# Patient Record
Sex: MALE | Race: ASIAN | NOT HISPANIC OR LATINO | URBAN - METROPOLITAN AREA
[De-identification: names, ages, dates, MRNs, and addresses within clinical notes are randomized per-mention and may not be internally consistent; named-entity substitution may affect disease eponyms.]

---

## 2017-07-14 ENCOUNTER — EMERGENCY (EMERGENCY)
Facility: HOSPITAL | Age: 47
LOS: 1 days | Discharge: HOME | End: 2017-07-14
Attending: EMERGENCY MEDICINE
Payer: SELF-PAY

## 2017-07-14 VITALS
TEMPERATURE: 99 F | HEART RATE: 92 BPM | RESPIRATION RATE: 20 BRPM | SYSTOLIC BLOOD PRESSURE: 130 MMHG | OXYGEN SATURATION: 96 % | DIASTOLIC BLOOD PRESSURE: 79 MMHG

## 2017-07-14 PROCEDURE — 99284 EMERGENCY DEPT VISIT MOD MDM: CPT

## 2017-07-14 RX ORDER — SODIUM CHLORIDE 9 MG/ML
1000 INJECTION INTRAMUSCULAR; INTRAVENOUS; SUBCUTANEOUS ONCE
Qty: 0 | Refills: 0 | Status: COMPLETED | OUTPATIENT
Start: 2017-07-14 | End: 2017-07-14

## 2017-07-14 RX ORDER — KETOROLAC TROMETHAMINE 30 MG/ML
30 SYRINGE (ML) INJECTION ONCE
Qty: 0 | Refills: 0 | Status: DISCONTINUED | OUTPATIENT
Start: 2017-07-14 | End: 2017-07-14

## 2017-07-14 RX ADMIN — SODIUM CHLORIDE 1000 MILLILITER(S): 9 INJECTION INTRAMUSCULAR; INTRAVENOUS; SUBCUTANEOUS at 23:50

## 2017-07-14 RX ADMIN — Medication 30 MILLIGRAM(S): at 23:50

## 2017-07-14 NOTE — ED PROVIDER NOTE - OBJECTIVE STATEMENT
48yo p/w generalized body aches since yesterday. Pt. reports mostly b/l lower back, radiates to abdomen, intermittent, aggravated by eating, no alleviating factors. Pt. took antacid and advil w/o relief. Pt. reports joint pains. Pt. reports similar symptoms previously. Denies fevers, nausea/vomiting, diarrhea, dysuria, chest pain, shortness of breath, cough, sick contact, recent travel, prolonged stasis, history of blood clots. Pt. reports fingers sticks >200 on metformin and glipizide. PMD: NS clinic

## 2017-07-14 NOTE — ED PROVIDER NOTE - MEDICAL DECISION MAKING DETAILS
46 y/o male with PMH DM2 who presents with c/o whole body myalgias since yesterday. Per patient, he began to feel pain to whole body from his feet up to his upper back. He states that after dinner tonight he began to feel some pain in his abdomen which quickly resolved. Body aches are 7/10, better when he palpates his body. Appetite good. Denies: F/C, HA, neck pain/stiffness, CP, SOB, abd pain (current), N/V/D,  rash, recent travel, trauma, tick bites, insect bites, urinary sx. Last BM today.  Exam demonstrates non-toxic patient in NAD. No meningismus. Exam within normal limits.  DDx: viral syndrome, electrolyte/metabolic abnormality, dehydration  Plan: basic labs, toradol, IVF, re-eval

## 2017-07-14 NOTE — ED PROVIDER NOTE - PLAN OF CARE
You were seen in the Emergency Department for joint pain. You were found to have a high blood sugar. Take your medications as written. Please return to the Emergency Department if you have any new concerning symptoms such as sever pain weakness, shortness of breath or any other concerning symptoms.

## 2017-07-14 NOTE — ED PROVIDER NOTE - PROGRESS NOTE DETAILS
Patient reports improvement in symptoms. Agrees with discharge and outpatient follow up with strict return precautions.

## 2017-07-14 NOTE — ED PROVIDER NOTE - CARE PLAN
Principal Discharge DX:	Pre-syncope Principal Discharge DX:	Hyperglycemia  Instructions for follow-up, activity and diet:	You were seen in the Emergency Department for joint pain. You were found to have a high blood sugar. Take your medications as written. Please return to the Emergency Department if you have any new concerning symptoms such as sever pain weakness, shortness of breath or any other concerning symptoms.

## 2017-07-14 NOTE — ED ADULT NURSE NOTE - OBJECTIVE STATEMENT
whole body pain, mostly abdomen and flank and back; since yesterday; did not see md; took advil and zantac with  no relief; no urinary symptoms; no nausea or vomit or diarrhea; last bm today and was normal; able to tolerate po intake

## 2017-07-15 ENCOUNTER — EMERGENCY (EMERGENCY)
Facility: HOSPITAL | Age: 47
LOS: 1 days | Discharge: HOME | End: 2017-07-15
Attending: EMERGENCY MEDICINE | Admitting: PERSONAL EMERGENCY RESPONSE ATTENDANT
Payer: SELF-PAY

## 2017-07-15 VITALS
RESPIRATION RATE: 17 BRPM | SYSTOLIC BLOOD PRESSURE: 130 MMHG | HEART RATE: 64 BPM | TEMPERATURE: 98 F | OXYGEN SATURATION: 98 % | DIASTOLIC BLOOD PRESSURE: 92 MMHG

## 2017-07-15 VITALS
OXYGEN SATURATION: 95 % | TEMPERATURE: 99 F | SYSTOLIC BLOOD PRESSURE: 166 MMHG | RESPIRATION RATE: 18 BRPM | HEART RATE: 72 BPM | DIASTOLIC BLOOD PRESSURE: 94 MMHG

## 2017-07-15 VITALS
DIASTOLIC BLOOD PRESSURE: 85 MMHG | SYSTOLIC BLOOD PRESSURE: 114 MMHG | OXYGEN SATURATION: 99 % | HEART RATE: 73 BPM | RESPIRATION RATE: 16 BRPM | TEMPERATURE: 98 F

## 2017-07-15 LAB
ALBUMIN SERPL ELPH-MCNC: 4.2 G/DL — SIGNIFICANT CHANGE UP (ref 3.3–5)
ALBUMIN SERPL ELPH-MCNC: 4.6 G/DL — SIGNIFICANT CHANGE UP (ref 3.3–5)
ALP SERPL-CCNC: 86 U/L — SIGNIFICANT CHANGE UP (ref 40–120)
ALP SERPL-CCNC: 94 U/L — SIGNIFICANT CHANGE UP (ref 40–120)
ALT FLD-CCNC: 31 U/L RC — SIGNIFICANT CHANGE UP (ref 10–45)
ALT FLD-CCNC: 35 U/L RC — SIGNIFICANT CHANGE UP (ref 10–45)
ANION GAP SERPL CALC-SCNC: 12 MMOL/L — SIGNIFICANT CHANGE UP (ref 5–17)
ANION GAP SERPL CALC-SCNC: 12 MMOL/L — SIGNIFICANT CHANGE UP (ref 5–17)
APPEARANCE UR: CLEAR — SIGNIFICANT CHANGE UP
APPEARANCE UR: CLEAR — SIGNIFICANT CHANGE UP
AST SERPL-CCNC: 19 U/L — SIGNIFICANT CHANGE UP (ref 10–40)
AST SERPL-CCNC: 28 U/L — SIGNIFICANT CHANGE UP (ref 10–40)
BASE EXCESS BLDV CALC-SCNC: 1.3 MMOL/L — SIGNIFICANT CHANGE UP (ref -2–2)
BASOPHILS # BLD AUTO: 0 K/UL — SIGNIFICANT CHANGE UP (ref 0–0.2)
BASOPHILS # BLD AUTO: 0.1 K/UL — SIGNIFICANT CHANGE UP (ref 0–0.2)
BASOPHILS NFR BLD AUTO: 0.5 % — SIGNIFICANT CHANGE UP (ref 0–2)
BASOPHILS NFR BLD AUTO: 0.9 % — SIGNIFICANT CHANGE UP (ref 0–2)
BILIRUB SERPL-MCNC: 0.3 MG/DL — SIGNIFICANT CHANGE UP (ref 0.2–1.2)
BILIRUB SERPL-MCNC: 0.6 MG/DL — SIGNIFICANT CHANGE UP (ref 0.2–1.2)
BILIRUB UR-MCNC: NEGATIVE — SIGNIFICANT CHANGE UP
BILIRUB UR-MCNC: NEGATIVE — SIGNIFICANT CHANGE UP
BUN SERPL-MCNC: 12 MG/DL — SIGNIFICANT CHANGE UP (ref 7–23)
BUN SERPL-MCNC: 15 MG/DL — SIGNIFICANT CHANGE UP (ref 7–23)
CA-I SERPL-SCNC: 1.32 MMOL/L — HIGH (ref 1.12–1.3)
CALCIUM SERPL-MCNC: 9.1 MG/DL — SIGNIFICANT CHANGE UP (ref 8.4–10.5)
CALCIUM SERPL-MCNC: 9.5 MG/DL — SIGNIFICANT CHANGE UP (ref 8.4–10.5)
CHLORIDE BLDV-SCNC: 96 MMOL/L — SIGNIFICANT CHANGE UP (ref 96–108)
CHLORIDE SERPL-SCNC: 100 MMOL/L — SIGNIFICANT CHANGE UP (ref 96–108)
CHLORIDE SERPL-SCNC: 96 MMOL/L — SIGNIFICANT CHANGE UP (ref 96–108)
CO2 BLDV-SCNC: 29 MMOL/L — SIGNIFICANT CHANGE UP (ref 22–30)
CO2 SERPL-SCNC: 25 MMOL/L — SIGNIFICANT CHANGE UP (ref 22–31)
CO2 SERPL-SCNC: 26 MMOL/L — SIGNIFICANT CHANGE UP (ref 22–31)
COLOR SPEC: COLORLESS — SIGNIFICANT CHANGE UP
COLOR SPEC: COLORLESS — SIGNIFICANT CHANGE UP
CREAT SERPL-MCNC: 0.85 MG/DL — SIGNIFICANT CHANGE UP (ref 0.5–1.3)
CREAT SERPL-MCNC: 0.95 MG/DL — SIGNIFICANT CHANGE UP (ref 0.5–1.3)
CULTURE RESULTS: NO GROWTH — SIGNIFICANT CHANGE UP
DIFF PNL FLD: NEGATIVE — SIGNIFICANT CHANGE UP
DIFF PNL FLD: NEGATIVE — SIGNIFICANT CHANGE UP
EOSINOPHIL # BLD AUTO: 0 K/UL — SIGNIFICANT CHANGE UP (ref 0–0.5)
EOSINOPHIL # BLD AUTO: 0.2 K/UL — SIGNIFICANT CHANGE UP (ref 0–0.5)
EOSINOPHIL NFR BLD AUTO: 0.5 % — SIGNIFICANT CHANGE UP (ref 0–6)
EOSINOPHIL NFR BLD AUTO: 2.3 % — SIGNIFICANT CHANGE UP (ref 0–6)
GAS PNL BLDV: 138 MMOL/L — SIGNIFICANT CHANGE UP (ref 136–145)
GAS PNL BLDV: SIGNIFICANT CHANGE UP
GLUCOSE BLDV-MCNC: 417 MG/DL — HIGH (ref 70–99)
GLUCOSE SERPL-MCNC: 419 MG/DL — HIGH (ref 70–99)
GLUCOSE SERPL-MCNC: 529 MG/DL — CRITICAL HIGH (ref 70–99)
GLUCOSE UR QL: >1000
GLUCOSE UR QL: >1000
HCO3 BLDV-SCNC: 28 MMOL/L — SIGNIFICANT CHANGE UP (ref 21–29)
HCT VFR BLD CALC: 40.1 % — SIGNIFICANT CHANGE UP (ref 39–50)
HCT VFR BLD CALC: 43.5 % — SIGNIFICANT CHANGE UP (ref 39–50)
HCT VFR BLDA CALC: 46 % — SIGNIFICANT CHANGE UP (ref 39–50)
HGB BLD CALC-MCNC: 15.1 G/DL — SIGNIFICANT CHANGE UP (ref 13–17)
HGB BLD-MCNC: 13.9 G/DL — SIGNIFICANT CHANGE UP (ref 13–17)
HGB BLD-MCNC: 15.1 G/DL — SIGNIFICANT CHANGE UP (ref 13–17)
KETONES UR-MCNC: NEGATIVE — SIGNIFICANT CHANGE UP
KETONES UR-MCNC: NEGATIVE — SIGNIFICANT CHANGE UP
LACTATE BLDV-MCNC: 1.8 MMOL/L — SIGNIFICANT CHANGE UP (ref 0.7–2)
LEUKOCYTE ESTERASE UR-ACNC: NEGATIVE — SIGNIFICANT CHANGE UP
LEUKOCYTE ESTERASE UR-ACNC: NEGATIVE — SIGNIFICANT CHANGE UP
LIDOCAIN IGE QN: 46 U/L — SIGNIFICANT CHANGE UP (ref 7–60)
LYMPHOCYTES # BLD AUTO: 1.9 K/UL — SIGNIFICANT CHANGE UP (ref 1–3.3)
LYMPHOCYTES # BLD AUTO: 19.2 % — SIGNIFICANT CHANGE UP (ref 13–44)
LYMPHOCYTES # BLD AUTO: 2.9 K/UL — SIGNIFICANT CHANGE UP (ref 1–3.3)
LYMPHOCYTES # BLD AUTO: 36.1 % — SIGNIFICANT CHANGE UP (ref 13–44)
MCHC RBC-ENTMCNC: 32.9 PG — SIGNIFICANT CHANGE UP (ref 27–34)
MCHC RBC-ENTMCNC: 32.9 PG — SIGNIFICANT CHANGE UP (ref 27–34)
MCHC RBC-ENTMCNC: 34.6 GM/DL — SIGNIFICANT CHANGE UP (ref 32–36)
MCHC RBC-ENTMCNC: 34.7 GM/DL — SIGNIFICANT CHANGE UP (ref 32–36)
MCV RBC AUTO: 94.7 FL — SIGNIFICANT CHANGE UP (ref 80–100)
MCV RBC AUTO: 95.2 FL — SIGNIFICANT CHANGE UP (ref 80–100)
MONOCYTES # BLD AUTO: 0.5 K/UL — SIGNIFICANT CHANGE UP (ref 0–0.9)
MONOCYTES # BLD AUTO: 0.6 K/UL — SIGNIFICANT CHANGE UP (ref 0–0.9)
MONOCYTES NFR BLD AUTO: 4.8 % — SIGNIFICANT CHANGE UP (ref 2–14)
MONOCYTES NFR BLD AUTO: 7.4 % — SIGNIFICANT CHANGE UP (ref 2–14)
NEUTROPHILS # BLD AUTO: 4.3 K/UL — SIGNIFICANT CHANGE UP (ref 1.8–7.4)
NEUTROPHILS # BLD AUTO: 7.5 K/UL — HIGH (ref 1.8–7.4)
NEUTROPHILS NFR BLD AUTO: 53.3 % — SIGNIFICANT CHANGE UP (ref 43–77)
NEUTROPHILS NFR BLD AUTO: 75.1 % — SIGNIFICANT CHANGE UP (ref 43–77)
NITRITE UR-MCNC: NEGATIVE — SIGNIFICANT CHANGE UP
NITRITE UR-MCNC: NEGATIVE — SIGNIFICANT CHANGE UP
PCO2 BLDV: 52 MMHG — HIGH (ref 35–50)
PH BLDV: 7.34 — LOW (ref 7.35–7.45)
PH UR: 6.5 — SIGNIFICANT CHANGE UP (ref 5–8)
PH UR: 7 — SIGNIFICANT CHANGE UP (ref 5–8)
PLATELET # BLD AUTO: 205 K/UL — SIGNIFICANT CHANGE UP (ref 150–400)
PLATELET # BLD AUTO: 208 K/UL — SIGNIFICANT CHANGE UP (ref 150–400)
PO2 BLDV: 27 MMHG — SIGNIFICANT CHANGE UP (ref 25–45)
POTASSIUM BLDV-SCNC: 4.3 MMOL/L — SIGNIFICANT CHANGE UP (ref 3.5–5)
POTASSIUM SERPL-MCNC: 4.5 MMOL/L — SIGNIFICANT CHANGE UP (ref 3.5–5.3)
POTASSIUM SERPL-MCNC: 4.5 MMOL/L — SIGNIFICANT CHANGE UP (ref 3.5–5.3)
POTASSIUM SERPL-SCNC: 4.5 MMOL/L — SIGNIFICANT CHANGE UP (ref 3.5–5.3)
POTASSIUM SERPL-SCNC: 4.5 MMOL/L — SIGNIFICANT CHANGE UP (ref 3.5–5.3)
PROT SERPL-MCNC: 7.1 G/DL — SIGNIFICANT CHANGE UP (ref 6–8.3)
PROT SERPL-MCNC: 8 G/DL — SIGNIFICANT CHANGE UP (ref 6–8.3)
PROT UR-MCNC: NEGATIVE — SIGNIFICANT CHANGE UP
PROT UR-MCNC: NEGATIVE — SIGNIFICANT CHANGE UP
RBC # BLD: 4.21 M/UL — SIGNIFICANT CHANGE UP (ref 4.2–5.8)
RBC # BLD: 4.6 M/UL — SIGNIFICANT CHANGE UP (ref 4.2–5.8)
RBC # FLD: 11.8 % — SIGNIFICANT CHANGE UP (ref 10.3–14.5)
RBC # FLD: 11.8 % — SIGNIFICANT CHANGE UP (ref 10.3–14.5)
RBC CASTS # UR COMP ASSIST: SIGNIFICANT CHANGE UP /HPF (ref 0–2)
SAO2 % BLDV: 46 % — LOW (ref 67–88)
SODIUM SERPL-SCNC: 134 MMOL/L — LOW (ref 135–145)
SODIUM SERPL-SCNC: 137 MMOL/L — SIGNIFICANT CHANGE UP (ref 135–145)
SP GR SPEC: 1.02 — SIGNIFICANT CHANGE UP (ref 1.01–1.02)
SP GR SPEC: >1.03 — HIGH (ref 1.01–1.02)
SPECIMEN SOURCE: SIGNIFICANT CHANGE UP
UROBILINOGEN FLD QL: NEGATIVE — SIGNIFICANT CHANGE UP
UROBILINOGEN FLD QL: NEGATIVE — SIGNIFICANT CHANGE UP
WBC # BLD: 10 K/UL — SIGNIFICANT CHANGE UP (ref 3.8–10.5)
WBC # BLD: 8 K/UL — SIGNIFICANT CHANGE UP (ref 3.8–10.5)
WBC # FLD AUTO: 10 K/UL — SIGNIFICANT CHANGE UP (ref 3.8–10.5)
WBC # FLD AUTO: 8 K/UL — SIGNIFICANT CHANGE UP (ref 3.8–10.5)
WBC UR QL: SIGNIFICANT CHANGE UP /HPF (ref 0–5)

## 2017-07-15 PROCEDURE — 83605 ASSAY OF LACTIC ACID: CPT

## 2017-07-15 PROCEDURE — 82009 KETONE BODYS QUAL: CPT

## 2017-07-15 PROCEDURE — 82803 BLOOD GASES ANY COMBINATION: CPT

## 2017-07-15 PROCEDURE — 99284 EMERGENCY DEPT VISIT MOD MDM: CPT | Mod: 25

## 2017-07-15 PROCEDURE — 82435 ASSAY OF BLOOD CHLORIDE: CPT

## 2017-07-15 PROCEDURE — 96372 THER/PROPH/DIAG INJ SC/IM: CPT | Mod: XU

## 2017-07-15 PROCEDURE — 87086 URINE CULTURE/COLONY COUNT: CPT

## 2017-07-15 PROCEDURE — 80053 COMPREHEN METABOLIC PANEL: CPT

## 2017-07-15 PROCEDURE — 85027 COMPLETE CBC AUTOMATED: CPT

## 2017-07-15 PROCEDURE — 96374 THER/PROPH/DIAG INJ IV PUSH: CPT

## 2017-07-15 PROCEDURE — 84132 ASSAY OF SERUM POTASSIUM: CPT

## 2017-07-15 PROCEDURE — 71020: CPT | Mod: 26

## 2017-07-15 PROCEDURE — 96375 TX/PRO/DX INJ NEW DRUG ADDON: CPT | Mod: XU

## 2017-07-15 PROCEDURE — 84295 ASSAY OF SERUM SODIUM: CPT

## 2017-07-15 PROCEDURE — 96374 THER/PROPH/DIAG INJ IV PUSH: CPT | Mod: XU

## 2017-07-15 PROCEDURE — 85014 HEMATOCRIT: CPT

## 2017-07-15 PROCEDURE — 82947 ASSAY GLUCOSE BLOOD QUANT: CPT

## 2017-07-15 PROCEDURE — 93005 ELECTROCARDIOGRAM TRACING: CPT

## 2017-07-15 PROCEDURE — 71275 CT ANGIOGRAPHY CHEST: CPT | Mod: 26

## 2017-07-15 PROCEDURE — 99285 EMERGENCY DEPT VISIT HI MDM: CPT | Mod: 25

## 2017-07-15 PROCEDURE — 74174 CTA ABD&PLVS W/CONTRAST: CPT

## 2017-07-15 PROCEDURE — 81003 URINALYSIS AUTO W/O SCOPE: CPT

## 2017-07-15 PROCEDURE — 71046 X-RAY EXAM CHEST 2 VIEWS: CPT

## 2017-07-15 PROCEDURE — 93010 ELECTROCARDIOGRAM REPORT: CPT

## 2017-07-15 PROCEDURE — 81001 URINALYSIS AUTO W/SCOPE: CPT

## 2017-07-15 PROCEDURE — 74174 CTA ABD&PLVS W/CONTRAST: CPT | Mod: 26

## 2017-07-15 PROCEDURE — 71275 CT ANGIOGRAPHY CHEST: CPT

## 2017-07-15 PROCEDURE — 76775 US EXAM ABDO BACK WALL LIM: CPT

## 2017-07-15 PROCEDURE — 82330 ASSAY OF CALCIUM: CPT

## 2017-07-15 PROCEDURE — 83690 ASSAY OF LIPASE: CPT

## 2017-07-15 RX ORDER — SODIUM CHLORIDE 9 MG/ML
1000 INJECTION INTRAMUSCULAR; INTRAVENOUS; SUBCUTANEOUS ONCE
Qty: 0 | Refills: 0 | Status: COMPLETED | OUTPATIENT
Start: 2017-07-15 | End: 2017-07-15

## 2017-07-15 RX ORDER — ONDANSETRON 8 MG/1
4 TABLET, FILM COATED ORAL ONCE
Qty: 0 | Refills: 0 | Status: COMPLETED | OUTPATIENT
Start: 2017-07-15 | End: 2017-07-15

## 2017-07-15 RX ORDER — INSULIN HUMAN 100 [IU]/ML
5 INJECTION, SOLUTION SUBCUTANEOUS ONCE
Qty: 0 | Refills: 0 | Status: COMPLETED | OUTPATIENT
Start: 2017-07-15 | End: 2017-07-15

## 2017-07-15 RX ORDER — MORPHINE SULFATE 50 MG/1
4 CAPSULE, EXTENDED RELEASE ORAL ONCE
Qty: 0 | Refills: 0 | Status: DISCONTINUED | OUTPATIENT
Start: 2017-07-15 | End: 2017-07-15

## 2017-07-15 RX ORDER — SODIUM CHLORIDE 9 MG/ML
3 INJECTION INTRAMUSCULAR; INTRAVENOUS; SUBCUTANEOUS EVERY 8 HOURS
Qty: 0 | Refills: 0 | Status: DISCONTINUED | OUTPATIENT
Start: 2017-07-15 | End: 2017-07-19

## 2017-07-15 RX ADMIN — MORPHINE SULFATE 4 MILLIGRAM(S): 50 CAPSULE, EXTENDED RELEASE ORAL at 18:00

## 2017-07-15 RX ADMIN — SODIUM CHLORIDE 1000 MILLILITER(S): 9 INJECTION INTRAMUSCULAR; INTRAVENOUS; SUBCUTANEOUS at 01:00

## 2017-07-15 RX ADMIN — ONDANSETRON 4 MILLIGRAM(S): 8 TABLET, FILM COATED ORAL at 17:57

## 2017-07-15 RX ADMIN — Medication 30 MILLIGRAM(S): at 00:53

## 2017-07-15 RX ADMIN — INSULIN HUMAN 5 UNIT(S): 100 INJECTION, SOLUTION SUBCUTANEOUS at 19:04

## 2017-07-15 RX ADMIN — SODIUM CHLORIDE 1000 MILLILITER(S): 9 INJECTION INTRAMUSCULAR; INTRAVENOUS; SUBCUTANEOUS at 18:05

## 2017-07-15 RX ADMIN — MORPHINE SULFATE 4 MILLIGRAM(S): 50 CAPSULE, EXTENDED RELEASE ORAL at 23:14

## 2017-07-15 NOTE — ED PROVIDER NOTE - ATTENDING CONTRIBUTION TO CARE
47M hx DM presents to the ED with abdominal pain. Pain started 3 days ago. Severe constant diffuse. associated nausea no vomiting. Pt seen in ED Last night had labs and d/c home. now pt returns with persistent pain. severe. cannot get comfortable. No f/c/cp/sob/ha/dizziness/dysuria. + diaphoresis. abd soft and mildly tender in b/l lower quadrants. no cvat. Concern for vasc pathology. Also, pt with elevated blood surgar, concern for potential dka. Will obtain labs xray ekg cta abd/pelvis, ua vbg acetone and reassess.     Constitutional: No fever or chills  Eyes: No visual changes  HEENT: No throat pain  CV: No chest pain  Resp: No SOB no cough  GI: + abd pain, + nausea no vomiting  : No dysuria  MSK: + back pain  Skin: No rash  Neuro: No headache    Constitutional: moderate distress AAOx3  Eyes: PERRLA EOMI  Head: Normocephalic atraumatic  Mouth: MMM  Cardiac: regular rate   Resp: Lungs CTAB  GI: Abd s/ mild ttp in b/l lower quadrants no cvat  Neuro: CN2-12 intact  Skin: No rashes    Jim Olson M.D., Attending Physician

## 2017-07-15 NOTE — ED PROVIDER NOTE - MEDICAL DECISION MAKING DETAILS
47M hx DM presents to the ED with abdominal pain. Pain started 3 days ago. Severe constant diffuse. associated nausea no vomiting. Pt seen in ED Last night had labs and d/c home. now pt returns with persistent pain. severe. cannot get comfortable. No f/c/cp/sob/ha/dizziness/dysuria. + diaphoresis. abd soft and mildly tender in b/l lower quadrants. no cvat. Concern for vasc pathology. Also, pt with elevated blood surgar, concern for potential dka. Will obtain labs xray ekg cta abd/pelvis, ua vbg acetone and reassess. Jim Olson M.D., Attending Physician

## 2017-07-15 NOTE — ED ADULT NURSE NOTE - CHPI ED SYMPTOMS NEG
no blood in stool/no abdominal distension/no burning urination/no diarrhea/no dysuria/no vomiting/no fever/no hematuria/no chills

## 2017-07-15 NOTE — ED PROVIDER NOTE - CARE PLAN
Principal Discharge DX:	Generalized abdominal pain Principal Discharge DX:	Generalized abdominal pain  Instructions for follow-up, activity and diet:	1. Take motrin or tylenol for pain as needed   2. Hydrate yourself well throughout the day   3. Follow-up with surgery clinic at phone number provided this week for reevaluation and continued treatment if needed   4. Follow-up with your primary care physician this week for reevaluation   5. Return to the ER for any new or worsening symptoms.

## 2017-07-15 NOTE — ED ADULT NURSE REASSESSMENT NOTE - NS ED NURSE REASSESS COMMENT FT1
Report received from Valarie Morales RN. Pt AAOx4, NAD, resp nonlabored, skin warm/dry, resting comfortably in be. Pt currently denies any abdominal pain, n/v/d, BRBPR, dark tarry stools, chest pain, SOB, palpitations. Pt denies headache, dizziness, urinary symptoms, fevers, chills, weakness at this time. Pt discharged as per MD, IV removed as per MD, pt ambulated out of ED independently, steady gait noted.

## 2017-07-15 NOTE — ED PROCEDURE NOTE - PROCEDURE ADDITIONAL DETAILS
educational us: no cholecystitis, ectatic aorta, no hydro follow up ct
POCUS: Emergency Department Focused Ultrasound performed at patient's bedside.  The complete report will be available in PACS.

## 2017-07-15 NOTE — ED ADULT NURSE NOTE - OBJECTIVE STATEMENT
Pt was seen in ED yesterday for same c/o of abd pain, accompanied by nausea, no vomiting.  Pain increased when he was having a bm.  Abd soft. oral mucosa moist.

## 2017-07-15 NOTE — ED PROVIDER NOTE - OBJECTIVE STATEMENT
46 y/o M w/ pmh DM seen in ED last night for abdominal pain (DC w/ dx hyperglycemia and nonspecific joint pain) presents again today w/ same complaint. Pt describes diffuse 9/10 intermittent pain in bilat abdomen (upper, mid, lower) as well as in right shoulder and right lower back. Pt says pain is worst in bilat lower abdomen, is concerned it is his kidneys. Pt says pain is only slightly improved w/ aleve/advil. Pt reports continued diaphoresis, as well as new onset nausea this AM. Pt denies vomiting, SOB, fever/chills. 48 y/o M w/ pmh DM seen in ED last night for abdominal pain (DC w/ dx hyperglycemia and nonspecific joint pain) presents again today w/ same complaint. Pt describes diffuse 9/10 intermittent pain in bilat abdomen (upper, mid, lower) as well as in right shoulder and right lower back. Pt says pain is worst in bilat lower abdomen, is concerned it is his kidneys. Pt says pain is only slightly improved w/ aleve/advil. Pt reports continued diaphoresis, as well as new onset nausea this AM. Pt denies vomiting, SOB, fever/chills.    bignami- 48 yo M with pmhx dm presenting with abdominal pain x yesterday. Patient states he has been having intermittent 9/10 generalized abdominal pain. Patient states seen yesterday and felt slightly better but pain came back. Patient admits to nausea. Patient denies vomiting, diarrhea, dysuria, tingling, numbness, weakness, fever, flank pain, cp, sob, and cough

## 2017-07-15 NOTE — ED PROVIDER NOTE - ABDOMINAL TENDER
left upper quadrant/epigastric/periumbilical/left costovertebral angle/right lower quadrant/right upper quadrant/left lower quadrant/right costovertebral angle

## 2017-07-15 NOTE — ED PROVIDER NOTE - PROGRESS NOTE DETAILS
patient feeling better. tolerating po. vss. pt with cholelithiasis. will follow up with surgery. Jim Olson M.D., Attending Physician

## 2017-07-15 NOTE — ED PROVIDER NOTE - PLAN OF CARE
1. Take motrin or tylenol for pain as needed   2. Hydrate yourself well throughout the day   3. Follow-up with surgery clinic at phone number provided this week for reevaluation and continued treatment if needed   4. Follow-up with your primary care physician this week for reevaluation   5. Return to the ER for any new or worsening symptoms.

## 2017-07-15 NOTE — ED ADULT NURSE NOTE - DISCHARGE TEACHING
Vanessa MATIAS, pt verbalizes understanding to f/u with PCP and Surg Clinic and return to ED for any worsening symptoms.

## 2017-07-16 ENCOUNTER — RESULT CHARGE (OUTPATIENT)
Age: 47
End: 2017-07-16

## 2017-07-16 LAB
CULTURE RESULTS: NO GROWTH — SIGNIFICANT CHANGE UP
SPECIMEN SOURCE: SIGNIFICANT CHANGE UP

## 2017-07-17 ENCOUNTER — RESULT CHARGE (OUTPATIENT)
Age: 47
End: 2017-07-17

## 2017-07-17 ENCOUNTER — LABORATORY RESULT (OUTPATIENT)
Age: 47
End: 2017-07-17

## 2017-07-17 ENCOUNTER — NON-APPOINTMENT (OUTPATIENT)
Age: 47
End: 2017-07-17

## 2017-07-17 ENCOUNTER — APPOINTMENT (OUTPATIENT)
Dept: INTERNAL MEDICINE | Facility: CLINIC | Age: 47
End: 2017-07-17

## 2017-07-17 ENCOUNTER — OUTPATIENT (OUTPATIENT)
Dept: OUTPATIENT SERVICES | Facility: HOSPITAL | Age: 47
LOS: 1 days | End: 2017-07-17
Payer: SELF-PAY

## 2017-07-17 VITALS
DIASTOLIC BLOOD PRESSURE: 80 MMHG | WEIGHT: 135 LBS | SYSTOLIC BLOOD PRESSURE: 130 MMHG | HEIGHT: 64.75 IN | BODY MASS INDEX: 22.77 KG/M2

## 2017-07-17 DIAGNOSIS — I10 ESSENTIAL (PRIMARY) HYPERTENSION: ICD-10-CM

## 2017-07-17 LAB
BILIRUB UR QL STRIP: NORMAL
CLARITY UR: CLEAR
COLLECTION METHOD: NORMAL
GLUCOSE BLDC GLUCOMTR-MCNC: 293
GLUCOSE UR-MCNC: 500
HBA1C MFR BLD HPLC: >14
HCG UR QL: 0.2 EU/DL
HGB UR QL STRIP.AUTO: NORMAL
KETONES UR-MCNC: NORMAL
LEUKOCYTE ESTERASE UR QL STRIP: NORMAL
NITRITE UR QL STRIP: NORMAL
PH UR STRIP: 6
PROT UR STRIP-MCNC: NORMAL
SP GR UR STRIP: 1.01

## 2017-07-17 PROCEDURE — 82962 GLUCOSE BLOOD TEST: CPT

## 2017-07-17 PROCEDURE — G0463: CPT

## 2017-07-17 PROCEDURE — 93005 ELECTROCARDIOGRAM TRACING: CPT

## 2017-07-17 PROCEDURE — 83036 HEMOGLOBIN GLYCOSYLATED A1C: CPT

## 2017-07-17 PROCEDURE — 76775 US EXAM ABDO BACK WALL LIM: CPT | Mod: 26

## 2017-07-17 PROCEDURE — 81003 URINALYSIS AUTO W/O SCOPE: CPT

## 2017-07-18 ENCOUNTER — NON-APPOINTMENT (OUTPATIENT)
Age: 47
End: 2017-07-18

## 2017-07-19 ENCOUNTER — APPOINTMENT (OUTPATIENT)
Dept: INTERNAL MEDICINE | Facility: CLINIC | Age: 47
End: 2017-07-19

## 2017-07-20 ENCOUNTER — APPOINTMENT (OUTPATIENT)
Dept: INTERNAL MEDICINE | Facility: CLINIC | Age: 47
End: 2017-07-20

## 2017-07-20 DIAGNOSIS — E11.9 TYPE 2 DIABETES MELLITUS WITHOUT COMPLICATIONS: ICD-10-CM

## 2017-07-20 DIAGNOSIS — F32.9 MAJOR DEPRESSIVE DISORDER, SINGLE EPISODE, UNSPECIFIED: ICD-10-CM

## 2017-07-20 DIAGNOSIS — R10.9 UNSPECIFIED ABDOMINAL PAIN: ICD-10-CM

## 2017-07-21 LAB — TSH SERPL-ACNC: 1.98 UIU/ML

## 2017-07-25 ENCOUNTER — MESSAGE (OUTPATIENT)
Age: 47
End: 2017-07-25

## 2017-08-17 ENCOUNTER — MESSAGE (OUTPATIENT)
Age: 47
End: 2017-08-17

## 2017-08-17 RX ORDER — INSULIN GLARGINE 300 U/ML
300 INJECTION, SOLUTION SUBCUTANEOUS AT BEDTIME
Qty: 1 | Refills: 3 | Status: DISCONTINUED | COMMUNITY
Start: 2017-07-21 | End: 2017-08-17

## 2017-08-21 ENCOUNTER — APPOINTMENT (OUTPATIENT)
Dept: INTERNAL MEDICINE | Facility: CLINIC | Age: 47
End: 2017-08-21

## 2017-08-21 ENCOUNTER — OUTPATIENT (OUTPATIENT)
Dept: OUTPATIENT SERVICES | Facility: HOSPITAL | Age: 47
LOS: 1 days | End: 2017-08-21
Payer: SELF-PAY

## 2017-08-21 VITALS
HEIGHT: 64.75 IN | WEIGHT: 138 LBS | DIASTOLIC BLOOD PRESSURE: 70 MMHG | SYSTOLIC BLOOD PRESSURE: 100 MMHG | BODY MASS INDEX: 23.27 KG/M2

## 2017-08-21 DIAGNOSIS — F32.9 MAJOR DEPRESSIVE DISORDER, SINGLE EPISODE, UNSPECIFIED: ICD-10-CM

## 2017-08-21 DIAGNOSIS — E11.9 TYPE 2 DIABETES MELLITUS WITHOUT COMPLICATIONS: ICD-10-CM

## 2017-08-21 DIAGNOSIS — I10 ESSENTIAL (PRIMARY) HYPERTENSION: ICD-10-CM

## 2017-08-21 DIAGNOSIS — R10.9 UNSPECIFIED ABDOMINAL PAIN: ICD-10-CM

## 2017-08-21 PROCEDURE — G0463: CPT

## 2017-09-25 ENCOUNTER — OUTPATIENT (OUTPATIENT)
Dept: OUTPATIENT SERVICES | Facility: HOSPITAL | Age: 47
LOS: 1 days | End: 2017-09-25
Payer: SELF-PAY

## 2017-09-25 ENCOUNTER — RESULT CHARGE (OUTPATIENT)
Age: 47
End: 2017-09-25

## 2017-09-25 ENCOUNTER — APPOINTMENT (OUTPATIENT)
Dept: INTERNAL MEDICINE | Facility: CLINIC | Age: 47
End: 2017-09-25

## 2017-09-25 VITALS
HEIGHT: 64.75 IN | WEIGHT: 142 LBS | BODY MASS INDEX: 23.95 KG/M2 | SYSTOLIC BLOOD PRESSURE: 120 MMHG | DIASTOLIC BLOOD PRESSURE: 70 MMHG

## 2017-09-25 DIAGNOSIS — I10 ESSENTIAL (PRIMARY) HYPERTENSION: ICD-10-CM

## 2017-09-25 PROCEDURE — 83036 HEMOGLOBIN GLYCOSYLATED A1C: CPT

## 2017-09-25 PROCEDURE — G0463: CPT

## 2017-09-25 PROCEDURE — 90688 IIV4 VACCINE SPLT 0.5 ML IM: CPT

## 2017-09-25 PROCEDURE — G0008: CPT

## 2017-09-25 RX ORDER — SIMETHICONE 80 MG/1
80 TABLET, CHEWABLE ORAL
Qty: 120 | Refills: 5 | Status: DISCONTINUED | COMMUNITY
Start: 2017-07-17 | End: 2017-09-25

## 2017-09-25 RX ORDER — POLYETHYLENE GLYCOL 3350 17 G/17G
17 POWDER, FOR SOLUTION ORAL
Qty: 1 | Refills: 0 | Status: DISCONTINUED | COMMUNITY
Start: 2017-07-17 | End: 2017-09-25

## 2017-09-28 LAB — HBA1C MFR BLD HPLC: 10.4

## 2017-10-02 DIAGNOSIS — Z23 ENCOUNTER FOR IMMUNIZATION: ICD-10-CM

## 2017-10-02 DIAGNOSIS — E11.9 TYPE 2 DIABETES MELLITUS WITHOUT COMPLICATIONS: ICD-10-CM

## 2017-10-30 ENCOUNTER — APPOINTMENT (OUTPATIENT)
Dept: INTERNAL MEDICINE | Facility: CLINIC | Age: 47
End: 2017-10-30

## 2017-12-04 ENCOUNTER — APPOINTMENT (OUTPATIENT)
Dept: INTERNAL MEDICINE | Facility: CLINIC | Age: 47
End: 2017-12-04

## 2017-12-18 ENCOUNTER — OUTPATIENT (OUTPATIENT)
Dept: OUTPATIENT SERVICES | Facility: HOSPITAL | Age: 47
LOS: 1 days | End: 2017-12-18
Payer: SELF-PAY

## 2017-12-18 ENCOUNTER — APPOINTMENT (OUTPATIENT)
Dept: INTERNAL MEDICINE | Facility: CLINIC | Age: 47
End: 2017-12-18

## 2017-12-18 ENCOUNTER — APPOINTMENT (OUTPATIENT)
Dept: OPHTHALMOLOGY | Facility: CLINIC | Age: 47
End: 2017-12-18

## 2017-12-18 ENCOUNTER — OUTPATIENT (OUTPATIENT)
Dept: OUTPATIENT SERVICES | Facility: HOSPITAL | Age: 47
LOS: 1 days | End: 2017-12-18

## 2017-12-18 VITALS
SYSTOLIC BLOOD PRESSURE: 140 MMHG | DIASTOLIC BLOOD PRESSURE: 60 MMHG | WEIGHT: 142 LBS | BODY MASS INDEX: 23.95 KG/M2 | HEIGHT: 64.75 IN

## 2017-12-18 DIAGNOSIS — I10 ESSENTIAL (PRIMARY) HYPERTENSION: ICD-10-CM

## 2017-12-18 PROCEDURE — 83036 HEMOGLOBIN GLYCOSYLATED A1C: CPT

## 2017-12-18 PROCEDURE — G0463: CPT

## 2017-12-20 LAB — HBA1C MFR BLD HPLC: 9.3

## 2017-12-22 DIAGNOSIS — E11.9 TYPE 2 DIABETES MELLITUS WITHOUT COMPLICATIONS: ICD-10-CM

## 2017-12-22 DIAGNOSIS — H25.813 COMBINED FORMS OF AGE-RELATED CATARACT, BILATERAL: ICD-10-CM

## 2018-01-02 DIAGNOSIS — E11.9 TYPE 2 DIABETES MELLITUS WITHOUT COMPLICATIONS: ICD-10-CM

## 2018-02-12 ENCOUNTER — APPOINTMENT (OUTPATIENT)
Dept: INTERNAL MEDICINE | Facility: CLINIC | Age: 48
End: 2018-02-12

## 2018-06-06 ENCOUNTER — APPOINTMENT (OUTPATIENT)
Dept: INTERNAL MEDICINE | Facility: CLINIC | Age: 48
End: 2018-06-06

## 2018-06-11 ENCOUNTER — LABORATORY RESULT (OUTPATIENT)
Age: 48
End: 2018-06-11

## 2018-06-11 ENCOUNTER — OUTPATIENT (OUTPATIENT)
Dept: OUTPATIENT SERVICES | Facility: HOSPITAL | Age: 48
LOS: 1 days | End: 2018-06-11
Payer: SELF-PAY

## 2018-06-11 ENCOUNTER — APPOINTMENT (OUTPATIENT)
Dept: INTERNAL MEDICINE | Facility: CLINIC | Age: 48
End: 2018-06-11

## 2018-06-11 VITALS
DIASTOLIC BLOOD PRESSURE: 80 MMHG | SYSTOLIC BLOOD PRESSURE: 138 MMHG | WEIGHT: 141 LBS | HEIGHT: 64.75 IN | BODY MASS INDEX: 23.78 KG/M2

## 2018-06-11 DIAGNOSIS — I10 ESSENTIAL (PRIMARY) HYPERTENSION: ICD-10-CM

## 2018-06-11 LAB
ALBUMIN SERPL ELPH-MCNC: 4.5 G/DL — SIGNIFICANT CHANGE UP (ref 3.3–5)
ALP SERPL-CCNC: 74 U/L — SIGNIFICANT CHANGE UP (ref 40–120)
ALT FLD-CCNC: 27 U/L — SIGNIFICANT CHANGE UP (ref 10–45)
ANION GAP SERPL CALC-SCNC: 16 MMOL/L — SIGNIFICANT CHANGE UP (ref 5–17)
AST SERPL-CCNC: 18 U/L — SIGNIFICANT CHANGE UP (ref 10–40)
BILIRUB SERPL-MCNC: 0.4 MG/DL — SIGNIFICANT CHANGE UP (ref 0.2–1.2)
BUN SERPL-MCNC: 13 MG/DL — SIGNIFICANT CHANGE UP (ref 7–23)
CALCIUM SERPL-MCNC: 10 MG/DL — SIGNIFICANT CHANGE UP (ref 8.4–10.5)
CHLORIDE SERPL-SCNC: 100 MMOL/L — SIGNIFICANT CHANGE UP (ref 96–108)
CHOLEST SERPL-MCNC: 149 MG/DL — SIGNIFICANT CHANGE UP (ref 10–199)
CO2 SERPL-SCNC: 23 MMOL/L — SIGNIFICANT CHANGE UP (ref 22–31)
CREAT SERPL-MCNC: 0.86 MG/DL — SIGNIFICANT CHANGE UP (ref 0.5–1.3)
GLUCOSE SERPL-MCNC: 231 MG/DL — HIGH (ref 70–99)
HCT VFR BLD CALC: 40.9 % — SIGNIFICANT CHANGE UP (ref 39–50)
HDLC SERPL-MCNC: 54 MG/DL — SIGNIFICANT CHANGE UP (ref 40–125)
HGB BLD-MCNC: 13.8 G/DL — SIGNIFICANT CHANGE UP (ref 13–17)
LIPID PNL WITH DIRECT LDL SERPL: 71 MG/DL — SIGNIFICANT CHANGE UP
MCHC RBC-ENTMCNC: 31.2 PG — SIGNIFICANT CHANGE UP (ref 27–34)
MCHC RBC-ENTMCNC: 33.7 GM/DL — SIGNIFICANT CHANGE UP (ref 32–36)
MCV RBC AUTO: 92.3 FL — SIGNIFICANT CHANGE UP (ref 80–100)
PLATELET # BLD AUTO: 262 K/UL — SIGNIFICANT CHANGE UP (ref 150–400)
POTASSIUM SERPL-MCNC: 4.2 MMOL/L — SIGNIFICANT CHANGE UP (ref 3.5–5.3)
POTASSIUM SERPL-SCNC: 4.2 MMOL/L — SIGNIFICANT CHANGE UP (ref 3.5–5.3)
PROT SERPL-MCNC: 7.8 G/DL — SIGNIFICANT CHANGE UP (ref 6–8.3)
RBC # BLD: 4.43 M/UL — SIGNIFICANT CHANGE UP (ref 4.2–5.8)
RBC # FLD: 13.8 % — SIGNIFICANT CHANGE UP (ref 10.3–14.5)
SODIUM SERPL-SCNC: 139 MMOL/L — SIGNIFICANT CHANGE UP (ref 135–145)
TOTAL CHOLESTEROL/HDL RATIO MEASUREMENT: 2.8 RATIO — LOW (ref 3.4–9.6)
TRIGL SERPL-MCNC: 119 MG/DL — SIGNIFICANT CHANGE UP (ref 10–149)
WBC # BLD: 11.27 K/UL — HIGH (ref 3.8–10.5)
WBC # FLD AUTO: 11.27 K/UL — HIGH (ref 3.8–10.5)

## 2018-06-11 PROCEDURE — 83036 HEMOGLOBIN GLYCOSYLATED A1C: CPT

## 2018-06-11 PROCEDURE — 80053 COMPREHEN METABOLIC PANEL: CPT

## 2018-06-11 PROCEDURE — 85027 COMPLETE CBC AUTOMATED: CPT

## 2018-06-11 PROCEDURE — G0463: CPT

## 2018-06-11 PROCEDURE — 80061 LIPID PANEL: CPT

## 2018-06-11 NOTE — PHYSICAL EXAM
[No Acute Distress] : no acute distress [Well Nourished] : well nourished [Normal Sclera/Conjunctiva] : normal sclera/conjunctiva [Normal Outer Ear/Nose] : the outer ears and nose were normal in appearance [Supple] : supple [No Respiratory Distress] : no respiratory distress  [Clear to Auscultation] : lungs were clear to auscultation bilaterally [Normal Rate] : normal rate  [Regular Rhythm] : with a regular rhythm [Soft] : abdomen soft [Non Tender] : non-tender [No Spinal Tenderness] : no spinal tenderness [Grossly Normal Strength/Tone] : grossly normal strength/tone [Coordination Grossly Intact] : coordination grossly intact [No Focal Deficits] : no focal deficits [Normal Affect] : the affect was normal [Normal Insight/Judgement] : insight and judgment were intact

## 2018-06-12 LAB — HBA1C BLD-MCNC: 9.1 % — HIGH (ref 4–5.6)

## 2018-06-14 NOTE — REVIEW OF SYSTEMS
[Vision Problems] : vision problems [Fever] : no fever [Chills] : no chills [Earache] : no earache [Hearing Loss] : no hearing loss [Chest Pain] : no chest pain [Palpitations] : no palpitations [Shortness Of Breath] : no shortness of breath [Wheezing] : no wheezing [Abdominal Pain] : no abdominal pain [Dysuria] : no dysuria [Headache] : no headache [Dizziness] : no dizziness [Fainting] : no fainting

## 2018-06-14 NOTE — HISTORY OF PRESENT ILLNESS
[FreeTextEntry1] : Diabetes mellitus Type 2  [de-identified] : 49 yo M PMH DM2 (A1c 9.3% 12/17) presents for follow up. Pt currently taking Metformin 800 mg BID, Glipizide 5 mg BID- compliant. Pt endorses previous hypoglycemia episodes, shaking, when taking AM meds however episodes have resolved now with staggering of meds. Endorses improved diet was decreased carbohydrate intakes. Most recent FS: 128, 128, 133, 122, 106, 135, 101, 121, 130.\par Pt seen by opthalmology earlier this year who recc glasses however pt has not filled prescription yet. \par Pt requesting medication refill.

## 2018-06-14 NOTE — PLAN
[FreeTextEntry1] : 1. DM2, improving glycemic control \par -Cont Glipizide and Metformin at current dosing; will refill meds today\par -Will repeat A1c today\par -Counseled on dietary and lifestyle modifications\par \par 2. HCM\par Will send routine labs today including CBC, CMP, Lipid profile \par Referral for colonoscopy given  \par Up to date on vaccines. HIV negative in 2014. Pneumovax in 2010. Tdap- 2014. Flu shot September 2017. \par \par Case discussed w Dr. Elmore \par \par RTC in 6 months \par \par \par

## 2018-06-18 DIAGNOSIS — E11.9 TYPE 2 DIABETES MELLITUS WITHOUT COMPLICATIONS: ICD-10-CM

## 2019-03-05 ENCOUNTER — RX RENEWAL (OUTPATIENT)
Age: 49
End: 2019-03-05

## 2019-08-08 ENCOUNTER — RX RENEWAL (OUTPATIENT)
Age: 49
End: 2019-08-08

## 2019-09-09 PROBLEM — E11.9 TYPE 2 DIABETES MELLITUS WITHOUT COMPLICATIONS: Chronic | Status: ACTIVE | Noted: 2017-07-14

## 2019-10-07 ENCOUNTER — APPOINTMENT (OUTPATIENT)
Dept: INTERNAL MEDICINE | Facility: CLINIC | Age: 49
End: 2019-10-07

## 2019-10-21 ENCOUNTER — APPOINTMENT (OUTPATIENT)
Dept: INTERNAL MEDICINE | Facility: CLINIC | Age: 49
End: 2019-10-21

## 2019-11-15 ENCOUNTER — INPATIENT (INPATIENT)
Facility: HOSPITAL | Age: 49
LOS: 3 days | Discharge: HOME HEALTH | DRG: 246 | End: 2019-11-19
Attending: STUDENT IN AN ORGANIZED HEALTH CARE EDUCATION/TRAINING PROGRAM | Admitting: HOSPITALIST
Payer: MEDICAID

## 2019-11-15 VITALS
DIASTOLIC BLOOD PRESSURE: 94 MMHG | WEIGHT: 138.89 LBS | TEMPERATURE: 97 F | SYSTOLIC BLOOD PRESSURE: 214 MMHG | HEIGHT: 66 IN | OXYGEN SATURATION: 98 % | RESPIRATION RATE: 18 BRPM | HEART RATE: 65 BPM

## 2019-11-15 PROCEDURE — 99285 EMERGENCY DEPT VISIT HI MDM: CPT

## 2019-11-15 PROCEDURE — 93010 ELECTROCARDIOGRAM REPORT: CPT

## 2019-11-16 DIAGNOSIS — I21.4 NON-ST ELEVATION (NSTEMI) MYOCARDIAL INFARCTION: ICD-10-CM

## 2019-11-16 DIAGNOSIS — E87.2 ACIDOSIS: ICD-10-CM

## 2019-11-16 DIAGNOSIS — E11.9 TYPE 2 DIABETES MELLITUS WITHOUT COMPLICATIONS: ICD-10-CM

## 2019-11-16 DIAGNOSIS — I24.9 ACUTE ISCHEMIC HEART DISEASE, UNSPECIFIED: ICD-10-CM

## 2019-11-16 DIAGNOSIS — Z02.9 ENCOUNTER FOR ADMINISTRATIVE EXAMINATIONS, UNSPECIFIED: ICD-10-CM

## 2019-11-16 DIAGNOSIS — Z29.9 ENCOUNTER FOR PROPHYLACTIC MEASURES, UNSPECIFIED: ICD-10-CM

## 2019-11-16 LAB
ALBUMIN SERPL ELPH-MCNC: 4 G/DL — SIGNIFICANT CHANGE UP (ref 3.3–5)
ALBUMIN SERPL ELPH-MCNC: 4.8 G/DL — SIGNIFICANT CHANGE UP (ref 3.3–5)
ALP SERPL-CCNC: 72 U/L — SIGNIFICANT CHANGE UP (ref 40–120)
ALP SERPL-CCNC: 87 U/L — SIGNIFICANT CHANGE UP (ref 40–120)
ALT FLD-CCNC: 18 U/L — SIGNIFICANT CHANGE UP (ref 10–45)
ALT FLD-CCNC: 21 U/L — SIGNIFICANT CHANGE UP (ref 10–45)
ANION GAP SERPL CALC-SCNC: 10 MMOL/L — SIGNIFICANT CHANGE UP (ref 5–17)
ANION GAP SERPL CALC-SCNC: 15 MMOL/L — SIGNIFICANT CHANGE UP (ref 5–17)
APPEARANCE UR: CLEAR — SIGNIFICANT CHANGE UP
APTT BLD: 28.7 SEC — SIGNIFICANT CHANGE UP (ref 27.5–36.3)
APTT BLD: 29.2 SEC — SIGNIFICANT CHANGE UP (ref 27.5–36.3)
APTT BLD: 52.3 SEC — HIGH (ref 27.5–36.3)
APTT BLD: 64.1 SEC — HIGH (ref 27.5–36.3)
AST SERPL-CCNC: 15 U/L — SIGNIFICANT CHANGE UP (ref 10–40)
AST SERPL-CCNC: 18 U/L — SIGNIFICANT CHANGE UP (ref 10–40)
BASE EXCESS BLDV CALC-SCNC: 1.1 MMOL/L — SIGNIFICANT CHANGE UP (ref -2–2)
BASE EXCESS BLDV CALC-SCNC: 2.2 MMOL/L — HIGH (ref -2–2)
BASOPHILS # BLD AUTO: 0.03 K/UL — SIGNIFICANT CHANGE UP (ref 0–0.2)
BASOPHILS NFR BLD AUTO: 0.3 % — SIGNIFICANT CHANGE UP (ref 0–2)
BILIRUB SERPL-MCNC: 0.5 MG/DL — SIGNIFICANT CHANGE UP (ref 0.2–1.2)
BILIRUB SERPL-MCNC: 0.5 MG/DL — SIGNIFICANT CHANGE UP (ref 0.2–1.2)
BILIRUB UR-MCNC: NEGATIVE — SIGNIFICANT CHANGE UP
BUN SERPL-MCNC: 12 MG/DL — SIGNIFICANT CHANGE UP (ref 7–23)
BUN SERPL-MCNC: 13 MG/DL — SIGNIFICANT CHANGE UP (ref 7–23)
CA-I SERPL-SCNC: 1.21 MMOL/L — SIGNIFICANT CHANGE UP (ref 1.12–1.3)
CA-I SERPL-SCNC: 1.25 MMOL/L — SIGNIFICANT CHANGE UP (ref 1.12–1.3)
CALCIUM SERPL-MCNC: 10.5 MG/DL — SIGNIFICANT CHANGE UP (ref 8.4–10.5)
CALCIUM SERPL-MCNC: 9.3 MG/DL — SIGNIFICANT CHANGE UP (ref 8.4–10.5)
CHLORIDE BLDV-SCNC: 105 MMOL/L — SIGNIFICANT CHANGE UP (ref 96–108)
CHLORIDE BLDV-SCNC: 99 MMOL/L — SIGNIFICANT CHANGE UP (ref 96–108)
CHLORIDE SERPL-SCNC: 102 MMOL/L — SIGNIFICANT CHANGE UP (ref 96–108)
CHLORIDE SERPL-SCNC: 97 MMOL/L — SIGNIFICANT CHANGE UP (ref 96–108)
CHOLEST SERPL-MCNC: 159 MG/DL — SIGNIFICANT CHANGE UP (ref 10–199)
CK MB BLD-MCNC: 12.2 % — HIGH (ref 0–3.5)
CK MB BLD-MCNC: 12.9 % — HIGH (ref 0–3.5)
CK MB BLD-MCNC: 14 % — HIGH (ref 0–3.5)
CK MB CFR SERPL CALC: 106.7 NG/ML — HIGH (ref 0–6.7)
CK MB CFR SERPL CALC: 111.6 NG/ML — HIGH (ref 0–6.7)
CK MB CFR SERPL CALC: 65.9 NG/ML — HIGH (ref 0–6.7)
CK SERPL-CCNC: 179 U/L — SIGNIFICANT CHANGE UP (ref 30–200)
CK SERPL-CCNC: 540 U/L — HIGH (ref 30–200)
CK SERPL-CCNC: 762 U/L — HIGH (ref 30–200)
CK SERPL-CCNC: 867 U/L — HIGH (ref 30–200)
CO2 BLDV-SCNC: 26 MMOL/L — SIGNIFICANT CHANGE UP (ref 22–30)
CO2 BLDV-SCNC: 29 MMOL/L — SIGNIFICANT CHANGE UP (ref 22–30)
CO2 SERPL-SCNC: 23 MMOL/L — SIGNIFICANT CHANGE UP (ref 22–31)
CO2 SERPL-SCNC: 25 MMOL/L — SIGNIFICANT CHANGE UP (ref 22–31)
COLOR SPEC: SIGNIFICANT CHANGE UP
CREAT SERPL-MCNC: 0.66 MG/DL — SIGNIFICANT CHANGE UP (ref 0.5–1.3)
CREAT SERPL-MCNC: 0.77 MG/DL — SIGNIFICANT CHANGE UP (ref 0.5–1.3)
DIFF PNL FLD: NEGATIVE — SIGNIFICANT CHANGE UP
EOSINOPHIL # BLD AUTO: 0.06 K/UL — SIGNIFICANT CHANGE UP (ref 0–0.5)
EOSINOPHIL NFR BLD AUTO: 0.6 % — SIGNIFICANT CHANGE UP (ref 0–6)
GAS PNL BLDV: 134 MMOL/L — LOW (ref 135–145)
GAS PNL BLDV: 137 MMOL/L — SIGNIFICANT CHANGE UP (ref 135–145)
GAS PNL BLDV: SIGNIFICANT CHANGE UP
GLUCOSE BLDC GLUCOMTR-MCNC: 193 MG/DL — HIGH (ref 70–99)
GLUCOSE BLDC GLUCOMTR-MCNC: 219 MG/DL — HIGH (ref 70–99)
GLUCOSE BLDC GLUCOMTR-MCNC: 223 MG/DL — HIGH (ref 70–99)
GLUCOSE BLDC GLUCOMTR-MCNC: 237 MG/DL — HIGH (ref 70–99)
GLUCOSE BLDV-MCNC: 298 MG/DL — HIGH (ref 70–99)
GLUCOSE BLDV-MCNC: 299 MG/DL — HIGH (ref 70–99)
GLUCOSE SERPL-MCNC: 260 MG/DL — HIGH (ref 70–99)
GLUCOSE SERPL-MCNC: 326 MG/DL — HIGH (ref 70–99)
GLUCOSE UR QL: ABNORMAL
HBA1C BLD-MCNC: 10.8 % — HIGH (ref 4–5.6)
HCO3 BLDV-SCNC: 25 MMOL/L — SIGNIFICANT CHANGE UP (ref 21–29)
HCO3 BLDV-SCNC: 27 MMOL/L — SIGNIFICANT CHANGE UP (ref 21–29)
HCT VFR BLD CALC: 36.3 % — LOW (ref 39–50)
HCT VFR BLD CALC: 36.4 % — LOW (ref 39–50)
HCT VFR BLD CALC: 37.3 % — LOW (ref 39–50)
HCT VFR BLD CALC: 41.6 % — SIGNIFICANT CHANGE UP (ref 39–50)
HCT VFR BLDA CALC: 41 % — SIGNIFICANT CHANGE UP (ref 39–50)
HCT VFR BLDA CALC: 45 % — SIGNIFICANT CHANGE UP (ref 39–50)
HDLC SERPL-MCNC: 51 MG/DL — SIGNIFICANT CHANGE UP
HGB BLD CALC-MCNC: 13.4 G/DL — SIGNIFICANT CHANGE UP (ref 13–17)
HGB BLD CALC-MCNC: 14.6 G/DL — SIGNIFICANT CHANGE UP (ref 13–17)
HGB BLD-MCNC: 12.5 G/DL — LOW (ref 13–17)
HGB BLD-MCNC: 13 G/DL — SIGNIFICANT CHANGE UP (ref 13–17)
HGB BLD-MCNC: 13.4 G/DL — SIGNIFICANT CHANGE UP (ref 13–17)
HGB BLD-MCNC: 14.8 G/DL — SIGNIFICANT CHANGE UP (ref 13–17)
IMM GRANULOCYTES NFR BLD AUTO: 0.4 % — SIGNIFICANT CHANGE UP (ref 0–1.5)
INR BLD: 0.97 RATIO — SIGNIFICANT CHANGE UP (ref 0.88–1.16)
INR BLD: 1.02 RATIO — SIGNIFICANT CHANGE UP (ref 0.88–1.16)
KETONES UR-MCNC: NEGATIVE — SIGNIFICANT CHANGE UP
LACTATE BLDV-MCNC: 1.4 MMOL/L — SIGNIFICANT CHANGE UP (ref 0.7–2)
LACTATE BLDV-MCNC: 3.4 MMOL/L — HIGH (ref 0.7–2)
LEUKOCYTE ESTERASE UR-ACNC: NEGATIVE — SIGNIFICANT CHANGE UP
LIDOCAIN IGE QN: 23 U/L — SIGNIFICANT CHANGE UP (ref 7–60)
LIPID PNL WITH DIRECT LDL SERPL: 90 MG/DL — SIGNIFICANT CHANGE UP
LYMPHOCYTES # BLD AUTO: 3.69 K/UL — HIGH (ref 1–3.3)
LYMPHOCYTES # BLD AUTO: 34.7 % — SIGNIFICANT CHANGE UP (ref 13–44)
MAGNESIUM SERPL-MCNC: 1.7 MG/DL — SIGNIFICANT CHANGE UP (ref 1.6–2.6)
MAGNESIUM SERPL-MCNC: 1.9 MG/DL — SIGNIFICANT CHANGE UP (ref 1.6–2.6)
MCHC RBC-ENTMCNC: 30.6 PG — SIGNIFICANT CHANGE UP (ref 27–34)
MCHC RBC-ENTMCNC: 31.7 PG — SIGNIFICANT CHANGE UP (ref 27–34)
MCHC RBC-ENTMCNC: 32 PG — SIGNIFICANT CHANGE UP (ref 27–34)
MCHC RBC-ENTMCNC: 32.3 PG — SIGNIFICANT CHANGE UP (ref 27–34)
MCHC RBC-ENTMCNC: 34.3 GM/DL — SIGNIFICANT CHANGE UP (ref 32–36)
MCHC RBC-ENTMCNC: 35.6 GM/DL — SIGNIFICANT CHANGE UP (ref 32–36)
MCHC RBC-ENTMCNC: 35.8 GM/DL — SIGNIFICANT CHANGE UP (ref 32–36)
MCHC RBC-ENTMCNC: 35.9 GM/DL — SIGNIFICANT CHANGE UP (ref 32–36)
MCV RBC AUTO: 89 FL — SIGNIFICANT CHANGE UP (ref 80–100)
MCV RBC AUTO: 89 FL — SIGNIFICANT CHANGE UP (ref 80–100)
MCV RBC AUTO: 89.1 FL — SIGNIFICANT CHANGE UP (ref 80–100)
MCV RBC AUTO: 90.1 FL — SIGNIFICANT CHANGE UP (ref 80–100)
MONOCYTES # BLD AUTO: 0.55 K/UL — SIGNIFICANT CHANGE UP (ref 0–0.9)
MONOCYTES NFR BLD AUTO: 5.2 % — SIGNIFICANT CHANGE UP (ref 2–14)
NEUTROPHILS # BLD AUTO: 6.25 K/UL — SIGNIFICANT CHANGE UP (ref 1.8–7.4)
NEUTROPHILS NFR BLD AUTO: 58.8 % — SIGNIFICANT CHANGE UP (ref 43–77)
NITRITE UR-MCNC: NEGATIVE — SIGNIFICANT CHANGE UP
NRBC # BLD: 0 /100 WBCS — SIGNIFICANT CHANGE UP (ref 0–0)
NT-PROBNP SERPL-SCNC: 5 PG/ML — SIGNIFICANT CHANGE UP (ref 0–300)
OTHER CELLS CSF MANUAL: 18 ML/DL — SIGNIFICANT CHANGE UP (ref 18–22)
PCO2 BLDV: 39 MMHG — SIGNIFICANT CHANGE UP (ref 35–50)
PCO2 BLDV: 46 MMHG — SIGNIFICANT CHANGE UP (ref 35–50)
PH BLDV: 7.39 — SIGNIFICANT CHANGE UP (ref 7.35–7.45)
PH BLDV: 7.42 — SIGNIFICANT CHANGE UP (ref 7.35–7.45)
PH UR: 6 — SIGNIFICANT CHANGE UP (ref 5–8)
PHOSPHATE SERPL-MCNC: 3.5 MG/DL — SIGNIFICANT CHANGE UP (ref 2.5–4.5)
PLATELET # BLD AUTO: 229 K/UL — SIGNIFICANT CHANGE UP (ref 150–400)
PLATELET # BLD AUTO: 230 K/UL — SIGNIFICANT CHANGE UP (ref 150–400)
PLATELET # BLD AUTO: 233 K/UL — SIGNIFICANT CHANGE UP (ref 150–400)
PLATELET # BLD AUTO: 248 K/UL — SIGNIFICANT CHANGE UP (ref 150–400)
PO2 BLDV: 53 MMHG — HIGH (ref 25–45)
PO2 BLDV: 80 MMHG — HIGH (ref 25–45)
POTASSIUM BLDV-SCNC: 3.8 MMOL/L — SIGNIFICANT CHANGE UP (ref 3.5–5.3)
POTASSIUM BLDV-SCNC: 4.5 MMOL/L — SIGNIFICANT CHANGE UP (ref 3.5–5.3)
POTASSIUM SERPL-MCNC: 4.3 MMOL/L — SIGNIFICANT CHANGE UP (ref 3.5–5.3)
POTASSIUM SERPL-MCNC: 4.8 MMOL/L — SIGNIFICANT CHANGE UP (ref 3.5–5.3)
POTASSIUM SERPL-SCNC: 4.3 MMOL/L — SIGNIFICANT CHANGE UP (ref 3.5–5.3)
POTASSIUM SERPL-SCNC: 4.8 MMOL/L — SIGNIFICANT CHANGE UP (ref 3.5–5.3)
PROT SERPL-MCNC: 6.9 G/DL — SIGNIFICANT CHANGE UP (ref 6–8.3)
PROT SERPL-MCNC: 8.1 G/DL — SIGNIFICANT CHANGE UP (ref 6–8.3)
PROT UR-MCNC: NEGATIVE — SIGNIFICANT CHANGE UP
PROTHROM AB SERPL-ACNC: 11 SEC — SIGNIFICANT CHANGE UP (ref 10–12.9)
PROTHROM AB SERPL-ACNC: 11.6 SEC — SIGNIFICANT CHANGE UP (ref 10–12.9)
RBC # BLD: 4.03 M/UL — LOW (ref 4.2–5.8)
RBC # BLD: 4.09 M/UL — LOW (ref 4.2–5.8)
RBC # BLD: 4.19 M/UL — LOW (ref 4.2–5.8)
RBC # BLD: 4.67 M/UL — SIGNIFICANT CHANGE UP (ref 4.2–5.8)
RBC # FLD: 12.4 % — SIGNIFICANT CHANGE UP (ref 10.3–14.5)
RBC # FLD: 12.6 % — SIGNIFICANT CHANGE UP (ref 10.3–14.5)
RBC # FLD: 12.7 % — SIGNIFICANT CHANGE UP (ref 10.3–14.5)
RBC # FLD: 12.8 % — SIGNIFICANT CHANGE UP (ref 10.3–14.5)
SAO2 % BLDV: 87 % — SIGNIFICANT CHANGE UP (ref 67–88)
SAO2 % BLDV: 95 % — HIGH (ref 67–88)
SODIUM SERPL-SCNC: 135 MMOL/L — SIGNIFICANT CHANGE UP (ref 135–145)
SODIUM SERPL-SCNC: 137 MMOL/L — SIGNIFICANT CHANGE UP (ref 135–145)
SP GR SPEC: 1.01 — SIGNIFICANT CHANGE UP (ref 1.01–1.02)
TOTAL CHOLESTEROL/HDL RATIO MEASUREMENT: 3.1 RATIO — LOW (ref 3.4–9.6)
TRIGL SERPL-MCNC: 88 MG/DL — SIGNIFICANT CHANGE UP (ref 10–149)
TROPONIN T, HIGH SENSITIVITY RESULT: 186 NG/L — HIGH (ref 0–51)
TROPONIN T, HIGH SENSITIVITY RESULT: 28 NG/L — SIGNIFICANT CHANGE UP (ref 0–51)
TROPONIN T, HIGH SENSITIVITY RESULT: 40 NG/L — SIGNIFICANT CHANGE UP (ref 0–51)
TROPONIN T, HIGH SENSITIVITY RESULT: 454 NG/L — HIGH (ref 0–51)
TROPONIN T, HIGH SENSITIVITY RESULT: 62 NG/L — HIGH (ref 0–51)
TROPONIN T, HIGH SENSITIVITY RESULT: 853 NG/L — HIGH (ref 0–51)
TSH SERPL-MCNC: 2.23 UIU/ML — SIGNIFICANT CHANGE UP (ref 0.27–4.2)
UROBILINOGEN FLD QL: NEGATIVE — SIGNIFICANT CHANGE UP
WBC # BLD: 10.62 K/UL — HIGH (ref 3.8–10.5)
WBC # BLD: 11.73 K/UL — HIGH (ref 3.8–10.5)
WBC # BLD: 13.36 K/UL — HIGH (ref 3.8–10.5)
WBC # BLD: 13.57 K/UL — HIGH (ref 3.8–10.5)
WBC # FLD AUTO: 10.62 K/UL — HIGH (ref 3.8–10.5)
WBC # FLD AUTO: 11.73 K/UL — HIGH (ref 3.8–10.5)
WBC # FLD AUTO: 13.36 K/UL — HIGH (ref 3.8–10.5)
WBC # FLD AUTO: 13.57 K/UL — HIGH (ref 3.8–10.5)

## 2019-11-16 PROCEDURE — 71045 X-RAY EXAM CHEST 1 VIEW: CPT | Mod: 26

## 2019-11-16 PROCEDURE — 93010 ELECTROCARDIOGRAM REPORT: CPT

## 2019-11-16 PROCEDURE — 99222 1ST HOSP IP/OBS MODERATE 55: CPT | Mod: GC

## 2019-11-16 PROCEDURE — 99223 1ST HOSP IP/OBS HIGH 75: CPT

## 2019-11-16 PROCEDURE — 99222 1ST HOSP IP/OBS MODERATE 55: CPT

## 2019-11-16 PROCEDURE — 12345: CPT | Mod: NC,GC

## 2019-11-16 RX ORDER — FAMOTIDINE 10 MG/ML
20 INJECTION INTRAVENOUS ONCE
Refills: 0 | Status: COMPLETED | OUTPATIENT
Start: 2019-11-16 | End: 2019-11-16

## 2019-11-16 RX ORDER — FAMOTIDINE 10 MG/ML
20 INJECTION INTRAVENOUS
Refills: 0 | Status: DISCONTINUED | OUTPATIENT
Start: 2019-11-16 | End: 2019-11-19

## 2019-11-16 RX ORDER — KETOROLAC TROMETHAMINE 30 MG/ML
15 SYRINGE (ML) INJECTION ONCE
Refills: 0 | Status: DISCONTINUED | OUTPATIENT
Start: 2019-11-16 | End: 2019-11-16

## 2019-11-16 RX ORDER — ONDANSETRON 8 MG/1
4 TABLET, FILM COATED ORAL ONCE
Refills: 0 | Status: COMPLETED | OUTPATIENT
Start: 2019-11-16 | End: 2019-11-16

## 2019-11-16 RX ORDER — HEPARIN SODIUM 5000 [USP'U]/ML
3800 INJECTION INTRAVENOUS; SUBCUTANEOUS EVERY 6 HOURS
Refills: 0 | Status: DISCONTINUED | OUTPATIENT
Start: 2019-11-16 | End: 2019-11-16

## 2019-11-16 RX ORDER — INSULIN LISPRO 100/ML
5 VIAL (ML) SUBCUTANEOUS
Refills: 0 | Status: DISCONTINUED | OUTPATIENT
Start: 2019-11-16 | End: 2019-11-17

## 2019-11-16 RX ORDER — TICAGRELOR 90 MG/1
90 TABLET ORAL EVERY 12 HOURS
Refills: 0 | Status: DISCONTINUED | OUTPATIENT
Start: 2019-11-17 | End: 2019-11-19

## 2019-11-16 RX ORDER — INFLUENZA VIRUS VACCINE 15; 15; 15; 15 UG/.5ML; UG/.5ML; UG/.5ML; UG/.5ML
0.5 SUSPENSION INTRAMUSCULAR ONCE
Refills: 0 | Status: DISCONTINUED | OUTPATIENT
Start: 2019-11-16 | End: 2019-11-19

## 2019-11-16 RX ORDER — NITROGLYCERIN 6.5 MG
0.4 CAPSULE, EXTENDED RELEASE ORAL ONCE
Refills: 0 | Status: COMPLETED | OUTPATIENT
Start: 2019-11-16 | End: 2019-11-16

## 2019-11-16 RX ORDER — INSULIN LISPRO 100/ML
VIAL (ML) SUBCUTANEOUS
Refills: 0 | Status: DISCONTINUED | OUTPATIENT
Start: 2019-11-16 | End: 2019-11-19

## 2019-11-16 RX ORDER — HEPARIN SODIUM 5000 [USP'U]/ML
3800 INJECTION INTRAVENOUS; SUBCUTANEOUS ONCE
Refills: 0 | Status: COMPLETED | OUTPATIENT
Start: 2019-11-16 | End: 2019-11-16

## 2019-11-16 RX ORDER — INSULIN GLARGINE 100 [IU]/ML
10 INJECTION, SOLUTION SUBCUTANEOUS AT BEDTIME
Refills: 0 | Status: DISCONTINUED | OUTPATIENT
Start: 2019-11-16 | End: 2019-11-16

## 2019-11-16 RX ORDER — HEPARIN SODIUM 5000 [USP'U]/ML
3800 INJECTION INTRAVENOUS; SUBCUTANEOUS EVERY 6 HOURS
Refills: 0 | Status: DISCONTINUED | OUTPATIENT
Start: 2019-11-16 | End: 2019-11-17

## 2019-11-16 RX ORDER — SODIUM CHLORIDE 9 MG/ML
1000 INJECTION INTRAMUSCULAR; INTRAVENOUS; SUBCUTANEOUS ONCE
Refills: 0 | Status: COMPLETED | OUTPATIENT
Start: 2019-11-16 | End: 2019-11-16

## 2019-11-16 RX ORDER — DEXTROSE 50 % IN WATER 50 %
15 SYRINGE (ML) INTRAVENOUS ONCE
Refills: 0 | Status: DISCONTINUED | OUTPATIENT
Start: 2019-11-16 | End: 2019-11-19

## 2019-11-16 RX ORDER — NITROGLYCERIN 6.5 MG
0.4 CAPSULE, EXTENDED RELEASE ORAL
Refills: 0 | Status: DISCONTINUED | OUTPATIENT
Start: 2019-11-16 | End: 2019-11-19

## 2019-11-16 RX ORDER — ATORVASTATIN CALCIUM 80 MG/1
40 TABLET, FILM COATED ORAL AT BEDTIME
Refills: 0 | Status: DISCONTINUED | OUTPATIENT
Start: 2019-11-16 | End: 2019-11-16

## 2019-11-16 RX ORDER — GLUCAGON INJECTION, SOLUTION 0.5 MG/.1ML
1 INJECTION, SOLUTION SUBCUTANEOUS ONCE
Refills: 0 | Status: DISCONTINUED | OUTPATIENT
Start: 2019-11-16 | End: 2019-11-19

## 2019-11-16 RX ORDER — ASPIRIN/CALCIUM CARB/MAGNESIUM 324 MG
81 TABLET ORAL DAILY
Refills: 0 | Status: DISCONTINUED | OUTPATIENT
Start: 2019-11-16 | End: 2019-11-19

## 2019-11-16 RX ORDER — METFORMIN HYDROCHLORIDE 850 MG/1
1 TABLET ORAL
Qty: 0 | Refills: 0 | DISCHARGE

## 2019-11-16 RX ORDER — ACETAMINOPHEN 500 MG
650 TABLET ORAL ONCE
Refills: 0 | Status: COMPLETED | OUTPATIENT
Start: 2019-11-16 | End: 2019-11-16

## 2019-11-16 RX ORDER — TICAGRELOR 90 MG/1
180 TABLET ORAL ONCE
Refills: 0 | Status: COMPLETED | OUTPATIENT
Start: 2019-11-16 | End: 2019-11-16

## 2019-11-16 RX ORDER — ASPIRIN/CALCIUM CARB/MAGNESIUM 324 MG
324 TABLET ORAL ONCE
Refills: 0 | Status: COMPLETED | OUTPATIENT
Start: 2019-11-16 | End: 2019-11-16

## 2019-11-16 RX ORDER — SODIUM CHLORIDE 9 MG/ML
1000 INJECTION, SOLUTION INTRAVENOUS
Refills: 0 | Status: DISCONTINUED | OUTPATIENT
Start: 2019-11-16 | End: 2019-11-19

## 2019-11-16 RX ORDER — HEPARIN SODIUM 5000 [USP'U]/ML
INJECTION INTRAVENOUS; SUBCUTANEOUS
Qty: 25000 | Refills: 0 | Status: DISCONTINUED | OUTPATIENT
Start: 2019-11-16 | End: 2019-11-16

## 2019-11-16 RX ORDER — ATORVASTATIN CALCIUM 80 MG/1
80 TABLET, FILM COATED ORAL AT BEDTIME
Refills: 0 | Status: DISCONTINUED | OUTPATIENT
Start: 2019-11-16 | End: 2019-11-19

## 2019-11-16 RX ORDER — INSULIN LISPRO 100/ML
VIAL (ML) SUBCUTANEOUS AT BEDTIME
Refills: 0 | Status: DISCONTINUED | OUTPATIENT
Start: 2019-11-16 | End: 2019-11-19

## 2019-11-16 RX ORDER — HEPARIN SODIUM 5000 [USP'U]/ML
INJECTION INTRAVENOUS; SUBCUTANEOUS
Qty: 25000 | Refills: 0 | Status: DISCONTINUED | OUTPATIENT
Start: 2019-11-16 | End: 2019-11-17

## 2019-11-16 RX ORDER — MORPHINE SULFATE 50 MG/1
4 CAPSULE, EXTENDED RELEASE ORAL ONCE
Refills: 0 | Status: DISCONTINUED | OUTPATIENT
Start: 2019-11-16 | End: 2019-11-16

## 2019-11-16 RX ORDER — METOPROLOL TARTRATE 50 MG
12.5 TABLET ORAL
Refills: 0 | Status: DISCONTINUED | OUTPATIENT
Start: 2019-11-16 | End: 2019-11-17

## 2019-11-16 RX ORDER — INSULIN GLARGINE 100 [IU]/ML
15 INJECTION, SOLUTION SUBCUTANEOUS AT BEDTIME
Refills: 0 | Status: DISCONTINUED | OUTPATIENT
Start: 2019-11-16 | End: 2019-11-17

## 2019-11-16 RX ORDER — ACETAMINOPHEN 500 MG
650 TABLET ORAL EVERY 6 HOURS
Refills: 0 | Status: DISCONTINUED | OUTPATIENT
Start: 2019-11-16 | End: 2019-11-19

## 2019-11-16 RX ORDER — DEXTROSE 50 % IN WATER 50 %
12.5 SYRINGE (ML) INTRAVENOUS ONCE
Refills: 0 | Status: DISCONTINUED | OUTPATIENT
Start: 2019-11-16 | End: 2019-11-19

## 2019-11-16 RX ORDER — DEXTROSE 50 % IN WATER 50 %
25 SYRINGE (ML) INTRAVENOUS ONCE
Refills: 0 | Status: DISCONTINUED | OUTPATIENT
Start: 2019-11-16 | End: 2019-11-19

## 2019-11-16 RX ADMIN — Medication 12.5 MILLIGRAM(S): at 06:08

## 2019-11-16 RX ADMIN — SODIUM CHLORIDE 1000 MILLILITER(S): 9 INJECTION INTRAMUSCULAR; INTRAVENOUS; SUBCUTANEOUS at 04:00

## 2019-11-16 RX ADMIN — Medication 650 MILLIGRAM(S): at 18:00

## 2019-11-16 RX ADMIN — MORPHINE SULFATE 4 MILLIGRAM(S): 50 CAPSULE, EXTENDED RELEASE ORAL at 02:22

## 2019-11-16 RX ADMIN — Medication 650 MILLIGRAM(S): at 02:31

## 2019-11-16 RX ADMIN — Medication 2: at 17:40

## 2019-11-16 RX ADMIN — Medication 15 MILLIGRAM(S): at 02:30

## 2019-11-16 RX ADMIN — INSULIN GLARGINE 15 UNIT(S): 100 INJECTION, SOLUTION SUBCUTANEOUS at 21:59

## 2019-11-16 RX ADMIN — HEPARIN SODIUM 750 UNIT(S)/HR: 5000 INJECTION INTRAVENOUS; SUBCUTANEOUS at 13:20

## 2019-11-16 RX ADMIN — Medication 2: at 12:44

## 2019-11-16 RX ADMIN — Medication 650 MILLIGRAM(S): at 17:30

## 2019-11-16 RX ADMIN — Medication 12.5 MILLIGRAM(S): at 17:29

## 2019-11-16 RX ADMIN — Medication 5 UNIT(S): at 17:40

## 2019-11-16 RX ADMIN — TICAGRELOR 180 MILLIGRAM(S): 90 TABLET ORAL at 06:08

## 2019-11-16 RX ADMIN — Medication 650 MILLIGRAM(S): at 03:08

## 2019-11-16 RX ADMIN — ATORVASTATIN CALCIUM 80 MILLIGRAM(S): 80 TABLET, FILM COATED ORAL at 21:59

## 2019-11-16 RX ADMIN — FAMOTIDINE 20 MILLIGRAM(S): 10 INJECTION INTRAVENOUS at 01:13

## 2019-11-16 RX ADMIN — Medication 0.4 MILLIGRAM(S): at 23:45

## 2019-11-16 RX ADMIN — HEPARIN SODIUM 750 UNIT(S)/HR: 5000 INJECTION INTRAVENOUS; SUBCUTANEOUS at 06:09

## 2019-11-16 RX ADMIN — MORPHINE SULFATE 4 MILLIGRAM(S): 50 CAPSULE, EXTENDED RELEASE ORAL at 01:16

## 2019-11-16 RX ADMIN — Medication 1: at 09:01

## 2019-11-16 RX ADMIN — Medication 0.4 MILLIGRAM(S): at 03:07

## 2019-11-16 RX ADMIN — Medication 324 MILLIGRAM(S): at 01:33

## 2019-11-16 RX ADMIN — Medication 15 MILLIGRAM(S): at 03:08

## 2019-11-16 RX ADMIN — FAMOTIDINE 20 MILLIGRAM(S): 10 INJECTION INTRAVENOUS at 17:30

## 2019-11-16 RX ADMIN — SODIUM CHLORIDE 1000 MILLILITER(S): 9 INJECTION INTRAMUSCULAR; INTRAVENOUS; SUBCUTANEOUS at 02:30

## 2019-11-16 RX ADMIN — HEPARIN SODIUM 850 UNIT(S)/HR: 5000 INJECTION INTRAVENOUS; SUBCUTANEOUS at 20:47

## 2019-11-16 RX ADMIN — ONDANSETRON 4 MILLIGRAM(S): 8 TABLET, FILM COATED ORAL at 01:09

## 2019-11-16 RX ADMIN — HEPARIN SODIUM 3800 UNIT(S): 5000 INJECTION INTRAVENOUS; SUBCUTANEOUS at 06:09

## 2019-11-16 NOTE — CONSULT NOTE ADULT - PROBLEM SELECTOR RECOMMENDATION 9
Suggest begin Lantus 15 units, premeal Humalog 5 units to be titrated daily. discussed with h/o officer

## 2019-11-16 NOTE — ED PROVIDER NOTE - OBJECTIVE STATEMENT
48 yo m pmh niddm, pw cp. pt reports cp began at rest at 1900 on 11/15. reports pain left sided, radiates to shoulder, no known exacerbating/remitting factors,  has had similar pain in past but never this severe. non positional. non pleuritic. no recent travel. no hx cad, pe, dvt. denies sob.

## 2019-11-16 NOTE — PROGRESS NOTE ADULT - SUBJECTIVE AND OBJECTIVE BOX
PROGRESS NOTE:   ************************************************  Authoted by: Maria D Patton MD PGY1  Psychiatry  Pager: [facility] [phone]; [facility] [phone]  *************************************************    Patient is a 49y old  Male who presents with a chief complaint of chest pain (16 Nov 2019 05:51)      SUBJECTIVE / OVERNIGHT EVENTS: The patient was seen and examined at bedside.     REVIEW OF SYSTEMS:    CONSTITUTIONAL: No weakness. No fevers. No chills. No rigors. No weight loss. No night sweats. No poor appetite.  	EYES: No visual changes. No eye pain.  	ENT: No hearing difficulty. No vertigo. No dysphagia. No sore throat. No Sinusitis/rhinorrhea.   	NECK: No pain. No stiffness/rigidity.  	CARDIAC: +chest pain. +palpitations. +lightheadedness.  	RESPIRATORY: No cough. +SOB. No hemoptysis.  	GASTROINTESTINAL: +abdominal pain. +nausea. No vomiting. No hematemesis. No diarrhea. No constipation. No melena. No hematochezia.  	GENITOURINARY: No dysuria. No frequency. No hesitancy. No hematuria. No oliguria.  	NEUROLOGICAL: No numbness/tingling. No focal weakness. No urinary or fecal incontinence. No headache. No unsteady gait.  	BACK: No back pain. No flank pain.  	EXTREMITIES: No lower extremity edema. Full ROM. No joint pain.  	SKIN: No rashes. No itching. No other lesions.  	PSYCHIATRIC: No depression. No anxiety. No SI/HI.  	ALLERGIC: No lip swelling. No hives.  	All other review of systems is negative unless indicated above.    CONSTITUTIONAL: No weakness, fevers or chills  EYES/ENT: No visual changes;  No vertigo or throat pain   NECK: No pain or stiffness  RESPIRATORY: +SOB No cough, wheezing, hemoptysis  CARDIOVASCULAR: +chest pain. +palpitations. +lightheadedness.  GASTROINTESTINAL: +abdominal pain. +nausea; No vomiting, or hematemesis; No diarrhea or constipation. No melena or hematochezia.  GENITOURINARY: No dysuria, frequency or hematuria  NEUROLOGICAL: No numbness or weakness  SKIN: No itching, rashes    MEDICATIONS  (STANDING):  aspirin enteric coated 81 milliGRAM(s) Oral daily  atorvastatin 40 milliGRAM(s) Oral at bedtime  dextrose 5%. 1000 milliLiter(s) (50 mL/Hr) IV Continuous <Continuous>  dextrose 50% Injectable 12.5 Gram(s) IV Push once  dextrose 50% Injectable 25 Gram(s) IV Push once  dextrose 50% Injectable 25 Gram(s) IV Push once  famotidine    Tablet 20 milliGRAM(s) Oral two times a day  heparin  Infusion.  Unit(s)/Hr (7.5 mL/Hr) IV Continuous <Continuous>  influenza   Vaccine 0.5 milliLiter(s) IntraMuscular once  insulin lispro (HumaLOG) corrective regimen sliding scale   SubCutaneous three times a day before meals  insulin lispro (HumaLOG) corrective regimen sliding scale   SubCutaneous at bedtime  metoprolol tartrate 12.5 milliGRAM(s) Oral two times a day    MEDICATIONS  (PRN):  dextrose 40% Gel 15 Gram(s) Oral once PRN Blood Glucose LESS THAN 70 milliGRAM(s)/deciliter  glucagon  Injectable 1 milliGRAM(s) IntraMuscular once PRN Glucose LESS THAN 70 milligrams/deciliter  heparin  Injectable 3800 Unit(s) IV Push every 6 hours PRN For aPTT less than 40      CAPILLARY BLOOD GLUCOSE        I&O's Summary      PHYSICAL EXAM:  Vital Signs Last 24 Hrs  T(C): 36.1 (16 Nov 2019 06:36), Max: 36.7 (16 Nov 2019 04:45)  T(F): 97 (16 Nov 2019 06:36), Max: 98 (16 Nov 2019 04:45)  HR: 65 (16 Nov 2019 06:36) (60 - 76)  BP: 161/96 (16 Nov 2019 06:36) (123/89 - 214/94)  BP(mean): --  RR: 18 (16 Nov 2019 06:36) (15 - 20)  SpO2: 97% (16 Nov 2019 06:36) (97% - 99%)    TELEMETRY:    CONSTITUTIONAL: NAD, well-developed  RESPIRATORY: Normal respiratory effort; lungs are clear to auscultation bilaterally  CARDIOVASCULAR: Not tachy, Not aaliyah. Regular rhythm. Not irregularly irregular. S1. S2. No murmur. No rub. No distant heart sound. No JVD. No lower extremity edema; Peripheral pulses are 2+ bilaterally  ABDOMEN: Nontender to palpation, normoactive bowel sounds, no rebound/guarding; No hepatosplenomegaly  MUSCLOSKELETAL: no clubbing or cyanosis of digits; no joint swelling or tenderness to palpation  PSYCH: A+O to person, place, and time; affect appropriate      LABS:                        12.5   10.62 )-----------( 233      ( 16 Nov 2019 06:09 )             36.4     11-16    135  |  102  |  12  ----------------------------<  260<H>  4.3   |  23  |  0.66    Ca    9.3      16 Nov 2019 06:09  Phos  3.5     11-16  Mg     1.7     11-16    TPro  6.9  /  Alb  4.0  /  TBili  0.5  /  DBili  x   /  AST  18  /  ALT  18  /  AlkPhos  72  11-16    PT/INR - ( 16 Nov 2019 06:09 )   PT: 11.6 sec;   INR: 1.02 ratio         PTT - ( 16 Nov 2019 06:09 )  PTT:28.7 sec  CARDIAC MARKERS ( 16 Nov 2019 06:09 )  x     / x     / 179 U/L / x     / x          Troponin T, High Sensitivity (11.16.19 @ 06:09)    Troponin T, High Sensitivity Result: 62    Troponin T, High Sensitivity (11.16.19 @ 02:41)    Troponin T, High Sensitivity Result: 40    Troponin T, High Sensitivity (11.16.19 @ 01:08)    Troponin T, High Sensitivity Result: 28      RADIOLOGY & ADDITIONAL TESTS:  Results Reviewed:   Imaging Personally Reviewed:  Electrocardiogram Personally Reviewed:    COORDINATION OF CARE:  Care Discussed with Consultants/Other Providers [Y/N]:  Prior or Outpatient Records Reviewed [Y/N]: PROGRESS NOTE:   ************************************************  Authoted by: Maria D Patton MD PGY1  Psychiatry  Pager: [facility] [phone]; [facility] [phone]  *************************************************    Patient is a 49y old  Male who presents with a chief complaint of chest pain (16 Nov 2019 05:51)      SUBJECTIVE / OVERNIGHT EVENTS: The patient was seen and examined at bedside.  He continues to have chest pain, but says it has improved from admission.  Denies pain radiating elsewhere.  Denies n/v/c/    REVIEW OF SYSTEMS:    CONSTITUTIONAL: No weakness, fevers or chills  EYES/ENT: No visual changes;  No vertigo or throat pain   NECK: No pain or stiffness  RESPIRATORY: +SOB No cough, wheezing, hemoptysis  CARDIOVASCULAR: +chest pain. +palpitations. +lightheadedness.  GASTROINTESTINAL: No abdominal pain. No nausea; No vomiting, or hematemesis; No diarrhea or constipation. No melena or hematochezia.  GENITOURINARY: No dysuria, frequency or hematuria  NEUROLOGICAL: No numbness or weakness  SKIN: No itching, rashes    MEDICATIONS  (STANDING):  aspirin enteric coated 81 milliGRAM(s) Oral daily  atorvastatin 40 milliGRAM(s) Oral at bedtime  dextrose 5%. 1000 milliLiter(s) (50 mL/Hr) IV Continuous <Continuous>  dextrose 50% Injectable 12.5 Gram(s) IV Push once  dextrose 50% Injectable 25 Gram(s) IV Push once  dextrose 50% Injectable 25 Gram(s) IV Push once  famotidine    Tablet 20 milliGRAM(s) Oral two times a day  heparin  Infusion.  Unit(s)/Hr (7.5 mL/Hr) IV Continuous <Continuous>  influenza   Vaccine 0.5 milliLiter(s) IntraMuscular once  insulin lispro (HumaLOG) corrective regimen sliding scale   SubCutaneous three times a day before meals  insulin lispro (HumaLOG) corrective regimen sliding scale   SubCutaneous at bedtime  metoprolol tartrate 12.5 milliGRAM(s) Oral two times a day    MEDICATIONS  (PRN):  dextrose 40% Gel 15 Gram(s) Oral once PRN Blood Glucose LESS THAN 70 milliGRAM(s)/deciliter  glucagon  Injectable 1 milliGRAM(s) IntraMuscular once PRN Glucose LESS THAN 70 milligrams/deciliter  heparin  Injectable 3800 Unit(s) IV Push every 6 hours PRN For aPTT less than 40      CAPILLARY BLOOD GLUCOSE        I&O's Summary      PHYSICAL EXAM:  Vital Signs Last 24 Hrs  T(C): 36.1 (16 Nov 2019 06:36), Max: 36.7 (16 Nov 2019 04:45)  T(F): 97 (16 Nov 2019 06:36), Max: 98 (16 Nov 2019 04:45)  HR: 65 (16 Nov 2019 06:36) (60 - 76)  BP: 161/96 (16 Nov 2019 06:36) (123/89 - 214/94)  BP(mean): --  RR: 18 (16 Nov 2019 06:36) (15 - 20)  SpO2: 97% (16 Nov 2019 06:36) (97% - 99%)    TELEMETRY: Sinus rhythm, 80s, no atypical beats, ST elevation    CONSTITUTIONAL: NAD, well-developed  RESPIRATORY: Normal respiratory effort; lungs are clear to auscultation bilaterally  CARDIOVASCULAR: Not tachy, Not aaliyah. Regular rhythm. Not irregularly irregular. S1. S2. No murmur. No rub. No distant heart sound. No JVD. No lower extremity edema; Peripheral pulses are 2+ bilaterally  ABDOMEN: Nontender to palpation, normoactive bowel sounds, no rebound/guarding; No hepatosplenomegaly  MUSCLOSKELETAL: no clubbing or cyanosis of digits; no joint swelling or tenderness to palpation  PSYCH: A+O to person, place, and time; affect appropriate      LABS:                        12.5   10.62 )-----------( 233      ( 16 Nov 2019 06:09 )             36.4     11-16    135  |  102  |  12  ----------------------------<  260<H>  4.3   |  23  |  0.66    Ca    9.3      16 Nov 2019 06:09  Phos  3.5     11-16  Mg     1.7     11-16    TPro  6.9  /  Alb  4.0  /  TBili  0.5  /  DBili  x   /  AST  18  /  ALT  18  /  AlkPhos  72  11-16    PT/INR - ( 16 Nov 2019 06:09 )   PT: 11.6 sec;   INR: 1.02 ratio         PTT - ( 16 Nov 2019 06:09 )  PTT:28.7 sec  CARDIAC MARKERS ( 16 Nov 2019 06:09 )  x     / x     / 179 U/L / x     / x          Troponin T, High Sensitivity (11.16.19 @ 06:09)    Troponin T, High Sensitivity Result: 62    Troponin T, High Sensitivity (11.16.19 @ 02:41)    Troponin T, High Sensitivity Result: 40    Troponin T, High Sensitivity (11.16.19 @ 01:08)    Troponin T, High Sensitivity Result: 28      RADIOLOGY & ADDITIONAL TESTS:  Results Reviewed:   Imaging Personally Reviewed:  Electrocardiogram Personally Reviewed:    COORDINATION OF CARE:  Care Discussed with Consultants/Other Providers [Y/N]:  Prior or Outpatient Records Reviewed [Y/N]: PROGRESS NOTE:   ************************************************  Authoted by: Maria D Patton MD PGY1  Psychiatry  Pager: [facility] [phone]; [facility] [phone]  *************************************************    Patient is a 49y old  Male who presents with a chief complaint of chest pain (16 Nov 2019 05:51)      SUBJECTIVE / OVERNIGHT EVENTS: The patient was seen and examined at bedside.  He continues to have chest pain, but says it has improved from admission.  Denies pain radiating elsewhere.  Denies n/v/fever/chills/diaphoresis/abdominal pain.  Denies SOB or trouble breathing.    REVIEW OF SYSTEMS:    CONSTITUTIONAL: No weakness, fevers or chills  EYES/ENT: No visual changes;  No vertigo or throat pain   NECK: No pain or stiffness  RESPIRATORY: No SOB No cough, wheezing, hemoptysis  CARDIOVASCULAR: +chest pain. +palpitations. +lightheadedness.  GASTROINTESTINAL: No abdominal pain. No nausea; No vomiting, or hematemesis; No diarrhea or constipation. No melena or hematochezia.  GENITOURINARY: No dysuria, frequency or hematuria  NEUROLOGICAL: No numbness or weakness  SKIN: No itching, rashes    MEDICATIONS  (STANDING):  aspirin enteric coated 81 milliGRAM(s) Oral daily  atorvastatin 40 milliGRAM(s) Oral at bedtime  dextrose 5%. 1000 milliLiter(s) (50 mL/Hr) IV Continuous <Continuous>  dextrose 50% Injectable 12.5 Gram(s) IV Push once  dextrose 50% Injectable 25 Gram(s) IV Push once  dextrose 50% Injectable 25 Gram(s) IV Push once  famotidine    Tablet 20 milliGRAM(s) Oral two times a day  heparin  Infusion.  Unit(s)/Hr (7.5 mL/Hr) IV Continuous <Continuous>  influenza   Vaccine 0.5 milliLiter(s) IntraMuscular once  insulin lispro (HumaLOG) corrective regimen sliding scale   SubCutaneous three times a day before meals  insulin lispro (HumaLOG) corrective regimen sliding scale   SubCutaneous at bedtime  metoprolol tartrate 12.5 milliGRAM(s) Oral two times a day    MEDICATIONS  (PRN):  dextrose 40% Gel 15 Gram(s) Oral once PRN Blood Glucose LESS THAN 70 milliGRAM(s)/deciliter  glucagon  Injectable 1 milliGRAM(s) IntraMuscular once PRN Glucose LESS THAN 70 milligrams/deciliter  heparin  Injectable 3800 Unit(s) IV Push every 6 hours PRN For aPTT less than 40      CAPILLARY BLOOD GLUCOSE        I&O's Summary      PHYSICAL EXAM:  Vital Signs Last 24 Hrs  T(C): 36.1 (16 Nov 2019 06:36), Max: 36.7 (16 Nov 2019 04:45)  T(F): 97 (16 Nov 2019 06:36), Max: 98 (16 Nov 2019 04:45)  HR: 65 (16 Nov 2019 06:36) (60 - 76)  BP: 161/96 (16 Nov 2019 06:36) (123/89 - 214/94)  BP(mean): --  RR: 18 (16 Nov 2019 06:36) (15 - 20)  SpO2: 97% (16 Nov 2019 06:36) (97% - 99%)    TELEMETRY: Sinus rhythm, 80s, no atypical beats, ST elevation    CONSTITUTIONAL: NAD, well-developed, seen resting in bed  RESPIRATORY: Normal respiratory effort; lungs are clear to auscultation bilaterally  CARDIOVASCULAR: Regular rhythm, S1. S2,  No murmur/rub/gallop, no distant heart sound. No JVD. No lower extremity edema; Peripheral pulses are 2+ bilaterally  ABDOMEN: soft, Nontender to palpation, normoactive bowel sounds, no rebound/guarding; No hepatosplenomegaly  MUSCLOSKELETAL: no clubbing or cyanosis of digits; no joint swelling or tenderness to palpation  PSYCH: A+O to person, place, and time; affect appropriate      LABS:                        12.5   10.62 )-----------( 233      ( 16 Nov 2019 06:09 )             36.4     11-16    135  |  102  |  12  ----------------------------<  260<H>  4.3   |  23  |  0.66    Ca    9.3      16 Nov 2019 06:09  Phos  3.5     11-16  Mg     1.7     11-16    TPro  6.9  /  Alb  4.0  /  TBili  0.5  /  DBili  x   /  AST  18  /  ALT  18  /  AlkPhos  72  11-16    PT/INR - ( 16 Nov 2019 06:09 )   PT: 11.6 sec;   INR: 1.02 ratio         PTT - ( 16 Nov 2019 06:09 )  PTT:28.7 sec  CARDIAC MARKERS ( 16 Nov 2019 06:09 )  x     / x     / 179 U/L / x     / x          Troponin T, High Sensitivity (11.16.19 @ 06:09)    Troponin T, High Sensitivity Result: 62    Troponin T, High Sensitivity (11.16.19 @ 02:41)    Troponin T, High Sensitivity Result: 40    Troponin T, High Sensitivity (11.16.19 @ 01:08)    Troponin T, High Sensitivity Result: 28    Thyroid Stimulating Hormone, Serum (11.16.19 @ 10:03)    Thyroid Stimulating Hormone, Serum: 2.23 uIU/mL    Hemoglobin A1C, Whole Blood: 10.8      RADIOLOGY & ADDITIONAL TESTS:  Results Reviewed:     < from: Xray Chest 1 View- PORTABLE-Urgent (11.16.19 @ 01:08) >  FINDINGS:     The heart is normal in size.   No focal consolidations, pleural effusions or pneumothoraces.  Degenerative changes of the spine.    IMPRESSION:   No focal consolidations.    < end of copied text >        Imaging Personally Reviewed:  Electrocardiogram Personally Reviewed:    NSR, rate 80, QTc 355.  TWI in III, STD in III, <2mm RAVINDRA in V3-V5.    COORDINATION OF CARE:  Care Discussed with Consultants/Other Providers [Y/N]:  Prior or Outpatient Records Reviewed [Y/N]: PROGRESS NOTE:   ************************************************  Authoted by: Maria D Patton MD PGY1  Psychiatry  Pager: [facility] [phone]; [facility] [phone]  *************************************************    Patient is a 49y old  Male who presents with a chief complaint of chest pain (16 Nov 2019 05:51)      SUBJECTIVE / OVERNIGHT EVENTS: The patient was seen and examined at bedside.  He continues to have chest pain, but says it has improved from admission.  Denies pain radiating elsewhere.  Denies n/v/fever/chills/diaphoresis/abdominal pain.  Denies SOB or trouble breathing.    REVIEW OF SYSTEMS:    CONSTITUTIONAL: No weakness, fevers or chills  EYES/ENT: No visual changes;  No vertigo or throat pain   NECK: No pain or stiffness  RESPIRATORY: No SOB No cough, wheezing, hemoptysis  CARDIOVASCULAR: +chest pain. +palpitations. +lightheadedness.  GASTROINTESTINAL: No abdominal pain. No nausea; No vomiting, or hematemesis; No diarrhea or constipation. No melena or hematochezia.  GENITOURINARY: No dysuria, frequency or hematuria  NEUROLOGICAL: No numbness or weakness  SKIN: No itching, rashes    MEDICATIONS  (STANDING):  aspirin enteric coated 81 milliGRAM(s) Oral daily  atorvastatin 40 milliGRAM(s) Oral at bedtime  dextrose 5%. 1000 milliLiter(s) (50 mL/Hr) IV Continuous <Continuous>  dextrose 50% Injectable 12.5 Gram(s) IV Push once  dextrose 50% Injectable 25 Gram(s) IV Push once  dextrose 50% Injectable 25 Gram(s) IV Push once  famotidine    Tablet 20 milliGRAM(s) Oral two times a day  heparin  Infusion.  Unit(s)/Hr (7.5 mL/Hr) IV Continuous <Continuous>  influenza   Vaccine 0.5 milliLiter(s) IntraMuscular once  insulin lispro (HumaLOG) corrective regimen sliding scale   SubCutaneous three times a day before meals  insulin lispro (HumaLOG) corrective regimen sliding scale   SubCutaneous at bedtime  metoprolol tartrate 12.5 milliGRAM(s) Oral two times a day    MEDICATIONS  (PRN):  dextrose 40% Gel 15 Gram(s) Oral once PRN Blood Glucose LESS THAN 70 milliGRAM(s)/deciliter  glucagon  Injectable 1 milliGRAM(s) IntraMuscular once PRN Glucose LESS THAN 70 milligrams/deciliter  heparin  Injectable 3800 Unit(s) IV Push every 6 hours PRN For aPTT less than 40      CAPILLARY BLOOD GLUCOSE        I&O's Summary      PHYSICAL EXAM:  Vital Signs Last 24 Hrs  T(C): 36.1 (16 Nov 2019 06:36), Max: 36.7 (16 Nov 2019 04:45)  T(F): 97 (16 Nov 2019 06:36), Max: 98 (16 Nov 2019 04:45)  HR: 65 (16 Nov 2019 06:36) (60 - 76)  BP: 161/96 (16 Nov 2019 06:36) (123/89 - 214/94)  BP(mean): --  RR: 18 (16 Nov 2019 06:36) (15 - 20)  SpO2: 97% (16 Nov 2019 06:36) (97% - 99%)    TELEMETRY: Sinus rhythm, 80s, no atypical beats, ST elevation    CONSTITUTIONAL: NAD, well-developed, seen resting in bed  RESPIRATORY: Normal respiratory effort; lungs are clear to auscultation bilaterally  CARDIOVASCULAR: Regular rhythm, S1. S2,  No murmur/rub/gallop, no distant heart sound. No JVD. No lower extremity edema; Peripheral pulses are 2+ bilaterally  ABDOMEN: soft, Nontender to palpation, normoactive bowel sounds, no rebound/guarding; No hepatosplenomegaly  MUSCLOSKELETAL: no clubbing or cyanosis of digits; no joint swelling or tenderness to palpation  PSYCH: A+O to person, place, and time; affect appropriate      LABS:                        12.5   10.62 )-----------( 233      ( 16 Nov 2019 06:09 )             36.4     11-16    135  |  102  |  12  ----------------------------<  260<H>  4.3   |  23  |  0.66    Ca    9.3      16 Nov 2019 06:09  Phos  3.5     11-16  Mg     1.7     11-16    TPro  6.9  /  Alb  4.0  /  TBili  0.5  /  DBili  x   /  AST  18  /  ALT  18  /  AlkPhos  72  11-16    PT/INR - ( 16 Nov 2019 06:09 )   PT: 11.6 sec;   INR: 1.02 ratio         PTT - ( 16 Nov 2019 06:09 )  PTT:28.7 sec  CARDIAC MARKERS ( 16 Nov 2019 06:09 )  x     / x     / 179 U/L / x     / x        Troponin T, High Sensitivity (11.16.19 @ 12:17)    Troponin T, High Sensitivity Result: 186    Troponin T, High Sensitivity (11.16.19 @ 06:09)    Troponin T, High Sensitivity Result: 62    Troponin T, High Sensitivity (11.16.19 @ 02:41)    Troponin T, High Sensitivity Result: 40    Troponin T, High Sensitivity (11.16.19 @ 01:08)    Troponin T, High Sensitivity Result: 28    CKMB Mass Assay (11.16.19 @ 12:17)    CKMB Units: 65.9 ng/mL    CPK Mass Assay %: 12.2 %    Creatine Kinase, Serum (11.16.19 @ 12:17)    Creatine Kinase, Serum: 540 U/L    Thyroid Stimulating Hormone, Serum (11.16.19 @ 10:03)    Thyroid Stimulating Hormone, Serum: 2.23 uIU/mL    Hemoglobin A1C, Whole Blood: 10.8        RADIOLOGY & ADDITIONAL TESTS:  Results Reviewed:     < from: Xray Chest 1 View- PORTABLE-Urgent (11.16.19 @ 01:08) >  FINDINGS:     The heart is normal in size.   No focal consolidations, pleural effusions or pneumothoraces.  Degenerative changes of the spine.    IMPRESSION:   No focal consolidations.    < end of copied text >        Imaging Personally Reviewed:  Electrocardiogram Personally Reviewed:    NSR, rate 80, QTc 355.  TWI in III, STD in III, <2mm RAVINDRA in V3-V5.    COORDINATION OF CARE:  Care Discussed with Consultants/Other Providers [Y/N]:  Prior or Outpatient Records Reviewed [Y/N]:

## 2019-11-16 NOTE — H&P ADULT - NSHPREVIEWOFSYSTEMS_GEN_ALL_CORE
REVIEW OF SYSTEMS:  CONSTITUTIONAL: No weakness. No fevers. No chills. No rigors. No weight loss. No night sweats. No poor appetite.  EYES: No visual changes. No eye pain.  ENT: No hearing difficulty. No vertigo. No dysphagia. No sore throat. No Sinusitis/rhinorrhea.   NECK: No pain. No stiffness/rigidity.  CARDIAC: +chest pain. +palpitations. +lightheadedness.  RESPIRATORY: No cough. +SOB. No hemoptysis.  GASTROINTESTINAL: +abdominal pain. +nausea. No vomiting. No hematemesis. No diarrhea. No constipation. No melena. No hematochezia.  GENITOURINARY: No dysuria. No frequency. No hesitancy. No hematuria. No oliguria.  NEUROLOGICAL: No numbness/tingling. No focal weakness. No urinary or fecal incontinence. No headache. No unsteady gait.  BACK: No back pain. No flank pain.  EXTREMITIES: No lower extremity edema. Full ROM. No joint pain.  SKIN: No rashes. No itching. No other lesions.  PSYCHIATRIC: No depression. No anxiety. No SI/HI.  ALLERGIC: No lip swelling. No hives.  All other review of systems is negative unless indicated above.  Unless indicated above, unable to assess ROS 2/2

## 2019-11-16 NOTE — PROGRESS NOTE ADULT - PROBLEM SELECTOR PLAN 1
- trend CE  - tele  - TTE  - hep gtt  - asa, brilinta  - metoprolol as per card recs  - will need [facility]  - pepcid - trend CE: trops 28->40->62  - TTE pending  - hep gtt 750 units/hr  - asa 81mg, brilinta 90mg q12hr  - metoprolol as per card recs 12.5mg BID  -atorvastatin 40mg qHS  - will need LHC  - pepcid 20mg BID  - SL nitro PRN  - repeating EKG, trops, +CK, CKMB at noon  -TSH, lipids WNL - trend CE: trops 28->40->62->  - TTE pending  - hep gtt 750 units/hr  - asa 81mg, brilinta 90mg q12hr  - metoprolol as per card recs 12.5mg BID  -atorvastatin 80mg qHS  - pepcid 20mg BID  - f/u EKG unchanged  - CK elevated, CKMP  -TSH, lipids WNL  - cards recs appreciated, LHC for Monday unless sx worsen; amlodipine 5 mg if BP remains elevated. - trend CE: trops 28->40->62->186  - TTE pending  - hep gtt 750 units/hr  - asa 81mg, brilinta 90mg q12hr  - metoprolol as per card recs 12.5mg BID  - atorvastatin 80mg qHS  - pepcid 20mg BID  - f/u EKG unchanged  - CK elevated 540, CKMB elevated 65.9, CPK Mass Assay % 12.2  - TSH, lipids WNL  - cards recs appreciated, LHC for Monday unless sx worsen; amlodipine 5 mg if BP remains elevated. - trend CE: trops 28->40->62->186  - TTE pending  - hep gtt 750 units/hr  - asa 81mg, brilinta 90mg q12hr  - metoprolol as per card recs 12.5mg BID  - atorvastatin 80mg qHS  - pepcid 20mg BID  - f/u EKG unchanged  - CK elevated 540, CKMB elevated 65.9, CPK Mass Assay % 12.2  - TSH, lipids WNL  - cards recs appreciated, LHC for Monday unless sx worsen; amlodipine 5 mg if BP remains elevated.  -Continue to trend trops and follow up with cards.

## 2019-11-16 NOTE — H&P ADULT - NSHPPHYSICALEXAM_GEN_ALL_CORE
PHYSICAL EXAM:   GENERAL: Alert. Not confused. No acute distress. Not thin. Not cachectic. Not obese.  HEAD:  Atraumatic. Normocephalic.  EYES: EOMI. PERRLA. Normal conjunctiva/sclera.  ENT: Neck supple. No JVD. Moist oral mucosa. Not edentulous. No thrush.  LYMPH: Normal supraclavicular/cervical lymph nodes.   CARDIAC: Not tachy, Not aaliyah. Regular rhythm. Not irregularly irregular. S1. S2. No murmur. No rub. No distant heart sounds.  LUNG/CHEST: CTAB. BS equal bilaterally. No wheezes. No rales. No rhonchi.  ABDOMEN: Soft. No tenderness. No distension. No fluid wave. Normal bowel sounds.  BACK: No midline/vertebral tenderness. No flank tenderness.  VASCULAR: +2 b/l radial or ulnar pulses. Palpable DP pulses.  EXTREMITIES:  No clubbing. No cyanosis. No edema. Moving all 4.  NEUROLOGY: A&Ox3. Non-focal exam. Cranial nerves intact. Normal speech. Sensation intact.  PSYCH: Normal behavior. Normal affect.  SKIN: No jaundice. No erythema. No rash/lesion.  Vascular Access:     ICU Vital Signs Last 24 Hrs  T(C): 36.7 (16 Nov 2019 04:45), Max: 36.7 (16 Nov 2019 04:45)  T(F): 98 (16 Nov 2019 04:45), Max: 98 (16 Nov 2019 04:45)  HR: 66 (16 Nov 2019 04:45) (65 - 76)  BP: 123/89 (16 Nov 2019 04:45) (123/89 - 214/94)  BP(mean): --  ABP: --  ABP(mean): --  RR: 16 (16 Nov 2019 04:45) (16 - 20)  SpO2: 99% (16 Nov 2019 04:45) (97% - 99%)      I&O's Summary

## 2019-11-16 NOTE — ED PROVIDER NOTE - PROGRESS NOTE DETAILS
Patient reassessed, NAD, non-toxic appearing. results dw pt/family, questions answered. reports persistent pain. cards consulted

## 2019-11-16 NOTE — ED ADULT NURSE NOTE - OBJECTIVE STATEMENT
left chest pain at 7 pm while watching tv; nausea but no vomit left chest pain at 7 pm while watching tv; nausea but no vomit; no cardiac hx; pt is alert and oriented x 4; skin is warm and dry; ekg done in triage and placed on cardiac monitor upon arrival to bed in Ed.

## 2019-11-16 NOTE — ED PROVIDER NOTE - NS ED ROS FT
GENERAL: No fever or chills, //             EYES: no change in vision, //             HEENT: no trouble swallowing or speaking, //             CARDIAC:  chest pain, //              PULMONARY: no cough or SOB, //             GI: no abdominal pain, no nausea or no vomiting, no diarrhea or constipation, //             : No changes in urination,  //            SKIN: no rashes,  //            NEURO: no headache,  //             MSK: No joint pain otherwise as HPI or negative. ~Tera Galindo DO PGY2

## 2019-11-16 NOTE — CONSULT NOTE ADULT - ATTENDING COMMENTS
Physical Therapy Daily Treatment     Visit Count: 5 / 12 (1/15/18)  Plan of Care Dates: Initial: 10/18/2017 Through: 1/13/2018     Insurance Information:   Received auth from [facility] for 12 visit(s) (total of 12 visits, including evaluation). Authorization # [id number] is valid from 10/18/17 - 1/15/18    Next Referring Provider Visit: 11/13/17     Referred by: Leonel Ramirez DPM  Medical Diagnosis (from order): M76.822 Posterior tibial tendinitis of left lower extremity   Insurance: 1. [employer]  2. N/A     Date of onset/injury: September 2017  Diagnosis Precautions: none  Chart reviewed: Relevant co-morbidities, allergies, tests and medications:   See initial Evaluation for past medical and surgical history.     SUBJECTIVE   Patient states that she feels \"good\" today. Patient reports walking half mile a couple times since her last visit, and is continuing to not use the boot.      Current Pain: 0/10.    Functional Change: Patient reports that she can do more on her feet; however, days with more activity can lead to soreness/tiredness, such as when performing multiple errands. Patient states she usually feels good in the morning, but by the end of day she can be sore.    OBJECTIVE     Observation/Gait: Patient arrived to today's session with no apparent antalgic gait and was wearing athletic shoes.     Treatment     Therapeutic Exercise: (bundled with neuromuscular re-education)   Double-leg bridge hold with marches   - 2 x 10 reps bilaterally   Side-lying straight-leg abduction    - x 20 reps     Neuromuscular Re-Education:  Single-Leg stance (right/left)   - x 1 minute even surface    - x 1 minute foam surface    - x 1 minute weight shifts (fwd/bwd)     Gait Training:   Tandem walking (forward/backward)   - 3 x 10' each direction  Step-ups with second hold (6\") bilaterally    - x 10 reps    - high knees x 10 reps    - lateral with high knee hold x 10 reps     Manual Therapy:   Left Foot  Manual  Rearfoot Distraction   - 2 x 30 seconds   Manual medial/lateral calcaneal glides    - 2 x 30 seconds (grade 4)  Manual Stretching    - Dorsiflexion - 2 x 30 seconds      Current Home Program (not performed this date except as noted above):   Exercise: Date issued Date DC Comments   Longsitting gastroc stretch with belt*  Supine hamstring stretch with belt*  Resisted ankle PF with t-band (green)*  Seated self-ankle DF stretch*  Seated closed-chain ankle DF/PF AROM * 10/18/17     Resisted clams, hip abduction, standing 3-way hip, standing fire hydrant, bridge with clam 11/1/17  To be performed 3-4 times a week           ASSESSMENT   Patient tolerated treatment well, especially with implementation of more challenging weight-bearing exercises. Patient required initial cuing to correct straight-leg abduction raises to proper alignment, and was provided cuing for maintaining correct foot posture in single-leg stance, which improved balance and exercise performance. Patient showed intermittent bouts of instability with exercises; although, the majority of repetitions were controlled. Patient continues to show active engagement in sessions, and receives instruction/education well.     Pain after treatment: 1-2/10  Patient educated on gradually progressing walking distance up to 1 mile, by the end of the upcoming weekend. Patient instructed that she would be able to walk, while pushing a stroller, as well as to keep up with icing as needed.   Result of above outlined education: Verbalizes understanding and Demonstrates understanding    Goals:     To be obtained by end of this plan of care:  1.  Patient will be issued, fit, instructed in custom-molded orthotics  2.  Patient will demonstrate left foot/ankle pain < 2/10 with iADL's, ADL's, work, fitness/wellness activities  3.  Patient will demonstrate left ankle AROM and PROM which is symmetrical, WNL, pain free including dorsiflexion = 20°  4.  Patient will demonstrate  left gastroc flexibility which is symmetrical, WNL, pain free including dorsiflexion = 20°  5.  Patient will demonstrate ability to ambulate between rooms, 2 blocks, 1 mile with non-antalgic gait pattern on even and uneven surfaces at variable speeds without limitation  6.  Patient will demonstrate ability to perform SL balance x 20 seconds on even and uneven surface without loss of balance to negotiate uneven terrain without limitation  7.  Patient will demonstrate ability to perform SL squat x 5 repetitions with good dynamic knee control to climb stairs without limitation  8.  Patient will demonstrate ability to perform SL heel raise x 20 repetitions with symmetrical heel rise to walk all distances without limitation  9. Patient will demonstrate improvement in lower extremity functional scale score from initial score of 30 to greater than or equal to 70.   9.  Patient will be independent with [facility] for self-management of left foot/ankle pain     PLAN   Next Session: Patient care to implement orthotic casting at next session per patient presentation. Continue to progress weight-bearing functional gait/balance exercises focusing on hip/ankle control per patient tolerance, as well as implement manual intervention and core strengthening as needed.      ultrasound vs iontophoresis - posterior tibial tendon   instrument assisted soft tissue mobilization - gastroc/soleus complex  select foot/ankle mobilization - emphasis ankle dorsiflexion and rearfoot mobility  Gastroc/soleus stretching and strengthening  foot/ankle range of motion, strength, coordination exercises  - incorporate eccentric training  kinesiology taping as needed  Footwear recommendations and custom-molded orthotics  review, modify, progress home exercises     THERAPY DAILY BILLING   Primary Insurance:  [employer]  Secondary Insurance: N/A    Evaluation Procedures:  No evaluation codes were used on this date of service    Timed  Procedures:  Gait Training, 18 minutes  Manual Therapy, 10 minutes  Neuromuscular Re-Education, 17 minutes    Untimed Procedures:  No untimed codes were used on this date of service    Total Treatment Time: 45 Minutes    ALLYN Macedo  Care approved by and performed under the direction of therapist.  Student's note read and approved.   Jose Alberto Ramirez, PT   Mr. Larsen reports that his chest pain has significantly improved.  Troponin T 28 --> 40 --> 62.  ACS treatment with DAPT and heparin gtt started.  Plan for [facility] Monday unless change in symptoms.  Repeat ECG and obtain TTE.  Increase atorvastatin to 80 mg q day.  Consider starting amlodipine 5 mg if BP remains elevated.

## 2019-11-16 NOTE — CONSULT NOTE ADULT - SUBJECTIVE AND OBJECTIVE BOX
HPI:  49M bilingual Divehi/english speaking, c hx DM2, pw acute onset chest pain.    Diabetes: h/o Type 2 DM x 17 years, maintained on orals only, metformin and glipizide. States BS controlled at home, FS low to mid 100s although A1c here is 10.8% Denies end-organ damage due to DM +FHx DM brother    Pt states he was eating dinner at around 7PM, when he had two types of chest pain. The first chest pain was a burning epigastric and substernal pain. The second chest pain was a more severe, aching pain, localized over his left lateral chest, with radiation down his left arm and to his back. At around 8pm, after his dinner, pt drank some tea, and then developed diaphoresis, palpitations, nausea, lightheadedness, mild SOB, and unbearable chest pain, which brought him to the hospital. The chest pain was worse with lying flat and better with leaning forward. In the ED, pt reports that he started to cry 2/2 his chest pain and discomfort. Pt states that his pain is significantly improved now, and he only has the mild epigastric pain. Pt has never had anything like this before. Pt's last exercise stress test was in August 2011, results in [employer]scripts, which showed 11 mets, normal results. Father passed away of heart attack in his 70s.    VS: Tm 97.4, P 65, /94, R 20, 97% RA  In the ED, received , NS 1L, pepcid, toradol, morphine, NTG (16 Nov 2019 05:51)      PAST MEDICAL & SURGICAL HISTORY:  Diabetes  No significant past surgical history      FAMILY HISTORY:  FH: HTN (hypertension)  FH: CAD (coronary artery disease)  brother : diabetes    Social History: -cigs    Outpatient Medications: metformin, glipizide    MEDICATIONS  (STANDING):  aspirin enteric coated 81 milliGRAM(s) Oral daily  atorvastatin 80 milliGRAM(s) Oral at bedtime  dextrose 5%. 1000 milliLiter(s) (50 mL/Hr) IV Continuous <Continuous>  dextrose 50% Injectable 12.5 Gram(s) IV Push once  dextrose 50% Injectable 25 Gram(s) IV Push once  dextrose 50% Injectable 25 Gram(s) IV Push once  famotidine    Tablet 20 milliGRAM(s) Oral two times a day  heparin  Infusion.  Unit(s)/Hr (7.5 mL/Hr) IV Continuous <Continuous>  influenza   Vaccine 0.5 milliLiter(s) IntraMuscular once  insulin lispro (HumaLOG) corrective regimen sliding scale   SubCutaneous three times a day before meals  insulin lispro (HumaLOG) corrective regimen sliding scale   SubCutaneous at bedtime  metoprolol tartrate 12.5 milliGRAM(s) Oral two times a day    MEDICATIONS  (PRN):  dextrose 40% Gel 15 Gram(s) Oral once PRN Blood Glucose LESS THAN 70 milliGRAM(s)/deciliter  glucagon  Injectable 1 milliGRAM(s) IntraMuscular once PRN Glucose LESS THAN 70 milligrams/deciliter  heparin  Injectable 3800 Unit(s) IV Push every 6 hours PRN For aPTT less than 40      Allergies    No Known Allergies    Intolerances      Review of Systems:  Constitutional: No fever  Eyes: No blurry vision  Neuro: No tremors  HEENT: No pain  Cardiovascular: No chest pain, palpitations  Respiratory: No SOB, no cough  GI: No nausea, vomiting, abdominal pain  : No dysuria  Skin: no rash  Psych: no depression  Endocrine: no polyuria, polydipsia  Hem/lymph: no swelling  Osteoporosis: no fractures    PHYSICAL EXAM:  VITALS: T(C): 36.8 (11-16-19 @ 12:20)  T(F): 98.2 (11-16-19 @ 12:20), Max: 98.2 (11-16-19 @ 12:20)  HR: 66 (11-16-19 @ 12:20) (60 - 76)  BP: 137/80 (11-16-19 @ 12:20) (123/89 - 214/94)  RR:  (15 - 20)  SpO2:  (97% - 99%)  Wt(kg): --65 kg  GENERAL: NAD, well-groomed, well-developed  EYES: No proptosis, no lid lag, anicteric  HEENT:  Atraumatic, Normocephalic, moist mucous membranes  THYROID: Normal size, no palpable nodules  RESPIRATORY: Clear to auscultation bilaterally; No rales, rhonchi, wheezing, or rubs  CARDIOVASCULAR: Regular rate and rhythm; No murmurs; no peripheral edema  pedal pulses present, skin intact  GI: Soft, nontender, non distended, normal bowel sounds  SKIN: Dry, intact, No rashes or lesions  MUSCULOSKELETAL: Full range of motion, normal strength  NEURO: sensation intact, extraocular movements intact, no tremor, normal reflexes  PSYCH: Alert and oriented x 3, normal affect, normal mood  CUSHING'S SIGNS: no striae    POCT Blood Glucose.: 219 mg/dL (11-16-19 @ 12:43)  POCT Blood Glucose.: 193 mg/dL (11-16-19 @ 08:57)                            13.0   11.73 )-----------( 230      ( 16 Nov 2019 12:17 )             36.3       11-16    135  |  102  |  12  ----------------------------<  260<H>  4.3   |  23  |  0.66    EGFR if : 130  EGFR if non : 112    Ca    9.3      11-16  Mg     1.7     11-16  Phos  3.5     11-16    TPro  6.9  /  Alb  4.0  /  TBili  0.5  /  DBili  x   /  AST  18  /  ALT  18  /  AlkPhos  72  11-16      Thyroid Function Tests:  11-16 @ 10:03 TSH 2.23 FreeT4 -- T3 -- Anti TPO -- Anti Thyroglobulin Ab -- TSI --      Hemoglobin A1C, Whole Blood: 10.8 % <H> [4.0 - 5.6] (11-16-19 @ 10:30)      11-16 Chol 159 LDL 90 HDL 51 Trig 88    Radiology:

## 2019-11-16 NOTE — PROGRESS NOTE ADULT - PROBLEM SELECTOR PLAN 4
DVT: on heparin gtt DVT: on heparin gtt  Diet: DASH/TLC DVT PPx: on heparin gtt  Diet: DASH/TLC and CC.

## 2019-11-16 NOTE — CONSULT NOTE ADULT - ASSESSMENT
48 yo male with long-standing Type 2 DM, poorly controlled on orals, adm for evaluation of chest pain.   Recommend change to basal/ bolus insulin during hospitalization, can start with weight based regimen of 0.5 IU per kg divided.   Goals of long-term DM disc, will need outpatient endocrine f/u

## 2019-11-16 NOTE — H&P ADULT - NSHPLABSRESULTS_GEN_ALL_CORE
Personally reviewed labs.   Personally reviewed imaging.   Personally reviewed EKG.                           14.8   13.36 )-----------( 248      ( 16 Nov 2019 01:08 )             41.6       11-16    137  |  97  |  13  ----------------------------<  326<H>  4.8   |  25  |  0.77    Ca    10.5      16 Nov 2019 01:08  Mg     1.9     11-16    TPro  8.1  /  Alb  4.8  /  TBili  0.5  /  DBili  x   /  AST  15  /  ALT  21  /  AlkPhos  87  11-16            LIVER FUNCTIONS - ( 16 Nov 2019 01:08 )  Alb: 4.8 g/dL / Pro: 8.1 g/dL / ALK PHOS: 87 U/L / ALT: 21 U/L / AST: 15 U/L / GGT: x             PT/INR - ( 16 Nov 2019 01:08 )   PT: 11.0 sec;   INR: 0.97 ratio         PTT - ( 16 Nov 2019 01:08 )  PTT:29.2 sec Personally reviewed labs.   Personally reviewed imaging.   Personally reviewed EKG. NSR, rate 80, . LVH. TWI in III. STD in III. <2mm RAVINDRA in V3-V5.                          14.8   13.36 )-----------( 248      ( 16 Nov 2019 01:08 )             41.6       11-16    137  |  97  |  13  ----------------------------<  326<H>  4.8   |  25  |  0.77    Ca    10.5      16 Nov 2019 01:08  Mg     1.9     11-16    TPro  8.1  /  Alb  4.8  /  TBili  0.5  /  DBili  x   /  AST  15  /  ALT  21  /  AlkPhos  87  11-16            LIVER FUNCTIONS - ( 16 Nov 2019 01:08 )  Alb: 4.8 g/dL / Pro: 8.1 g/dL / ALK PHOS: 87 U/L / ALT: 21 U/L / AST: 15 U/L / GGT: x             PT/INR - ( 16 Nov 2019 01:08 )   PT: 11.0 sec;   INR: 0.97 ratio         PTT - ( 16 Nov 2019 01:08 )  PTT:29.2 sec

## 2019-11-16 NOTE — PROGRESS NOTE ADULT - PROBLEM SELECTOR PLAN 3
- poss 2/2 high dose metformin - poss 2/2 high dose metformin?  confirmed home meds with pharmacy, on glipizide 5mg BID and metformin 800mg TID - poss 2/2 high dose metformin?  confirmed home meds with pharmacy, on glipizide 5mg BID and metformin 800mg TID  -f/u rpt V[facility]

## 2019-11-16 NOTE — PROGRESS NOTE ADULT - ATTENDING COMMENTS
-Patient seen/examined on 11/16/19. Case/plan discussed with the intern and resident as reviewed/edited by me above and in any comments below.  -F/u with cards regarding rising trops.

## 2019-11-16 NOTE — PROGRESS NOTE ADULT - ASSESSMENT
49M bilingual Swedish/english speaking, c hx DM2, pw NSTEMI 49M bilingual Icelandic/english speaking, PMH of DMII (oral meds only with A1c of 10.8), pw NSTEMI.

## 2019-11-16 NOTE — H&P ADULT - PROBLEM SELECTOR PLAN 1
- trend CE  - tele  - TTE  - hep gtt  - asa, brilinta  - metoprolol as per card recs  - will need [facility]  - pepcid

## 2019-11-16 NOTE — ED PROVIDER NOTE - ATTENDING CONTRIBUTION TO CARE
Afebrile. Awake and Alert. Lungs CTA. Heart RRR. Abdomen soft NTND. CN II-XII grossly intact. Moves all extremities without lateralization.    r/o ACS  PE less likely, no hypoxia or tachycardia  No cough to suggest infectious etiology Afebrile. Awake and Alert. Lungs CTA. Heart RRR. Abdomen soft NTND. CN II-XII grossly intact. Moves all extremities without lateralization.    r/o ACS  PE less likely, no hypoxia or tachycardia  No cough to suggest infectious etiology  r/o GB pathology Afebrile. Awake and Alert. Lungs CTA. Heart RRR. Abdomen soft NTND. CN II-XII grossly intact. Moves all extremities without lateralization.    r/o ACS  PE less likely, no hypoxia or tachycardia  No cough to suggest infectious etiology  h/o gallstones, however, no RUQ TTP

## 2019-11-16 NOTE — ED PROVIDER NOTE - PHYSICAL EXAMINATION
General: well appearing, interactive, well nourished, no apparent distress, ncat  HEENT: EOMI, PERRLA, normal mucosa, normal oropharynx, no lesions on the lips or on oral mucosa, normal external ear  Neck: supple, no lymphadenopathy, full range of motion, no nuchal rigidity  CV: RRR, normal S1 and S2 with no murmur, capillary refill less than two seconds, pp 2+  Resp: lungs CTA b/l, good aeration bilaterally, symmetric chest wall   Abd: non-distended, soft, non-tender  : no CVA tenderness  MSK: full range of motion, no cyanosis, no edema, no clubbing, no immobility  Neuro: CN II-XII grossly intact, muscle strength 5/5 in all extremities

## 2019-11-16 NOTE — PROGRESS NOTE ADULT - PROBLEM SELECTOR PLAN 2
- ISS, a1c - ISS with FSBGs  - A1c 10.8  - c/s Endocrine - ISS with FSBGs  - A1c 10.8  - Appreciate Endocrine recs re: insulin - ISS with FSBGs  - A1c 10.8  - Appreciate Endocrine recs re: insulin lantus 15u qhs/ humalog 5u TID AC - ISS with FSBGs  - A1c 10.8  - Appreciate Endocrine recs re: insulin lantus 15u qhs/ humalog 5u TID AC  -Nutrition eval.

## 2019-11-16 NOTE — PROGRESS NOTE ADULT - PROBLEM SELECTOR PLAN 5
Transitions of Care Status:  1.  Name of PCP:  2.  PCP Contacted on Admission: [ ] Y    [ ] N    3.  PCP contacted at Discharge: [ ] Y    [ ] N    [ ] N/A  4.  Post-Discharge Appointment Date and Location:  5.  Summary of Handoff given to PCP: Transitions of Care Status:  1.  Name of PCP:  Pt does not have PCP, goes to [facility] clinic  2.  PCP Contacted on Admission: [ ] Y    [ ] N    3.  PCP contacted at Discharge: [ ] Y    [ ] N    [ ] N/A  4.  Post-Discharge Appointment Date and Location:  5.  Summary of Handoff given to PCP:

## 2019-11-16 NOTE — H&P ADULT - NSHPSOCIALHISTORY_GEN_ALL_CORE
Patient is scheduled for surgery 6/20/2017 with Dr Renae for left inguinal hernia    Patient is having lab work done with surgeon today 6/15  Patient had a CXR 5/6/2017    Please advise if you would like any other testing   Social History:    Marital Status: ( x ) , (  ) Single, (  ) , (  ) , (  )   # of Children: 2 sons  family lives in [facility]  Lives with: ( x ) alone, (  ) children, (  ) spouse, (  ) parents, (  ) siblings, (  ) friends, (  ) other:   Occupation: convenience store    Substance Use/Illicit Drugs: (  ) never used vs other:   Tobacco Usage: ( x ) never smoked, (  ) former smoker, (  ) current smoker and Total Pack-Years:   Last Alcohol Usage/Frequency/Amount/Withdrawal/Hx of Abuse:  1 shot of marilia 2 days ago. usually drinks once a week  Foreign travel:   Animal exposure:

## 2019-11-16 NOTE — CONSULT NOTE ADULT - SUBJECTIVE AND OBJECTIVE BOX
Patient seen and evaluated at bedside    Chief Complaint: chest pain, improving    HPI:  50 yo male with a history of DMII (oral meds only) presents with chest pain.  He is associating it with a meal he had earlier in the day.  There was food that was "old" in his fridge for many days and 2 hours after eating it he developed burning pain in his epigastrium and chest.  However he also says chest pain radiated over both sides of his chest.  Pain was very severe and brought him to the ed.  He has noted that the pain was resolving, and improved with sublingual nitro.  He also felt better with belching.    PMHx:   Diabetes    PSHx:   No significant past surgical history    Allergies:  No Known Allergies      Home Meds: see chart    Current Medications:   dextrose 40% Gel 15 Gram(s) Oral once PRN  dextrose 5%. 1000 milliLiter(s) IV Continuous <Continuous>  dextrose 50% Injectable 12.5 Gram(s) IV Push once  dextrose 50% Injectable 25 Gram(s) IV Push once  dextrose 50% Injectable 25 Gram(s) IV Push once  glucagon  Injectable 1 milliGRAM(s) IntraMuscular once PRN  insulin lispro (HumaLOG) corrective regimen sliding scale   SubCutaneous three times a day before meals  insulin lispro (HumaLOG) corrective regimen sliding scale   SubCutaneous at bedtime      FAMILY HISTORY: CAD in father    Social History:  drinks alcohol socially, no smoking/drugs    REVIEW OF SYSTEMS:  CONSTITUTIONAL: No weakness, fevers or chills  EYES/ENT: No visual changes;  No dysphagia  NECK: No pain or stiffness  RESPIRATORY: No cough, wheezing, hemoptysis; No shortness of breath  CARDIOVASCULAR: see hpi  GASTROINTESTINAL: see hpi  BACK: No back pain  GENITOURINARY: No dysuria, frequency or hematuria  NEUROLOGICAL: No numbness or weakness  SKIN: No itching, burning, rashes, or lesions   All other review of systems is negative unless indicated above.    Physical Exam:  T(F): 97.4 (11-15), Max: 97.4 (11-15)  HR: 70 (11-16) (65 - 76)  BP: 167/95 (11-16) (150/101 - 214/94)  RR: 16 (11-16)  SpO2: 99% (11-16)  GENERAL: No acute distress, well-developed  HEAD:  Atraumatic, Normocephalic  ENT: EOMI, PERRLA, conjunctiva and sclera clear, Neck supple, No JVD, moist mucosa  CHEST/LUNG: Clear to auscultation bilaterally; No wheeze, equal breath sounds bilaterally   BACK: No spinal tenderness  HEART: Regular rate and rhythm; No murmurs, rubs, or gallops  ABDOMEN: Soft, Nontender, Nondistended; Bowel sounds present  EXTREMITIES:  No clubbing, cyanosis, or edema  PSYCH: Nl behavior, nl affect  NEUROLOGY: AAOx3, non-focal  SKIN: Normal color, No rashes or lesions    Cardiovascular Diagnostic Testing:    ECG: Personally reviewed: sharp large t waves in v3-v5, sinus 80s    Echo: pending    Imaging:    CXR: unremarkable    Labs: Personally reviewed                        14.8   13.36 )-----------( 248      ( 16 Nov 2019 01:08 )             41.6     11-16    137  |  97  |  13  ----------------------------<  326<H>  4.8   |  25  |  0.77    Ca    10.5      16 Nov 2019 01:08  Mg     1.9     11-16    TPro  8.1  /  Alb  4.8  /  TBili  0.5  /  DBili  x   /  AST  15  /  ALT  21  /  AlkPhos  87  11-16    PT/INR - ( 16 Nov 2019 01:08 )   PT: 11.0 sec;   INR: 0.97 ratio         PTT - ( 16 Nov 2019 01:08 )  PTT:29.2 sec    CARDIAC MARKERS ( 16 Nov 2019 02:41 )  40 ng/L / x     / x     / x     / x     / x      CARDIAC MARKERS ( 16 Nov 2019 01:08 )  28 ng/L / x     / x     / x     / x     / x        Serum Pro-Brain Natriuretic Peptide: 5 pg/mL (11-16 @ 01:08)      Assessment and Plan:    50 yo male with a history of DMII (oral meds only) presents with chest pain.  There are many atypical features to his pain including onset after eating, burning sensation.  Abnormal ekg changes, troponin negative although trending up, arm pain radiation, and FHx of CAD, would initially manage for UA.      Possible Unstable angina, pain resolving  - treat with heparin gtt, ASA, brillinta, statin, metop 12.5 bid  - get echo, trend complete set of cardiac enzymes and ekg  - SL nitro prn  - stress test Sunday/Monday  - monitor on tele      For all Cardiology service contact information, go to [email or web address] and use "[facility]" to login.

## 2019-11-17 ENCOUNTER — TRANSCRIPTION ENCOUNTER (OUTPATIENT)
Age: 49
End: 2019-11-17

## 2019-11-17 DIAGNOSIS — R07.89 OTHER CHEST PAIN: ICD-10-CM

## 2019-11-17 DIAGNOSIS — I10 ESSENTIAL (PRIMARY) HYPERTENSION: ICD-10-CM

## 2019-11-17 LAB
ALBUMIN SERPL ELPH-MCNC: 4.2 G/DL — SIGNIFICANT CHANGE UP (ref 3.3–5)
ALP SERPL-CCNC: 75 U/L — SIGNIFICANT CHANGE UP (ref 40–120)
ALT FLD-CCNC: 27 U/L — SIGNIFICANT CHANGE UP (ref 10–45)
ANION GAP SERPL CALC-SCNC: 13 MMOL/L — SIGNIFICANT CHANGE UP (ref 5–17)
APTT BLD: 66 SEC — HIGH (ref 27.5–36.3)
AST SERPL-CCNC: 69 U/L — HIGH (ref 10–40)
BASOPHILS # BLD AUTO: 0.04 K/UL — SIGNIFICANT CHANGE UP (ref 0–0.2)
BASOPHILS NFR BLD AUTO: 0.3 % — SIGNIFICANT CHANGE UP (ref 0–2)
BILIRUB SERPL-MCNC: 0.7 MG/DL — SIGNIFICANT CHANGE UP (ref 0.2–1.2)
BUN SERPL-MCNC: 11 MG/DL — SIGNIFICANT CHANGE UP (ref 7–23)
CALCIUM SERPL-MCNC: 9.4 MG/DL — SIGNIFICANT CHANGE UP (ref 8.4–10.5)
CHLORIDE SERPL-SCNC: 101 MMOL/L — SIGNIFICANT CHANGE UP (ref 96–108)
CK MB BLD-MCNC: 13.2 % — HIGH (ref 0–3.5)
CK MB CFR SERPL CALC: 105.3 NG/ML — HIGH (ref 0–6.7)
CK SERPL-CCNC: 798 U/L — HIGH (ref 30–200)
CO2 SERPL-SCNC: 27 MMOL/L — SIGNIFICANT CHANGE UP (ref 22–31)
CREAT SERPL-MCNC: 0.68 MG/DL — SIGNIFICANT CHANGE UP (ref 0.5–1.3)
EOSINOPHIL # BLD AUTO: 0.2 K/UL — SIGNIFICANT CHANGE UP (ref 0–0.5)
EOSINOPHIL NFR BLD AUTO: 1.5 % — SIGNIFICANT CHANGE UP (ref 0–6)
GLUCOSE BLDC GLUCOMTR-MCNC: 193 MG/DL — HIGH (ref 70–99)
GLUCOSE BLDC GLUCOMTR-MCNC: 262 MG/DL — HIGH (ref 70–99)
GLUCOSE BLDC GLUCOMTR-MCNC: 327 MG/DL — HIGH (ref 70–99)
GLUCOSE SERPL-MCNC: 161 MG/DL — HIGH (ref 70–99)
HCT VFR BLD CALC: 39 % — SIGNIFICANT CHANGE UP (ref 39–50)
HGB BLD-MCNC: 13.6 G/DL — SIGNIFICANT CHANGE UP (ref 13–17)
IMM GRANULOCYTES NFR BLD AUTO: 0.3 % — SIGNIFICANT CHANGE UP (ref 0–1.5)
LYMPHOCYTES # BLD AUTO: 32.3 % — SIGNIFICANT CHANGE UP (ref 13–44)
LYMPHOCYTES # BLD AUTO: 4.24 K/UL — HIGH (ref 1–3.3)
MAGNESIUM SERPL-MCNC: 1.7 MG/DL — SIGNIFICANT CHANGE UP (ref 1.6–2.6)
MCHC RBC-ENTMCNC: 31.1 PG — SIGNIFICANT CHANGE UP (ref 27–34)
MCHC RBC-ENTMCNC: 34.9 GM/DL — SIGNIFICANT CHANGE UP (ref 32–36)
MCV RBC AUTO: 89.2 FL — SIGNIFICANT CHANGE UP (ref 80–100)
MONOCYTES # BLD AUTO: 0.78 K/UL — SIGNIFICANT CHANGE UP (ref 0–0.9)
MONOCYTES NFR BLD AUTO: 5.9 % — SIGNIFICANT CHANGE UP (ref 2–14)
NEUTROPHILS # BLD AUTO: 7.82 K/UL — HIGH (ref 1.8–7.4)
NEUTROPHILS NFR BLD AUTO: 59.7 % — SIGNIFICANT CHANGE UP (ref 43–77)
NRBC # BLD: 0 /100 WBCS — SIGNIFICANT CHANGE UP (ref 0–0)
PHOSPHATE SERPL-MCNC: 2.8 MG/DL — SIGNIFICANT CHANGE UP (ref 2.5–4.5)
PLATELET # BLD AUTO: 263 K/UL — SIGNIFICANT CHANGE UP (ref 150–400)
POTASSIUM SERPL-MCNC: 3.6 MMOL/L — SIGNIFICANT CHANGE UP (ref 3.5–5.3)
POTASSIUM SERPL-SCNC: 3.6 MMOL/L — SIGNIFICANT CHANGE UP (ref 3.5–5.3)
PROT SERPL-MCNC: 7.2 G/DL — SIGNIFICANT CHANGE UP (ref 6–8.3)
RBC # BLD: 4.37 M/UL — SIGNIFICANT CHANGE UP (ref 4.2–5.8)
RBC # FLD: 12.7 % — SIGNIFICANT CHANGE UP (ref 10.3–14.5)
SODIUM SERPL-SCNC: 141 MMOL/L — SIGNIFICANT CHANGE UP (ref 135–145)
TROPONIN T, HIGH SENSITIVITY RESULT: 1188 NG/L — HIGH (ref 0–51)
WBC # BLD: 13.12 K/UL — HIGH (ref 3.8–10.5)
WBC # FLD AUTO: 13.12 K/UL — HIGH (ref 3.8–10.5)

## 2019-11-17 PROCEDURE — 99233 SBSQ HOSP IP/OBS HIGH 50: CPT | Mod: GC

## 2019-11-17 PROCEDURE — 92941 PRQ TRLML REVSC TOT OCCL AMI: CPT | Mod: LC

## 2019-11-17 PROCEDURE — 93458 L HRT ARTERY/VENTRICLE ANGIO: CPT | Mod: 26,59

## 2019-11-17 PROCEDURE — 92929: CPT | Mod: LC

## 2019-11-17 PROCEDURE — 93010 ELECTROCARDIOGRAM REPORT: CPT

## 2019-11-17 PROCEDURE — 99232 SBSQ HOSP IP/OBS MODERATE 35: CPT | Mod: GC

## 2019-11-17 PROCEDURE — 93306 TTE W/DOPPLER COMPLETE: CPT | Mod: 26

## 2019-11-17 PROCEDURE — 99232 SBSQ HOSP IP/OBS MODERATE 35: CPT

## 2019-11-17 PROCEDURE — 99152 MOD SED SAME PHYS/QHP 5/>YRS: CPT

## 2019-11-17 RX ORDER — LISINOPRIL 2.5 MG/1
2.5 TABLET ORAL DAILY
Refills: 0 | Status: DISCONTINUED | OUTPATIENT
Start: 2019-11-17 | End: 2019-11-18

## 2019-11-17 RX ORDER — MAGNESIUM SULFATE 500 MG/ML
2 VIAL (ML) INJECTION ONCE
Refills: 0 | Status: COMPLETED | OUTPATIENT
Start: 2019-11-17 | End: 2019-11-17

## 2019-11-17 RX ORDER — METOPROLOL TARTRATE 50 MG
25 TABLET ORAL EVERY 12 HOURS
Refills: 0 | Status: DISCONTINUED | OUTPATIENT
Start: 2019-11-17 | End: 2019-11-18

## 2019-11-17 RX ORDER — INSULIN GLARGINE 100 [IU]/ML
18 INJECTION, SOLUTION SUBCUTANEOUS AT BEDTIME
Refills: 0 | Status: DISCONTINUED | OUTPATIENT
Start: 2019-11-17 | End: 2019-11-19

## 2019-11-17 RX ORDER — INSULIN LISPRO 100/ML
7 VIAL (ML) SUBCUTANEOUS
Refills: 0 | Status: DISCONTINUED | OUTPATIENT
Start: 2019-11-17 | End: 2019-11-19

## 2019-11-17 RX ORDER — CHLORHEXIDINE GLUCONATE 213 G/1000ML
1 SOLUTION TOPICAL AT BEDTIME
Refills: 0 | Status: DISCONTINUED | OUTPATIENT
Start: 2019-11-17 | End: 2019-11-19

## 2019-11-17 RX ADMIN — Medication 81 MILLIGRAM(S): at 09:06

## 2019-11-17 RX ADMIN — FAMOTIDINE 20 MILLIGRAM(S): 10 INJECTION INTRAVENOUS at 16:42

## 2019-11-17 RX ADMIN — Medication 50 GRAM(S): at 11:29

## 2019-11-17 RX ADMIN — ATORVASTATIN CALCIUM 80 MILLIGRAM(S): 80 TABLET, FILM COATED ORAL at 21:05

## 2019-11-17 RX ADMIN — Medication 3: at 11:30

## 2019-11-17 RX ADMIN — CHLORHEXIDINE GLUCONATE 1 APPLICATION(S): 213 SOLUTION TOPICAL at 21:05

## 2019-11-17 RX ADMIN — INSULIN GLARGINE 15 UNIT(S): 100 INJECTION, SOLUTION SUBCUTANEOUS at 21:06

## 2019-11-17 RX ADMIN — LISINOPRIL 2.5 MILLIGRAM(S): 2.5 TABLET ORAL at 16:42

## 2019-11-17 RX ADMIN — Medication 2: at 21:05

## 2019-11-17 RX ADMIN — HEPARIN SODIUM 850 UNIT(S)/HR: 5000 INJECTION INTRAVENOUS; SUBCUTANEOUS at 03:47

## 2019-11-17 RX ADMIN — FAMOTIDINE 20 MILLIGRAM(S): 10 INJECTION INTRAVENOUS at 05:19

## 2019-11-17 RX ADMIN — Medication 12.5 MILLIGRAM(S): at 05:19

## 2019-11-17 RX ADMIN — Medication 25 MILLIGRAM(S): at 16:42

## 2019-11-17 RX ADMIN — Medication 5 UNIT(S): at 11:30

## 2019-11-17 RX ADMIN — TICAGRELOR 90 MILLIGRAM(S): 90 TABLET ORAL at 05:19

## 2019-11-17 RX ADMIN — TICAGRELOR 90 MILLIGRAM(S): 90 TABLET ORAL at 16:42

## 2019-11-17 NOTE — PROGRESS NOTE ADULT - PROBLEM SELECTOR PLAN 1
- trend CE: trops 28->40->62->186  - TTE pending  - hep gtt 750 units/hr  - asa 81mg, brilinta 90mg q12hr  - metoprolol as per card recs 12.5mg BID  - atorvastatin 80mg qHS  - pepcid 20mg BID  - f/u EKG unchanged  - CK elevated 540, CKMB elevated 65.9, CPK Mass Assay % 12.2  - TSH, lipids WNL  - cards recs appreciated, LHC for Monday unless sx worsen; amlodipine 5 mg if BP remains elevated.  -Continue to trend trops and follow up with cards. - trend CE: trops 28->40->62->186->454->853->1188  CKMB: 65.9->106.7->111.6->105.3  CK: 179->540->762->867->798  - Went for [facility] this AM given uptrending Ace, worsening chest pain  - TTE pending  - hep gtt 750 units/hr  - asa 81mg, brilinta 90mg q12hr  - metoprolol as per card recs 12.5mg BID  - atorvastatin 80mg qHS  - pepcid 20mg BID  - f/u EKG overnight unchanged  - TSH, lipids WNL  - pt to be transferred to CCU s/p [facility]

## 2019-11-17 NOTE — PROGRESS NOTE ADULT - ASSESSMENT
49M bilingual Yakut/english speaking, PMH of DMII (oral meds only with A1c of 10.8), pw NSTEMI. 49M bilingual Slovak/english speaking, PMH of DMII (oral meds only with A1c of 10.8), pw NSTEMI vs UA.

## 2019-11-17 NOTE — PROGRESS NOTE ADULT - SUBJECTIVE AND OBJECTIVE BOX
Patient seen and examined at bedside.    Overnight Events: Waxing/waning midsternal chest pain since having dinner last night. No SOB, lightheadedness/dizziness.       REVIEW OF SYSTEMS:  Constitutional:     No fevers, chills, weight loss, weight gain  HEENT:                  No dry eyes, nasal congestion, postnasal drip  CV:                         see above  Resp:                     No cough, SOB, dyspnea, wheezing, sputum  GI:                          No nausea, vomiting, abdominal pain, diarrhea, constipation  :                        No dysuria, nocturia, hematuria, increased urinary frequency  Musculoskeletal: No back pain, myalgias, arthralgias   Skin:                       No rash, pruritus, ecchymoses  Neurological:        No headache, dizziness, syncope, weakness, numbness  Psychiatric:           No anxiety, depression   Endocrine:            No hot/cold intolerance, polydipsia  Heme/Lymph:      No bleeding, easy bruising  Allergic/Immune: No itchy eyes, rhinorrhea, hives angioedema      Current Meds:  acetaminophen   Tablet .. 650 milliGRAM(s) Oral every 6 hours PRN  aspirin enteric coated 81 milliGRAM(s) Oral daily  atorvastatin 80 milliGRAM(s) Oral at bedtime  dextrose 40% Gel 15 Gram(s) Oral once PRN  dextrose 5%. 1000 milliLiter(s) IV Continuous <Continuous>  dextrose 50% Injectable 12.5 Gram(s) IV Push once  dextrose 50% Injectable 25 Gram(s) IV Push once  dextrose 50% Injectable 25 Gram(s) IV Push once  famotidine    Tablet 20 milliGRAM(s) Oral two times a day  glucagon  Injectable 1 milliGRAM(s) IntraMuscular once PRN  heparin  Infusion.  Unit(s)/Hr IV Continuous <Continuous>  heparin  Injectable 3800 Unit(s) IV Push every 6 hours PRN  influenza   Vaccine 0.5 milliLiter(s) IntraMuscular once  insulin glargine Injectable (LANTUS) 15 Unit(s) SubCutaneous at bedtime  insulin lispro (HumaLOG) corrective regimen sliding scale   SubCutaneous three times a day before meals  insulin lispro (HumaLOG) corrective regimen sliding scale   SubCutaneous at bedtime  insulin lispro Injectable (HumaLOG) 5 Unit(s) SubCutaneous three times a day before meals  metoprolol tartrate 12.5 milliGRAM(s) Oral two times a day  nitroglycerin     SubLingual 0.4 milliGRAM(s) SubLingual every 5 minutes PRN  ticagrelor 90 milliGRAM(s) Oral every 12 hours      PAST MEDICAL & SURGICAL HISTORY:  Diabetes  No significant past surgical history      Vitals:  T(F): 97.5 (11-17), Max: 98.3 (11-16)  HR: 71 (-17) (66 - 80)  BP: 153/82 (11-17) (137/80 - 161/95)  RR: 18 (-)  SpO2: 96% ()  I&O's Summary    2019 07:01  -  2019 07:00  --------------------------------------------------------  IN: 508.5 mL / OUT: 1950 mL / NET: -1441.5 mL        Physical Exam:  Appearance: No acute distress   Eyes: PERRL, EOMI, pink conjunctiva  HENT: Normal oral mucosa  Cardiovascular: RRR, S1, S2, no murmurs, rubs, or gallops; no edema; no JVD  Respiratory: Clear to auscultation bilaterally  Gastrointestinal: soft, non-tender, non-distended with normal bowel sounds  Musculoskeletal: No clubbing; no joint deformity   Neurologic: Non-focal  Lymphatic: No lymphadenopathy  Psychiatry: AAOx3, mood & affect appropriate  Skin: No rashes, ecchymoses, or cyanosis                          13.6   13.12 )-----------( 263      ( 2019 07:18 )             39.0         141  |  101  |  11  ----------------------------<  161<H>  3.6   |  27  |  0.68    Ca    9.4      2019 07:18  Phos  2.8       Mg     1.7         TPro  7.2  /  Alb  4.2  /  TBili  0.7  /  DBili  x   /  AST  69<H>  /  ALT  27  /  AlkPhos  75      PT/INR - ( 2019 06:09 )   PT: 11.6 sec;   INR: 1.02 ratio         PTT - ( 2019 02:48 )  PTT:66.0 sec  CARDIAC MARKERS ( 2019 01:07 )  x     / x     / 798 U/L / x     / 105.3 ng/mL  CARDIAC MARKERS ( 2019 20:53 )  x     / x     / 867 U/L / x     / 111.6 ng/mL  CARDIAC MARKERS ( 2019 16:18 )  x     / x     / 762 U/L / x     / 106.7 ng/mL  CARDIAC MARKERS ( 2019 12:17 )  x     / x     / 540 U/L / x     / 65.9 ng/mL  CARDIAC MARKERS ( 2019 06:09 )  x     / x     / 179 U/L / x     / x          Serum Pro-Brain Natriuretic Peptide: 5 pg/mL ( @ 01:08)    Total Cholesterol: 159  LDL: 90  HDL: 51  T    Hemoglobin A1C, Whole Blood: 10.8 % ( @ 10:30)      New ECG(s): Personally reviewed      Interpretation of Telemetry: SR 60-90s, overnight episode of sinus tachy 120s

## 2019-11-17 NOTE — PROGRESS NOTE ADULT - ASSESSMENT
49 yr old [facility] male with PMH of DM2, family history of CAD who presented with chest pain and EKG changes, now s/p LHC with KHARI x2 to prox om2 via RRA access.

## 2019-11-17 NOTE — PROGRESS NOTE ADULT - ASSESSMENT
48 yo male with a history of DMII (oral meds only), FMH CAD who presents with chest pain.  Abnormal ekg changes, cardiac biomarkers trending up, ongoing chest pain this morning. Recommend LHC for unstable angina.     #Unstable angina  - Continue heparin gtt, ASA, Brilinta, statin, metop 12.5 bid  - LHC this morning  - check TTE    Discussed with Catracho White and Armen.     TALIB Hartmann MD  Cardiology Fellow  All cardiology service information may be found 24/7 on [email or web address], password: [email or web address]  [phone] 50 yo male with a history of DMII (oral meds only), FMH CAD who presents with chest pain.  Abnormal ekg changes, cardiac biomarkers trending up, ongoing chest pain this morning. Recommend LHC for unstable angina.     #Unstable angina  - Continue heparin gtt, ASA, Brilinta, statin, metop 12.5 bid  - LHC this morning and then transfer to CCU2  - check TTE    Discussed with Catracho White and Armen.     TALIB Hartmann MD  Cardiology Fellow  All cardiology service information may be found 24/7 on [email or web address], password: [employer]  [phone]

## 2019-11-17 NOTE — PROVIDER CONTACT NOTE (OTHER) - ASSESSMENT
Patient is A&O x4, denies SOB, dizziness, arm pain, nausea. VS: Temp 97.9, HR 68, /95, RR 18, O2 98% on room air.

## 2019-11-17 NOTE — PROVIDER CONTACT NOTE (OTHER) - ACTION/TREATMENT ORDERED:
Peter Hewitt MD aware. EKG completed as ordered. nitroglycerin tablet given as ordered. Peter Hewitt MD aware. EKG completed as ordered. nitroglycerin tablet x1 given as ordered. Will continue to monitor.

## 2019-11-17 NOTE — PROGRESS NOTE ADULT - PROBLEM SELECTOR PLAN 2
- ISS with FSBGs  - A1c 10.8  - Appreciate Endocrine recs re: insulin lantus 15u qhs/ humalog 5u TID AC  -Nutrition eval.

## 2019-11-17 NOTE — CHART NOTE - NSCHARTNOTEFT_GEN_A_CORE
Patient in cath lab. Chart checked, missed Breakfast and Dinner time humalog.   Adjust lantus to 18 units and humalog to 7 units TIDAC with low dose sliding scale.

## 2019-11-17 NOTE — PROGRESS NOTE ADULT - PROBLEM SELECTOR PLAN 5
Transitions of Care Status:  1.  Name of PCP:  Pt does not have PCP, goes to [facility] clinic  2.  PCP Contacted on Admission: [ ] Y    [ ] N    3.  PCP contacted at Discharge: [ ] Y    [ ] N    [ ] N/A  4.  Post-Discharge Appointment Date and Location:  5.  Summary of Handoff given to PCP:

## 2019-11-17 NOTE — PROGRESS NOTE ADULT - PROBLEM SELECTOR PLAN 1
-now s/p KHARI x2 to OM2 via RRA  -RRA site stable  -continue aspirin, brilinta, statin  -echo  EF 55%, mild inferior wall hypokinesis

## 2019-11-17 NOTE — PROGRESS NOTE ADULT - PROBLEM SELECTOR PLAN 3
- poss 2/2 high dose metformin?  confirmed home meds with pharmacy, on glipizide 5mg BID and metformin 800mg TID  -f/u rpt VBG Resolved  -rpt VBG with resolution of acidosis, lactate now WNL

## 2019-11-17 NOTE — CHART NOTE - NSCHARTNOTEFT_GEN_A_CORE
Removal of Radial Band    Pulses in the (right) upper extremity are (palpable). The patient was placed in the supine position. The insertion site was identified and the band deflated per protocol. The radial band was removed slowly. Direct pressure was applied for  __0____ minutes/not applicable.      Monitoring of the (right) wrists and both upper extremities including neuro-vascular checks and vital signs every 15 minutes x 4.    Complications: None    Comments: Palpable pulses, LINARES, cap refill < 3 secs, no ecchymosis or hematoma

## 2019-11-17 NOTE — PROGRESS NOTE ADULT - ATTENDING COMMENTS
I interviewed and examined Mr. Larsen.  His case was discussed with Dr. Hartmann.  Mr. Larsen reports resolution of chest pain post PCI/stent.  His BP remains elevated.  Given HTN and DM2 the patient would benefit from an ACE if no evidence of SHAKEEL over the next 48 hours.  TTE pending.

## 2019-11-17 NOTE — CHART NOTE - NSCHARTNOTEFT_GEN_A_CORE
CCU Accept Note    Transfer from:   ( x ) Telemetry     3 DSU    Accepting physican: Deacon Snow MD    HPI:  49M bilingual Azeri/english speaking, c hx DM2, pw acute onset chest pain.  Pt states he was eating dinner at around 7PM, when he had two types of chest pain. The first chest pain was a burning epigastric and substernal pain. The second chest pain was a more severe, aching pain, localized over his left lateral chest, with radiation down his left arm and to his back. At around 8pm, after his dinner, pt drank some tea, and then developed diaphoresis, palpitations, nausea, lightheadedness, mild SOB, and unbearable chest pain, which brought him to the hospital. The chest pain was worse with lying flat and better with leaning forward. In the ED, pt reports that he started to cry 2/2 his chest pain and discomfort. Pt states that his pain is significantly improved now, and he only has the mild epigastric pain. Pt has never had anything like this before. Pt's last exercise stress test was in August 2011, results in allscripts, which showed 11 mets, normal results. Father passed away of heart attack in his 70s.  VS: Tm 97.4, P 65, /94, R 20, 97% RA  In the ED, received , NS 1L, pepcid, toradol, morphine, NTG  REVIEW OF SYSTEMS:    CONSTITUTIONAL: + weakness, fevers or chills  EYES/ENT: No visual changes;  No vertigo or throat pain   NECK: No pain or stiffness  RESPIRATORY: No cough, wheezing, hemoptysis; No shortness of breath  CARDIOVASCULAR: No chest pain or palpitations  GASTROINTESTINAL: No abdominal or epigastric pain. No nausea, vomiting, or hematemesis; No diarrhea or constipation. No melena or hematochezia.  GENITOURINARY: No dysuria, frequency or hematuria  NEUROLOGICAL: No numbness or weakness  SKIN: No itching, rashes      Vital Signs Last 24 Hrs  T(C): 36.4 (17 Nov 2019 04:43), Max: 36.8 (16 Nov 2019 12:20)  T(F): 97.5 (17 Nov 2019 04:43), Max: 98.3 (16 Nov 2019 20:09)  HR: 94 (17 Nov 2019 10:20) (66 - 94)  BP: 151/88 (17 Nov 2019 10:20) (137/80 - 161/95)  BP(mean): 112 (17 Nov 2019 10:20) (112 - 112)  RR: 17 (17 Nov 2019 10:20) (17 - 18)  SpO2: 96% (17 Nov 2019 10:20) (96% - 99%)  I&O's Summary    16 Nov 2019 07:01  -  17 Nov 2019 07:00  --------------------------------------------------------  IN: 508.5 mL / OUT: 1950 mL / NET: -1441.5 mL      Allergies:      MEDICATIONS  (STANDING):  aspirin enteric coated 81 milliGRAM(s) Oral daily  atorvastatin 80 milliGRAM(s) Oral at bedtime  dextrose 5%. 1000 milliLiter(s) (50 mL/Hr) IV Continuous <Continuous>  dextrose 50% Injectable 12.5 Gram(s) IV Push once  dextrose 50% Injectable 25 Gram(s) IV Push once  dextrose 50% Injectable 25 Gram(s) IV Push once  famotidine    Tablet 20 milliGRAM(s) Oral two times a day  heparin  Infusion.  Unit(s)/Hr (7.5 mL/Hr) IV Continuous <Continuous>  influenza   Vaccine 0.5 milliLiter(s) IntraMuscular once  insulin glargine Injectable (LANTUS) 15 Unit(s) SubCutaneous at bedtime  insulin lispro (HumaLOG) corrective regimen sliding scale   SubCutaneous three times a day before meals  insulin lispro (HumaLOG) corrective regimen sliding scale   SubCutaneous at bedtime  insulin lispro Injectable (HumaLOG) 5 Unit(s) SubCutaneous three times a day before meals  magnesium sulfate  IVPB 2 Gram(s) IV Intermittent once  metoprolol tartrate 12.5 milliGRAM(s) Oral two times a day  ticagrelor 90 milliGRAM(s) Oral every 12 hours    MEDICATIONS  (PRN):  acetaminophen   Tablet .. 650 milliGRAM(s) Oral every 6 hours PRN Mild Pain (1 - 3), Moderate Pain (4 - 6)  dextrose 40% Gel 15 Gram(s) Oral once PRN Blood Glucose LESS THAN 70 milliGRAM(s)/deciliter  glucagon  Injectable 1 milliGRAM(s) IntraMuscular once PRN Glucose LESS THAN 70 milligrams/deciliter  heparin  Injectable 3800 Unit(s) IV Push every 6 hours PRN For aPTT less than 40  nitroglycerin     SubLingual 0.4 milliGRAM(s) SubLingual every 5 minutes PRN Chest Pain      LABS                                            13.6                  Neurophils% (auto):   59.7   (11-17 @ 07:18):    13.12)-----------(263          Lymphocytes% (auto):  32.3                                          39.0                   Eosinphils% (auto):   1.5      Manual%: Neutrophils x    ; Lymphocytes x    ; Eosinophils x    ; Bands%: x    ; Blasts x                                    141    |  101    |  11                  Calcium: 9.4   / iCa: x      (11-17 @ 07:18)    -----------------< from: 12 Lead ECG (11.16.19 @ 01:01) >        -----------<  161       Magnesium: 1.7                              3.6     |  27     |  0.68             Phosphorous: 2.8      TPro  7.2    /  Alb  4.2    /  TBili  0.7    /  DBili  x      /  AST  69     /  ALT  27     /  AlkPhos  75     17 Nov 2019 07:18    ( 11-17 @ 02:48 )   PT: x    ;   INR: x      aPTT: 66.0 sec    PHYSICAL  General: WN/WD NAD  Neurology: Awake, nonfocal, LINARES x 4  Eyes: Scleras clear, PERRLA/ EOMI, Gross vision intact  ENT:Gross hearing intact, grossly patent pharynx, no stridor  Neck: Neck supple, trachea midline, No JVD,   Respiratory: CTA B/L, No wheezing, rales, rhonchi  CV: RRR, S1S2, no murmurs, rubs or gallops  Abdominal: Soft, NT, ND +BS,   Extremities: No edema, + peripheral pulses  Skin: No Rashes, Hematoma, Ecchymosis  Lymphatic: No Neck, axilla, groin LAD  Psych: Oriented x 3, normal affect  Incisions: right radial band intact, fingers pink, warm and mobile      EKG:  Ventricular Rate 80 BPM    Atrial Rate 80 BPM    P-R Interval 124 ms    QRS Duration 74 ms    Q-T Interval 308 ms    QTC Calculation(Bezet) 355 ms    P Axis 35 degrees    R Axis 51 degrees    T Axis 23 degrees    Diagnosis Line NORMAL SINUS RHYTHM  NONSPECIFIC ST ABNORMALITY  ABNORMAL ECG    < end of copied text >  Telemetry:    Echo:    Cardiac Cath:  Stress Test:  Imaging  < from: Xray Chest 1 View- PORTABLE-Urgent (11.16.19 @ 01:08) >    FINDINGS:     The heart is normal in size.   No focal consolidations, pleural effusions or pneumothoraces.  Degenerative changes of the spine.    IMPRESSION:   No focal consolidations.      < end of copied text >      ASSESSMENT & PLAN:   50 yo male with a history of DMII (oral meds only), FMH CAD who p/w CP.  Abnormal ekg changes, cardiac biomarkers trending up, ongoing chest pain this morning. Recommend LHC for unstable angina.     #NSTEMI LHC: s/p PCI LM/LAD mild Dz; prxLCx 70%, OM2 prx 100% KHARI x 2, EDP 12  -accept to CCU for close monitoring  -continue DAPT, high intensity statin, low dose BB with bp/hr parameters  -will add ACE-I as BP and sCr tolerates  -Trend Ace, daily ECGs  -Right radial checks, remove band in 2 hrs  -DASH diet / low card diet  -IHSS obtain AIC if not ordered CCU Accept Note    Transfer from:   ( x ) Telemetry     3 DSU    Accepting physican: Deacon Snow MD    HPI:  49M bilingual Turkish/english speaking, c hx DM2, pw acute onset chest pain.  Pt states he was eating dinner at around 7PM, when he had two types of chest pain. The first chest pain was a burning epigastric and substernal pain. The second chest pain was a more severe, aching pain, localized over his left lateral chest, with radiation down his left arm and to his back. At around 8pm, after his dinner, pt drank some tea, and then developed diaphoresis, palpitations, nausea, lightheadedness, mild SOB, and unbearable chest pain, which brought him to the hospital. The chest pain was worse with lying flat and better with leaning forward. In the ED, pt reports that he started to cry 2/2 his chest pain and discomfort. Pt states that his pain is significantly improved now, and he only has the mild epigastric pain. Pt has never had anything like this before. Pt's last exercise stress test was in August 2011, results in allscripts, which showed 11 mets, normal results. Father passed away of heart attack in his 70s.  VS: Tm 97.4, P 65, /94, R 20, 97% RA  In the ED, received , NS 1L, pepcid, toradol, morphine, NTG  REVIEW OF SYSTEMS:    CONSTITUTIONAL: + weakness, fevers or chills  EYES/ENT: No visual changes;  No vertigo or throat pain   NECK: No pain or stiffness  RESPIRATORY: No cough, wheezing, hemoptysis; No shortness of breath  CARDIOVASCULAR: No chest pain or palpitations  GASTROINTESTINAL: No abdominal or epigastric pain. No nausea, vomiting, or hematemesis; No diarrhea or constipation. No melena or hematochezia.  GENITOURINARY: No dysuria, frequency or hematuria  NEUROLOGICAL: No numbness or weakness  SKIN: No itching, rashes      Vital Signs Last 24 Hrs  T(C): 36.4 (17 Nov 2019 04:43), Max: 36.8 (16 Nov 2019 12:20)  T(F): 97.5 (17 Nov 2019 04:43), Max: 98.3 (16 Nov 2019 20:09)  HR: 94 (17 Nov 2019 10:20) (66 - 94)  BP: 151/88 (17 Nov 2019 10:20) (137/80 - 161/95)  BP(mean): 112 (17 Nov 2019 10:20) (112 - 112)  RR: 17 (17 Nov 2019 10:20) (17 - 18)  SpO2: 96% (17 Nov 2019 10:20) (96% - 99%)  I&O's Summary    16 Nov 2019 07:01  -  17 Nov 2019 07:00  --------------------------------------------------------  IN: 508.5 mL / OUT: 1950 mL / NET: -1441.5 mL      Allergies:      MEDICATIONS  (STANDING):  aspirin enteric coated 81 milliGRAM(s) Oral daily  atorvastatin 80 milliGRAM(s) Oral at bedtime  dextrose 5%. 1000 milliLiter(s) (50 mL/Hr) IV Continuous <Continuous>  dextrose 50% Injectable 12.5 Gram(s) IV Push once  dextrose 50% Injectable 25 Gram(s) IV Push once  dextrose 50% Injectable 25 Gram(s) IV Push once  famotidine    Tablet 20 milliGRAM(s) Oral two times a day  heparin  Infusion.  Unit(s)/Hr (7.5 mL/Hr) IV Continuous <Continuous>  influenza   Vaccine 0.5 milliLiter(s) IntraMuscular once  insulin glargine Injectable (LANTUS) 15 Unit(s) SubCutaneous at bedtime  insulin lispro (HumaLOG) corrective regimen sliding scale   SubCutaneous three times a day before meals  insulin lispro (HumaLOG) corrective regimen sliding scale   SubCutaneous at bedtime  insulin lispro Injectable (HumaLOG) 5 Unit(s) SubCutaneous three times a day before meals  magnesium sulfate  IVPB 2 Gram(s) IV Intermittent once  metoprolol tartrate 12.5 milliGRAM(s) Oral two times a day  ticagrelor 90 milliGRAM(s) Oral every 12 hours    MEDICATIONS  (PRN):  acetaminophen   Tablet .. 650 milliGRAM(s) Oral every 6 hours PRN Mild Pain (1 - 3), Moderate Pain (4 - 6)  dextrose 40% Gel 15 Gram(s) Oral once PRN Blood Glucose LESS THAN 70 milliGRAM(s)/deciliter  glucagon  Injectable 1 milliGRAM(s) IntraMuscular once PRN Glucose LESS THAN 70 milligrams/deciliter  heparin  Injectable 3800 Unit(s) IV Push every 6 hours PRN For aPTT less than 40  nitroglycerin     SubLingual 0.4 milliGRAM(s) SubLingual every 5 minutes PRN Chest Pain      LABS                                            13.6                  Neurophils% (auto):   59.7   (11-17 @ 07:18):    13.12)-----------(263          Lymphocytes% (auto):  32.3                                          39.0                   Eosinphils% (auto):   1.5      Manual%: Neutrophils x    ; Lymphocytes x    ; Eosinophils x    ; Bands%: x    ; Blasts x                                    141    |  101    |  11                  Calcium: 9.4   / iCa: x      (11-17 @ 07:18)    -----------------< from: 12 Lead ECG (11.16.19 @ 01:01) >        -----------<  161       Magnesium: 1.7                              3.6     |  27     |  0.68             Phosphorous: 2.8      TPro  7.2    /  Alb  4.2    /  TBili  0.7    /  DBili  x      /  AST  69     /  ALT  27     /  AlkPhos  75     17 Nov 2019 07:18    ( 11-17 @ 02:48 )   PT: x    ;   INR: x      aPTT: 66.0 sec    PHYSICAL  General: WN/WD NAD  Neurology: Awake, nonfocal, LINARES x 4  Eyes: Scleras clear, PERRLA/ EOMI, Gross vision intact  ENT:Gross hearing intact, grossly patent pharynx, no stridor  Neck: Neck supple, trachea midline, No JVD,   Respiratory: CTA B/L, No wheezing, rales, rhonchi  CV: RRR, S1S2, no murmurs, rubs or gallops  Abdominal: Soft, NT, ND +BS,   Extremities: No edema, + peripheral pulses  Skin: No Rashes, Hematoma, Ecchymosis  Lymphatic: No Neck, axilla, groin LAD  Psych: Oriented x 3, normal affect  Incisions: right radial band intact, fingers pink, warm and mobile      EKG:  Ventricular Rate 80 BPM    Atrial Rate 80 BPM    P-R Interval 124 ms    QRS Duration 74 ms    Q-T Interval 308 ms    QTC Calculation(Bezet) 355 ms    P Axis 35 degrees    R Axis 51 degrees    T Axis 23 degrees    Diagnosis Line NORMAL SINUS RHYTHM  NONSPECIFIC ST ABNORMALITY  ABNORMAL ECG    < end of copied text >  Telemetry:    Echo:    Cardiac Cath:  Stress Test:  Imaging  < from: Xray Chest 1 View- PORTABLE-Urgent (11.16.19 @ 01:08) >    FINDINGS:     The heart is normal in size.   No focal consolidations, pleural effusions or pneumothoraces.  Degenerative changes of the spine.    IMPRESSION:   No focal consolidations.      < end of copied text >      ASSESSMENT & PLAN:   48 yo male with a history of DMII (oral meds only), FMH CAD who p/w CP.  Abnormal ekg changes, cardiac biomarkers trending up, ongoing chest pain this morning. Recommend LHC for unstable angina.     #NSTEMI LHC: s/p PCI LM/LAD mild Dz; prxLCx 70%, OM2 prx 100% KHARI x 2, EDP 12  -accept to CCU for close monitoring  -continue DAPT, high intensity statin, low dose BB with bp/hr parameters  -will add ACE-I as BP and sCr tolerates  -Trend Ace, daily ECGs  -Right radial checks, remove band in 2 hrs  -DASH diet / low card diet  -IHSS obtain AIC if not ordered  -TTE today and f/u results CCU Accept Note    Transfer from:   ( x ) Telemetry     3 DSU    Accepting physican: Deacon Snow MD    HPI:  49M bilingual Upper sorbian/english speaking, c hx DM2, pw acute onset chest pain.  Pt states he was eating dinner at around 7PM, when he had two types of chest pain. The first chest pain was a burning epigastric and substernal pain. The second chest pain was a more severe, aching pain, localized over his left lateral chest, with radiation down his left arm and to his back. At around 8pm, after his dinner, pt drank some tea, and then developed diaphoresis, palpitations, nausea, lightheadedness, mild SOB, and unbearable chest pain, which brought him to the hospital. The chest pain was worse with lying flat and better with leaning forward. In the ED, pt reports that he started to cry 2/2 his chest pain and discomfort. Pt states that his pain is significantly improved now, and he only has the mild epigastric pain. Pt has never had anything like this before. Pt's last exercise stress test was in August 2011, results in allscripts, which showed 11 mets, normal results. Father passed away of heart attack in his 70s.  VS: Tm 97.4, P 65, /94, R 20, 97% RA  In the ED, received , NS 1L, pepcid, toradol, morphine, NTG  REVIEW OF SYSTEMS:    CONSTITUTIONAL: + weakness, fevers or chills  EYES/ENT: No visual changes;  No vertigo or throat pain   NECK: No pain or stiffness  RESPIRATORY: No cough, wheezing, hemoptysis; No shortness of breath  CARDIOVASCULAR: No chest pain or palpitations  GASTROINTESTINAL: No abdominal or epigastric pain. No nausea, vomiting, or hematemesis; No diarrhea or constipation. No melena or hematochezia.  GENITOURINARY: No dysuria, frequency or hematuria  NEUROLOGICAL: No numbness or weakness  SKIN: No itching, rashes      Vital Signs Last 24 Hrs  T(C): 36.4 (17 Nov 2019 04:43), Max: 36.8 (16 Nov 2019 12:20)  T(F): 97.5 (17 Nov 2019 04:43), Max: 98.3 (16 Nov 2019 20:09)  HR: 94 (17 Nov 2019 10:20) (66 - 94)  BP: 151/88 (17 Nov 2019 10:20) (137/80 - 161/95)  BP(mean): 112 (17 Nov 2019 10:20) (112 - 112)  RR: 17 (17 Nov 2019 10:20) (17 - 18)  SpO2: 96% (17 Nov 2019 10:20) (96% - 99%)  I&O's Summary    16 Nov 2019 07:01  -  17 Nov 2019 07:00  --------------------------------------------------------  IN: 508.5 mL / OUT: 1950 mL / NET: -1441.5 mL      Allergies:      MEDICATIONS  (STANDING):  aspirin enteric coated 81 milliGRAM(s) Oral daily  atorvastatin 80 milliGRAM(s) Oral at bedtime  dextrose 5%. 1000 milliLiter(s) (50 mL/Hr) IV Continuous <Continuous>  dextrose 50% Injectable 12.5 Gram(s) IV Push once  dextrose 50% Injectable 25 Gram(s) IV Push once  dextrose 50% Injectable 25 Gram(s) IV Push once  famotidine    Tablet 20 milliGRAM(s) Oral two times a day  heparin  Infusion.  Unit(s)/Hr (7.5 mL/Hr) IV Continuous <Continuous>  influenza   Vaccine 0.5 milliLiter(s) IntraMuscular once  insulin glargine Injectable (LANTUS) 15 Unit(s) SubCutaneous at bedtime  insulin lispro (HumaLOG) corrective regimen sliding scale   SubCutaneous three times a day before meals  insulin lispro (HumaLOG) corrective regimen sliding scale   SubCutaneous at bedtime  insulin lispro Injectable (HumaLOG) 5 Unit(s) SubCutaneous three times a day before meals  magnesium sulfate  IVPB 2 Gram(s) IV Intermittent once  metoprolol tartrate 12.5 milliGRAM(s) Oral two times a day  ticagrelor 90 milliGRAM(s) Oral every 12 hours    MEDICATIONS  (PRN):  acetaminophen   Tablet .. 650 milliGRAM(s) Oral every 6 hours PRN Mild Pain (1 - 3), Moderate Pain (4 - 6)  dextrose 40% Gel 15 Gram(s) Oral once PRN Blood Glucose LESS THAN 70 milliGRAM(s)/deciliter  glucagon  Injectable 1 milliGRAM(s) IntraMuscular once PRN Glucose LESS THAN 70 milligrams/deciliter  heparin  Injectable 3800 Unit(s) IV Push every 6 hours PRN For aPTT less than 40  nitroglycerin     SubLingual 0.4 milliGRAM(s) SubLingual every 5 minutes PRN Chest Pain      LABS                                            13.6                  Neurophils% (auto):   59.7   (11-17 @ 07:18):    13.12)-----------(263          Lymphocytes% (auto):  32.3                                          39.0                   Eosinphils% (auto):   1.5      Manual%: Neutrophils x    ; Lymphocytes x    ; Eosinophils x    ; Bands%: x    ; Blasts x                                    141    |  101    |  11                  Calcium: 9.4   / iCa: x      (11-17 @ 07:18)    -----------------< from: 12 Lead ECG (11.16.19 @ 01:01) >        -----------<  161       Magnesium: 1.7                              3.6     |  27     |  0.68             Phosphorous: 2.8      TPro  7.2    /  Alb  4.2    /  TBili  0.7    /  DBili  x      /  AST  69     /  ALT  27     /  AlkPhos  75     17 Nov 2019 07:18    ( 11-17 @ 02:48 )   PT: x    ;   INR: x      aPTT: 66.0 sec    PHYSICAL  General: WN/WD NAD  Neurology: Awake, nonfocal, LINARES x 4  Eyes: Scleras clear, PERRLA/ EOMI, Gross vision intact  ENT:Gross hearing intact, grossly patent pharynx, no stridor  Neck: Neck supple, trachea midline, No JVD,   Respiratory: CTA B/L, No wheezing, rales, rhonchi  CV: RRR, S1S2, no murmurs, rubs or gallops  Abdominal: Soft, NT, ND +BS,   Extremities: No edema, + peripheral pulses  Skin: No Rashes, Hematoma, Ecchymosis  Lymphatic: No Neck, axilla, groin LAD  Psych: Oriented x 3, normal affect  Incisions: right radial band intact, fingers pink, warm and mobile      EKG:  Ventricular Rate 80 BPM    Atrial Rate 80 BPM    P-R Interval 124 ms    QRS Duration 74 ms    Q-T Interval 308 ms    QTC Calculation(Bezet) 355 ms    P Axis 35 degrees    R Axis 51 degrees    T Axis 23 degrees    Diagnosis Line NORMAL SINUS RHYTHM  NONSPECIFIC ST ABNORMALITY  ABNORMAL ECG    < end of copied text >  Telemetry:    Echo:    Cardiac Cath:  Stress Test:  Imaging  < from: Xray Chest 1 View- PORTABLE-Urgent (11.16.19 @ 01:08) >    FINDINGS:     The heart is normal in size.   No focal consolidations, pleural effusions or pneumothoraces.  Degenerative changes of the spine.    IMPRESSION:   No focal consolidations.      < end of copied text >      ASSESSMENT & PLAN:   50 yo male with a history of DMII (oral meds only), FMH CAD who p/w CP.  Abnormal ekg changes, cardiac biomarkers trending up, ongoing chest pain this morning. Recommend LHC for unstable angina.     #NSTEMI LHC: s/p PCI LM/LAD mild Dz; prxLCx 70%, OM2 prx 100% KHARI x 2, EDP 12  -accept to CCU for close monitoring  -continue DAPT, high intensity statin, low dose BB with bp/hr parameters  -will add ACE-I as BP and sCr tolerates  -Trend Ace, daily ECGs  -Right radial checks, remove band in 2 hrs  -DASH diet / low card diet  -IHSS obtain AIC if not ordered  -TTE today and f/u results    CCU Attending Accept Note Addendum:  Mr Larsen is resting comfortably in bed, now s/p PCI of the proximal LCX and OM, which was 100% occluded.  LVEF was noted to be preserved.  He now reports resolution of the angina he was experiencing this morning.  His ECG is stable.  His BP is 158/85 with a HR in the 70's.  He has a right radial band with brisk capillary refill in the right digits.  His lungs are clear and cardiac exam is normal.  Continue DAPT, statin, and add a low dose beta blocker and ACE as BP and HR allow.  Planned for a TTE today.  Optimize his DM regimen.  I have reviewed the angiographic findings, details of the PCI, and my plan with the patient in detail.  hIsan Gallego MD CCU Accept Note    Transfer from:   ( x ) Telemetry     3 DSU    Accepting physican: Ihsan Gallego MD    HPI:  49M bilingual Yakut/english speaking, c hx DM2, pw acute onset chest pain.  Pt states he was eating dinner at around 7PM, when he had two types of chest pain. The first chest pain was a burning epigastric and substernal pain. The second chest pain was a more severe, aching pain, localized over his left lateral chest, with radiation down his left arm and to his back. At around 8pm, after his dinner, pt drank some tea, and then developed diaphoresis, palpitations, nausea, lightheadedness, mild SOB, and unbearable chest pain, which brought him to the hospital. The chest pain was worse with lying flat and better with leaning forward. In the ED, pt reports that he started to cry 2/2 his chest pain and discomfort. Pt states that his pain is significantly improved now, and he only has the mild epigastric pain. Pt has never had anything like this before. Pt's last exercise stress test was in August 2011, results in allscripts, which showed 11 mets, normal results. Father passed away of heart attack in his 70s.  VS: Tm 97.4, P 65, /94, R 20, 97% RA  In the ED, received , NS 1L, pepcid, toradol, morphine, NTG  REVIEW OF SYSTEMS:    CONSTITUTIONAL: + weakness, fevers or chills  EYES/ENT: No visual changes;  No vertigo or throat pain   NECK: No pain or stiffness  RESPIRATORY: No cough, wheezing, hemoptysis; No shortness of breath  CARDIOVASCULAR: No chest pain or palpitations  GASTROINTESTINAL: No abdominal or epigastric pain. No nausea, vomiting, or hematemesis; No diarrhea or constipation. No melena or hematochezia.  GENITOURINARY: No dysuria, frequency or hematuria  NEUROLOGICAL: No numbness or weakness  SKIN: No itching, rashes      Vital Signs Last 24 Hrs  T(C): 36.4 (17 Nov 2019 04:43), Max: 36.8 (16 Nov 2019 12:20)  T(F): 97.5 (17 Nov 2019 04:43), Max: 98.3 (16 Nov 2019 20:09)  HR: 94 (17 Nov 2019 10:20) (66 - 94)  BP: 151/88 (17 Nov 2019 10:20) (137/80 - 161/95)  BP(mean): 112 (17 Nov 2019 10:20) (112 - 112)  RR: 17 (17 Nov 2019 10:20) (17 - 18)  SpO2: 96% (17 Nov 2019 10:20) (96% - 99%)  I&O's Summary    16 Nov 2019 07:01  -  17 Nov 2019 07:00  --------------------------------------------------------  IN: 508.5 mL / OUT: 1950 mL / NET: -1441.5 mL      Allergies:      MEDICATIONS  (STANDING):  aspirin enteric coated 81 milliGRAM(s) Oral daily  atorvastatin 80 milliGRAM(s) Oral at bedtime  dextrose 5%. 1000 milliLiter(s) (50 mL/Hr) IV Continuous <Continuous>  dextrose 50% Injectable 12.5 Gram(s) IV Push once  dextrose 50% Injectable 25 Gram(s) IV Push once  dextrose 50% Injectable 25 Gram(s) IV Push once  famotidine    Tablet 20 milliGRAM(s) Oral two times a day  heparin  Infusion.  Unit(s)/Hr (7.5 mL/Hr) IV Continuous <Continuous>  influenza   Vaccine 0.5 milliLiter(s) IntraMuscular once  insulin glargine Injectable (LANTUS) 15 Unit(s) SubCutaneous at bedtime  insulin lispro (HumaLOG) corrective regimen sliding scale   SubCutaneous three times a day before meals  insulin lispro (HumaLOG) corrective regimen sliding scale   SubCutaneous at bedtime  insulin lispro Injectable (HumaLOG) 5 Unit(s) SubCutaneous three times a day before meals  magnesium sulfate  IVPB 2 Gram(s) IV Intermittent once  metoprolol tartrate 12.5 milliGRAM(s) Oral two times a day  ticagrelor 90 milliGRAM(s) Oral every 12 hours    MEDICATIONS  (PRN):  acetaminophen   Tablet .. 650 milliGRAM(s) Oral every 6 hours PRN Mild Pain (1 - 3), Moderate Pain (4 - 6)  dextrose 40% Gel 15 Gram(s) Oral once PRN Blood Glucose LESS THAN 70 milliGRAM(s)/deciliter  glucagon  Injectable 1 milliGRAM(s) IntraMuscular once PRN Glucose LESS THAN 70 milligrams/deciliter  heparin  Injectable 3800 Unit(s) IV Push every 6 hours PRN For aPTT less than 40  nitroglycerin     SubLingual 0.4 milliGRAM(s) SubLingual every 5 minutes PRN Chest Pain      LABS                                            13.6                  Neurophils% (auto):   59.7   (11-17 @ 07:18):    13.12)-----------(263          Lymphocytes% (auto):  32.3                                          39.0                   Eosinphils% (auto):   1.5      Manual%: Neutrophils x    ; Lymphocytes x    ; Eosinophils x    ; Bands%: x    ; Blasts x                                    141    |  101    |  11                  Calcium: 9.4   / iCa: x      (11-17 @ 07:18)    -----------------< from: 12 Lead ECG (11.16.19 @ 01:01) >        -----------<  161       Magnesium: 1.7                              3.6     |  27     |  0.68             Phosphorous: 2.8      TPro  7.2    /  Alb  4.2    /  TBili  0.7    /  DBili  x      /  AST  69     /  ALT  27     /  AlkPhos  75     17 Nov 2019 07:18    ( 11-17 @ 02:48 )   PT: x    ;   INR: x      aPTT: 66.0 sec    PHYSICAL  General: WN/WD NAD  Neurology: Awake, nonfocal, LINARES x 4  Eyes: Scleras clear, PERRLA/ EOMI, Gross vision intact  ENT:Gross hearing intact, grossly patent pharynx, no stridor  Neck: Neck supple, trachea midline, No JVD,   Respiratory: CTA B/L, No wheezing, rales, rhonchi  CV: RRR, S1S2, no murmurs, rubs or gallops  Abdominal: Soft, NT, ND +BS,   Extremities: No edema, + peripheral pulses  Skin: No Rashes, Hematoma, Ecchymosis  Lymphatic: No Neck, axilla, groin LAD  Psych: Oriented x 3, normal affect  Incisions: right radial band intact, fingers pink, warm and mobile      EKG:  Ventricular Rate 80 BPM    Atrial Rate 80 BPM    P-R Interval 124 ms    QRS Duration 74 ms    Q-T Interval 308 ms    QTC Calculation(Bezet) 355 ms    P Axis 35 degrees    R Axis 51 degrees    T Axis 23 degrees    Diagnosis Line NORMAL SINUS RHYTHM  NONSPECIFIC ST ABNORMALITY  ABNORMAL ECG    < end of copied text >  Telemetry:    Echo:    Cardiac Cath:  Stress Test:  Imaging  < from: Xray Chest 1 View- PORTABLE-Urgent (11.16.19 @ 01:08) >    FINDINGS:     The heart is normal in size.   No focal consolidations, pleural effusions or pneumothoraces.  Degenerative changes of the spine.    IMPRESSION:   No focal consolidations.      < end of copied text >      ASSESSMENT & PLAN:   50 yo male with a history of DMII (oral meds only), FMH CAD who p/w CP.  Abnormal ekg changes, cardiac biomarkers trending up, ongoing chest pain this morning. Recommend LHC for unstable angina.     #NSTEMI LHC: s/p PCI LM/LAD mild Dz; prxLCx 70%, OM2 prx 100% KHARI x 2, EDP 12  -accept to CCU for close monitoring  -continue DAPT, high intensity statin, low dose BB with bp/hr parameters  -will add ACE-I as BP and sCr tolerates  -Trend Ace, daily ECGs  -Right radial checks, remove band in 2 hrs  -DASH diet / low card diet  -IHSS obtain AIC if not ordered  -TTE today and f/u results    CCU Attending Accept Note Addendum:  Mr Larsen is resting comfortably in bed, now s/p PCI of the proximal LCX and OM, which was 100% occluded.  LVEF was noted to be preserved.  He now reports resolution of the angina he was experiencing this morning.  His ECG is stable.  His BP is 158/85 with a HR in the 70's.  He has a right radial band with brisk capillary refill in the right digits.  His lungs are clear and cardiac exam is normal.  Continue DAPT, statin, and add a low dose beta blocker and ACE as BP and HR allow.  Planned for a TTE today.  Optimize his DM regimen.  I have reviewed the angiographic findings, details of the PCI, and my plan with the patient in detail.  Ihsan Gallego MD

## 2019-11-17 NOTE — PROGRESS NOTE ADULT - SUBJECTIVE AND OBJECTIVE BOX
HPI:  49M bilingual Occitan/english speaking, c hx DM2, pw acute onset chest pain.  Pt states he was eating dinner at around 7PM, when he had two types of chest pain. The first chest pain was a burning epigastric and substernal pain. The second chest pain was a more severe, aching pain, localized over his left lateral chest, with radiation down his left arm and to his back. At around 8pm, after his dinner, pt drank some tea, and then developed diaphoresis, palpitations, nausea, lightheadedness, mild SOB, and unbearable chest pain, which brought him to the hospital. The chest pain was worse with lying flat and better with leaning forward. In the ED, pt reports that he started to cry 2/2 his chest pain and discomfort. Pt states that his pain is significantly improved now, and he only has the mild epigastric pain. Pt has never had anything like this before. Pt's last exercise stress test was in 2011, results in [employer], which showed 11 mets, normal results. Father passed away of heart attack in his 70s. Admitted to CCU for unstable angina/    INTERVAL HISTORY: patient now s/p KHARI x 2 to prox OM2 100% via RRA; pt currently chest pain free    REVIEW OF SYSTEMS:    CONSTITUTIONAL: No weakness, fevers or chills  EYES/ENT: No visual changes;  No vertigo or throat pain   NECK: No pain or stiffness  RESPIRATORY: No cough, wheezing, hemoptysis; No shortness of breath  CARDIOVASCULAR: No chest pain or palpitations  GASTROINTESTINAL: No abdominal or epigastric pain. No nausea, vomiting, or hematemesis; No diarrhea or constipation. No melena or hematochezia.  GENITOURINARY: No dysuria, frequency or hematuria  NEUROLOGICAL: No numbness or weakness  SKIN: No itching, rashes      MEDICATIONS  (STANDING):  aspirin enteric coated 81 milliGRAM(s) Oral daily  atorvastatin 80 milliGRAM(s) Oral at bedtime  chlorhexidine 2% Cloths 1 Application(s) Topical at bedtime  dextrose 5%. 1000 milliLiter(s) (50 mL/Hr) IV Continuous <Continuous>  dextrose 50% Injectable 12.5 Gram(s) IV Push once  dextrose 50% Injectable 25 Gram(s) IV Push once  dextrose 50% Injectable 25 Gram(s) IV Push once  famotidine    Tablet 20 milliGRAM(s) Oral two times a day  influenza   Vaccine 0.5 milliLiter(s) IntraMuscular once  insulin glargine Injectable (LANTUS) 18 Unit(s) SubCutaneous at bedtime  insulin lispro (HumaLOG) corrective regimen sliding scale   SubCutaneous three times a day before meals  insulin lispro (HumaLOG) corrective regimen sliding scale   SubCutaneous at bedtime  insulin lispro Injectable (HumaLOG) 5 Unit(s) SubCutaneous three times a day before meals  lisinopril 2.5 milliGRAM(s) Oral daily  metoprolol tartrate 25 milliGRAM(s) Oral every 12 hours  ticagrelor 90 milliGRAM(s) Oral every 12 hours    MEDICATIONS  (PRN):  acetaminophen   Tablet .. 650 milliGRAM(s) Oral every 6 hours PRN Mild Pain (1 - 3), Moderate Pain (4 - 6)  dextrose 40% Gel 15 Gram(s) Oral once PRN Blood Glucose LESS THAN 70 milliGRAM(s)/deciliter  glucagon  Injectable 1 milliGRAM(s) IntraMuscular once PRN Glucose LESS THAN 70 milligrams/deciliter  nitroglycerin     SubLingual 0.4 milliGRAM(s) SubLingual every 5 minutes PRN Chest Pain      Objective:  ICU Vital Signs Last 24 Hrs  T(C): 36.4 (2019 14:30), Max: 36.6 (2019 22:02)  T(F): 97.5 (2019 14:30), Max: 97.9 (2019 22:02)  HR: 80 (2019 19:00) (66 - 95)  BP: 135/82 (2019 19:00) (132/80 - 161/95)  BP(mean): 102 (2019 19:00) (100 - 125)  ABP: --  ABP(mean): --  RR: 34 (2019 19:00) (11 - 34)  SpO2: 97% (2019 19:00) (96% - 99%)          16 @ 07:01  -   @ 07:00  --------------------------------------------------------  IN: 508.5 mL / OUT: 1950 mL / NET: -1441.5 mL     @ 07:01  -   @ 21:30  --------------------------------------------------------  IN: 0 mL / OUT: 800 mL / NET: -800 mL      Daily     Daily Weight in k.1 (2019 04:43)    PHYSICAL EXAM:  General: in no acute distress  Neurology: A&Ox3, nonfocal, LINARES x 4  Respiratory: CTA B/L  CV: RRR, S1S2, no murmurs, rubs or gallops  Abdominal: Soft, NT, ND +BS  Extremities: No edema, + peripheral pulses; RRA cath site stable and without hematoma    TELEMETRY: SR 70s/80s    EKG:   < from: 12 Lead ECG (19 @ 01:01) >  Diagnosis Line NORMAL SINUS RHYTHM  NONSPECIFIC ST ABNORMALITY  ABNORMAL ECG    < end of copied text >      IMAGING:    Labs:                          13.6   13.12 )-----------( 263      ( 2019 07:18 )             39.0         141  |  101  |  11  ----------------------------<  161<H>  3.6   |  27  |  0.68    Ca    9.4      2019 07:18  Phos  2.8       Mg     1.7         TPro  7.2  /  Alb  4.2  /  TBili  0.7  /  DBili  x   /  AST  69<H>  /  ALT  27  /  AlkPhos  75      LIVER FUNCTIONS - ( 2019 07:18 )  Alb: 4.2 g/dL / Pro: 7.2 g/dL / ALK PHOS: 75 U/L / ALT: 27 U/L / AST: 69 U/L / GGT: x           PT/INR - ( 2019 06:09 )   PT: 11.6 sec;   INR: 1.02 ratio         PTT - ( 2019 02:48 )  PTT:66.0 sec  CKMB Units: 105.3 ng/mL ( @ 01:07)  Creatine Kinase, Serum: 798 U/L ( @ 01:07)    Urinalysis Basic - ( 2019 14:45 )    Color: Light Yellow / Appearance: Clear / S.015 / pH: x  Gluc: x / Ketone: Negative  / Bili: Negative / Urobili: Negative   Blood: x / Protein: Negative / Nitrite: Negative   Leuk Esterase: Negative / RBC: x / WBC x   Sq Epi: x / Non Sq Epi: x / Bacteria: x

## 2019-11-17 NOTE — PROGRESS NOTE ADULT - SUBJECTIVE AND OBJECTIVE BOX
PROGRESS NOTE:   ************************************************  Authoted by: Maria D Patton MD PGY1  Psychiatry  Pager: [facility] [phone]; [facility] [phone]  *************************************************    Patient is a 49y old  Male who presents with a chief complaint of chest pain (2019 14:27)      SUBJECTIVE / OVERNIGHT EVENTS: The patient was seen and examined at bedside.     REVIEW OF SYSTEMS:    CONSTITUTIONAL: No weakness, fevers or chills  EYES/ENT: No visual changes;  No vertigo or throat pain   NECK: No pain or stiffness  RESPIRATORY: No SOB No cough, wheezing, hemoptysis  CARDIOVASCULAR: +chest pain. +palpitations. +lightheadedness.  GASTROINTESTINAL: No abdominal pain. No nausea; No vomiting, or hematemesis; No diarrhea or constipation. No melena or hematochezia.  GENITOURINARY: No dysuria, frequency or hematuria  NEUROLOGICAL: No numbness or weakness  SKIN: No itching, rashes    MEDICATIONS  (STANDING):  aspirin enteric coated 81 milliGRAM(s) Oral daily  atorvastatin 80 milliGRAM(s) Oral at bedtime  dextrose 5%. 1000 milliLiter(s) (50 mL/Hr) IV Continuous <Continuous>  dextrose 50% Injectable 12.5 Gram(s) IV Push once  dextrose 50% Injectable 25 Gram(s) IV Push once  dextrose 50% Injectable 25 Gram(s) IV Push once  famotidine    Tablet 20 milliGRAM(s) Oral two times a day  heparin  Infusion.  Unit(s)/Hr (7.5 mL/Hr) IV Continuous <Continuous>  influenza   Vaccine 0.5 milliLiter(s) IntraMuscular once  insulin glargine Injectable (LANTUS) 15 Unit(s) SubCutaneous at bedtime  insulin lispro (HumaLOG) corrective regimen sliding scale   SubCutaneous three times a day before meals  insulin lispro (HumaLOG) corrective regimen sliding scale   SubCutaneous at bedtime  insulin lispro Injectable (HumaLOG) 5 Unit(s) SubCutaneous three times a day before meals  metoprolol tartrate 12.5 milliGRAM(s) Oral two times a day  ticagrelor 90 milliGRAM(s) Oral every 12 hours    MEDICATIONS  (PRN):  acetaminophen   Tablet .. 650 milliGRAM(s) Oral every 6 hours PRN Mild Pain (1 - 3), Moderate Pain (4 - 6)  dextrose 40% Gel 15 Gram(s) Oral once PRN Blood Glucose LESS THAN 70 milliGRAM(s)/deciliter  glucagon  Injectable 1 milliGRAM(s) IntraMuscular once PRN Glucose LESS THAN 70 milligrams/deciliter  heparin  Injectable 3800 Unit(s) IV Push every 6 hours PRN For aPTT less than 40  nitroglycerin     SubLingual 0.4 milliGRAM(s) SubLingual every 5 minutes PRN Chest Pain      CAPILLARY BLOOD GLUCOSE      POCT Blood Glucose.: 223 mg/dL (2019 21:44)  POCT Blood Glucose.: 237 mg/dL (2019 17:39)  POCT Blood Glucose.: 219 mg/dL (2019 12:43)  POCT Blood Glucose.: 193 mg/dL (2019 08:57)    I&O's Summary    2019 07:01  -  2019 07:00  --------------------------------------------------------  IN: 491.5 mL / OUT: 1950 mL / NET: -1458.5 mL        PHYSICAL EXAM:  Vital Signs Last 24 Hrs  T(C): 36.4 (2019 04:43), Max: 36.8 (2019 12:20)  T(F): 97.5 (2019 04:43), Max: 98.3 (2019 20:09)  HR: 71 (2019 04:43) (66 - 80)  BP: 153/82 (2019 04:43) (137/80 - 161/95)  BP(mean): --  RR: 18 (2019 04:43) (18 - 18)  SpO2: 96% (2019 04:43) (96% - 99%)    TELEMETRY:    CONSTITUTIONAL: NAD, well-developed, seen resting in bed  RESPIRATORY: Normal respiratory effort; lungs are clear to auscultation bilaterally  CARDIOVASCULAR: Regular rhythm, S1. S2,  No murmur/rub/gallop, no distant heart sound. No JVD. No lower extremity edema; Peripheral pulses are 2+ bilaterally  ABDOMEN: soft, Nontender to palpation, normoactive bowel sounds, no rebound/guarding; No hepatosplenomegaly  MUSCLOSKELETAL: no clubbing or cyanosis of digits; no joint swelling or tenderness to palpation  PSYCH: A+O to person, place, and time; affect appropriate  LABS:                        13.4   13.57 )-----------( 229      ( 2019 20:53 )             37.3     -    135  |  102  |  12  ----------------------------<  260<H>  4.3   |  23  |  0.66    Ca    9.3      2019 06:09  Phos  3.5     -  Mg     1.7     -    TPro  6.9  /  Alb  4.0  /  TBili  0.5  /  DBili  x   /  AST  18  /  ALT  18  /  AlkPhos  72  11-16    PT/INR - ( 2019 06:09 )   PT: 11.6 sec;   INR: 1.02 ratio         PTT - ( 2019 02:48 )  PTT:66.0 sec  CARDIAC MARKERS ( 2019 01:07 )  x     / x     / 798 U/L / x     / 105.3 ng/mL  CARDIAC MARKERS ( 2019 20:53 )  x     / x     / 867 U/L / x     / 111.6 ng/mL  CARDIAC MARKERS ( 2019 16:18 )  x     / x     / 762 U/L / x     / 106.7 ng/mL  CARDIAC MARKERS ( 2019 12:17 )  x     / x     / 540 U/L / x     / 65.9 ng/mL  CARDIAC MARKERS ( 2019 06:09 )  x     / x     / 179 U/L / x     / x          Urinalysis Basic - ( 2019 14:45 )    Color: Light Yellow / Appearance: Clear / S.015 / pH: x  Gluc: x / Ketone: Negative  / Bili: Negative / Urobili: Negative   Blood: x / Protein: Negative / Nitrite: Negative   Leuk Esterase: Negative / RBC: x / WBC x   Sq Epi: x / Non Sq Epi: x / Bacteria: x          RADIOLOGY & ADDITIONAL TESTS:  Results Reviewed:   Imaging Personally Reviewed:  Electrocardiogram Personally Reviewed:    COORDINATION OF CARE:  Care Discussed with Consultants/Other Providers [Y/N]:  Prior or Outpatient Records Reviewed [Y/N]: PROGRESS NOTE:   ************************************************  Authoted by: Maria D Patton MD PGY1  Psychiatry  Pager: [facility] [phone]; [facility] [phone]  *************************************************    Patient is a 49y old  Male who presents with a chief complaint of chest pain (2019 07:18)      SUBJECTIVE / OVERNIGHT EVENTS: The patient was seen and examined at bedside. Overnight, pt with increasing chest pain, rpt EKG unchanged, given one tab SL nitro to good effect.    REVIEW OF SYSTEMS:    CONSTITUTIONAL: No weakness, fevers or chills  EYES/ENT: No visual changes;  No vertigo or throat pain   NECK: No pain or stiffness  RESPIRATORY: No SOB No cough, wheezing, hemoptysis  CARDIOVASCULAR: +chest pain. +palpitations. +lightheadedness.  GASTROINTESTINAL: No abdominal pain. No nausea; No vomiting, or hematemesis; No diarrhea or constipation. No melena or hematochezia.  GENITOURINARY: No dysuria, frequency or hematuria  NEUROLOGICAL: No numbness or weakness  SKIN: No itching, rashes    MEDICATIONS  (STANDING):  aspirin enteric coated 81 milliGRAM(s) Oral daily  atorvastatin 80 milliGRAM(s) Oral at bedtime  dextrose 5%. 1000 milliLiter(s) (50 mL/Hr) IV Continuous <Continuous>  dextrose 50% Injectable 12.5 Gram(s) IV Push once  dextrose 50% Injectable 25 Gram(s) IV Push once  dextrose 50% Injectable 25 Gram(s) IV Push once  famotidine    Tablet 20 milliGRAM(s) Oral two times a day  heparin  Infusion.  Unit(s)/Hr (7.5 mL/Hr) IV Continuous <Continuous>  influenza   Vaccine 0.5 milliLiter(s) IntraMuscular once  insulin glargine Injectable (LANTUS) 15 Unit(s) SubCutaneous at bedtime  insulin lispro (HumaLOG) corrective regimen sliding scale   SubCutaneous three times a day before meals  insulin lispro (HumaLOG) corrective regimen sliding scale   SubCutaneous at bedtime  insulin lispro Injectable (HumaLOG) 5 Unit(s) SubCutaneous three times a day before meals  metoprolol tartrate 12.5 milliGRAM(s) Oral two times a day  ticagrelor 90 milliGRAM(s) Oral every 12 hours    MEDICATIONS  (PRN):  acetaminophen   Tablet .. 650 milliGRAM(s) Oral every 6 hours PRN Mild Pain (1 - 3), Moderate Pain (4 - 6)  dextrose 40% Gel 15 Gram(s) Oral once PRN Blood Glucose LESS THAN 70 milliGRAM(s)/deciliter  glucagon  Injectable 1 milliGRAM(s) IntraMuscular once PRN Glucose LESS THAN 70 milligrams/deciliter  heparin  Injectable 3800 Unit(s) IV Push every 6 hours PRN For aPTT less than 40  nitroglycerin     SubLingual 0.4 milliGRAM(s) SubLingual every 5 minutes PRN Chest Pain      CAPILLARY BLOOD GLUCOSE      POCT Blood Glucose.: 193 mg/dL (2019 08:37)  POCT Blood Glucose.: 223 mg/dL (2019 21:44)  POCT Blood Glucose.: 237 mg/dL (2019 17:39)  POCT Blood Glucose.: 219 mg/dL (2019 12:43)  POCT Blood Glucose.: 193 mg/dL (2019 08:57)    I&O's Summary    2019 07:01  -  2019 07:00  --------------------------------------------------------  IN: 508.5 mL / OUT: 1950 mL / NET: -1441.5 mL        PHYSICAL EXAM:  Vital Signs Last 24 Hrs  T(C): 36.4 (2019 04:43), Max: 36.8 (2019 12:20)  T(F): 97.5 (2019 04:43), Max: 98.3 (2019 20:09)  HR: 71 (2019 04:43) (66 - 80)  BP: 153/82 (2019 04:43) (137/80 - 161/95)  BP(mean): --  RR: 18 (2019 04:43) (18 - 18)  SpO2: 96% (2019 04:43) (96% - 99%)    TELEMETRY:    CONSTITUTIONAL: NAD, well-developed, seen resting in bed  RESPIRATORY: Normal respiratory effort; lungs are clear to auscultation bilaterally  CARDIOVASCULAR: Regular rhythm, S1. S2,  No murmur/rub/gallop, no distant heart sound. No JVD. No lower extremity edema; Peripheral pulses are 2+ bilaterally  ABDOMEN: soft, Nontender to palpation, normoactive bowel sounds, no rebound/guarding; No hepatosplenomegaly  MUSCLOSKELETAL: no clubbing or cyanosis of digits; no joint swelling or tenderness to palpation  PSYCH: A+O to person, place, and time; affect appropriate    LABS:                        13.6   13.12 )-----------( 263      ( 2019 07:18 )             39.0         141  |  101  |  11  ----------------------------<  161<H>  3.6   |  27  |  0.68    Ca    9.4      2019 07:18  Phos  2.8       Mg     1.7         TPro  7.2  /  Alb  4.2  /  TBili  0.7  /  DBili  x   /  AST  69<H>  /  ALT  27  /  AlkPhos  75  -17    PT/INR - ( 2019 06:09 )   PT: 11.6 sec;   INR: 1.02 ratio         PTT - ( 2019 02:48 )  PTT:66.0 sec  CARDIAC MARKERS ( 2019 01:07 )  x     / x     / 798 U/L / x     / 105.3 ng/mL  CARDIAC MARKERS ( 2019 20:53 )  x     / x     / 867 U/L / x     / 111.6 ng/mL  CARDIAC MARKERS ( 2019 16:18 )  x     / x     / 762 U/L / x     / 106.7 ng/mL  CARDIAC MARKERS ( 2019 12:17 )  x     / x     / 540 U/L / x     / 65.9 ng/mL  CARDIAC MARKERS ( 2019 06:09 )  x     / x     / 179 U/L / x     / x          Urinalysis Basic - ( 2019 14:45 )    Color: Light Yellow / Appearance: Clear / S.015 / pH: x  Gluc: x / Ketone: Negative  / Bili: Negative / Urobili: Negative   Blood: x / Protein: Negative / Nitrite: Negative   Leuk Esterase: Negative / RBC: x / WBC x   Sq Epi: x / Non Sq Epi: x / Bacteria: x          RADIOLOGY & ADDITIONAL TESTS:  Results Reviewed:   Imaging Personally Reviewed:  Electrocardiogram Personally Reviewed:    COORDINATION OF CARE:  Care Discussed with Consultants/Other Providers [Y/N]:  Prior or Outpatient Records Reviewed [Y/N]: PROGRESS NOTE:   ************************************************  Authoted by: Maria D Patton MD PGY1  Psychiatry  Pager: [facility] [phone]; [facility] [phone]  *************************************************    Patient is a 49y old  Male who presents with a chief complaint of chest pain (2019 07:18)      SUBJECTIVE / OVERNIGHT EVENTS: The patient was seen and examined at bedside. Overnight, pt with increasing chest pain, rpt EKG unchanged, given one tab SL nitro to good effect.  This AM, pt still with chest pain, taken for LHC.  To be transferred s/p LHC to CCU.    REVIEW OF SYSTEMS:    CONSTITUTIONAL: No weakness, fevers or chills  EYES/ENT: No visual changes;  No vertigo or throat pain   NECK: No pain or stiffness  RESPIRATORY: No SOB No cough, wheezing, hemoptysis  CARDIOVASCULAR: +chest pain. No palpitations. No lightheadedness.  GASTROINTESTINAL: No abdominal pain. No nausea; No vomiting, or hematemesis; No diarrhea or constipation. No melena or hematochezia.  GENITOURINARY: No dysuria, frequency or hematuria  NEUROLOGICAL: No numbness or weakness  SKIN: No itching, rashes    MEDICATIONS  (STANDING):  aspirin enteric coated 81 milliGRAM(s) Oral daily  atorvastatin 80 milliGRAM(s) Oral at bedtime  dextrose 5%. 1000 milliLiter(s) (50 mL/Hr) IV Continuous <Continuous>  dextrose 50% Injectable 12.5 Gram(s) IV Push once  dextrose 50% Injectable 25 Gram(s) IV Push once  dextrose 50% Injectable 25 Gram(s) IV Push once  famotidine    Tablet 20 milliGRAM(s) Oral two times a day  heparin  Infusion.  Unit(s)/Hr (7.5 mL/Hr) IV Continuous <Continuous>  influenza   Vaccine 0.5 milliLiter(s) IntraMuscular once  insulin glargine Injectable (LANTUS) 15 Unit(s) SubCutaneous at bedtime  insulin lispro (HumaLOG) corrective regimen sliding scale   SubCutaneous three times a day before meals  insulin lispro (HumaLOG) corrective regimen sliding scale   SubCutaneous at bedtime  insulin lispro Injectable (HumaLOG) 5 Unit(s) SubCutaneous three times a day before meals  metoprolol tartrate 12.5 milliGRAM(s) Oral two times a day  ticagrelor 90 milliGRAM(s) Oral every 12 hours    MEDICATIONS  (PRN):  acetaminophen   Tablet .. 650 milliGRAM(s) Oral every 6 hours PRN Mild Pain (1 - 3), Moderate Pain (4 - 6)  dextrose 40% Gel 15 Gram(s) Oral once PRN Blood Glucose LESS THAN 70 milliGRAM(s)/deciliter  glucagon  Injectable 1 milliGRAM(s) IntraMuscular once PRN Glucose LESS THAN 70 milligrams/deciliter  heparin  Injectable 3800 Unit(s) IV Push every 6 hours PRN For aPTT less than 40  nitroglycerin     SubLingual 0.4 milliGRAM(s) SubLingual every 5 minutes PRN Chest Pain      CAPILLARY BLOOD GLUCOSE      POCT Blood Glucose.: 193 mg/dL (2019 08:37)  POCT Blood Glucose.: 223 mg/dL (2019 21:44)  POCT Blood Glucose.: 237 mg/dL (2019 17:39)  POCT Blood Glucose.: 219 mg/dL (2019 12:43)  POCT Blood Glucose.: 193 mg/dL (2019 08:57)    I&O's Summary    2019 07:01  -  2019 07:00  --------------------------------------------------------  IN: 508.5 mL / OUT: 1950 mL / NET: -1441.5 mL        PHYSICAL EXAM:  Vital Signs Last 24 Hrs  T(C): 36.4 (2019 04:43), Max: 36.8 (2019 12:20)  T(F): 97.5 (2019 04:43), Max: 98.3 (2019 20:09)  HR: 71 (2019 04:43) (66 - 80)  BP: 153/82 (2019 04:43) (137/80 - 161/95)  BP(mean): --  RR: 18 (2019 04:43) (18 - 18)  SpO2: 96% (2019 04:43) (96% - 99%)    CONSTITUTIONAL: NAD, well-developed, seen resting in bed  RESPIRATORY: Normal respiratory effort; lungs are clear to auscultation bilaterally  CARDIOVASCULAR: Regular rhythm, S1. S2,  No murmur/rub/gallop, no distant heart sound. No JVD. No lower extremity edema; Peripheral pulses are 2+ bilaterally  ABDOMEN: soft, Nontender to palpation, normoactive bowel sounds, no rebound/guarding; No hepatosplenomegaly  MUSCLOSKELETAL: no clubbing or cyanosis of digits; no joint swelling or tenderness to palpation  PSYCH: A+O to person, place, and time; affect appropriate    LABS:                        13.6   13.12 )-----------( 263      ( 2019 07:18 )             39.0     -    141  |  101  |  11  ----------------------------<  161<H>  3.6   |  27  |  0.68    Ca    9.4      2019 07:18  Phos  2.8     -  Mg     1.7         TPro  7.2  /  Alb  4.2  /  TBili  0.7  /  DBili  x   /  AST  69<H>  /  ALT  27  /  AlkPhos  75  -17    PT/INR - ( 2019 06:09 )   PT: 11.6 sec;   INR: 1.02 ratio         PTT - ( 2019 02:48 )  PTT:66.0 sec  CARDIAC MARKERS ( 2019 01:07 )  x     / x     / 798 U/L / x     / 105.3 ng/mL  CARDIAC MARKERS ( 2019 20:53 )  x     / x     / 867 U/L / x     / 111.6 ng/mL  CARDIAC MARKERS ( 2019 16:18 )  x     / x     / 762 U/L / x     / 106.7 ng/mL  CARDIAC MARKERS ( 2019 12:17 )  x     / x     / 540 U/L / x     / 65.9 ng/mL  CARDIAC MARKERS ( 2019 06:09 )  x     / x     / 179 U/L / x     / x          Urinalysis Basic - ( 2019 14:45 )    Color: Light Yellow / Appearance: Clear / S.015 / pH: x  Gluc: x / Ketone: Negative  / Bili: Negative / Urobili: Negative   Blood: x / Protein: Negative / Nitrite: Negative   Leuk Esterase: Negative / RBC: x / WBC x   Sq Epi: x / Non Sq Epi: x / Bacteria: x      RADIOLOGY & ADDITIONAL TESTS:  Results Reviewed:   Imaging Personally Reviewed:  Electrocardiogram Personally Reviewed:    COORDINATION OF CARE:  Care Discussed with Consultants/Other Providers [Y/N]:  Prior or Outpatient Records Reviewed [Y/N]:

## 2019-11-17 NOTE — PROGRESS NOTE ADULT - ATTENDING COMMENTS
-Patient seen/examined on 11/17/19. Case/plan discussed with the intern and resident as reviewed/edited by me above and in any comments below.  -Appreciated cardiology taking patient for cath. -Patient seen/examined on 11/17/19. Case/plan discussed with the intern and resident as reviewed/edited by me above and in any comments below.  -Appreciated cardiology taking patient for cath.  -F/u cards recs.

## 2019-11-18 DIAGNOSIS — E11.65 TYPE 2 DIABETES MELLITUS WITH HYPERGLYCEMIA: ICD-10-CM

## 2019-11-18 DIAGNOSIS — Z29.9 ENCOUNTER FOR PROPHYLACTIC MEASURES, UNSPECIFIED: ICD-10-CM

## 2019-11-18 LAB
ALBUMIN SERPL ELPH-MCNC: 3.9 G/DL — SIGNIFICANT CHANGE UP (ref 3.3–5)
ALP SERPL-CCNC: 75 U/L — SIGNIFICANT CHANGE UP (ref 40–120)
ALT FLD-CCNC: 24 U/L — SIGNIFICANT CHANGE UP (ref 10–45)
ANION GAP SERPL CALC-SCNC: 13 MMOL/L — SIGNIFICANT CHANGE UP (ref 5–17)
AST SERPL-CCNC: 33 U/L — SIGNIFICANT CHANGE UP (ref 10–40)
BASOPHILS # BLD AUTO: 0.03 K/UL — SIGNIFICANT CHANGE UP (ref 0–0.2)
BASOPHILS NFR BLD AUTO: 0.3 % — SIGNIFICANT CHANGE UP (ref 0–2)
BILIRUB SERPL-MCNC: 1 MG/DL — SIGNIFICANT CHANGE UP (ref 0.2–1.2)
BUN SERPL-MCNC: 11 MG/DL — SIGNIFICANT CHANGE UP (ref 7–23)
CALCIUM SERPL-MCNC: 9.3 MG/DL — SIGNIFICANT CHANGE UP (ref 8.4–10.5)
CHLORIDE SERPL-SCNC: 104 MMOL/L — SIGNIFICANT CHANGE UP (ref 96–108)
CK MB BLD-MCNC: 6.8 % — HIGH (ref 0–3.5)
CK MB CFR SERPL CALC: 17.7 NG/ML — HIGH (ref 0–6.7)
CK SERPL-CCNC: 259 U/L — HIGH (ref 30–200)
CO2 SERPL-SCNC: 21 MMOL/L — LOW (ref 22–31)
CREAT SERPL-MCNC: 0.8 MG/DL — SIGNIFICANT CHANGE UP (ref 0.5–1.3)
EOSINOPHIL # BLD AUTO: 0.11 K/UL — SIGNIFICANT CHANGE UP (ref 0–0.5)
EOSINOPHIL NFR BLD AUTO: 1.1 % — SIGNIFICANT CHANGE UP (ref 0–6)
GLUCOSE BLDC GLUCOMTR-MCNC: 148 MG/DL — HIGH (ref 70–99)
GLUCOSE BLDC GLUCOMTR-MCNC: 153 MG/DL — HIGH (ref 70–99)
GLUCOSE BLDC GLUCOMTR-MCNC: 254 MG/DL — HIGH (ref 70–99)
GLUCOSE BLDC GLUCOMTR-MCNC: 336 MG/DL — HIGH (ref 70–99)
GLUCOSE SERPL-MCNC: 149 MG/DL — HIGH (ref 70–99)
HCT VFR BLD CALC: 39.1 % — SIGNIFICANT CHANGE UP (ref 39–50)
HGB BLD-MCNC: 14.2 G/DL — SIGNIFICANT CHANGE UP (ref 13–17)
IMM GRANULOCYTES NFR BLD AUTO: 0.4 % — SIGNIFICANT CHANGE UP (ref 0–1.5)
LYMPHOCYTES # BLD AUTO: 3.2 K/UL — SIGNIFICANT CHANGE UP (ref 1–3.3)
LYMPHOCYTES # BLD AUTO: 31.6 % — SIGNIFICANT CHANGE UP (ref 13–44)
MAGNESIUM SERPL-MCNC: 2.2 MG/DL — SIGNIFICANT CHANGE UP (ref 1.6–2.6)
MCHC RBC-ENTMCNC: 32.4 PG — SIGNIFICANT CHANGE UP (ref 27–34)
MCHC RBC-ENTMCNC: 36.3 GM/DL — HIGH (ref 32–36)
MCV RBC AUTO: 89.3 FL — SIGNIFICANT CHANGE UP (ref 80–100)
MONOCYTES # BLD AUTO: 0.97 K/UL — HIGH (ref 0–0.9)
MONOCYTES NFR BLD AUTO: 9.6 % — SIGNIFICANT CHANGE UP (ref 2–14)
NEUTROPHILS # BLD AUTO: 5.78 K/UL — SIGNIFICANT CHANGE UP (ref 1.8–7.4)
NEUTROPHILS NFR BLD AUTO: 57 % — SIGNIFICANT CHANGE UP (ref 43–77)
NRBC # BLD: 0 /100 WBCS — SIGNIFICANT CHANGE UP (ref 0–0)
PHOSPHATE SERPL-MCNC: 3.2 MG/DL — SIGNIFICANT CHANGE UP (ref 2.5–4.5)
PLATELET # BLD AUTO: 240 K/UL — SIGNIFICANT CHANGE UP (ref 150–400)
POTASSIUM SERPL-MCNC: 4 MMOL/L — SIGNIFICANT CHANGE UP (ref 3.5–5.3)
POTASSIUM SERPL-SCNC: 4 MMOL/L — SIGNIFICANT CHANGE UP (ref 3.5–5.3)
PROT SERPL-MCNC: 7.3 G/DL — SIGNIFICANT CHANGE UP (ref 6–8.3)
RBC # BLD: 4.38 M/UL — SIGNIFICANT CHANGE UP (ref 4.2–5.8)
RBC # FLD: 12.9 % — SIGNIFICANT CHANGE UP (ref 10.3–14.5)
SODIUM SERPL-SCNC: 138 MMOL/L — SIGNIFICANT CHANGE UP (ref 135–145)
TROPONIN T, HIGH SENSITIVITY RESULT: 790 NG/L — HIGH (ref 0–51)
WBC # BLD: 10.13 K/UL — SIGNIFICANT CHANGE UP (ref 3.8–10.5)
WBC # FLD AUTO: 10.13 K/UL — SIGNIFICANT CHANGE UP (ref 3.8–10.5)

## 2019-11-18 PROCEDURE — 99233 SBSQ HOSP IP/OBS HIGH 50: CPT

## 2019-11-18 PROCEDURE — 99232 SBSQ HOSP IP/OBS MODERATE 35: CPT

## 2019-11-18 RX ORDER — INSULIN NPH HUM/REG INSULIN HM 70-30/ML
10 VIAL (ML) SUBCUTANEOUS
Qty: 100 | Refills: 0
Start: 2019-11-18

## 2019-11-18 RX ORDER — FAMOTIDINE 10 MG/ML
1 INJECTION INTRAVENOUS
Qty: 180 | Refills: 0
Start: 2019-11-18 | End: 2020-02-15

## 2019-11-18 RX ORDER — METOPROLOL TARTRATE 50 MG
25 TABLET ORAL ONCE
Refills: 0 | Status: COMPLETED | OUTPATIENT
Start: 2019-11-18 | End: 2019-11-18

## 2019-11-18 RX ORDER — METOPROLOL TARTRATE 50 MG
50 TABLET ORAL DAILY
Refills: 0 | Status: DISCONTINUED | OUTPATIENT
Start: 2019-11-19 | End: 2019-11-19

## 2019-11-18 RX ORDER — LISINOPRIL 2.5 MG/1
1 TABLET ORAL
Qty: 90 | Refills: 0
Start: 2019-11-18 | End: 2020-02-15

## 2019-11-18 RX ORDER — TICAGRELOR 90 MG/1
1 TABLET ORAL
Qty: 180 | Refills: 0
Start: 2019-11-18 | End: 2020-02-15

## 2019-11-18 RX ORDER — METOPROLOL TARTRATE 50 MG
50 TABLET ORAL DAILY
Refills: 0 | Status: DISCONTINUED | OUTPATIENT
Start: 2019-11-18 | End: 2019-11-18

## 2019-11-18 RX ORDER — METFORMIN HYDROCHLORIDE 850 MG/1
1 TABLET ORAL
Qty: 0 | Refills: 0 | DISCHARGE

## 2019-11-18 RX ORDER — INSULIN NPH HUM/REG INSULIN HM 70-30/ML
5 VIAL (ML) SUBCUTANEOUS
Qty: 0 | Refills: 3 | DISCHARGE
Start: 2019-11-18 | End: 2020-03-16

## 2019-11-18 RX ORDER — INSULIN NPH HUM/REG INSULIN HM 70-30/ML
4 VIAL (ML) SUBCUTANEOUS
Qty: 0 | Refills: 3 | DISCHARGE
Start: 2019-11-18 | End: 2020-03-16

## 2019-11-18 RX ORDER — METOPROLOL TARTRATE 50 MG
1 TABLET ORAL
Qty: 30 | Refills: 3
Start: 2019-11-18 | End: 2020-03-16

## 2019-11-18 RX ORDER — ATORVASTATIN CALCIUM 80 MG/1
1 TABLET, FILM COATED ORAL
Qty: 90 | Refills: 0
Start: 2019-11-18 | End: 2020-02-15

## 2019-11-18 RX ORDER — INSULIN NPH HUM/REG INSULIN HM 70-30/ML
10 VIAL (ML) SUBCUTANEOUS
Qty: 0 | Refills: 0 | DISCHARGE
Start: 2019-11-18

## 2019-11-18 RX ORDER — METOPROLOL TARTRATE 50 MG
1 TABLET ORAL
Qty: 180 | Refills: 0
Start: 2019-11-18 | End: 2020-02-15

## 2019-11-18 RX ORDER — INSULIN NPH HUM/REG INSULIN HM 70-30/ML
9 VIAL (ML) SUBCUTANEOUS
Qty: 0 | Refills: 0 | DISCHARGE
Start: 2019-11-18

## 2019-11-18 RX ORDER — INSULIN NPH HUM/REG INSULIN HM 70-30/ML
5 VIAL (ML) SUBCUTANEOUS
Qty: 100 | Refills: 3
Start: 2019-11-18 | End: 2020-03-16

## 2019-11-18 RX ORDER — LISINOPRIL 2.5 MG/1
5 TABLET ORAL DAILY
Refills: 0 | Status: DISCONTINUED | OUTPATIENT
Start: 2019-11-18 | End: 2019-11-19

## 2019-11-18 RX ADMIN — Medication 7 UNIT(S): at 16:27

## 2019-11-18 RX ADMIN — LISINOPRIL 5 MILLIGRAM(S): 2.5 TABLET ORAL at 06:53

## 2019-11-18 RX ADMIN — TICAGRELOR 90 MILLIGRAM(S): 90 TABLET ORAL at 17:08

## 2019-11-18 RX ADMIN — Medication 4: at 08:19

## 2019-11-18 RX ADMIN — FAMOTIDINE 20 MILLIGRAM(S): 10 INJECTION INTRAVENOUS at 06:53

## 2019-11-18 RX ADMIN — Medication 7 UNIT(S): at 12:08

## 2019-11-18 RX ADMIN — FAMOTIDINE 20 MILLIGRAM(S): 10 INJECTION INTRAVENOUS at 17:08

## 2019-11-18 RX ADMIN — Medication 25 MILLIGRAM(S): at 17:08

## 2019-11-18 RX ADMIN — Medication 1: at 12:08

## 2019-11-18 RX ADMIN — Medication 25 MILLIGRAM(S): at 06:53

## 2019-11-18 RX ADMIN — INSULIN GLARGINE 18 UNIT(S): 100 INJECTION, SOLUTION SUBCUTANEOUS at 21:15

## 2019-11-18 RX ADMIN — Medication 1: at 21:15

## 2019-11-18 RX ADMIN — CHLORHEXIDINE GLUCONATE 1 APPLICATION(S): 213 SOLUTION TOPICAL at 21:16

## 2019-11-18 RX ADMIN — Medication 81 MILLIGRAM(S): at 12:08

## 2019-11-18 RX ADMIN — ATORVASTATIN CALCIUM 80 MILLIGRAM(S): 80 TABLET, FILM COATED ORAL at 21:15

## 2019-11-18 RX ADMIN — Medication 7 UNIT(S): at 08:19

## 2019-11-18 RX ADMIN — TICAGRELOR 90 MILLIGRAM(S): 90 TABLET ORAL at 06:53

## 2019-11-18 NOTE — DISCHARGE NOTE PROVIDER - NSDCQMACEB_CARD_A_CORE
"Chief Complaint   Patient presents with     Cerumen Impaction     Ear Cleaning       Initial Resp 16  Ht 1.467 m (4' 9.75\")  Wt 61.7 kg (136 lb)  BMI 28.67 kg/m2 Estimated body mass index is 28.67 kg/(m^2) as calculated from the following:    Height as of this encounter: 1.467 m (4' 9.75\").    Weight as of this encounter: 61.7 kg (136 lb).  Medication Reconciliation: complete     Linda Maradiaga MA    " Yes

## 2019-11-18 NOTE — DISCHARGE NOTE PROVIDER - NSDCFUSCHEDAPPT_GEN_ALL_CORE_FT
TARUN CERVANTES ; 11/19/2019 ; NPP Med Int [address]  TARUN CERVANTES ; 11/22/2019 ; [address]PP [address] TARUN CERVANTES ; 11/19/2019 ; NPP Med Int [address]  TARUN CERVANTES ; 11/22/2019 ; [address]PP [address]  TARUN CERVANTES ; 11/26/2019 ; NPP Cardio [address] TARUN CERVANTES ; 11/26/2019 ; NPP Cardio [address]  TARUN CERVANTES ; 12/06/2019 ; NPP Med Int [address]  TARUN CERVANTES ; 12/20/2019 ; NPP [address]

## 2019-11-18 NOTE — DISCHARGE NOTE PROVIDER - NSDCCPCAREPLAN_GEN_ALL_CORE_FT
PRINCIPAL DISCHARGE DIAGNOSIS  Diagnosis: Chest pain  Assessment and Plan of Treatment: -received 2 stents to PRINCIPAL DISCHARGE DIAGNOSIS  Diagnosis: Chest pain  Assessment and Plan of Treatment: -received 2 stents to OM 2 artery; take all medications as ordered and follow up with your doctor. DO NOT STOP aspirin and Brilinta-these medications keep your stents open.   -Echo: EF 55%

## 2019-11-18 NOTE — PROGRESS NOTE ADULT - SUBJECTIVE AND OBJECTIVE BOX
Admission date:  CHIEF COMPLAINT:  HPI:  49M bilingual Romansh/english speaking, c hx DM2, pw acute onset chest pain.    Pt states he was eating dinner at around 7PM, when he had two types of chest pain. The first chest pain was a burning epigastric and substernal pain. The second chest pain was a more severe, aching pain, localized over his left lateral chest, with radiation down his left arm and to his back. At around 8pm, after his dinner, pt drank some tea, and then developed diaphoresis, palpitations, nausea, lightheadedness, mild SOB, and unbearable chest pain, which brought him to the hospital. The chest pain was worse with lying flat and better with leaning forward. In the ED, pt reports that he started to cry 2/2 his chest pain and discomfort. Pt states that his pain is significantly improved now, and he only has the mild epigastric pain. Pt has never had anything like this before. Pt's last exercise stress test was in 2011, results in [employer]scripts, which showed 11 mets, normal results. Father passed away of heart attack in his 70s.    VS: Tm 97.4, P 65, /94, R 20, 97% RA  In the ED, received , NS 1L, pepcid, toradol, morphine, NTG (2019 05:51)    INTERVAL HISTORY: Patient seen and examined, denies CP, dyspnea, orthopnea, PND, palpitations and able to lay flat. NAD noted.    REVIEW OF SYSTEMS:    CONSTITUTIONAL: + weakness, fevers or chills  EYES/ENT: No visual changes;  No vertigo or throat pain   NECK: No pain or stiffness  RESPIRATORY: No cough, wheezing, hemoptysis; No shortness of breath  CARDIOVASCULAR: No chest pain or palpitations  GASTROINTESTINAL: No abdominal or epigastric pain. No nausea, vomiting, or hematemesis; No diarrhea or constipation. No melena or hematochezia.  GENITOURINARY: No dysuria, frequency or hematuria  NEUROLOGICAL: No numbness or weakness  SKIN: No itching, rashes      MEDICATIONS  (STANDING):  aspirin enteric coated 81 milliGRAM(s) Oral daily  atorvastatin 80 milliGRAM(s) Oral at bedtime  chlorhexidine 2% Cloths 1 Application(s) Topical at bedtime  dextrose 5%. 1000 milliLiter(s) (50 mL/Hr) IV Continuous <Continuous>  dextrose 50% Injectable 12.5 Gram(s) IV Push once  dextrose 50% Injectable 25 Gram(s) IV Push once  dextrose 50% Injectable 25 Gram(s) IV Push once  famotidine    Tablet 20 milliGRAM(s) Oral two times a day  influenza   Vaccine 0.5 milliLiter(s) IntraMuscular once  insulin glargine Injectable (LANTUS) 18 Unit(s) SubCutaneous at bedtime  insulin lispro (HumaLOG) corrective regimen sliding scale   SubCutaneous three times a day before meals  insulin lispro (HumaLOG) corrective regimen sliding scale   SubCutaneous at bedtime  insulin lispro Injectable (HumaLOG) 7 Unit(s) SubCutaneous three times a day before meals  lisinopril 5 milliGRAM(s) Oral daily  metoprolol tartrate 25 milliGRAM(s) Oral every 12 hours  ticagrelor 90 milliGRAM(s) Oral every 12 hours    MEDICATIONS  (PRN):  acetaminophen   Tablet .. 650 milliGRAM(s) Oral every 6 hours PRN Mild Pain (1 - 3), Moderate Pain (4 - 6)  dextrose 40% Gel 15 Gram(s) Oral once PRN Blood Glucose LESS THAN 70 milliGRAM(s)/deciliter  glucagon  Injectable 1 milliGRAM(s) IntraMuscular once PRN Glucose LESS THAN 70 milligrams/deciliter  nitroglycerin     SubLingual 0.4 milliGRAM(s) SubLingual every 5 minutes PRN Chest Pain      Objective:  ICU Vital Signs Last 24 Hrs  T(C): 36.9 (2019 22:00), Max: 36.9 (2019 22:00)  T(F): 98.5 (2019 22:00), Max: 98.5 (2019 22:00)  HR: 68 (2019 06:00) (60 - 95)  BP: 113/75 (2019 06:00) (84/54 - 159/98)  BP(mean): 90 (2019 06:00) (64 - 125)  ABP: --  ABP(mean): --  RR: 26 (2019 06:00) (11 - 39)  SpO2: 98% (2019 06:00) (96% - 99%)           @ 07:01  -   @ 07:00  --------------------------------------------------------  IN: 0 mL / OUT: 1450 mL / NET: -1450 mL      Daily     Daily     PHYSICAL EXAM:    General: WN/WD NAD  Neurology: Awake, nonfocal, LINARES x 4  Eyes: Scleras clear, PERRLA/ EOMI, Gross vision intact  ENT:Gross hearing intact, grossly patent pharynx, no stridor  Neck: Neck supple, trachea midline, No JVD,   Respiratory: CTA B/L, No wheezing, rales, rhonchi  CV: RRR, S1S2, no murmurs, rubs or gallops  Abdominal: Soft, NT, ND +BS,   Extremities: No edema, + peripheral pulses  Skin: No Rashes, Hematoma, Ecchymosis  Lymphatic: No Neck, axilla, groin LAD  Psych: Oriented x 3, normal affect  Incisions: right radial benign        TELEMETRY:     EKG:     IMAGING:    Labs:                          14.2   10.13 )-----------( 240      ( 2019 05:39 )             39.1         138  |  104  |  11  ----------------------------<  149<H>  4.0   |  21<L>  |  0.80    Ca    9.3      2019 05:39  Phos  3.2       Mg     2.2         TPro  7.3  /  Alb  3.9  /  TBili  1.0  /  DBili  x   /  AST  33  /  ALT  24  /  AlkPhos  75      LIVER FUNCTIONS - ( 2019 05:39 )  Alb: 3.9 g/dL / Pro: 7.3 g/dL / ALK PHOS: 75 U/L / ALT: 24 U/L / AST: 33 U/L / GGT: x           PTT - ( 2019 02:48 )  PTT:66.0 sec  CKMB Units: 17.7 ng/mL ( @ 05:39)  Creatine Kinase, Serum: 259 U/L ( @ 05:39)    Urinalysis Basic - ( 2019 14:45 )    Color: Light Yellow / Appearance: Clear / S.015 / pH: x  Gluc: x / Ketone: Negative  / Bili: Negative / Urobili: Negative   Blood: x / Protein: Negative / Nitrite: Negative   Leuk Esterase: Negative / RBC: x / WBC x   Sq Epi: x / Non Sq Epi: x / Bacteria: x        HEALTH ISSUES - PROBLEM Dx:

## 2019-11-18 NOTE — PHYSICAL THERAPY INITIAL EVALUATION ADULT - PERTINENT HX OF CURRENT PROBLEM, REHAB EVAL
50 yo M p/w cp. pt reports cp began at rest at 1900 on 11/15. reports pain left sided, radiates to shoulder, no known exacerbating/remitting factors,  has had similar pain in past but never this severe. non positional. Found to have an NSTEMI. Now s/p C with KHARI x2 to prox om2 via RRA access on 11/17. XRay Chest 11/16: No focal consolidations

## 2019-11-18 NOTE — DISCHARGE NOTE PROVIDER - NSDCFUADDINST_GEN_ALL_CORE_FT
Continue your medications. Do not stop Aspirin or Brilinta unless instructed by your cardiologist.  No heavy lifting or pushing/pulling or strenuous activity with procedure arm for 2 weeks. No driving for 2 days.You may shower 24 hours following procedure but no soaking of your wrist in water (such as in a pool, sink, or tub) for 1 week. Check wrist site for bleeding and/or swelling daily following procedure. Call your doctor/cardiologist immediately for bleeding or swelling or if you have increased/persistent pain or drainage at the wrist site or if you have numbness, tingling or blue or white coloring of your hand or fingers.  Follow up with your cardiologist in 1- 2 weeks. You may call [facility] at [phone] (or [phone] after office hours and weekends) with any questions or concerns following your procedure. Continue your medications. Do not stop Aspirin or Brilinta unless instructed by your cardiologist.  No heavy lifting or pushing/pulling or strenuous activity with procedure arm for 2 weeks. No driving for 2 days. You may shower 24 hours following procedure but no soaking of your wrist in water (such as in a pool, sink, or tub) for 1 week. Check wrist site for bleeding and/or swelling daily following procedure. Call your doctor/cardiologist immediately for bleeding or swelling or if you have increased/persistent pain or drainage at the wrist site or if you have numbness, tingling or blue or white coloring of your hand or fingers.  Follow up with your cardiologist in 1- 2 weeks. You may call [facility] at [phone] (or [phone] after office hours and weekends) with any questions or concerns following your procedure.

## 2019-11-18 NOTE — DISCHARGE NOTE PROVIDER - NSDCCAREPROVSEEN_GEN_ALL_CORE_FT
Rylie May  [facility] Cardiology, Team Rylie May  [facility] Cardiology, Team  [facility] Endocrinology, Team

## 2019-11-18 NOTE — PROGRESS NOTE ADULT - ASSESSMENT
49 yr old [facility] male with PMH of DM2, family history of CAD who presented with chest pain and EKG changes, now s/p [facility] with KHARI x2 to prox om2 via RRA access.

## 2019-11-18 NOTE — DISCHARGE NOTE PROVIDER - PROVIDER TOKENS
FREE:[LAST:[Reddy],FIRST:[Jim],PHONE:[phone]],FAX:[phone]],ADDRESS:[[facility] [place] [address]],SCHEDULEDAPPT:[11/26/2019],SCHEDULEDAPPTTIME:[02:15 PM]],PROVIDER:[TOKEN:[[id number]]] FREE:[LAST:[Reddy],FIRST:[Jim],PHONE:[phone]],FAX:[phone]],ADDRESS:[[facility] [address] [address]],SCHEDULEDAPPT:[11/26/2019],SCHEDULEDAPPTTIME:[02:15 PM]],FREE:[LAST:[Austen],FIRST:[Kathy],PHONE:[phone]],FAX:[phone]],ADDRESS:[[address] [address]]]

## 2019-11-18 NOTE — PROGRESS NOTE ADULT - SUBJECTIVE AND OBJECTIVE BOX
DIABETES FOLLOW UP NOTE: Saw pt earlier today  INTERVAL HX: 50 y/o M w/h/o uncontrolled Type 2 DM x 17 years, maintained on orals only (A1c here is 10.8%). Denies complications but pt has no insurance so he doesn't have  yearly follow ups. Here with CP/unstable angina> s/p cardiac cath with  DESX2. Denies any CP/SOB. Reports tolerating POs with glycemic control variable depending on PO intake. Noted FBG in  but then FBG by . Pt reports he ate a banana and a large bread sandwich this am prior to breakfast. Noted more bananas and bread at bedside. Pt's friend brought them because she believed they were healthy. No hypoglycemia. Per team possible discharge this pm.      Review of Systems:  General: As above  Cardiovascular: No chest pain, palpitations  Respiratory: No SOB, no cough  GI: No nausea, vomiting, abdominal pain  Endocrine: no polyuria, polydipsia or S&Sx of hypoglycemia    Allergies    No Known Allergies    Intolerances      MEDICATIONS:  atorvastatin 80 milliGRAM(s) Oral at bedtime  insulin glargine Injectable (LANTUS) 18 Unit(s) SubCutaneous at bedtime  insulin lispro (HumaLOG) corrective regimen sliding scale   SubCutaneous three times a day before meals  insulin lispro (HumaLOG) corrective regimen sliding scale   SubCutaneous at bedtime  insulin lispro Injectable (HumaLOG) 7 Unit(s) SubCutaneous three times a day before meals    PHYSICAL EXAM:  VITALS: T(C): 37 (11-18-19 @ 15:00)  T(F): 98.6 (11-18-19 @ 15:00), Max: 98.6 (11-18-19 @ 15:00)  HR: 85 (11-18-19 @ 15:00) (60 - 103)  BP: 107/73 (11-18-19 @ 15:00) (84/54 - 156/88)  RR:  (16 - 49)  SpO2:  (96% - 98%)  Wt(kg): --  GENERAL: Male sitting at edge of bed eating lunch in NAD. Friend at bedside  Abdomen: Soft, nontender, non distended. central adiposity  Extremities: Warm, no edema in all 4 exts  NEURO: A&O X3    LABS:  POCT Blood Glucose.: 148 mg/dL (11-18-19 @ 16:05)  POCT Blood Glucose.: 153 mg/dL (11-18-19 @ 11:56)  POCT Blood Glucose.: 336 mg/dL (11-18-19 @ 08:15)  POCT Blood Glucose.: 327 mg/dL (11-17-19 @ 21:02)  POCT Blood Glucose.: 262 mg/dL (11-17-19 @ 11:27)  POCT Blood Glucose.: 193 mg/dL (11-17-19 @ 08:37)  POCT Blood Glucose.: 223 mg/dL (11-16-19 @ 21:44)  POCT Blood Glucose.: 237 mg/dL (11-16-19 @ 17:39)  POCT Blood Glucose.: 219 mg/dL (11-16-19 @ 12:43)  POCT Blood Glucose.: 193 mg/dL (11-16-19 @ 08:57)                            14.2   10.13 )-----------( 240      ( 18 Nov 2019 05:39 )             39.1       11-18    138  |  104  |  11  ----------------------------<  149<H>  4.0   |  21<L>  |  0.80    EGFR if : 120  EGFR if non : 103    Ca    9.3      11-18  Mg     2.2     11-18  Phos  3.2     11-18    TPro  7.3  /  Alb  3.9  /  TBili  1.0  /  DBili  x   /  AST  33  /  ALT  24  /  AlkPhos  75  11-18      Thyroid Function Tests:  11-16 @ 10:03 TSH 2.23 FreeT4 -- T3 -- Anti TPO -- Anti Thyroglobulin Ab -- TSI --      Hemoglobin A1C, Whole Blood: 10.8 % <H> [4.0 - 5.6] (11-16-19 @ 10:30)      11-16 Chol 159 LDL 90 HDL 51 Trig 88

## 2019-11-18 NOTE — DISCHARGE NOTE PROVIDER - NSDCFUADDAPPT_GEN_ALL_CORE_FT
Please see you doctor in 1-2 weeks for followup. Please see the Cardiologist Jim Blakely Tuesday 11/26/19 @2:15 PM  Please call and make an appointment fo see Endocrinologist Kathy Byoce MD in 1 - 2 weeks after discharge (HgbA1C 10.8) Please see the Cardiologist Jim Blakely Tuesday 11/26/19 @2:15 PM  Please call and make an appointment fo see Endocrinology  Clinic in 1 - 2 weeks after discharge (HgbA1C 10.8) Please see the Cardiologist Jim Blakely Tuesday 11/26/19 @2:15 PM  An appointment to see Endocrinology  Clinic at [address] [facility]  on 12/6 at 2:30pm(HgbA1C 10.8)  Appointment on 12/20 Opthamology [address] at 1:15pm

## 2019-11-18 NOTE — DISCHARGE NOTE PROVIDER - NSDCACTIVITY_GEN_ALL_CORE
No heavy lifting/straining/Stairs allowed/Walking - Indoors allowed/Walking - Outdoors allowed/Showering allowed

## 2019-11-18 NOTE — CHART NOTE - NSCHARTNOTEFT_GEN_A_CORE
CCU Transfer Note    Transfer from: CCU    Transfer to:    (  ) Telemetry       Accepting Physician:    Signout given to:     CCU COURSE:  49M bilingual Vietnamese/english speaking, c hx DM2, pw acute onset chest pain.    Pt states he was eating dinner at around 7PM, when he had two types of chest pain. The first chest pain was a burning epigastric and substernal pain. The second chest pain was a more severe, aching pain, localized over his left lateral chest, with radiation down his left arm and to his back. At around 8pm, after his dinner, pt drank some tea, and then developed diaphoresis, palpitations, nausea, lightheadedness, mild SOB, and unbearable chest pain, which brought him to the hospital. The chest pain was worse with lying flat and better with leaning forward. In the ED, pt reports that he started to cry 2/2 his chest pain and discomfort. Pt states that his pain is significantly improved now, and he only has the mild epigastric pain. Pt has never had anything like this before. Pt's last exercise stress test was in August 2011, results in allscripts, which showed 11 mets, normal results. Father passed away of heart attack in his 70s. In the ED, received , NS 1L, pepcid, toradol, morphine, NTG (16 Nov 2019 05:51). 11/17 s/p [facility] with KHARI x2 to prox om2 via RRA access.    INTERVAL HISTORY: Patient seen and examined, denies CP, dyspnea, orthopnea, PND, palpitations and able to lay flat. NAD noted.      PAST MEDICAL & SURGICAL HISTORY:  Diabetes  No significant past surgical history      Vital Signs Last 24 Hrs  T(C): 37 (18 Nov 2019 15:00), Max: 37 (18 Nov 2019 15:00)  T(F): 98.6 (18 Nov 2019 15:00), Max: 98.6 (18 Nov 2019 15:00)  HR: 88 (18 Nov 2019 17:00) (60 - 103)  BP: 129/80 (18 Nov 2019 17:00) (84/54 - 144/83)  BP(mean): 98 (18 Nov 2019 17:00) (64 - 106)  RR: 23 (18 Nov 2019 17:00) (16 - 49)  SpO2: 97% (18 Nov 2019 16:00) (96% - 98%)  I&O's Summary    17 Nov 2019 07:01  -  18 Nov 2019 07:00  --------------------------------------------------------  IN: 0 mL / OUT: 1450 mL / NET: -1450 mL    18 Nov 2019 07:01  -  18 Nov 2019 17:08  --------------------------------------------------------  IN: 120 mL / OUT: 0 mL / NET: 120 mL      Allergies    No Known Allergies    Intolerances      MEDICATIONS  (STANDING):  aspirin enteric coated 81 milliGRAM(s) Oral daily  atorvastatin 80 milliGRAM(s) Oral at bedtime  chlorhexidine 2% Cloths 1 Application(s) Topical at bedtime  dextrose 5%. 1000 milliLiter(s) (50 mL/Hr) IV Continuous <Continuous>  dextrose 50% Injectable 12.5 Gram(s) IV Push once  dextrose 50% Injectable 25 Gram(s) IV Push once  dextrose 50% Injectable 25 Gram(s) IV Push once  famotidine    Tablet 20 milliGRAM(s) Oral two times a day  influenza   Vaccine 0.5 milliLiter(s) IntraMuscular once  insulin glargine Injectable (LANTUS) 18 Unit(s) SubCutaneous at bedtime  insulin lispro (HumaLOG) corrective regimen sliding scale   SubCutaneous three times a day before meals  insulin lispro (HumaLOG) corrective regimen sliding scale   SubCutaneous at bedtime  insulin lispro Injectable (HumaLOG) 7 Unit(s) SubCutaneous three times a day before meals  lisinopril 5 milliGRAM(s) Oral daily  metoprolol tartrate 25 milliGRAM(s) Oral once  ticagrelor 90 milliGRAM(s) Oral every 12 hours    MEDICATIONS  (PRN):  acetaminophen   Tablet .. 650 milliGRAM(s) Oral every 6 hours PRN Mild Pain (1 - 3), Moderate Pain (4 - 6)  dextrose 40% Gel 15 Gram(s) Oral once PRN Blood Glucose LESS THAN 70 milliGRAM(s)/deciliter  glucagon  Injectable 1 milliGRAM(s) IntraMuscular once PRN Glucose LESS THAN 70 milligrams/deciliter  nitroglycerin     SubLingual 0.4 milliGRAM(s) SubLingual every 5 minutes PRN Chest Pain      Adult Advanced Hemodynamics Last 24 Hrs    CARDIAC MARKERS ( 18 Nov 2019 05:39 )  x     / x     / 259 U/L / x     / 17.7 ng/mL  CARDIAC MARKERS ( 17 Nov 2019 01:07 )  x     / x     / 798 U/L / x     / 105.3 ng/mL  CARDIAC MARKERS ( 16 Nov 2019 20:53 )  x     / x     / 867 U/L / x     / 111.6 ng/mL                            14.2   10.13 )-----------( 240      ( 18 Nov 2019 05:39 )             39.1     11-18    138  |  104  |  11  ----------------------------<  149<H>  4.0   |  21<L>  |  0.80    Ca    9.3      18 Nov 2019 05:39  Phos  3.2     11-18  Mg     2.2     11-18      TPro  7.3  /  Alb  3.9  /  TBili  1.0  /  DBili  x   /  AST  33  /  ALT  24  /  AlkPhos  75  11-18    PTT - ( 17 Nov 2019 02:48 )  PTT:66.0 sec    PHYSICAL EXAM:  General: WN/WD NAD  Neurology: Awake, nonfocal, LINARES x 4  Eyes: Scleras clear, PERRLA/ EOMI, Gross vision intact  ENT:Gross hearing intact, grossly patent pharynx, no stridor  Neck: Neck supple, trachea midline, No JVD,   Respiratory: CTA B/L, No wheezing, rales, rhonchi  CV: RRR, S1S2, no murmurs, rubs or gallops  Abdominal: Soft, NT, ND +BS,   Extremities: No edema, + peripheral pulses  Skin: No Rashes, Hematoma, Ecchymosis  Lymphatic: No Neck, axilla, groin LAD  Psych: Oriented x 3, normal affect  Incisions: right radial benign      Lactate:    Ekg:  < from: 12 Lead ECG (11.17.19 @ 10:24) >    Ventricular Rate 74 BPM    Atrial Rate 74 BPM    P-R Interval 124 ms    QRS Duration 80 ms    Q-T Interval 356 ms    QTC Calculation(Bezet) 395 ms    P Axis 34 degrees    R Axis 49 degrees    T Axis 52 degrees    Diagnosis Line NORMAL SINUS RHYTHM  NORMAL ECG    < end of copied text >  Telemetry:  Echo:  < from: Transthoracic Echocardiogram (11.17.19 @ 14:32) >    Conclusions: EF 55%  1. Mild concentric left ventricular hypertrophy.  2. Mild inferior wall hypokinesis.    < end of copied text >      Cardiac Cath:  Stress Test:  Imaging:    ASSESSMENT & PLAN:   49 yr old [place] male with PMH of DM2, family history of CAD who presented with chest pain and EKG changes, now s/p [facility] with KHARI x2 to prox om2 via RRA access.     Problem/Plan - 1:  ·  Problem: Other chest pain.  Plan: -now s/p KHARI x2 to OM2 via RRA  -RRA site stable  -continue aspirin, brilinta, statin  -echo  EF 55%, mild inferior wall hypokinesis.      Problem/Plan - 2:  ·  Problem: Hypertension.  Plan: -continue metoprolol  -continue lisinopril 5mg daily.      Problem/Plan - 3:  ·  Problem: Type 2 diabetes mellitus without complication, without long-term current use of insulin.  Plan: -lantus increased to 18 units QHS  -continue ISS coverage.      Problem/Plan - 4:  ·  Problem: Prophylactic measure.  Plan: For possible discharge to home will f/u with cards clinic  Meds sent to [facility]  PT eval patient ability to climb up / down stairs and there is no need for home PT or SARA.    Attending Attestation:   49 DM, [facility] yesterday with Dr Ayers with KHARI to OM2 x 2.  Cx 60%, mild LAD disease with EDP ~12.   EF is 55%.  Feeling well today.  Creatinine is stable at 0.8.   Trop 790<--1100    Plan for d/c home today with brillinata and aspirin  Followup in 1-2 weeks        FOR FOLLOW UP:  -Uptitrate BB / ACE-I  -Meds sent to [facility]  -[facility] for emergency medicare/medicaid?  -Cardiology Consult CCU Transfer Note    Transfer from: CCU    Transfer to:    (  ) Telemetry       Accepting Physician: Vimal May MD    Signout given to: Vimal May MD    CCU COURSE:  49M bilingual Swedish/english speaking, c hx DM2, pw acute onset chest pain.    Pt states he was eating dinner at around 7PM, when he had two types of chest pain. The first chest pain was a burning epigastric and substernal pain. The second chest pain was a more severe, aching pain, localized over his left lateral chest, with radiation down his left arm and to his back. At around 8pm, after his dinner, pt drank some tea, and then developed diaphoresis, palpitations, nausea, lightheadedness, mild SOB, and unbearable chest pain, which brought him to the hospital. The chest pain was worse with lying flat and better with leaning forward. In the ED, pt reports that he started to cry 2/2 his chest pain and discomfort. Pt states that his pain is significantly improved now, and he only has the mild epigastric pain. Pt has never had anything like this before. Pt's last exercise stress test was in August 2011, results in allscripts, which showed 11 mets, normal results. Father passed away of heart attack in his 70s. In the ED, received , NS 1L, pepcid, toradol, morphine, NTG (16 Nov 2019 05:51). 11/17 s/p [facility] with KHARI x2 to prox om2 via RRA access.    INTERVAL HISTORY: Patient seen and examined, denies CP, dyspnea, orthopnea, PND, palpitations and able to lay flat. NAD noted.      PAST MEDICAL & SURGICAL HISTORY:  Diabetes  No significant past surgical history      Vital Signs Last 24 Hrs  T(C): 37 (18 Nov 2019 15:00), Max: 37 (18 Nov 2019 15:00)  T(F): 98.6 (18 Nov 2019 15:00), Max: 98.6 (18 Nov 2019 15:00)  HR: 88 (18 Nov 2019 17:00) (60 - 103)  BP: 129/80 (18 Nov 2019 17:00) (84/54 - 144/83)  BP(mean): 98 (18 Nov 2019 17:00) (64 - 106)  RR: 23 (18 Nov 2019 17:00) (16 - 49)  SpO2: 97% (18 Nov 2019 16:00) (96% - 98%)  I&O's Summary    17 Nov 2019 07:01  -  18 Nov 2019 07:00  --------------------------------------------------------  IN: 0 mL / OUT: 1450 mL / NET: -1450 mL    18 Nov 2019 07:01  -  18 Nov 2019 17:08  --------------------------------------------------------  IN: 120 mL / OUT: 0 mL / NET: 120 mL      Allergies    No Known Allergies    Intolerances      MEDICATIONS  (STANDING):  aspirin enteric coated 81 milliGRAM(s) Oral daily  atorvastatin 80 milliGRAM(s) Oral at bedtime  chlorhexidine 2% Cloths 1 Application(s) Topical at bedtime  dextrose 5%. 1000 milliLiter(s) (50 mL/Hr) IV Continuous <Continuous>  dextrose 50% Injectable 12.5 Gram(s) IV Push once  dextrose 50% Injectable 25 Gram(s) IV Push once  dextrose 50% Injectable 25 Gram(s) IV Push once  famotidine    Tablet 20 milliGRAM(s) Oral two times a day  influenza   Vaccine 0.5 milliLiter(s) IntraMuscular once  insulin glargine Injectable (LANTUS) 18 Unit(s) SubCutaneous at bedtime  insulin lispro (HumaLOG) corrective regimen sliding scale   SubCutaneous three times a day before meals  insulin lispro (HumaLOG) corrective regimen sliding scale   SubCutaneous at bedtime  insulin lispro Injectable (HumaLOG) 7 Unit(s) SubCutaneous three times a day before meals  lisinopril 5 milliGRAM(s) Oral daily  metoprolol tartrate 25 milliGRAM(s) Oral once  ticagrelor 90 milliGRAM(s) Oral every 12 hours    MEDICATIONS  (PRN):  acetaminophen   Tablet .. 650 milliGRAM(s) Oral every 6 hours PRN Mild Pain (1 - 3), Moderate Pain (4 - 6)  dextrose 40% Gel 15 Gram(s) Oral once PRN Blood Glucose LESS THAN 70 milliGRAM(s)/deciliter  glucagon  Injectable 1 milliGRAM(s) IntraMuscular once PRN Glucose LESS THAN 70 milligrams/deciliter  nitroglycerin     SubLingual 0.4 milliGRAM(s) SubLingual every 5 minutes PRN Chest Pain      Adult Advanced Hemodynamics Last 24 Hrs    CARDIAC MARKERS ( 18 Nov 2019 05:39 )  x     / x     / 259 U/L / x     / 17.7 ng/mL  CARDIAC MARKERS ( 17 Nov 2019 01:07 )  x     / x     / 798 U/L / x     / 105.3 ng/mL  CARDIAC MARKERS ( 16 Nov 2019 20:53 )  x     / x     / 867 U/L / x     / 111.6 ng/mL                            14.2   10.13 )-----------( 240      ( 18 Nov 2019 05:39 )             39.1     11-18    138  |  104  |  11  ----------------------------<  149<H>  4.0   |  21<L>  |  0.80    Ca    9.3      18 Nov 2019 05:39  Phos  3.2     11-18  Mg     2.2     11-18      TPro  7.3  /  Alb  3.9  /  TBili  1.0  /  DBili  x   /  AST  33  /  ALT  24  /  AlkPhos  75  11-18    PTT - ( 17 Nov 2019 02:48 )  PTT:66.0 sec    PHYSICAL EXAM:  General: WN/WD NAD  Neurology: Awake, nonfocal, LINARES x 4  Eyes: Scleras clear, PERRLA/ EOMI, Gross vision intact  ENT:Gross hearing intact, grossly patent pharynx, no stridor  Neck: Neck supple, trachea midline, No JVD,   Respiratory: CTA B/L, No wheezing, rales, rhonchi  CV: RRR, S1S2, no murmurs, rubs or gallops  Abdominal: Soft, NT, ND +BS,   Extremities: No edema, + peripheral pulses  Skin: No Rashes, Hematoma, Ecchymosis  Lymphatic: No Neck, axilla, groin LAD  Psych: Oriented x 3, normal affect  Incisions: right radial benign      Lactate:    Ekg:  < from: 12 Lead ECG (11.17.19 @ 10:24) >    Ventricular Rate 74 BPM    Atrial Rate 74 BPM    P-R Interval 124 ms    QRS Duration 80 ms    Q-T Interval 356 ms    QTC Calculation(Bezet) 395 ms    P Axis 34 degrees    R Axis 49 degrees    T Axis 52 degrees    Diagnosis Line NORMAL SINUS RHYTHM  NORMAL ECG    < end of copied text >  Telemetry:  Echo:  < from: Transthoracic Echocardiogram (11.17.19 @ 14:32) >    Conclusions: EF 55%  1. Mild concentric left ventricular hypertrophy.  2. Mild inferior wall hypokinesis.    < end of copied text >      Cardiac Cath:  Stress Test:  Imaging:    ASSESSMENT & PLAN:   49 yr old [place] male with PMH of DM2, family history of CAD who presented with chest pain and EKG changes, now s/p [facility] with KHARI x2 to prox om2 via RRA access.     Problem/Plan - 1:  ·  Problem: Other chest pain.  Plan: -now s/p KHARI x2 to OM2 via RRA  -RRA site stable  -continue aspirin, brilinta, statin  -echo  EF 55%, mild inferior wall hypokinesis.      Problem/Plan - 2:  ·  Problem: Hypertension.  Plan: -continue metoprolol  -continue lisinopril 5mg daily.      Problem/Plan - 3:  ·  Problem: Type 2 diabetes mellitus without complication, without long-term current use of insulin.  Plan: -lantus increased to 18 units QHS  -continue ISS coverage.      Problem/Plan - 4:  ·  Problem: Prophylactic measure.  Plan: For possible discharge to home will f/u with cards clinic  Meds sent to [facility]  PT eval patient ability to climb up / down stairs and there is no need for home PT or SARA.    Attending Attestation:   49 DM, [facility] yesterday with Dr Ayesr with KHARI to OM2 x 2.  Cx 60%, mild LAD disease with EDP ~12.   EF is 55%.  Feeling well today.  Creatinine is stable at 0.8.   Trop 790<--1100    Plan for d/c home today with brillinata and aspirin  Followup in 1-2 weeks        FOR FOLLOW UP:  -Uptitrate BB / ACE-I  -Meds sent to [facility]  -[facility] for emergency medicare/medicaid?  -Cardiology Consult

## 2019-11-18 NOTE — PHYSICAL THERAPY INITIAL EVALUATION ADULT - ADDITIONAL COMMENTS
Stat ECG was performed and given to Piper FU.       pt lives alone in 2nd floor apartment (1 flight of stairs). Prior to admission, pt was I with all functional mobility and ADLs without AD.

## 2019-11-18 NOTE — DISCHARGE NOTE PROVIDER - CARE PROVIDER_API CALL
Jim Blakely  Cardiology Clinic Dept  [address] [place] [address]  Phone: [phone]  Fax: [phone]  Scheduled Appointment: 11/26/2019 02:15 PM    Kathy Boyce)  EndocrinologyMetabDiabetes; Internal Medicine  [address] [facility] [address]  [place] [address]  Phone: [phone]  Fax: [phone]  Follow Up Time: Jim Blakely  Cardiology Clinic Dept  [address] [place] [address].[place]. [address]  Phone: [phone]  Fax: [phone]  Scheduled Appointment: 11/26/2019 02:15 PM    Austen Kathy  [facility] Endocrinology  [address] [place] [address], [place] [address]  Phone: [phone]  Fax: [phone]  Follow Up Time:

## 2019-11-18 NOTE — PROGRESS NOTE ADULT - PROBLEM SELECTOR PLAN 4
For possible discharge to home will f/u with cards clinic  Meds sent to [facility] For possible discharge to home will f/u with cards clinic  Meds sent to [facility]  PT eval patient ability to climb up / down stairs and there is no need for home PT or SARA

## 2019-11-18 NOTE — DISCHARGE NOTE PROVIDER - HOSPITAL COURSE
49M bilingual Faroese/english speaking, c hx DM2, pw acute onset chest pain.    Pt states he was eating dinner at around 7PM, when he had two types of chest pain. The first chest pain was a burning epigastric and substernal pain. The second chest pain was a more severe, aching pain, localized over his left lateral chest, with radiation down his left arm and to his back. At around 8pm, after his dinner, pt drank some tea, and then developed diaphoresis, palpitations, nausea, lightheadedness, mild SOB, and unbearable chest pain, which brought him to the hospital. The chest pain was worse with lying flat and better with leaning forward. In the ED, pt reports that he started to cry 2/2 his chest pain and discomfort. Pt states that his pain is significantly improved now, and he only has the mild epigastric pain. Pt has never had anything like this before. Pt's last exercise stress test was in August 2011, results in allscripts, which showed 11 mets, normal results. Father passed away of heart attack in his 70s. Admitted to CCU for unstable angina/        11/17: patient now s/p KHARI x 2 to prox OM2 100% via RRA 49M bilingual Nepali/english speaking, c hx DM2, pw acute onset chest pain.    Pt states he was eating dinner at around 7PM, when he had two types of chest pain. The first chest pain was a burning epigastric and substernal pain. The second chest pain was a more severe, aching pain, localized over his left lateral chest, with radiation down his left arm and to his back. At around 8pm, after his dinner, pt drank some tea, and then developed diaphoresis, palpitations, nausea, lightheadedness, mild SOB, and unbearable chest pain, which brought him to the hospital. The chest pain was worse with lying flat and better with leaning forward. In the ED, pt reports that he started to cry 2/2 his chest pain and discomfort. Pt states that his pain is significantly improved now, and he only has the mild epigastric pain. Pt has never had anything like this before. Pt's last exercise stress test was in August 2011, results in allscripts, which showed 11 mets, normal results. Father passed away of heart attack in his 70s. Admitted to CCU for unstable angina.        11/17: patient now s/p KHARI x 2 to prox OM2 100% via RRA 49M bilingual Yakut/english speaking, c hx DM2, pw acute onset chest pain.    Pt states he was eating dinner at around 7PM, when he had two types of chest pain. The first chest pain was a burning epigastric and substernal pain. The second chest pain was a more severe, aching pain, localized over his left lateral chest, with radiation down his left arm and to his back. At around 8pm, after his dinner, pt drank some tea, and then developed diaphoresis, palpitations, nausea, lightheadedness, mild SOB, and unbearable chest pain, which brought him to the hospital. The chest pain was worse with lying flat and better with leaning forward. In the ED, pt reports that he started to cry 2/2 his chest pain and discomfort. Pt states that his pain is significantly improved now, and he only has the mild epigastric pain. Pt has never had anything like this before. Pt's last exercise stress test was in August 2011, results in allscripts, which showed 11 mets, normal results. Father passed away of heart attack in his 70s. Admitted to CCU for unstable angina.11/17: patient now s/p KHARI x 2 to prox OM2 100% via RRA < from: Transthoracic Echocardiogram (11.17.19 @ 14:32) >    Observations: EF 55%    Mitral Valve: Normal mitral valve. Mild mitral    regurgitation.  Peak mitral valve gradient equals 3 mm Hg,    mean transmitral valve gradient equals 1 mm Hg.    Aortic Valve/Aorta: Normal trileaflet aortic valve. Peak    transaortic valve gradient equals 7 mm Hg. Peak left    ventricular outflow tract gradient equals 4 mm Hg, mean    gradient is equal to 2 mm Hg, LVOT velocity time integral    equals 17 cm.    Aortic Root: 3.1 cm.    Left Atrium: LA volume index = 11 cc/m2.    Left Ventricle: Mild inferior wall hypokinesis. Mild    concentric left ventricular hypertrophy.    Right Heart: Normal right atrium. Normal right ventricular    size and function. Normal tricuspid valve. Mild tricuspid    regurgitation. Normal pulmonic valve. Minimal pulmonic    regurgitation.    Pericardium/Pleura: Normal pericardium with no pericardial    effusion.    Hemodynamic: Estimated right atrial pressure is 8 mm Hg.    Estimated right ventricular systolic pressure equals 35 mm    Hg, assuming right atrial pressure equals 8 mm Hg,    consistent with borderline pulmonary hypertension. 49M bilingual Divehi/english speaking, c hx DM2, pw acute onset chest pain.    Pt states he was eating dinner at around 7PM, when he had two types of chest pain. The first chest pain was a burning epigastric and substernal pain. The second chest pain was a more severe, aching pain, localized over his left lateral chest, with radiation down his left arm and to his back. At around 8pm, after his dinner, pt drank some tea, and then developed diaphoresis, palpitations, nausea, lightheadedness, mild SOB, and unbearable chest pain, which brought him to the hospital. The chest pain was worse with lying flat and better with leaning forward. In the ED, pt reports that he started to cry 2/2 his chest pain and discomfort. Pt states that his pain is significantly improved now, and he only has the mild epigastric pain. Pt has never had anything like this before. Pt's last exercise stress test was in August 2011, results in allscripts, which showed 11 mets, normal results. Father passed away of heart attack in his 70s. Admitted to CCU for unstable angina.11/17: patient now s/p KHARI x 2 to prox OM2 100% via RRA. Patient instructed to please follow up with Cardiologist Jim Blakely Tuesday 11/26/19 @2:15 PM    Please call and make an appointment fo see Endocrinologist Kathy Boyce MD in 1 - 2 weeks after discharge (HgbA1C 10.8)     < from: Transthoracic Echocardiogram (11.17.19 @ 14:32) >    Observations: EF 55%    Mitral Valve: Normal mitral valve. Mild mitral    regurgitation.  Peak mitral valve gradient equals 3 mm Hg,    mean transmitral valve gradient equals 1 mm Hg.    Aortic Valve/Aorta: Normal trileaflet aortic valve. Peak    transaortic valve gradient equals 7 mm Hg. Peak left    ventricular outflow tract gradient equals 4 mm Hg, mean    gradient is equal to 2 mm Hg, LVOT velocity time integral    equals 17 cm.    Aortic Root: 3.1 cm.    Left Atrium: LA volume index = 11 cc/m2.    Left Ventricle: Mild inferior wall hypokinesis. Mild    concentric left ventricular hypertrophy.    Right Heart: Normal right atrium. Normal right ventricular    size and function. Normal tricuspid valve. Mild tricuspid    regurgitation. Normal pulmonic valve. Minimal pulmonic    regurgitation.    Pericardium/Pleura: Normal pericardium with no pericardial    effusion.    Hemodynamic: Estimated right atrial pressure is 8 mm Hg.    Estimated right ventricular systolic pressure equals 35 mm    Hg, assuming right atrial pressure equals 8 mm Hg,    consistent with borderline pulmonary hypertension. 49M bilingual Nepali/english speaking, c hx DM2, pw acute onset chest pain.    Pt states he was eating dinner at around 7PM, when he had two types of chest pain. The first chest pain was a burning epigastric and substernal pain. The second chest pain was a more severe, aching pain, localized over his left lateral chest, with radiation down his left arm and to his back. At around 8pm, after his dinner, pt drank some tea, and then developed diaphoresis, palpitations, nausea, lightheadedness, mild SOB, and unbearable chest pain, which brought him to the hospital. The chest pain was worse with lying flat and better with leaning forward. In the ED, pt reports that he started to cry 2/2 his chest pain and discomfort. Pt states that his pain is significantly improved now, and he only has the mild epigastric pain. Pt has never had anything like this before. Pt's last exercise stress test was in August 2011, results in allscripts, which showed 11 mets, normal results. Father passed away of heart attack in his 70s. Admitted to CCU for unstable angina.11/17: patient now s/p KHARI x 2 to prox OM2 100% via RRA. Patient instructed to please follow up with Cardiologist Jim Blakely Tuesday 11/26/19 @2:15 PM    An appointment to see Endocrinologist Kathy Boyce MD is scheduled for 12/6 at 14:30 (HgbA1C 10.8)     < from: Transthoracic Echocardiogram (11.17.19 @ 14:32) >    Observations: EF 55%    Mitral Valve: Normal mitral valve. Mild mitral    regurgitation.  Peak mitral valve gradient equals 3 mm Hg,    mean transmitral valve gradient equals 1 mm Hg.    Aortic Valve/Aorta: Normal trileaflet aortic valve. Peak    transaortic valve gradient equals 7 mm Hg. Peak left    ventricular outflow tract gradient equals 4 mm Hg, mean    gradient is equal to 2 mm Hg, LVOT velocity time integral    equals 17 cm.    Aortic Root: 3.1 cm.    Left Atrium: LA volume index = 11 cc/m2.    Left Ventricle: Mild inferior wall hypokinesis. Mild    concentric left ventricular hypertrophy.    Right Heart: Normal right atrium. Normal right ventricular    size and function. Normal tricuspid valve. Mild tricuspid    regurgitation. Normal pulmonic valve. Minimal pulmonic    regurgitation.    Pericardium/Pleura: Normal pericardium with no pericardial    effusion.    Hemodynamic: Estimated right atrial pressure is 8 mm Hg.    Estimated right ventricular systolic pressure equals 35 mm    Hg, assuming right atrial pressure equals 8 mm Hg,    consistent with borderline pulmonary hypertension.

## 2019-11-18 NOTE — DISCHARGE NOTE PROVIDER - NSDCCPTREATMENT_GEN_ALL_CORE_FT
PRINCIPAL PROCEDURE  Procedure: Coronary stent placement  Findings and Treatment: s/p 2 stents to OM2 artery via right radial artery

## 2019-11-18 NOTE — PROGRESS NOTE ADULT - ATTENDING COMMENTS
49 DM, C yesterday with Dr Ayers with KHARI to OM2 x 2.  Cx 60%, mild LAD disease with EDP ~12.   EF is 55%.  Feeling well today.  Creatinine is stable at 0.8.   Trop 790<--1100    Plan for d/c home today with brillinata and aspirin  Followup in 1-2 weeks    Mehdi [id number]

## 2019-11-18 NOTE — PROGRESS NOTE ADULT - PROBLEM SELECTOR PLAN 1
-Test BG ac and hs  -C/w Lantus 15/Humalog 7 ac meals. Primary team increase Lantus dose to 18 units however fasting hyperglycemia in POC this am was caused by food intake and not because pt needs more Lantus insulin.  -Continue low dose Humalog correction scales ac and hs.  DISCHARGE:  -test BG ac breakfast and ac dinner  -Since pt has no health insurance and can't afford insulin analogs please discharge on Humulin 70/30 mix insulin 10 units ac breakfast and 5 ac dinner.  -Needs to learn insulin preparation and administration by unit staff. Spoke to RN.  -Pt to f/u at [address]. Phone . Please make apt  -Please write Rxs for: Humulin 70/30 mix insulin #1 vial plus 1/2 cc insulin syringes #1 box/glucose meter/strips/lancets  -Since pt is new to insulin consider 1-2 free home care visits to reinforce education.  -Plan discussed with pt/team/RN.  Contact info: [phone] (24/7). pager [phone] -Test BG ac and hs  -C/w Lantus 15/Humalog 7 ac meals. Primary team increase Lantus dose to 18 units however fasting hyperglycemia in POC this am was caused by food intake and not because pt needs more Lantus insulin.  -Continue low dose Humalog correction scales ac and hs.  DISCHARGE:  -test BG ac breakfast and ac dinner  -Since pt has no health insurance and can't afford insulin analogs please discharge on Humulin 70/30 mix insulin 10 units ac breakfast and 5 ac dinner.  -C/w Metformin 850mg tid (max dose)  -Needs to learn insulin preparation and administration by unit staff. Spoke to RN.  -Pt to f/u at [address]. Phone . Please make apt  -Please write Rxs for: Humulin 70/30 mix insulin #1 vial plus 1/2 cc insulin syringes #1 box/glucose meter/strips/lancets  -Since pt is new to insulin consider 1-2 free home care visits to reinforce education.  -Plan discussed with pt/team/RN.  Contact info: [phone] (24/7). pager [phone]

## 2019-11-18 NOTE — CHART NOTE - NSCHARTNOTEFT_GEN_A_CORE
====================  CCU MIDNIGHT ROUNDS  ====================    TARUN CERVANTES  [id number]  Patient is a 49y old  Male who presents with a chief complaint of chest pain (18 Nov 2019 16:20)      ====================  SUMMARY:49 yr old Czech male with PMH of DM2, family history of CAD who presented with chest pain and EKG changes, now s/p LHC with KHARI x2 to prox om2 via RRA access.  NSR on monitor   ====================        ====================  NEW EVENTS: no new event  ====================        ====================  VITALS (Last 12 hrs):  ====================    T(C): 36.8 (11-18-19 @ 20:00), Max: 37 (11-18-19 @ 15:00)  T(F): 98.3 (11-18-19 @ 20:00), Max: 98.6 (11-18-19 @ 15:00)  HR: 71 (11-18-19 @ 22:00) (71 - 103)  BP: 119/65 (11-18-19 @ 22:00) (86/51 - 138/79)  BP(mean): 86 (11-18-19 @ 22:00) (64 - 102)  RR: 23 (11-18-19 @ 22:00) (20 - 49)  SpO2: 97% (11-18-19 @ 22:00) (96% - 98%)    17 Nov 2019 07:01  -  18 Nov 2019 07:00  --------------------------------------------------------  IN: 0 mL / OUT: 1450 mL / NET: -1450 mL    18 Nov 2019 07:01  -  18 Nov 2019 22:53  --------------------------------------------------------  IN: 480 mL / OUT: 0 mL / NET: 480 mL            ====================  NEW LABS:  ====================                          14.2   10.13 )-----------( 240      ( 18 Nov 2019 05:39 )             39.1     11-18    138  |  104  |  11  ----------------------------<  149<H>  4.0   |  21<L>  |  0.80    Ca    9.3      18 Nov 2019 05:39  Phos  3.2     11-18  Mg     2.2     11-18    TPro  7.3  /  Alb  3.9  /  TBili  1.0  /  DBili  x   /  AST  33  /  ALT  24  /  AlkPhos  75  11-18    PTT - ( 17 Nov 2019 02:48 )  PTT:66.0 sec  Creatine Kinase, Serum: 259 U/L <H> (11-18-19 @ 05:39)    CKMB Units: 17.7 ng/mL (11-18 @ 05:39)    PHYSICAL EXAM:    Appearance: Normal	  HEENT:   Normal oral mucosa, PERRL, EOMI	  Cardiovascular: Normal S1 S2, No JVD, No murmurs, No edema  Respiratory: Lungs clear to auscultation	  Psychiatry: A & O x 3, Mood & affect appropriate  Gastrointestinal:  Soft, Non-tender, + BS	  Skin: No rashes, No ecchymoses, No cyanosis	  Neurologic: Non-focal  Extremities: Normal range of motion, No clubbing, cyanosis or edema  Vascular: Peripheral pulses palpable 2+ bilaterally                    ====================  PLAN:49 DM, LHC yesterday with Dr Ayers with KHARI to OM2 x 2.  Cx 60%, mild LAD disease with EDP ~12.   EF is 55%. Creatinine is stable at 0.8.  Trop 790<--1100. Currently stable . No arrhthymias on tele. Plan for d/c home today with Brilinta  and aspirin .Followup in 1-2 weeks. Patient accepted by hospitalist Dr May . Patient is stable for non tele medicine bed  ====================  - ====================  CCU MIDNIGHT ROUNDS  ====================    TARUN CERVANTES  [id number]  Patient is a 49y old  Male who presents with a chief complaint of chest pain (18 Nov 2019 16:20)      ====================  SUMMARY:49 yr old Romanian male with PMH of DM2, family history of CAD who presented with chest pain and EKG changes, now s/p C with KHARI x2 to prox om2 via RRA access.  NSR on monitor   ====================        ====================  NEW EVENTS: no new event  ====================     MEDICATIONS  (STANDING):  aspirin enteric coated 81 milliGRAM(s) Oral daily  atorvastatin 80 milliGRAM(s) Oral at bedtime  chlorhexidine 2% Cloths 1 Application(s) Topical at bedtime  dextrose 5%. 1000 milliLiter(s) (50 mL/Hr) IV Continuous <Continuous>  dextrose 50% Injectable 12.5 Gram(s) IV Push once  dextrose 50% Injectable 25 Gram(s) IV Push once  dextrose 50% Injectable 25 Gram(s) IV Push once  famotidine    Tablet 20 milliGRAM(s) Oral two times a day  influenza   Vaccine 0.5 milliLiter(s) IntraMuscular once  insulin glargine Injectable (LANTUS) 18 Unit(s) SubCutaneous at bedtime  insulin lispro (HumaLOG) corrective regimen sliding scale   SubCutaneous three times a day before meals  insulin lispro (HumaLOG) corrective regimen sliding scale   SubCutaneous at bedtime  insulin lispro Injectable (HumaLOG) 7 Unit(s) SubCutaneous three times a day before meals  lisinopril 5 milliGRAM(s) Oral daily  metoprolol tartrate 25 milliGRAM(s) Oral once  ticagrelor 90 milliGRAM(s) Oral every 12 hours  MEDICATIONS  (PRN):  acetaminophen   Tablet .. 650 milliGRAM(s) Oral every 6 hours PRN Mild Pain (1 - 3), Moderate Pain (4 - 6)  dextrose 40% Gel 15 Gram(s) Oral once PRN Blood Glucose LESS THAN 70 milliGRAM(s)/deciliter  glucagon  Injectable 1 milliGRAM(s) IntraMuscular once PRN Glucose LESS THAN 70 milligrams/deciliter  nitroglycerin     SubLingual 0.4 milliGRAM(s) SubLingual every 5 minutes PRN Chest Pain      ====================  VITALS (Last 12 hrs):  ====================    T(C): 36.8 (11-18-19 @ 20:00), Max: 37 (11-18-19 @ 15:00)  T(F): 98.3 (11-18-19 @ 20:00), Max: 98.6 (11-18-19 @ 15:00)  HR: 71 (11-18-19 @ 22:00) (71 - 103)  BP: 119/65 (11-18-19 @ 22:00) (86/51 - 138/79)  BP(mean): 86 (11-18-19 @ 22:00) (64 - 102)  RR: 23 (11-18-19 @ 22:00) (20 - 49)  SpO2: 97% (11-18-19 @ 22:00) (96% - 98%)    17 Nov 2019 07:01  -  18 Nov 2019 07:00  --------------------------------------------------------  IN: 0 mL / OUT: 1450 mL / NET: -1450 mL    18 Nov 2019 07:01  -  18 Nov 2019 22:53  --------------------------------------------------------  IN: 480 mL / OUT: 0 mL / NET: 480 mL            ====================  NEW LABS:  ====================                          14.2   10.13 )-----------( 240      ( 18 Nov 2019 05:39 )             39.1     11-18    138  |  104  |  11  ----------------------------<  149<H>  4.0   |  21<L>  |  0.80    Ca    9.3      18 Nov 2019 05:39  Phos  3.2     11-18  Mg     2.2     11-18    TPro  7.3  /  Alb  3.9  /  TBili  1.0  /  DBili  x   /  AST  33  /  ALT  24  /  AlkPhos  75  11-18    PTT - ( 17 Nov 2019 02:48 )  PTT:66.0 sec  Creatine Kinase, Serum: 259 U/L <H> (11-18-19 @ 05:39)    CKMB Units: 17.7 ng/mL (11-18 @ 05:39)    PHYSICAL EXAM:    Appearance: Normal	  HEENT:   Normal oral mucosa, PERRL, EOMI	  Cardiovascular: Normal S1 S2, No JVD, No murmurs, No edema  Respiratory: Lungs clear to auscultation	  Psychiatry: A & O x 3, Mood & affect appropriate  Gastrointestinal:  Soft, Non-tender, + BS	  Skin: No rashes, No ecchymoses, No cyanosis	  Neurologic: Non-focal  Extremities: Normal range of motion, No clubbing, cyanosis or edema  Vascular: Peripheral pulses palpable 2+ bilaterally                    ====================  PLAN:49 DM, LHC yesterday with Dr Ayers with KHARI to OM2 x 2.  Cx 60%, mild LAD disease with EDP ~12.   EF is 55%. Creatinine is stable at 0.8.  Trop 790<--1100. Currently stable . No arrhthymias on tele. Plan for d/c home today with Brilinta  and aspirin .Followup in 1-2 weeks. Patient accepted by hospitalist Dr May . Patient is stable for non tele medicine bed  ====================  -

## 2019-11-18 NOTE — DISCHARGE NOTE PROVIDER - NSDCMRMEDTOKEN_GEN_ALL_CORE_FT
aspirin 81 mg oral delayed release capsule:  orally once a day  glipiZIDE 5 mg oral tablet: 1 tab(s) orally 2 times a day  metFORMIN 850 mg oral tablet: 1 tab(s) orally 3 times a day aspirin 81 mg oral delayed release capsule:  orally once a day  glipiZIDE 5 mg oral tablet: 1 tab(s) orally 2 times a day  metFORMIN 850 mg oral tablet: 1 tab(s) orally 3 times a day  DO NOT TAKE UNTIL 11/20 aspirin 81 mg oral delayed release capsule:  orally once a day  atorvastatin 80 mg oral tablet: 1 tab(s) orally once a day (at bedtime)  famotidine 20 mg oral tablet: 1 tab(s) orally 2 times a day  glipiZIDE 5 mg oral tablet: 1 tab(s) orally 2 times a day  lisinopril 5 mg oral tablet: 1 tab(s) orally once a day  metFORMIN 850 mg oral tablet: 1 tab(s) orally 3 times a day  DO NOT TAKE UNTIL 11/20  metoprolol tartrate 25 mg oral tablet: 1 tab(s) orally every 12 hours  ticagrelor 90 mg oral tablet: 1 tab(s) orally every 12 hours aspirin 81 mg oral delayed release capsule:  orally once a day  atorvastatin 80 mg oral tablet: 1 tab(s) orally once a day (at bedtime)  famotidine 20 mg oral tablet: 1 tab(s) orally 2 times a day  glipiZIDE 5 mg oral tablet: 1 tab(s) orally 2 times a day  lisinopril 5 mg oral tablet: 1 tab(s) orally once a day  metFORMIN 850 mg oral tablet: 1 tab(s) orally 3 times a day  DO NOT TAKE UNTIL 11/20  Metoprolol Succinate ER 50 mg oral tablet, extended release: 1 tab(s) orally once a day   ticagrelor 90 mg oral tablet: 1 tab(s) orally every 12 hours aspirin 81 mg oral delayed release capsule:  orally once a day  atorvastatin 80 mg oral tablet: 1 tab(s) orally once a day (at bedtime)  famotidine 20 mg oral tablet: 1 tab(s) orally 2 times a day  glucometer: 1 each subcutaneous 2 times a day   HumuLIN 70/30 subcutaneous suspension: 10 unit(s) subcutaneous once a day before breakfast  HumuLIN 70/30 subcutaneous suspension: 5 unit(s) subcutaneous once a day (at bedtime)   lancet: 1  subcutaneous 2 times a day   lisinopril 5 mg oral tablet: 1 tab(s) orally once a day  metFORMIN 850 mg oral tablet: 1 tab(s) orally 3 times a day  DO NOT TAKE UNTIL 11/20  Metoprolol Succinate ER 50 mg oral tablet, extended release: 1 tab(s) orally once a day   syringe: 1 each subcutaneous 2 times a day   syringes: 100 syringes  ticagrelor 90 mg oral tablet: 1 tab(s) orally every 12 hours

## 2019-11-18 NOTE — DISCHARGE NOTE PROVIDER - CARE PROVIDERS DIRECT ADDRESSES
,DirectAddress_Unknown,[email or web address] ,DirectAddress_Unknown,DirectAddress_Unknown

## 2019-11-19 ENCOUNTER — APPOINTMENT (OUTPATIENT)
Dept: INTERNAL MEDICINE | Facility: CLINIC | Age: 49
End: 2019-11-19

## 2019-11-19 ENCOUNTER — TRANSCRIPTION ENCOUNTER (OUTPATIENT)
Age: 49
End: 2019-11-19

## 2019-11-19 VITALS
OXYGEN SATURATION: 100 % | RESPIRATION RATE: 20 BRPM | SYSTOLIC BLOOD PRESSURE: 115 MMHG | DIASTOLIC BLOOD PRESSURE: 63 MMHG | HEART RATE: 63 BPM

## 2019-11-19 LAB
ALBUMIN SERPL ELPH-MCNC: 3.9 G/DL — SIGNIFICANT CHANGE UP (ref 3.3–5)
ALP SERPL-CCNC: 69 U/L — SIGNIFICANT CHANGE UP (ref 40–120)
ALT FLD-CCNC: 25 U/L — SIGNIFICANT CHANGE UP (ref 10–45)
ANION GAP SERPL CALC-SCNC: 14 MMOL/L — SIGNIFICANT CHANGE UP (ref 5–17)
AST SERPL-CCNC: 27 U/L — SIGNIFICANT CHANGE UP (ref 10–40)
BASOPHILS # BLD AUTO: 0.02 K/UL — SIGNIFICANT CHANGE UP (ref 0–0.2)
BASOPHILS NFR BLD AUTO: 0.2 % — SIGNIFICANT CHANGE UP (ref 0–2)
BILIRUB SERPL-MCNC: 0.8 MG/DL — SIGNIFICANT CHANGE UP (ref 0.2–1.2)
BUN SERPL-MCNC: 22 MG/DL — SIGNIFICANT CHANGE UP (ref 7–23)
CALCIUM SERPL-MCNC: 9.2 MG/DL — SIGNIFICANT CHANGE UP (ref 8.4–10.5)
CHLORIDE SERPL-SCNC: 101 MMOL/L — SIGNIFICANT CHANGE UP (ref 96–108)
CO2 SERPL-SCNC: 23 MMOL/L — SIGNIFICANT CHANGE UP (ref 22–31)
CREAT SERPL-MCNC: 0.8 MG/DL — SIGNIFICANT CHANGE UP (ref 0.5–1.3)
EOSINOPHIL # BLD AUTO: 0.13 K/UL — SIGNIFICANT CHANGE UP (ref 0–0.5)
EOSINOPHIL NFR BLD AUTO: 1.3 % — SIGNIFICANT CHANGE UP (ref 0–6)
GLUCOSE BLDC GLUCOMTR-MCNC: 148 MG/DL — HIGH (ref 70–99)
GLUCOSE BLDC GLUCOMTR-MCNC: 183 MG/DL — HIGH (ref 70–99)
GLUCOSE SERPL-MCNC: 185 MG/DL — HIGH (ref 70–99)
HCT VFR BLD CALC: 38.2 % — LOW (ref 39–50)
HGB BLD-MCNC: 13.5 G/DL — SIGNIFICANT CHANGE UP (ref 13–17)
IMM GRANULOCYTES NFR BLD AUTO: 0.3 % — SIGNIFICANT CHANGE UP (ref 0–1.5)
LYMPHOCYTES # BLD AUTO: 3.36 K/UL — HIGH (ref 1–3.3)
LYMPHOCYTES # BLD AUTO: 33.3 % — SIGNIFICANT CHANGE UP (ref 13–44)
MAGNESIUM SERPL-MCNC: 1.8 MG/DL — SIGNIFICANT CHANGE UP (ref 1.6–2.6)
MCHC RBC-ENTMCNC: 31.3 PG — SIGNIFICANT CHANGE UP (ref 27–34)
MCHC RBC-ENTMCNC: 35.3 GM/DL — SIGNIFICANT CHANGE UP (ref 32–36)
MCV RBC AUTO: 88.6 FL — SIGNIFICANT CHANGE UP (ref 80–100)
MONOCYTES # BLD AUTO: 0.83 K/UL — SIGNIFICANT CHANGE UP (ref 0–0.9)
MONOCYTES NFR BLD AUTO: 8.2 % — SIGNIFICANT CHANGE UP (ref 2–14)
NEUTROPHILS # BLD AUTO: 5.72 K/UL — SIGNIFICANT CHANGE UP (ref 1.8–7.4)
NEUTROPHILS NFR BLD AUTO: 56.7 % — SIGNIFICANT CHANGE UP (ref 43–77)
NRBC # BLD: 0 /100 WBCS — SIGNIFICANT CHANGE UP (ref 0–0)
PHOSPHATE SERPL-MCNC: 3.4 MG/DL — SIGNIFICANT CHANGE UP (ref 2.5–4.5)
PLATELET # BLD AUTO: 247 K/UL — SIGNIFICANT CHANGE UP (ref 150–400)
POTASSIUM SERPL-MCNC: 4.1 MMOL/L — SIGNIFICANT CHANGE UP (ref 3.5–5.3)
POTASSIUM SERPL-SCNC: 4.1 MMOL/L — SIGNIFICANT CHANGE UP (ref 3.5–5.3)
PROT SERPL-MCNC: 7.3 G/DL — SIGNIFICANT CHANGE UP (ref 6–8.3)
RBC # BLD: 4.31 M/UL — SIGNIFICANT CHANGE UP (ref 4.2–5.8)
RBC # FLD: 12.7 % — SIGNIFICANT CHANGE UP (ref 10.3–14.5)
SODIUM SERPL-SCNC: 138 MMOL/L — SIGNIFICANT CHANGE UP (ref 135–145)
WBC # BLD: 10.09 K/UL — SIGNIFICANT CHANGE UP (ref 3.8–10.5)
WBC # FLD AUTO: 10.09 K/UL — SIGNIFICANT CHANGE UP (ref 3.8–10.5)

## 2019-11-19 PROCEDURE — 93005 ELECTROCARDIOGRAM TRACING: CPT

## 2019-11-19 PROCEDURE — C1874: CPT

## 2019-11-19 PROCEDURE — 82435 ASSAY OF BLOOD CHLORIDE: CPT

## 2019-11-19 PROCEDURE — 85610 PROTHROMBIN TIME: CPT

## 2019-11-19 PROCEDURE — 82550 ASSAY OF CK (CPK): CPT

## 2019-11-19 PROCEDURE — C9606: CPT | Mod: LC

## 2019-11-19 PROCEDURE — 80061 LIPID PANEL: CPT

## 2019-11-19 PROCEDURE — C1894: CPT

## 2019-11-19 PROCEDURE — 99152 MOD SED SAME PHYS/QHP 5/>YRS: CPT

## 2019-11-19 PROCEDURE — 99238 HOSP IP/OBS DSCHRG MGMT 30/<: CPT

## 2019-11-19 PROCEDURE — 80053 COMPREHEN METABOLIC PANEL: CPT

## 2019-11-19 PROCEDURE — 83690 ASSAY OF LIPASE: CPT

## 2019-11-19 PROCEDURE — 83880 ASSAY OF NATRIURETIC PEPTIDE: CPT

## 2019-11-19 PROCEDURE — 82947 ASSAY GLUCOSE BLOOD QUANT: CPT

## 2019-11-19 PROCEDURE — 99153 MOD SED SAME PHYS/QHP EA: CPT

## 2019-11-19 PROCEDURE — 84443 ASSAY THYROID STIM HORMONE: CPT

## 2019-11-19 PROCEDURE — 84100 ASSAY OF PHOSPHORUS: CPT

## 2019-11-19 PROCEDURE — 71045 X-RAY EXAM CHEST 1 VIEW: CPT

## 2019-11-19 PROCEDURE — 82803 BLOOD GASES ANY COMBINATION: CPT

## 2019-11-19 PROCEDURE — C9601: CPT | Mod: LC

## 2019-11-19 PROCEDURE — 99285 EMERGENCY DEPT VISIT HI MDM: CPT | Mod: 25

## 2019-11-19 PROCEDURE — 84484 ASSAY OF TROPONIN QUANT: CPT

## 2019-11-19 PROCEDURE — 85730 THROMBOPLASTIN TIME PARTIAL: CPT

## 2019-11-19 PROCEDURE — 85027 COMPLETE CBC AUTOMATED: CPT

## 2019-11-19 PROCEDURE — 82565 ASSAY OF CREATININE: CPT

## 2019-11-19 PROCEDURE — C1769: CPT

## 2019-11-19 PROCEDURE — 81003 URINALYSIS AUTO W/O SCOPE: CPT

## 2019-11-19 PROCEDURE — C1887: CPT

## 2019-11-19 PROCEDURE — 82553 CREATINE MB FRACTION: CPT

## 2019-11-19 PROCEDURE — 82962 GLUCOSE BLOOD TEST: CPT

## 2019-11-19 PROCEDURE — 84295 ASSAY OF SERUM SODIUM: CPT

## 2019-11-19 PROCEDURE — 83605 ASSAY OF LACTIC ACID: CPT

## 2019-11-19 PROCEDURE — 84132 ASSAY OF SERUM POTASSIUM: CPT

## 2019-11-19 PROCEDURE — 97161 PT EVAL LOW COMPLEX 20 MIN: CPT

## 2019-11-19 PROCEDURE — 82330 ASSAY OF CALCIUM: CPT

## 2019-11-19 PROCEDURE — 93010 ELECTROCARDIOGRAM REPORT: CPT

## 2019-11-19 PROCEDURE — 83036 HEMOGLOBIN GLYCOSYLATED A1C: CPT

## 2019-11-19 PROCEDURE — 93458 L HRT ARTERY/VENTRICLE ANGIO: CPT | Mod: 59

## 2019-11-19 PROCEDURE — 93306 TTE W/DOPPLER COMPLETE: CPT

## 2019-11-19 PROCEDURE — 96374 THER/PROPH/DIAG INJ IV PUSH: CPT

## 2019-11-19 PROCEDURE — 85014 HEMATOCRIT: CPT

## 2019-11-19 PROCEDURE — 96375 TX/PRO/DX INJ NEW DRUG ADDON: CPT

## 2019-11-19 PROCEDURE — C1725: CPT

## 2019-11-19 PROCEDURE — 83735 ASSAY OF MAGNESIUM: CPT

## 2019-11-19 RX ORDER — MAGNESIUM OXIDE 400 MG ORAL TABLET 241.3 MG
400 TABLET ORAL ONCE
Refills: 0 | Status: COMPLETED | OUTPATIENT
Start: 2019-11-19 | End: 2019-11-19

## 2019-11-19 RX ADMIN — MAGNESIUM OXIDE 400 MG ORAL TABLET 400 MILLIGRAM(S): 241.3 TABLET ORAL at 06:49

## 2019-11-19 RX ADMIN — Medication 7 UNIT(S): at 12:18

## 2019-11-19 RX ADMIN — TICAGRELOR 90 MILLIGRAM(S): 90 TABLET ORAL at 05:27

## 2019-11-19 RX ADMIN — Medication 1: at 12:18

## 2019-11-19 RX ADMIN — Medication 81 MILLIGRAM(S): at 12:18

## 2019-11-19 RX ADMIN — FAMOTIDINE 20 MILLIGRAM(S): 10 INJECTION INTRAVENOUS at 05:26

## 2019-11-19 RX ADMIN — LISINOPRIL 5 MILLIGRAM(S): 2.5 TABLET ORAL at 05:26

## 2019-11-19 RX ADMIN — Medication 7 UNIT(S): at 08:29

## 2019-11-19 RX ADMIN — Medication 50 MILLIGRAM(S): at 05:26

## 2019-11-19 NOTE — PROGRESS NOTE ADULT - PROBLEM SELECTOR PLAN 3
c/w lantus & ISS coverage  Plan for Humulin 70/30 for discharge per endo recs  c/w diabetes education  To F/u with Endocrinology

## 2019-11-19 NOTE — DISCHARGE NOTE NURSING/CASE MANAGEMENT/SOCIAL WORK - PATIENT PORTAL LINK FT
You can access the FollowMyHealth Patient Portal offered by Health system by registering at the following website: [email or web address]. By joining [facility]’s FollowMyHealth portal, you will also be able to view your health information using other applications (apps) compatible with our system.

## 2019-11-19 NOTE — PROGRESS NOTE ADULT - PROBLEM SELECTOR PROBLEM 1
NSTEMI (non-ST elevated myocardial infarction)
NSTEMI (non-ST elevated myocardial infarction)
Other chest pain
Other chest pain
Type 2 diabetes mellitus with hyperglycemia, without long-term current use of insulin
Other chest pain

## 2019-11-19 NOTE — DISCHARGE NOTE NURSING/CASE MANAGEMENT/SOCIAL WORK - NSDCFUADDAPPT_GEN_ALL_CORE_FT
Please see the Cardiologist Jim Blakely Tuesday 11/26/19 @2:15 PM  An appointment to see Endocrinology  Clinic at [address] [facility]  on 12/6 at 2:30pm(HgbA1C 10.8)  Appointment on 12/20 Opthamology [address] at 1:15pm

## 2019-11-19 NOTE — PROGRESS NOTE ADULT - PROBLEM SELECTOR PLAN 1
S/p KHARI x2 to OM2 via RRA  -RRA site stable  -c/w aspirin, brilinta, statin, toprol & ACE  -TTE - EF 55%, mild inferior wall hypokinesis  F/u with socia work -  For discharge to home -Meds sent to [facility]  will f/u with cards clinic appointment scheduled

## 2019-11-19 NOTE — PROGRESS NOTE ADULT - SUBJECTIVE AND OBJECTIVE BOX
Admission date:   CHIEF COMPLAINT:  HPI: 49M bilingual Icelandic/english speaking, c hx DM2, pw acute onset chest pain.  Pt states he was eating dinner at around 7PM, when he had two types of chest pain. The first chest pain was a burning epigastric and substernal pain. The second chest pain was a more severe, aching pain, localized over his left lateral chest, with radiation down his left arm and to his back. At around 8pm, after his dinner, pt drank some tea, and then developed diaphoresis, palpitations, nausea, lightheadedness, mild SOB, and unbearable chest pain, which brought him to the hospital. The chest pain was worse with lying flat and better with leaning forward. In the ED, pt reports that he started to cry 2/2 his chest pain and discomfort. Pt states that his pain is significantly improved now, and he only has the mild epigastric pain. Pt has never had anything like this before. Pt's last exercise stress test was in 2011, results in allscripts, which showed 11 mets, normal results. Father passed away of heart attack in his 70s.  VS: Tm 97.4, P 65, /94, R 20, 97% RA  In the ED, received , NS 1L, pepcid, toradol, morphine, NTG (2019 05:51)    INTERVAL HISTORY: No acute events overnight  Resting comfortably in bed, no complaints offered    REVIEW OF SYSTEMS: Denies           ; all others negative    MEDICATIONS  (STANDING):  aspirin enteric coated 81 milliGRAM(s) Oral daily  atorvastatin 80 milliGRAM(s) Oral at bedtime  chlorhexidine 2% Cloths 1 Application(s) Topical at bedtime  dextrose 5%. 1000 milliLiter(s) (50 mL/Hr) IV Continuous <Continuous>  dextrose 50% Injectable 12.5 Gram(s) IV Push once  dextrose 50% Injectable 25 Gram(s) IV Push once  dextrose 50% Injectable 25 Gram(s) IV Push once  famotidine    Tablet 20 milliGRAM(s) Oral two times a day  influenza   Vaccine 0.5 milliLiter(s) IntraMuscular once  insulin glargine Injectable (LANTUS) 18 Unit(s) SubCutaneous at bedtime  insulin lispro (HumaLOG) corrective regimen sliding scale   SubCutaneous three times a day before meals  insulin lispro (HumaLOG) corrective regimen sliding scale   SubCutaneous at bedtime  insulin lispro Injectable (HumaLOG) 7 Unit(s) SubCutaneous three times a day before meals  lisinopril 5 milliGRAM(s) Oral daily  metoprolol succinate ER 50 milliGRAM(s) Oral daily  ticagrelor 90 milliGRAM(s) Oral every 12 hours    MEDICATIONS  (PRN):  acetaminophen   Tablet .. 650 milliGRAM(s) Oral every 6 hours PRN Mild Pain (1 - 3), Moderate Pain (4 - 6)  dextrose 40% Gel 15 Gram(s) Oral once PRN Blood Glucose LESS THAN 70 milliGRAM(s)/deciliter  glucagon  Injectable 1 milliGRAM(s) IntraMuscular once PRN Glucose LESS THAN 70 milligrams/deciliter  nitroglycerin     SubLingual 0.4 milliGRAM(s) SubLingual every 5 minutes PRN Chest Pain      Objective:  ICU Vital Signs Last 24 Hrs  T(C): 37.2 (2019 07:00), Max: 37.2 (2019 07:00)  T(F): 98.9 (2019 07:00), Max: 98.9 (2019 07:00)  HR: 65 (2019 07:00) (65 - 103)  BP: 97/59 (2019 07:00) (86/51 - 138/79)  BP(mean): 71 (2019 07:00) (64 - 102)  ABP: --  ABP(mean): --  RR: 16 (2019 07:00) (16 - 49)  SpO2: 98% (2019 07:00) (96% - 98%)     @ 07:01  -   @ 07:00  --------------------------------------------------------  IN: 600 mL / OUT: 0 mL / NET: 600 mL      Daily     Daily Weight in k.2 (2019 05:25)    PHYSICAL EXAM:  Constitutional:  HEENT:  Respiratory:  Cardiovascular:  Access site:  Gastrointestinal:  Genitourinary:  Extremities:  Vascular:  Neurological:  Skin:      TELEMETRY:  SR no events    EKG: SR 64; ARUN 124; QRS 80; QT/QTc 356/367      Labs:                          13.5   10.09 )-----------( 247      ( 2019 05:54 )             38.2         138  |  101  |  22  ----------------------------<  185<H>  4.1   |  23  |  0.80    Ca    9.2      2019 05:54  Phos  3.4       Mg     1.8         TPro  7.3  /  Alb  3.9  /  TBili  0.8  /  DBili  x   /  AST  27  /  ALT  25  /  AlkPhos  69      LIVER FUNCTIONS - ( 2019 05:54 )  Alb: 3.9 g/dL / Pro: 7.3 g/dL / ALK PHOS: 69 U/L / ALT: 25 U/L / AST: 27 U/L / GGT: x                   HEALTH ISSUES - PROBLEM Dx:  Type 2 diabetes mellitus with hyperglycemia, without long-term current use of insulin: Type 2 diabetes mellitus with hyperglycemia, without long-term current use of insulin  Prophylactic measure: Prophylactic measure Admission date:   CHIEF COMPLAINT:  HPI: 49M bilingual Kiswahili/english speaking, c hx DM2, pw acute onset chest pain.  Pt states he was eating dinner at around 7PM, when he had two types of chest pain. The first chest pain was a burning epigastric and substernal pain. The second chest pain was a more severe, aching pain, localized over his left lateral chest, with radiation down his left arm and to his back. At around 8pm, after his dinner, pt drank some tea, and then developed diaphoresis, palpitations, nausea, lightheadedness, mild SOB, and unbearable chest pain, which brought him to the hospital. The chest pain was worse with lying flat and better with leaning forward. In the ED, pt reports that he started to cry 2/2 his chest pain and discomfort. Pt states that his pain is significantly improved now, and he only has the mild epigastric pain. Pt has never had anything like this before. Pt's last exercise stress test was in 2011, results in allscripts, which showed 11 mets, normal results. Father passed away of heart attack in his 70s.  VS: Tm 97.4, P 65, /94, R 20, 97% RA  In the ED, received , NS 1L, pepcid, toradol, morphine, NTG (2019 05:51)    INTERVAL HISTORY: No acute events overnight  Resting comfortably in bed, no complaints offered    REVIEW OF SYSTEMS: Denies chest pain, SOB, dizziness, headache, NV; all others negative    MEDICATIONS  (STANDING):  aspirin enteric coated 81 milliGRAM(s) Oral daily  atorvastatin 80 milliGRAM(s) Oral at bedtime  chlorhexidine 2% Cloths 1 Application(s) Topical at bedtime  dextrose 5%. 1000 milliLiter(s) (50 mL/Hr) IV Continuous <Continuous>  dextrose 50% Injectable 12.5 Gram(s) IV Push once  dextrose 50% Injectable 25 Gram(s) IV Push once  dextrose 50% Injectable 25 Gram(s) IV Push once  famotidine    Tablet 20 milliGRAM(s) Oral two times a day  influenza   Vaccine 0.5 milliLiter(s) IntraMuscular once  insulin glargine Injectable (LANTUS) 18 Unit(s) SubCutaneous at bedtime  insulin lispro (HumaLOG) corrective regimen sliding scale   SubCutaneous three times a day before meals  insulin lispro (HumaLOG) corrective regimen sliding scale   SubCutaneous at bedtime  insulin lispro Injectable (HumaLOG) 7 Unit(s) SubCutaneous three times a day before meals  lisinopril 5 milliGRAM(s) Oral daily  metoprolol succinate ER 50 milliGRAM(s) Oral daily  ticagrelor 90 milliGRAM(s) Oral every 12 hours    MEDICATIONS  (PRN):  acetaminophen   Tablet .. 650 milliGRAM(s) Oral every 6 hours PRN Mild Pain (1 - 3), Moderate Pain (4 - 6)  dextrose 40% Gel 15 Gram(s) Oral once PRN Blood Glucose LESS THAN 70 milliGRAM(s)/deciliter  glucagon  Injectable 1 milliGRAM(s) IntraMuscular once PRN Glucose LESS THAN 70 milligrams/deciliter  nitroglycerin     SubLingual 0.4 milliGRAM(s) SubLingual every 5 minutes PRN Chest Pain      Objective:  ICU Vital Signs Last 24 Hrs  T(C): 37.2 (2019 07:00), Max: 37.2 (2019 07:00)  T(F): 98.9 (2019 07:00), Max: 98.9 (2019 07:00)  HR: 65 (2019 07:00) (65 - 103)  BP: 97/59 (2019 07:00) (86/51 - 138/79)  BP(mean): 71 (2019 07:00) (64 - 102)  ABP: --  ABP(mean): --  RR: 16 (2019 07:00) (16 - 49)  SpO2: 98% (2019 07:00) (96% - 98%)     @ 07:01  -   @ 07:00  --------------------------------------------------------  IN: 600 mL / OUT: 0 mL / NET: 600 mL      Daily     Daily Weight in k.2 (2019 05:25)    PHYSICAL EXAM:  Constitutional: NAD  HEENT: oral mucosa pink moist  Respiratory: Regular unlabored, CTA, no wheeze  Cardiovascular: RRR S1S2   Access site: Right radial site ok, no hematoma/ecchymosis, hand warm +/sensation  Gastrointestinal: soft ND/NT; + bowel sounds  Genitourinary: voiding on own  Extremities: LINARES equal strength  Vascular: warm peripherally  Neurological: A &O x 3 mood & affect appropriate  Skin: warm dry intact, no rash      TELEMETRY:  SR no events    EKG: SR 64; ARUN 124; QRS 80; QT/QTc 356/367      Labs:                          13.5   10.09 )-----------( 247      ( 2019 05:54 )             38.2         138  |  101  |  22  ----------------------------<  185<H>  4.1   |  23  |  0.80    Ca    9.2      2019 05:54  Phos  3.4       Mg     1.8         TPro  7.3  /  Alb  3.9  /  TBili  0.8  /  DBili  x   /  AST  27  /  ALT  25  /  AlkPhos  69      LIVER FUNCTIONS - ( 2019 05:54 )  Alb: 3.9 g/dL / Pro: 7.3 g/dL / ALK PHOS: 69 U/L / ALT: 25 U/L / AST: 27 U/L / GGT: x                   HEALTH ISSUES - PROBLEM Dx:  Type 2 diabetes mellitus with hyperglycemia, without long-term current use of insulin: Type 2 diabetes mellitus with hyperglycemia, without long-term current use of insulin  Prophylactic measure: Prophylactic measure

## 2019-11-19 NOTE — PROGRESS NOTE ADULT - PROBLEM SELECTOR PROBLEM 3
Lactic acidosis
Lactic acidosis
Type 2 diabetes mellitus without complication, without long-term current use of insulin

## 2019-11-19 NOTE — PROGRESS NOTE ADULT - ATTENDING COMMENTS
48 yo man with UA s/p PCI doing well from cardiac stand point and is ready for discharge     Continue DAPT, high dose statin  Continue bb and ACEi  Will need follow up with cardiology

## 2019-11-19 NOTE — PROGRESS NOTE ADULT - ASSESSMENT
49 yr old [facility] male with PMH of DM2, family history of CAD who presented with chest pain and EKG changes, now s/p LHC with KHARI x2 to prox om2 via RRA access.

## 2019-11-19 NOTE — DISCHARGE NOTE NURSING/CASE MANAGEMENT/SOCIAL WORK - NSFLUVACAGEDISCH_IMM_ALL_CORE
Pt stated she fell 1 week ago.  Didn't notice anything right away, but for the last 2 days L knee has been swollen and painful.  States nothing is helping pain, rates pain a 10/10, but stated she did not take anything today and went to [place] yesterday.   Adult

## 2019-11-19 NOTE — PROGRESS NOTE ADULT - PROBLEM SELECTOR PROBLEM 2
Hypertension
Hypertension
Type 2 diabetes mellitus without complication, without long-term current use of insulin
Type 2 diabetes mellitus without complication, without long-term current use of insulin
Hypertension

## 2019-11-26 ENCOUNTER — OUTPATIENT (OUTPATIENT)
Dept: OUTPATIENT SERVICES | Facility: HOSPITAL | Age: 49
LOS: 1 days | End: 2019-11-26
Payer: SELF-PAY

## 2019-11-26 ENCOUNTER — APPOINTMENT (OUTPATIENT)
Dept: CARDIOLOGY | Facility: HOSPITAL | Age: 49
End: 2019-11-26

## 2019-11-26 VITALS
DIASTOLIC BLOOD PRESSURE: 68 MMHG | HEIGHT: 65 IN | SYSTOLIC BLOOD PRESSURE: 109 MMHG | HEART RATE: 60 BPM | WEIGHT: 140 LBS | OXYGEN SATURATION: 98 % | BODY MASS INDEX: 23.32 KG/M2

## 2019-11-26 DIAGNOSIS — I25.10 ATHEROSCLEROTIC HEART DISEASE OF NATIVE CORONARY ARTERY WITHOUT ANGINA PECTORIS: ICD-10-CM

## 2019-11-26 DIAGNOSIS — I25.2 OLD MYOCARDIAL INFARCTION: ICD-10-CM

## 2019-11-26 PROCEDURE — G0463: CPT

## 2019-11-26 PROCEDURE — 93005 ELECTROCARDIOGRAM TRACING: CPT

## 2019-11-27 ENCOUNTER — NON-APPOINTMENT (OUTPATIENT)
Age: 49
End: 2019-11-27

## 2019-11-27 PROBLEM — I25.2 HISTORY OF NON-ST ELEVATION MYOCARDIAL INFARCTION (NSTEMI): Status: RESOLVED | Noted: 2019-11-27 | Resolved: 2019-11-27

## 2019-11-27 RX ORDER — GLIPIZIDE 5 MG/1
5 TABLET ORAL TWICE DAILY
Qty: 180 | Refills: 0 | Status: DISCONTINUED | COMMUNITY
Start: 2017-08-17 | End: 2019-11-27

## 2019-11-27 NOTE — PHYSICAL EXAM
[General Appearance - Well Developed] : well developed [Normal Appearance] : normal appearance [General Appearance - Well Nourished] : well nourished [Normal Conjunctiva] : the conjunctiva exhibited no abnormalities [Normal Oral Mucosa] : normal oral mucosa [Normal Jugular Venous A Waves Present] : normal jugular venous A waves present [Normal Jugular Venous V Waves Present] : normal jugular venous V waves present [Heart Rate And Rhythm] : heart rate and rhythm were normal [Heart Sounds] : normal S1 and S2 [Murmurs] : no murmurs present [Arterial Pulses Normal] : the arterial pulses were normal [Respiration, Rhythm And Depth] : normal respiratory rhythm and effort [Auscultation Breath Sounds / Voice Sounds] : lungs were clear to auscultation bilaterally [Bowel Sounds] : normal bowel sounds [Abdomen Soft] : soft [Abdomen Tenderness] : non-tender [Abnormal Walk] : normal gait [Nail Clubbing] : no clubbing of the fingernails [Cyanosis, Localized] : no localized cyanosis [Skin Color & Pigmentation] : normal skin color and pigmentation [Skin Turgor] : normal skin turgor [] : no rash [Oriented To Time, Place, And Person] : oriented to person, place, and time [Impaired Insight] : insight and judgment were intact [Affect] : the affect was normal

## 2019-11-29 DIAGNOSIS — I21.29 ST ELEVATION (STEMI) MYOCARDIAL INFARCTION INVOLVING OTHER SITES: ICD-10-CM

## 2019-11-30 NOTE — DISCUSSION/SUMMARY
[Patient] : the patient [FreeTextEntry1] : Mr. Crocker is a 50 yo [place] man with a PMHx of poorly controlled DM2 (A1c 10.8 11/2019), recent NSTEMI 11/16 s/p LHC w/ KHARI to 60% pCX and 100% OM2, here for post-discharge follow up. \par \par #CAD/Recent NSTEMI:\par -TTE w/ normal LV function\par -No CP, SOB, cardiac sx since discharge\par -Continue asa 81 daily, Brilinta 90 BID for at least 1 year post intervention\par -Continue atorvastatin 80 daily\par -Continue Toprol XL 50 daily\par -Continue lisinopril 5 mg daily\par -Counseled on aggressive risk factor modification including healthy diet, exercise and diabetes control\par -Will attempt to refer to cardiac rehab, though patient is currently uninsured\par \par #Epigastric pain: Hx of GERD. No alarm symptoms\par -Continue H2 blocker\par -F/u with PCP\par \par -RTC in 2 months\par  \par \par

## 2019-11-30 NOTE — REVIEW OF SYSTEMS
[Abdominal Pain] : abdominal pain [Nausea] : nausea [Negative] : Heme/Lymph [Vomiting] : no vomiting

## 2019-11-30 NOTE — HISTORY OF PRESENT ILLNESS
[FreeTextEntry1] : Mr. Crocker is a 48 yo [place] man with a PMHx of poorly controlled DM2 (A1c 10.8 11/2019), recent NSTEMI 11/16 s/p C w/ KHARI to 60% pCX and 100% OM2, here for post-discharge follow up. Patient has felt generally well since hospital discharge. He endorses some intermittent epigastric pressure/nausea/bloating, which doesn’t seem to be related to eating, usually lasts for 10-15 minutes then subsides on its own, different from the chest pain he had during his NSTEMI. He denies any vomiting, constipation, diarrhea, melena, BRBPR. He denies any chest pain, SOB, TORO, PND, or LE swelling. Prior to hospitalization, he admits to not watching his diet or exercising. He was also only intermittently compliant with his diabetic medications.  He was seen by endocrine while inpatient and was started on Humalog. Since discharge, he has been eating healthier foods including vegetables, lean meats and avoiding carbohydrates and sweets. He has not started exercising yet, but plans to start w/ light exercise next week. He has been compliant with his medications including aspirin 81, Brilinta 90 BID, atorvastatin 80, Toprol 50 and lisinopril 5, metformin 850 TID and Humalog. He has follow up with endocrine on 12/6/19.  \par \par A1c: 10.8\par Cholesterol: 159, TG 88, HDL 51, LDL 90\par \par [facility] 11/17/19:\par VENTRICLES: EF estimated was 55 %.\par CORONARY VESSELS: The coronary circulation is right dominant.\par LM:   --  LM: The vessel was medium sized. Angiography showed mild\par atherosclerosis with no flow limiting lesions.\par LAD:   --  Proximal LAD: The vessel was medium sized. Angiography showed\par mild atherosclerosis with no flow limiting lesions.\par CX:   --  Proximal circumflex: There was a 60 % stenosis.\par --  OM1: The vessel was small sized. Angiography showed mild\par atherosclerosis with no flow limiting lesions.\par --  OM2: There was a 100 % stenosis.\par RCA:   --  Proximal RCA: The vessel was large sized. Angiography showed\par mild atherosclerosis with no flow limiting lesions.\par --  RPDA: The vessel was medium sized. Angiography showed mild\par atherosclerosis with no flow limiting lesions.\par COMPLICATIONS: There were no complications.\par DIAGNOSTIC RECOMMENDATIONS: Coronary Angiography Summary:\par -single vessel coronary disease.\par -OM2 100% (culprit lesions).\par -proximal circumflex (60%).\par Coronary Interventional Summary:\par -successful KHARI to the OM2.\par -successful KHARI to the proximal circumflex\par \par TTE 11/17/19:\par Dimensions:    Normal Values:\par LA:     3.2    2.0 - 4.0 cm\par Ao:     3.1    2.0 - 3.8 cm\par SEPTUM: 1.1    0.6 - 1.2 cm\par PWT:    1.1    0.6 - 1.1 cm\par LVIDd:  4.6    3.0 - 5.6 cm\par LVIDs:  3.3    1.8 - 4.0 cm\par Derived variables:\par LVMI: 112 g/m2\par RWT: 0.47\par Fractional short: 28 %\par EF (Teicholtz): 55 %\par Doppler Peak Velocity (m/sec): MV=0.8 AoV=1.3\par ------------------------------------------------------------------------\par Observations:\par Mitral Valve: Normal mitral valve. Mild mitral\par regurgitation.  Peak mitral valve gradient equals 3 mm Hg,\par mean transmitral valve gradient equals 1 mm Hg.\par Aortic Valve/Aorta: Normal trileaflet aortic valve. Peak\par transaortic valve gradient equals 7 mm Hg. Peak left\par ventricular outflow tract gradient equals 4 mm Hg, mean\par gradient is equal to 2 mm Hg, LVOT velocity time integral\par equals 17 cm.\par Aortic Root: 3.1 cm.\par Left Atrium: LA volume index = 11 cc/m2.\par Left Ventricle: Mild inferior wall hypokinesis. Mild\par concentric left ventricular hypertrophy.\par Right Heart: Normal right atrium. Normal right ventricular\par size and function. Normal tricuspid valve. Mild tricuspid\par regurgitation. Normal pulmonic valve. Minimal pulmonic\par regurgitation.\par Pericardium/Pleura: Normal pericardium with no pericardial\par effusion.\par Hemodynamic: Estimated right atrial pressure is 8 mm Hg.\par Estimated right ventricular systolic pressure equals 35 mm\par Hg, assuming right atrial pressure equals 8 mm Hg,\par consistent with borderline pulmonary hypertension.\par ------------------------------------------------------------------------\par Conclusions:\par 1. Mild concentric left ventricular hypertrophy.\par 2. Mild inferior wall hypokinesis.\par

## 2019-12-03 NOTE — ED ADULT NURSE NOTE - CADM POA CENTRAL LINE
Received call from patient.  Mrs. Todd voiced concern regarding several days of chronic headaches which she described as a bad migraine and intractable nausea.  Also, stated on Saturday and Sunday she experienced two separate episodes of dizziness and disorientation which did not last very long.  She asked if this could be related to her radiation treatment.  I stated that it would be out of the ordinary based upon the area we are treating and I would discuss with Dr. Gerber.  After speaking with Dr. Gerber, he would like patient to have a MRI of the brain.  Explained to patient that we will order and obtain authorization.  Patient verbalized understanding.   No

## 2019-12-06 ENCOUNTER — APPOINTMENT (OUTPATIENT)
Dept: INTERNAL MEDICINE | Facility: CLINIC | Age: 49
End: 2019-12-06

## 2019-12-06 ENCOUNTER — OUTPATIENT (OUTPATIENT)
Dept: OUTPATIENT SERVICES | Facility: HOSPITAL | Age: 49
LOS: 1 days | End: 2019-12-06
Payer: SELF-PAY

## 2019-12-06 VITALS — SYSTOLIC BLOOD PRESSURE: 98 MMHG | HEART RATE: 78 BPM | DIASTOLIC BLOOD PRESSURE: 78 MMHG | OXYGEN SATURATION: 98 %

## 2019-12-06 VITALS
HEIGHT: 65 IN | DIASTOLIC BLOOD PRESSURE: 80 MMHG | SYSTOLIC BLOOD PRESSURE: 132 MMHG | BODY MASS INDEX: 23.32 KG/M2 | WEIGHT: 140 LBS

## 2019-12-06 VITALS — OXYGEN SATURATION: 96 % | HEART RATE: 62 BPM | DIASTOLIC BLOOD PRESSURE: 72 MMHG | SYSTOLIC BLOOD PRESSURE: 120 MMHG

## 2019-12-06 DIAGNOSIS — R10.9 UNSPECIFIED ABDOMINAL PAIN: ICD-10-CM

## 2019-12-06 DIAGNOSIS — I10 ESSENTIAL (PRIMARY) HYPERTENSION: ICD-10-CM

## 2019-12-06 DIAGNOSIS — I95.1 ORTHOSTATIC HYPOTENSION: ICD-10-CM

## 2019-12-06 PROCEDURE — G0008: CPT

## 2019-12-06 PROCEDURE — G0463: CPT | Mod: 25

## 2019-12-06 PROCEDURE — 90688 IIV4 VACCINE SPLT 0.5 ML IM: CPT

## 2019-12-20 ENCOUNTER — APPOINTMENT (OUTPATIENT)
Dept: OPHTHALMOLOGY | Facility: CLINIC | Age: 49
End: 2019-12-20

## 2019-12-23 RX ORDER — TICAGRELOR 90 MG/1
90 TABLET ORAL TWICE DAILY
Qty: 60 | Refills: 3 | Status: DISCONTINUED | COMMUNITY
Start: 2019-11-26 | End: 2019-12-23

## 2019-12-26 NOTE — ASSESSMENT
[FreeTextEntry1] : 49-yo M w/ PMH of T2DM (A1C 10.8 in Nov 2019), presenting for post-hospitalization follow-up.\par \par #T2DM\par - Historically elevated A1C, currently 10.8 as of Nov 2019.\par - Started on Humulin 70/30 10u QAM and 5u QPM\par - Advised to reduce to 9u QAM and 4u QPM\par - Patient follows visiting nurse for diabetes education\par - Advised to log FSG to paper book\par \par #CAD\par - CAD s/p KHARI x2\par - Continue with ASA 81 mg PO QD, Brilinta 90 mg PO BID, Lipitor 80 mg PO QD, Toprol XL 50 mg PO QD, and lisinopril 5 mg PO QD.\par \par #Orthostatic hypotension\par - Orthostatic positive on current visit. Patient has recently experienced a major cardiac event.\par - Advised to hydrate well with 6-8 cups of water\par - Advised to take caution and time when standing up\par - Continue to monitor at this time.\par \par #Epigastric pain\par - History of GERD. Epigastric burning sensation with bloating.\par - EKG w/ cardiology 11/29/19 WNL\par - C/w H2 blocker at this time\par \par #HCM\par - Flu shot given\par - Colonoscopy due, however given the recent cardiac event and being on DAPT, will hold off colonoscopy at this time.\par - RTC 5 weeks\par \par Discussed with Dr. Drummond

## 2019-12-26 NOTE — HISTORY OF PRESENT ILLNESS
[FreeTextEntry1] : Post-hospitalization follow-up. [de-identified] : 49-yo M w/ PMH of T2DM (A1C 10.8 in Nov 2019), presenting for post-hospitalization follow-up. Patient presented to [facility] ED for acute chest pain a/w diaphoresis, radiation to the back and L arm, palpitations, nausea, SOB, and lightheadedness on 11/16/19. Patient was admitted to CCU for unstable angina. Cath revealed pOM2 100%, s/p KHARI x2. TTE with EF 55%. Follows cardiology, which recommends ASA 81 mg PO QD, Brilinta 90 mg PO BID for at least 1 year post-intervention, Lipitor 80 mg PO QD, metoprolol succinate 50 mg PO QD, lisninopril 5 mg PO QD. For his T2DM, patient uses Humulin 70/30 10u QAM and 5u QPM and metformin 850 mg PO TID. FSG spans from 60 to 80s AM. Complains of epigastric burning and bloating sensation. Took no meds for the epigastric pain. Spoke to cardiologist 10 days ago, was given famotidine c/f GI upset. Also complains of lightheadedness upon standing up too quickly from sitting. No LOC or head trauma.

## 2019-12-26 NOTE — REVIEW OF SYSTEMS
[Fever] : no fever [Chills] : no chills [Fatigue] : no fatigue [Night Sweats] : no night sweats [Discharge] : no discharge [Pain] : no pain [Redness] : no redness [Sore Throat] : no sore throat [Chest Pain] : no chest pain [Palpitations] : no palpitations [Claudication] : no  leg claudication [Lower Ext Edema] : no lower extremity edema [Shortness Of Breath] : no shortness of breath [Wheezing] : no wheezing [Cough] : no cough [Nausea] : no nausea [Constipation] : no constipation [Vomiting] : no vomiting [Diarrhea] : no diarrhea [Dysuria] : no dysuria [Incontinence] : no incontinence [Joint Stiffness] : no joint stiffness [Joint Pain] : no joint pain [Joint Swelling] : no joint swelling [Mole Changes] : no mole changes [Itching] : no itching [Headache] : no headache [Skin Rash] : no skin rash [Dizziness] : no dizziness [Confusion] : no confusion [Fainting] : no fainting [Anxiety] : no anxiety [Depression] : no depression [FreeTextEntry5] : Orthostatic hypotension [Swollen Glands] : no swollen glands [FreeTextEntry7] : Epigastric burning and bloating

## 2019-12-27 DIAGNOSIS — Z23 ENCOUNTER FOR IMMUNIZATION: ICD-10-CM

## 2019-12-27 DIAGNOSIS — R10.9 UNSPECIFIED ABDOMINAL PAIN: ICD-10-CM

## 2019-12-27 DIAGNOSIS — I95.1 ORTHOSTATIC HYPOTENSION: ICD-10-CM

## 2019-12-27 DIAGNOSIS — E11.9 TYPE 2 DIABETES MELLITUS WITHOUT COMPLICATIONS: ICD-10-CM

## 2019-12-27 DIAGNOSIS — I25.10 ATHEROSCLEROTIC HEART DISEASE OF NATIVE CORONARY ARTERY WITHOUT ANGINA PECTORIS: ICD-10-CM

## 2020-01-07 ENCOUNTER — OUTPATIENT (OUTPATIENT)
Dept: OUTPATIENT SERVICES | Facility: HOSPITAL | Age: 50
LOS: 1 days | End: 2020-01-07
Payer: SELF-PAY

## 2020-01-07 ENCOUNTER — APPOINTMENT (OUTPATIENT)
Dept: INTERNAL MEDICINE | Facility: CLINIC | Age: 50
End: 2020-01-07

## 2020-01-07 VITALS
HEIGHT: 65 IN | WEIGHT: 134 LBS | BODY MASS INDEX: 22.33 KG/M2 | DIASTOLIC BLOOD PRESSURE: 70 MMHG | SYSTOLIC BLOOD PRESSURE: 104 MMHG

## 2020-01-07 DIAGNOSIS — I25.10 ATHEROSCLEROTIC HEART DISEASE OF NATIVE CORONARY ARTERY W/OUT ANGINA PECTORIS: ICD-10-CM

## 2020-01-07 DIAGNOSIS — I10 ESSENTIAL (PRIMARY) HYPERTENSION: ICD-10-CM

## 2020-01-07 DIAGNOSIS — Z87.39 PERSONAL HISTORY OF OTHER DISEASES OF THE MUSCULOSKELETAL SYSTEM AND CONNECTIVE TISSUE: ICD-10-CM

## 2020-01-07 DIAGNOSIS — Z92.29 PERSONAL HISTORY OF OTHER DRUG THERAPY: ICD-10-CM

## 2020-01-07 DIAGNOSIS — F32.9 MAJOR DEPRESSIVE DISORDER, SINGLE EPISODE, UNSPECIFIED: ICD-10-CM

## 2020-01-07 DIAGNOSIS — K21.9 GASTRO-ESOPHAGEAL REFLUX DISEASE W/OUT ESOPHAGITIS: ICD-10-CM

## 2020-01-07 PROCEDURE — G0463: CPT

## 2020-01-08 PROBLEM — F32.9 DEPRESSIVE DISORDER: Status: RESOLVED | Noted: 2017-07-17 | Resolved: 2020-01-08

## 2020-01-08 PROBLEM — I25.10 CORONARY ARTERY DISEASE: Noted: 2019-11-27

## 2020-01-08 PROBLEM — Z92.29 HISTORY OF INFLUENZA VACCINATION: Status: RESOLVED | Noted: 2017-09-25 | Resolved: 2020-01-08

## 2020-01-08 NOTE — PHYSICAL EXAM
[No Acute Distress] : no acute distress [Well Nourished] : well nourished [Well-Appearing] : well-appearing [Well Developed] : well developed [No Respiratory Distress] : no respiratory distress  [No Accessory Muscle Use] : no accessory muscle use [Clear to Auscultation] : lungs were clear to auscultation bilaterally [Normal Rate] : normal rate  [Regular Rhythm] : with a regular rhythm [Normal S1, S2] : normal S1 and S2 [No Murmur] : no murmur heard [No Edema] : there was no peripheral edema [Soft] : abdomen soft [Non Tender] : non-tender [No Masses] : no abdominal mass palpated [Non-distended] : non-distended [Normal Affect] : the affect was normal [Alert and Oriented x3] : oriented to person, place, and time [Normal Mood] : the mood was normal [Normal Insight/Judgement] : insight and judgment were intact

## 2020-01-10 ENCOUNTER — APPOINTMENT (OUTPATIENT)
Dept: INTERNAL MEDICINE | Facility: CLINIC | Age: 50
End: 2020-01-10

## 2020-01-14 DIAGNOSIS — E11.9 TYPE 2 DIABETES MELLITUS WITHOUT COMPLICATIONS: ICD-10-CM

## 2020-01-14 DIAGNOSIS — K21.9 GASTRO-ESOPHAGEAL REFLUX DISEASE WITHOUT ESOPHAGITIS: ICD-10-CM

## 2020-01-14 DIAGNOSIS — I25.10 ATHEROSCLEROTIC HEART DISEASE OF NATIVE CORONARY ARTERY WITHOUT ANGINA PECTORIS: ICD-10-CM

## 2020-01-15 ENCOUNTER — APPOINTMENT (OUTPATIENT)
Dept: INTERNAL MEDICINE | Facility: CLINIC | Age: 50
End: 2020-01-15

## 2020-01-15 ENCOUNTER — EMERGENCY (EMERGENCY)
Facility: HOSPITAL | Age: 50
LOS: 1 days | Discharge: HOME | End: 2020-01-15
Attending: EMERGENCY MEDICINE
Payer: MEDICAID

## 2020-01-15 ENCOUNTER — OUTPATIENT (OUTPATIENT)
Dept: OUTPATIENT SERVICES | Facility: HOSPITAL | Age: 50
LOS: 1 days | End: 2020-01-15
Payer: SELF-PAY

## 2020-01-15 ENCOUNTER — NON-APPOINTMENT (OUTPATIENT)
Age: 50
End: 2020-01-15

## 2020-01-15 VITALS
OXYGEN SATURATION: 97 % | WEIGHT: 138.89 LBS | SYSTOLIC BLOOD PRESSURE: 98 MMHG | HEIGHT: 64 IN | DIASTOLIC BLOOD PRESSURE: 65 MMHG | HEART RATE: 63 BPM | RESPIRATION RATE: 16 BRPM | TEMPERATURE: 98 F

## 2020-01-15 VITALS
WEIGHT: 134 LBS | SYSTOLIC BLOOD PRESSURE: 104 MMHG | BODY MASS INDEX: 22.3 KG/M2 | HEART RATE: 76 BPM | DIASTOLIC BLOOD PRESSURE: 58 MMHG | OXYGEN SATURATION: 99 %

## 2020-01-15 DIAGNOSIS — Z95.5 PRESENCE OF CORONARY ANGIOPLASTY IMPLANT AND GRAFT: Chronic | ICD-10-CM

## 2020-01-15 LAB
ALBUMIN SERPL ELPH-MCNC: 4.3 G/DL — SIGNIFICANT CHANGE UP (ref 3.3–5)
ALP SERPL-CCNC: 77 U/L — SIGNIFICANT CHANGE UP (ref 40–120)
ALT FLD-CCNC: 62 U/L — HIGH (ref 10–45)
ANION GAP SERPL CALC-SCNC: 14 MMOL/L — SIGNIFICANT CHANGE UP (ref 5–17)
APTT BLD: 30.5 SEC — SIGNIFICANT CHANGE UP (ref 27.5–36.3)
AST SERPL-CCNC: 44 U/L — HIGH (ref 10–40)
BILIRUB SERPL-MCNC: 0.6 MG/DL — SIGNIFICANT CHANGE UP (ref 0.2–1.2)
BUN SERPL-MCNC: 12 MG/DL — SIGNIFICANT CHANGE UP (ref 7–23)
CALCIUM SERPL-MCNC: 9.7 MG/DL — SIGNIFICANT CHANGE UP (ref 8.4–10.5)
CHLORIDE SERPL-SCNC: 97 MMOL/L — SIGNIFICANT CHANGE UP (ref 96–108)
CO2 SERPL-SCNC: 25 MMOL/L — SIGNIFICANT CHANGE UP (ref 22–31)
CREAT SERPL-MCNC: 0.69 MG/DL — SIGNIFICANT CHANGE UP (ref 0.5–1.3)
GLUCOSE SERPL-MCNC: 91 MG/DL — SIGNIFICANT CHANGE UP (ref 70–99)
HCT VFR BLD CALC: 36.6 % — LOW (ref 39–50)
HGB BLD-MCNC: 12.1 G/DL — LOW (ref 13–17)
INR BLD: 1.18 RATIO — HIGH (ref 0.88–1.16)
MCHC RBC-ENTMCNC: 30.6 PG — SIGNIFICANT CHANGE UP (ref 27–34)
MCHC RBC-ENTMCNC: 33.1 GM/DL — SIGNIFICANT CHANGE UP (ref 32–36)
MCV RBC AUTO: 92.7 FL — SIGNIFICANT CHANGE UP (ref 80–100)
NRBC # BLD: 0 /100 WBCS — SIGNIFICANT CHANGE UP (ref 0–0)
PLATELET # BLD AUTO: 212 K/UL — SIGNIFICANT CHANGE UP (ref 150–400)
POTASSIUM SERPL-MCNC: 4.1 MMOL/L — SIGNIFICANT CHANGE UP (ref 3.5–5.3)
POTASSIUM SERPL-SCNC: 4.1 MMOL/L — SIGNIFICANT CHANGE UP (ref 3.5–5.3)
PROT SERPL-MCNC: 7.5 G/DL — SIGNIFICANT CHANGE UP (ref 6–8.3)
PROTHROM AB SERPL-ACNC: 13.5 SEC — HIGH (ref 10–12.9)
RBC # BLD: 3.95 M/UL — LOW (ref 4.2–5.8)
RBC # FLD: 13.2 % — SIGNIFICANT CHANGE UP (ref 10.3–14.5)
SODIUM SERPL-SCNC: 136 MMOL/L — SIGNIFICANT CHANGE UP (ref 135–145)
TROPONIN T, HIGH SENSITIVITY RESULT: 16 NG/L — SIGNIFICANT CHANGE UP (ref 0–51)
TROPONIN T, HIGH SENSITIVITY RESULT: 16 NG/L — SIGNIFICANT CHANGE UP (ref 0–51)
WBC # BLD: 7.67 K/UL — SIGNIFICANT CHANGE UP (ref 3.8–10.5)
WBC # FLD AUTO: 7.67 K/UL — SIGNIFICANT CHANGE UP (ref 3.8–10.5)

## 2020-01-15 PROCEDURE — 99218: CPT

## 2020-01-15 PROCEDURE — 93005 ELECTROCARDIOGRAM TRACING: CPT

## 2020-01-15 PROCEDURE — 71046 X-RAY EXAM CHEST 2 VIEWS: CPT | Mod: 26

## 2020-01-15 PROCEDURE — G0463: CPT | Mod: 25

## 2020-01-15 RX ORDER — INSULIN GLARGINE 100 [IU]/ML
5 INJECTION, SOLUTION SUBCUTANEOUS AT BEDTIME
Refills: 0 | Status: DISCONTINUED | OUTPATIENT
Start: 2020-01-15 | End: 2020-02-02

## 2020-01-15 RX ORDER — SODIUM CHLORIDE 9 MG/ML
1000 INJECTION, SOLUTION INTRAVENOUS
Refills: 0 | Status: DISCONTINUED | OUTPATIENT
Start: 2020-01-15 | End: 2020-02-02

## 2020-01-15 RX ORDER — ASPIRIN/CALCIUM CARB/MAGNESIUM 324 MG
162 TABLET ORAL ONCE
Refills: 0 | Status: COMPLETED | OUTPATIENT
Start: 2020-01-15 | End: 2020-01-15

## 2020-01-15 RX ORDER — METFORMIN HYDROCHLORIDE 850 MG/1
850 TABLET ORAL THREE TIMES A DAY
Refills: 0 | Status: DISCONTINUED | OUTPATIENT
Start: 2020-01-15 | End: 2020-02-02

## 2020-01-15 RX ORDER — INSULIN LISPRO 100/ML
VIAL (ML) SUBCUTANEOUS AT BEDTIME
Refills: 0 | Status: DISCONTINUED | OUTPATIENT
Start: 2020-01-15 | End: 2020-02-02

## 2020-01-15 RX ORDER — CLOPIDOGREL BISULFATE 75 MG/1
75 TABLET, FILM COATED ORAL DAILY
Refills: 0 | Status: DISCONTINUED | OUTPATIENT
Start: 2020-01-15 | End: 2020-02-02

## 2020-01-15 RX ORDER — ATORVASTATIN CALCIUM 80 MG/1
80 TABLET, FILM COATED ORAL AT BEDTIME
Refills: 0 | Status: DISCONTINUED | OUTPATIENT
Start: 2020-01-15 | End: 2020-02-02

## 2020-01-15 RX ORDER — DEXTROSE 50 % IN WATER 50 %
25 SYRINGE (ML) INTRAVENOUS ONCE
Refills: 0 | Status: DISCONTINUED | OUTPATIENT
Start: 2020-01-15 | End: 2020-02-02

## 2020-01-15 RX ORDER — INSULIN LISPRO 100/ML
VIAL (ML) SUBCUTANEOUS
Refills: 0 | Status: DISCONTINUED | OUTPATIENT
Start: 2020-01-15 | End: 2020-02-02

## 2020-01-15 RX ORDER — FAMOTIDINE 10 MG/ML
20 INJECTION INTRAVENOUS AT BEDTIME
Refills: 0 | Status: DISCONTINUED | OUTPATIENT
Start: 2020-01-15 | End: 2020-01-16

## 2020-01-15 RX ORDER — FAMOTIDINE 10 MG/ML
20 INJECTION INTRAVENOUS DAILY
Refills: 0 | Status: DISCONTINUED | OUTPATIENT
Start: 2020-01-15 | End: 2020-01-24

## 2020-01-15 RX ORDER — ASPIRIN/CALCIUM CARB/MAGNESIUM 324 MG
81 TABLET ORAL DAILY
Refills: 0 | Status: DISCONTINUED | OUTPATIENT
Start: 2020-01-15 | End: 2020-02-02

## 2020-01-15 RX ORDER — GLUCAGON INJECTION, SOLUTION 0.5 MG/.1ML
1 INJECTION, SOLUTION SUBCUTANEOUS ONCE
Refills: 0 | Status: DISCONTINUED | OUTPATIENT
Start: 2020-01-15 | End: 2020-02-02

## 2020-01-15 RX ORDER — METOPROLOL TARTRATE 50 MG
50 TABLET ORAL DAILY
Refills: 0 | Status: DISCONTINUED | OUTPATIENT
Start: 2020-01-15 | End: 2020-02-02

## 2020-01-15 RX ORDER — LISINOPRIL 2.5 MG/1
5 TABLET ORAL DAILY
Refills: 0 | Status: DISCONTINUED | OUTPATIENT
Start: 2020-01-15 | End: 2020-02-02

## 2020-01-15 RX ORDER — DEXTROSE 50 % IN WATER 50 %
15 SYRINGE (ML) INTRAVENOUS ONCE
Refills: 0 | Status: DISCONTINUED | OUTPATIENT
Start: 2020-01-15 | End: 2020-02-02

## 2020-01-15 RX ORDER — DEXTROSE 50 % IN WATER 50 %
12.5 SYRINGE (ML) INTRAVENOUS ONCE
Refills: 0 | Status: DISCONTINUED | OUTPATIENT
Start: 2020-01-15 | End: 2020-02-02

## 2020-01-15 RX ADMIN — FAMOTIDINE 20 MILLIGRAM(S): 10 INJECTION INTRAVENOUS at 23:30

## 2020-01-15 RX ADMIN — METFORMIN HYDROCHLORIDE 850 MILLIGRAM(S): 850 TABLET ORAL at 23:30

## 2020-01-15 RX ADMIN — ATORVASTATIN CALCIUM 80 MILLIGRAM(S): 80 TABLET, FILM COATED ORAL at 23:30

## 2020-01-15 RX ADMIN — Medication 162 MILLIGRAM(S): at 16:59

## 2020-01-15 NOTE — ED PROVIDER NOTE - CLINICAL SUMMARY MEDICAL DECISION MAKING FREE TEXT BOX
50yo M pmhx htn hld dm cad p/w CC chest pain - intermittent since Sunday - will send labs, ekg, give aspirin

## 2020-01-15 NOTE — ED CDU PROVIDER INITIAL DAY NOTE - ATTENDING CONTRIBUTION TO CARE
ATTENDING, Bhavin RODARTE: I have personally performed a face to face diagnostic evaluation on this patient.  I have reviewed the ACP note and agree with the history, exam, and plan of care, except as noted here. Progress notes and further evaluation to be reviewed by observation and discharging attending.

## 2020-01-15 NOTE — ED PROVIDER NOTE - RAPID ASSESSMENT
Pt immediately triaged, IV placed, and sent back to Dr. Nguyen after EKG.  EKG done in [facility] at 4:21pm: peak T waves, V2 to V4, similar to EKG in PMD's office 1 hour prior.     49y M with PMHx of CAD S/P MI and cardiac stents x2 (on ASA 81mg QD and Plavix 75mg QD), HTN p/w sharp CP radiating to left arm with SOB x 3 days, experienced both when at rest and with exertion. CP lasts 5 minutes at a time, self resolves. Denies similarity to previous CP experienced with previous MI. Last dose of ASA 81mg today.     PE: NAD, no diaphoresis.     **Pt seen in waiting room by Attending, Johnnie MACKENZIE), documentation completed by Mirella Mcdonald. Pt to be sent to main ED for further evaluation - all orders placed to be followed by MD in the main ED** Pt immediately triaged, IV placed, and sent back to Dr. Nguyen after EKG.  EKG done in [facility] at 4:21pm: peak T waves, V2 to V4, similar to EKG in PMD's office 1 hour prior.     49y M with PMHx of CAD S/P MI and cardiac stents x2 (on ASA 81mg QD and Plavix 75mg QD), HTN p/w sharp CP radiating to left arm with SOB x 3 days, experienced both when at rest and with exertion. CP lasts 5 minutes at a time, self resolves. Denies similarity to previous CP experienced with previous MI. Last dose of ASA 81mg today. Ordered additional aspirin    PE: NAD, no diaphoresis. No increased work of breathing.    **Pt seen in waiting room by Attending, Johnnie MACKENZIE), documentation completed by Mirella Mcdonald. Pt to be sent to main ED for further evaluation - all orders placed to be followed by MD in the main ED**

## 2020-01-15 NOTE — ED ADULT NURSE NOTE - NSIMPLEMENTINTERV_GEN_ALL_ED
Implemented All Fall with Harm Risk Interventions:  [place] to call system. Call bell, personal items and telephone within reach. Instruct patient to call for assistance. Room bathroom lighting operational. Non-slip footwear when patient is off stretcher. Physically safe environment: no spills, clutter or unnecessary equipment. Stretcher in lowest position, wheels locked, appropriate side rails in place. Provide visual cue, wrist band, yellow gown, etc. Monitor gait and stability. Monitor for mental status changes and reorient to person, place, and time. Review medications for side effects contributing to fall risk. Reinforce activity limits and safety measures with patient and family. Provide visual clues: red socks.

## 2020-01-15 NOTE — ED ADULT NURSE NOTE - OBJECTIVE STATEMENT
49 year old male with PMH of CAD, HTN, MI with 2 stents on 81 mg ASA and Plavix presents to ED for chest pain. Chest pain x 3 days, 5/10 pain, intermittent, located on left side of chest. Denies palpitations, SOB. A&Ox4 gross neuro intact, lungs cta bilaterally, no difficulty speaking in complete sentences, s1s2 heart sounds heard, pulses x 4, garcia x4, abdomen soft nontender nondistended, normoactive bowel sounds to all 4 quadrants, skin intact. Denies headache, dizziness, vision changes, shortness of breath, abdominal pain, nausea, vomiting, diarrhea, fevers, chills, dysuria, hematuria, recent illness travel or fall. 49 year old male with PMH of CAD,DM, HTN, MI with 2 stents on 81 mg ASA and Plavix presents to ED for chest pain. Chest pain x 3 days, 5/10 pain, intermittent, located on left side of chest. Denies palpitations, SOB. A&Ox4 gross neuro intact, lungs cta bilaterally, no difficulty speaking in complete sentences, s1s2 heart sounds heard, pulses x 4, garcia x4, abdomen soft nontender nondistended, normoactive bowel sounds to all 4 quadrants, skin intact. Denies headache, dizziness, vision changes, shortness of breath, abdominal pain, nausea, vomiting, diarrhea, fevers, chills, dysuria, hematuria, recent illness travel or fall. Patient does not smoke.

## 2020-01-15 NOTE — ED CDU PROVIDER INITIAL DAY NOTE - OBJECTIVE STATEMENT
48y/o M with PMH of HTN, HLD, DM2, and CAD S/P MI and cardiac stents x2 in November 2019 (on ASA 81mg QD and Plavix 75mg QD) p/w sharp CP radiating to left arm with SOB x 3 days. experienced symptoms when at rest and with exertion. CP is intermittent; lasts 5 minutes at a time then self resolves. Denies similarity to previous CP experienced with previous MI. Last dose of ASA 81mg today. also notes having a mild H/A. denies any fever, chills, sweats, flu like symptoms, abd pain, back pain, problems walking/going to the bathroom.   In ED, ALT 62, AST WNL, delta trops negative, other labs unremarkable, CXR negative. cardio c/s called, recommended trending trops and sending to CDU for observation.    Cardio Dr. Blakely

## 2020-01-15 NOTE — ED CLERICAL - NS ED CLERK NOTE PRE-ARRIVAL INFORMATION; ADDITIONAL PRE-ARRIVAL INFORMATION
CC/Reason For referral: Chest Pain  Preferred Consultant(if applicable): N/A  Who admits for you (if needed): N/A  Do you have documents you would like to fax over? No, documents sent with patient  Would you still like to speak to an ED attending? Yes, call Dr Duarte [phone] after patient is seen

## 2020-01-15 NOTE — ED PROVIDER NOTE - OBJECTIVE STATEMENT
48yo M pmhx HTN DM HLD CAD x2 stents on brilinta p/w CC intermittent CP since Sunday, pain is non-exertional, non radiating, located anterior chest wall, went to clinic today and was referred to ED. Denies fever chills cough N/V/D abd pain back pain numbness/tingling/weakness.     PMD: [facility]

## 2020-01-15 NOTE — ED ADULT NURSE NOTE - CHPI ED NUR SYMPTOMS NEG
no shortness of breath/no dizziness/no diaphoresis/no congestion/no chills/no nausea/no syncope/no back pain/no fever/no vomiting

## 2020-01-15 NOTE — ED PROVIDER NOTE - ATTENDING CONTRIBUTION TO CARE
cp / no sob  rrr / clear lungs  ro acs. pe / dissection unlikely clinically. reviewed old cath nov 2019. has circ lesion not intervened on.

## 2020-01-15 NOTE — ED ADULT NURSE REASSESSMENT NOTE - NS ED NURSE REASSESS COMMENT FT1
Patient resting comfortably.
to cxr; cardiac monitor in place; resting in bed; no acute distress
Pt received from FLORENCIO Lira. Pt oriented to CDU & plan of care was discussed. Pt A&O x 3. Pt in CDU for cardiac monitoring, and trop monitoring. Pt c/o 4/10 chest pain, declines medication at this time, KILEY Norton aware. Pt denies any SOB, dizziness or palpitations as of now. Pt on a cardiac monitor in sinus aaliyah, HR in 50's. Pt resting in bed. Safety & comfort measures maintained. Call bell in reach. Will continue to monitor.

## 2020-01-16 VITALS
OXYGEN SATURATION: 98 % | SYSTOLIC BLOOD PRESSURE: 112 MMHG | TEMPERATURE: 98 F | RESPIRATION RATE: 19 BRPM | DIASTOLIC BLOOD PRESSURE: 76 MMHG | HEART RATE: 70 BPM

## 2020-01-16 LAB
ALBUMIN SERPL ELPH-MCNC: 4 G/DL — SIGNIFICANT CHANGE UP (ref 3.3–5)
ALP SERPL-CCNC: 74 U/L — SIGNIFICANT CHANGE UP (ref 40–120)
ALT FLD-CCNC: 62 U/L — HIGH (ref 10–45)
AST SERPL-CCNC: 43 U/L — HIGH (ref 10–40)
BILIRUB DIRECT SERPL-MCNC: 0.1 MG/DL — SIGNIFICANT CHANGE UP (ref 0–0.2)
BILIRUB INDIRECT FLD-MCNC: 0.5 MG/DL — SIGNIFICANT CHANGE UP (ref 0.2–1)
BILIRUB SERPL-MCNC: 0.6 MG/DL — SIGNIFICANT CHANGE UP (ref 0.2–1.2)
PROT SERPL-MCNC: 6.9 G/DL — SIGNIFICANT CHANGE UP (ref 6–8.3)
TROPONIN T, HIGH SENSITIVITY RESULT: 17 NG/L — SIGNIFICANT CHANGE UP (ref 0–51)

## 2020-01-16 PROCEDURE — 82962 GLUCOSE BLOOD TEST: CPT

## 2020-01-16 PROCEDURE — 76705 ECHO EXAM OF ABDOMEN: CPT

## 2020-01-16 PROCEDURE — 99284 EMERGENCY DEPT VISIT MOD MDM: CPT | Mod: 25

## 2020-01-16 PROCEDURE — G0378: CPT

## 2020-01-16 PROCEDURE — 80053 COMPREHEN METABOLIC PANEL: CPT

## 2020-01-16 PROCEDURE — 71046 X-RAY EXAM CHEST 2 VIEWS: CPT

## 2020-01-16 PROCEDURE — 99217: CPT

## 2020-01-16 PROCEDURE — 80076 HEPATIC FUNCTION PANEL: CPT

## 2020-01-16 PROCEDURE — 93005 ELECTROCARDIOGRAM TRACING: CPT

## 2020-01-16 PROCEDURE — 85730 THROMBOPLASTIN TIME PARTIAL: CPT

## 2020-01-16 PROCEDURE — 76705 ECHO EXAM OF ABDOMEN: CPT | Mod: 26

## 2020-01-16 PROCEDURE — 99285 EMERGENCY DEPT VISIT HI MDM: CPT | Mod: GC

## 2020-01-16 PROCEDURE — 84484 ASSAY OF TROPONIN QUANT: CPT

## 2020-01-16 PROCEDURE — 85027 COMPLETE CBC AUTOMATED: CPT

## 2020-01-16 PROCEDURE — 85610 PROTHROMBIN TIME: CPT

## 2020-01-16 RX ORDER — FAMOTIDINE 10 MG/ML
20 INJECTION INTRAVENOUS DAILY
Refills: 0 | Status: DISCONTINUED | OUTPATIENT
Start: 2020-01-16 | End: 2020-02-02

## 2020-01-16 RX ORDER — LIDOCAINE 4 G/100G
10 CREAM TOPICAL ONCE
Refills: 0 | Status: COMPLETED | OUTPATIENT
Start: 2020-01-16 | End: 2020-01-16

## 2020-01-16 RX ADMIN — CLOPIDOGREL BISULFATE 75 MILLIGRAM(S): 75 TABLET, FILM COATED ORAL at 07:51

## 2020-01-16 RX ADMIN — Medication 81 MILLIGRAM(S): at 07:50

## 2020-01-16 RX ADMIN — LISINOPRIL 5 MILLIGRAM(S): 2.5 TABLET ORAL at 07:50

## 2020-01-16 RX ADMIN — Medication 30 MILLILITER(S): at 07:51

## 2020-01-16 RX ADMIN — LIDOCAINE 10 MILLILITER(S): 4 CREAM TOPICAL at 07:51

## 2020-01-16 RX ADMIN — INSULIN GLARGINE 5 UNIT(S): 100 INJECTION, SOLUTION SUBCUTANEOUS at 00:25

## 2020-01-16 RX ADMIN — FAMOTIDINE 20 MILLIGRAM(S): 10 INJECTION INTRAVENOUS at 07:51

## 2020-01-16 RX ADMIN — METFORMIN HYDROCHLORIDE 850 MILLIGRAM(S): 850 TABLET ORAL at 07:51

## 2020-01-16 NOTE — ED CDU PROVIDER DISPOSITION NOTE - NSFOLLOWUPINSTRUCTIONS_ED_ALL_ED_FT
1. Continue your Aspirin 81mg daily and all of your other medications as previously prescribed.   2. Follow up with your PMD and cardiologist within 48-72 hours. Show copies of your reports given to you.   3. Worsening or continued chest pain, shortness of breath, weakness, return to ED. 1. Stay hydrated. Rest.   2. Continue Current Home Medications.   3. Follow up with your PCP in 1-2 days. Follow up with your Cardiologist Dr. Blakely within 2-3 days. Follow up elevated liver enzymes. (Bring printed results to your doctor visit).  4. Return if symptoms, worsen, fever, weakness, chest pain, difficulty breathing, dizziness and all other concerns.

## 2020-01-16 NOTE — ED CDU PROVIDER DISPOSITION NOTE - PATIENT PORTAL LINK FT
You can access the FollowMyHealth Patient Portal offered by [facility] by registering at the following website: [email or web address]. By joining [employer]’s FollowMyHealth portal, you will also be able to view your health information using other applications (apps) compatible with our system.

## 2020-01-16 NOTE — ED CDU PROVIDER SUBSEQUENT DAY NOTE - HISTORY
No interval changes since initial CDU provider note. Pt feels well without complaint. No CP currently. NAD VSS. no events on tele. cardio saw pt, recommended another trop and nuc ex stress in AM. orders entered per cardio, will continue monitoring. Kathie Santos
Yes

## 2020-01-16 NOTE — CONSULT NOTE ADULT - ASSESSMENT
49M hx of CAD s/p MI PCI 11/2019 (PCI OM2, LCx) HTN, DM2, HLD, here for atypical CP for 4 days.     #Atypical chest pain  -currently HDS, asx, non-exertional, no addl episodes since arrival to ED  -trop 16x2, EKG NSR  -REBECCA score 69  -TTE w/ normal EF 11/2019, mild inferior wall hypokinesis  -low suspicion ACS at this time given chemical and electrical stability  -would trend CE to completion  -exercise nuc stress in AM    Will discuss w/ attending  Errol Mosher MD  Cardiology Fellow - PGY 4  Text or Call: [phone]  For all New Consults and Questions:  [email or web address]   Login: [email or web address] 49M hx of CAD s/p MI PCI 11/2019 (PCI OM2, LCx) HTN, DM2, HLD, here for atypical CP for 4 days.     #Atypical chest pain  -currently HDS, asx, non-exertional, no addl episodes since arrival to ED  -trop 16x2, EKG NSR  -REBECCA score 69  -TTE w/ normal EF 11/2019, mild inferior wall hypokinesis  -low suspicion ACS at this time given chemical and electrical stability  -would trend CE to completion      Errol Mosher MD  Cardiology Fellow - PGY 4  Text or Call: [phone]  For all New Consults and Questions:  [email or web address]   Login: [email or web address]

## 2020-01-16 NOTE — ED CDU PROVIDER SUBSEQUENT DAY NOTE - PROGRESS NOTE DETAILS
CDU NOTE KILEY SAAVEDRA: Pt resting comfortably, feeling well without complaint, no CP now, states intermittent CP is less than before. NAD, VSS. No events on telemetry. will continue monitoring. CDU NOTE KILEY Abreu: pt resting, states that he still has L sided chest pain- same pain he had earlier, mild. no new complaints. no sob/dyspnea. n/v. NAD VSS. chest wall- non-tender. abdomen- soft, ttp at epigastrium and LUQ. ordered GI cocktail and pepcid. CDU NOTE KILEY Abreu: pt resting, states that he still has L sided chest pain- same pain he had earlier, mild, atypical- not exertional, no associated symptoms. no new complaints. no sob/dyspnea. n/v. NAD VSS. chest wall- non-tender. abdomen- soft, ttp at epigastrium and LUQ. ordered GI cocktail and pepcid. CDU NOTE KILEY Abreu: spoke with stress lab- cards fellow there spoke with the cards fellow that is following this patient, will hold off on stress until cards attending evaluation, as concern for utility of test in setting of patient with recent stent placement. CDU NOTE KILEY Abreu: pt seen by Cards attending Dr. Odell, no further cardiac testing needed, symptoms atypical, ok with d/c home. pt had RUQ ultrasound in setting of elevated LFTs and epigastric tenderness, normal.   as per Dr. Pritchard, pt stable for d/c home.

## 2020-01-16 NOTE — ED CDU PROVIDER DISPOSITION NOTE - ATTENDING CONTRIBUTION TO CARE
Dr Pritchard Note: 48 yo M w CAD, presented w atypical CP, trops remained 16-->16, no further symptoms, no events in CDU, seen by cards attending and cleared for oupt f/u  lungs clear, cardiac rrr no murmurs, no LE edema or calf tenderness   Discussed w pt strict return precautions fo any worsening or other concerns, will f/u cards

## 2020-01-16 NOTE — CONSULT NOTE ADULT - NSHPATTENDINGPLANDISCUSS_GEN_ALL_CORE
ER attending. Call Cardiology Fellow or Attending as listed on [email or web address] password: [email or web address].

## 2020-01-16 NOTE — CONSULT NOTE ADULT - SUBJECTIVE AND OBJECTIVE BOX
Patient seen and evaluated at bedside    Chief Complaint:  chest pain     HPI: 49M hx of CAD s/p MI PCI 11/2019 (OM2, LCx) HTN, DM2, HLD, here for atypical CP for 4 days.  Pt states he was in his [employer] when he had insidious onset of substernal, "chilling" type episodic CP that occurs multiple times a day and last for a few seconds to 5 minutes.  Does not occur at work, only at home.  No exertional or positional component.  Not similar to previous CP in 11/2019 with presentation to MI.  Denies associated diaphoresis, palpitations, dyspnea.  Pt states he does not ambulate much however has not had a change in exercise tolerance.  Pt came to ED for further eval.      PMHx:   MI (myocardial infarction)  CAD (coronary artery disease)  HTN (hypertension)  Diabetes      PSHx:   History of coronary artery stent placement  No significant past surgical history      Allergies:  No Known Allergies      Home Meds: per med rec    Current Medications:   aspirin enteric coated 81 milliGRAM(s) Oral daily  atorvastatin 80 milliGRAM(s) Oral at bedtime  clopidogrel Tablet 75 milliGRAM(s) Oral daily  dextrose 40% Gel 15 Gram(s) Oral once PRN  dextrose 5%. 1000 milliLiter(s) IV Continuous <Continuous>  dextrose 50% Injectable 12.5 Gram(s) IV Push once  dextrose 50% Injectable 25 Gram(s) IV Push once  dextrose 50% Injectable 25 Gram(s) IV Push once  famotidine    Tablet 20 milliGRAM(s) Oral daily  famotidine    Tablet 20 milliGRAM(s) Oral at bedtime  glucagon  Injectable 1 milliGRAM(s) IntraMuscular once PRN  insulin glargine Injectable (LANTUS) 5 Unit(s) SubCutaneous at bedtime  insulin lispro (HumaLOG) corrective regimen sliding scale   SubCutaneous three times a day before meals  insulin lispro (HumaLOG) corrective regimen sliding scale   SubCutaneous at bedtime  lisinopril 5 milliGRAM(s) Oral daily  metFORMIN 850 milliGRAM(s) Oral three times a day  metoprolol succinate ER 50 milliGRAM(s) Oral daily      FAMILY HISTORY:  FH: HTN (hypertension)  FH: CAD (coronary artery disease)      Social History:  Smoking History: denies  Alcohol Use: denies  Drug Use: denies    REVIEW OF SYSTEMS:  Constitutional:     [x ] negative [ ] fevers [ ] chills [ ] weight loss [ ] weight gain  HEENT:                  [x ] negative [ ] dry eyes [ ] eye irritation [ ] postnasal drip [ ] nasal congestion  CV:                         [ x] negative  [ ] chest pain [ ] orthopnea [ ] palpitations [ ] murmur  Resp:                     [x ] negative [ ] cough [ ] shortness of breath [ ] dyspnea [ ] wheezing [ ] sputum [ ]hemoptysis  GI:                          [ x] negative [ ] nausea [ ] vomiting [ ] diarrhea [ ] constipation [ ] abd pain [ ] dysphagia   :                        [ x] negative [ ] dysuria [ ] nocturia [ ] hematuria [ ] increased urinary frequency  Musculoskeletal: [x ] negative [ ] back pain [ ] myalgias [ ] arthralgias [ ] fracture  Skin:                       [ x] negative [ ] rash [ ] itch  Neurological:        [ x] negative [ ] headache [ ] dizziness [ ] syncope [ ] weakness [ ] numbness  Psychiatric:           [ x] negative [ ] anxiety [ ] depression  Endocrine:            [ x] negative [ ] diabetes [ ] thyroid problem  Heme/Lymph:      [ x] negative [ ] anemia [ ] bleeding problem  Allergic/Immune: [ x] negative [ ] itchy eyes [ ] nasal discharge [ ] hives [ ] angioedema    [ x] All other systems negative  [ ] Unable to assess ROS due to      Physical Exam:  T(F): 98.2 (01-16), Max: 98.2 (01-15)  HR: 58 (01-16) (52 - 63)  BP: 113/71 (01-16) (98/65 - 113/71)  RR: 18 (01-16)  SpO2: 97% (01-16)  GENERAL: No acute distress, well-developed  HEAD:  Atraumatic, Normocephalic  ENT: EOMI, PERRLA, conjunctiva and sclera clear, Neck supple, No JVD, moist mucosa  CHEST/LUNG: Clear to auscultation bilaterally; No wheeze, equal breath sounds bilaterally   BACK: No spinal tenderness  HEART: Regular rate and rhythm; No murmurs, rubs, or gallops  ABDOMEN: Soft, Nontender, Nondistended; Bowel sounds present  EXTREMITIES:  No clubbing, cyanosis, or edema  PSYCH: Nl behavior, nl affect  NEUROLOGY: AAOx3, non-focal, cranial nerves intact  SKIN: Normal color, No rashes or lesions  LINES:    Cardiovascular Diagnostic Testing:    ECG: Personally reviewed: NSR, no ischemic changes    Echo: Personally reviewed: 11/2019    Observations:  Mitral Valve: Normal mitral valve. Mild mitral  regurgitation.  Peak mitral valve gradient equals 3 mm Hg,  mean transmitral valve gradient equals 1 mm Hg.  Aortic Valve/Aorta: Normal trileaflet aortic valve. Peak  transaortic valve gradient equals 7 mm Hg. Peak left  ventricular outflow tract gradient equals 4 mm Hg, mean  gradient is equal to 2 mm Hg, LVOT velocity time integral  equals 17 cm.  Aortic Root: 3.1 cm.  Left Atrium: LA volume index = 11 cc/m2.  Left Ventricle: Mild inferior wall hypokinesis. Mild  concentric left ventricular hypertrophy.  Right Heart: Normal right atrium. Normal right ventricular  size and function. Normal tricuspid valve. Mild tricuspid  regurgitation. Normal pulmonic valve. Minimal pulmonic  regurgitation.  Pericardium/Pleura: Normal pericardium with no pericardial  effusion.  Hemodynamic: Estimated right atrial pressure is 8 mm Hg.  Estimated right ventricular systolic pressure equals 35 mm  Hg, assuming right atrial pressure equals 8 mm Hg,  consistent with borderline pulmonary hypertension.    Cath: 11/2019  CORONARY VESSELS: The coronary circulation is right dominant.  LM:   --  LM: The vessel was medium sized. Angiography showed mild  atherosclerosis with no flow limiting lesions.  LAD:   --  Proximal LAD: The vessel was medium sized. Angiography showed  mild atherosclerosis with no flow limiting lesions.  CX:   --  Proximal circumflex: There was a 60 % stenosis.  --  OM1: The vessel was small sized. Angiography showed mild  atherosclerosis with no flow limiting lesions.  --  OM2: There was a 100 % stenosis.  RCA:   --  Proximal RCA: The vessel was large sized. Angiography showed  mild atherosclerosis with no flow limiting lesions.  --  RPDA: The vessel was medium sized. Angiography showedmild  atherosclerosis with no flow limiting lesions.  COMPLICATIONS: There were no complications.  DIAGNOSTIC RECOMMENDATIONS: Coronary Angiography Summary:  -single vessel coronary disease.  -OM2 100% (culprit lesions).  -proximal circumflex (60%).  Coronary Interventional Summary:  -successful KHARI to the OM2.  -successful KHARI to the proximal circumflex.      Imaging:    CXR: Personally reviewed    Labs: Personally reviewed                        12.1   7.67  )-----------( 212      ( 15 Jl 2020 17:28 )             36.6     01-15    136  |  97  |  12  ----------------------------<  91  4.1   |  25  |  0.69    Ca    9.7      15 Jl 2020 17:28    TPro  7.5  /  Alb  4.3  /  TBili  0.6  /  DBili  x   /  AST  44<H>  /  ALT  62<H>  /  AlkPhos  77  01-15    PT/INR - ( 15 Jl 2020 17:28 )   PT: 13.5 sec;   INR: 1.18 ratio         PTT - ( 15 Jl 2020 17:28 )  PTT:30.5 sec

## 2020-01-16 NOTE — ED CDU PROVIDER DISPOSITION NOTE - CLINICAL COURSE
50y/o M with PMH of HTN, HLD, DM2, and CAD S/P MI and cardiac stents x2 in November 2019 (on ASA 81mg QD and Plavix 75mg QD) p/w sharp CP radiating to left arm with SOB x 3 days. experienced symptoms when at rest and with exertion. CP is intermittent; lasts 5 minutes at a time then self resolves. Denies similarity to previous CP experienced with previous MI. Last dose of ASA 81mg today. also notes having a mild H/A. denies any fever, chills, sweats, flu like symptoms, abd pain, back pain, problems walking/going to the bathroom.   In ED, ALT 62, AST WNL, delta trops negative, other labs unremarkable, CXR negative. cardio c/s called, recommended trending trops and sending to CDU for observation.  In CDU, cardio saw pt recommended a nuc stress test. ____________ 48y/o M with PMH of HTN, HLD, DM2, and CAD S/P MI and cardiac stents x2 in November 2019 (on ASA 81mg QD and Plavix 75mg QD) p/w sharp CP radiating to left arm with SOB x 3 days. experienced symptoms when at rest and with exertion. CP is intermittent; lasts 5 minutes at a time then self resolves. Denies similarity to previous CP experienced with previous MI. Last dose of ASA 81mg today. also notes having a mild H/A. denies any fever, chills, sweats, flu like symptoms, abd pain, back pain, problems walking/going to the bathroom.   In ED, ALT 62, AST WNL, delta trops negative, other labs unremarkable, CXR negative. cardio c/s called, recommended trending trops and sending to CDU for observation.  In CDU, cardio saw pt recommended a nuc stress test. however, while pt in cdu- no events on tele, cards attending evaluated, stress test cancelled, symptoms atypical, pt to f/up outpt with his Cardiologist.

## 2020-01-16 NOTE — ED CDU PROVIDER SUBSEQUENT DAY NOTE - ATTENDING CONTRIBUTION TO CARE
Dr Pritchard Note: 50yo M w pmhx htn, hld, dm, cad s/p stents 11/19 seen in ED for CP, describes atypical CP, sharp, at rest, w eating, and occasionally w exertion, no SOB, no Edema has been for 3-4 days   Pt sent to CDU for cards and monitoring  trops 16--16--17   LFTs mild elev AST 43, ALT 62  Pt this morning with no complaints, denies any cp, sob  Gen: no acute distress non toxic alert and coherent, no cyanosis   HEENT: atraumatic,  no scleral icterus  EOMI   Neck: no midline tenderness, supple  Lungs: Air entry good, clear to auscultation and percussion   CVS: reg HR S1/S2 no murmur no gallop   ABD: +BS in all 4 quadrants, soft, non tender,  non distended, non palpable liver and spleen and no other masses. no hernias.  Back: no midline tenderness   Extremities: No deformities, no edema, no calf tenderness  Neuro: AA and Ox3, CNII-XII grossly intact       This morning with no symptoms   Pending cards and eval get RUQ US due to mild elev LFTS

## 2020-01-16 NOTE — CONSULT NOTE ADULT - ATTENDING COMMENTS
49 year old man with acute MI, circumflex stent November now presents with intermittent, focal, left lower chest pain not related to exertion. Exam unremarkable, EKG without ischemia, troponin 16, 16. Symptoms seem not consistent with myocardial ischemia. Stress testing will not be helpful as test is predictably abnormal, likely mixed infarct and ischemia in distribution of prior stent. He has defined himself as low risk and recommend discharge to home, follow up as outpatient with cardiology.

## 2020-01-17 NOTE — HISTORY OF PRESENT ILLNESS
[de-identified] : 49M PMH DM2, CAD s/p PCI with KHARI x 2 in Nov presenting for follow up.  At last visit, pt was seen post hospitalization. \par \par # DM\par Pt has long-standing DM, on insulin and metformin TID.  \par \par #CAD\par Pt had called in Dec 23 as he was unable to afford Brillinta and was switched to Plavix.\par \par # Orthostatic HTN\par Pt no longer feeling dizzy with standing as at last visit.\par \par # Social Determinents\par Pt states he is originally from [place] but has not been back in over 15 years since his Visa .  Pt staets he does not have health insurance.  He would like to go visit his family, but he cannot.  Pt is asking about how to go about getting immigration papers today.\par

## 2020-01-17 NOTE — REVIEW OF SYSTEMS
[Chills] : no chills [Fever] : no fever [Fatigue] : no fatigue [Chest Pain] : no chest pain [Palpitations] : no palpitations [Shortness Of Breath] : no shortness of breath [Lower Ext Edema] : no lower extremity edema [Dyspnea on Exertion] : not dyspnea on exertion [Wheezing] : no wheezing [Cough] : no cough [Abdominal Pain] : no abdominal pain [Nausea] : no nausea [Constipation] : no constipation [Headache] : no headache [Diarrhea] : no diarrhea [Dizziness] : no dizziness

## 2020-01-17 NOTE — END OF VISIT
[FreeTextEntry3] : 48yo M evaluated with Dr. Devine at time of clinic visit; confirm and agree with H&P as noted. Hx DM, CAD, PCI and KHARI 11/2019, GERD. Orthostatic at last visit in [facility], also A1c 10.9. Now here for f/u of DM and BP – on metformin and 70/30 insulin; FBS running in low 100's. PE: stable, no new findings. Plan as outlined by Dr. Devine - continue with current regimen, monitor DM with target A1c goal around 7%.\par  [] : Resident

## 2020-01-22 NOTE — END OF VISIT
[] : Resident [FreeTextEntry3] : Pt w h/o CAD/MI/stent w ongoing substernal  left  chest pain rad to arm x 3 dd.\par EKG peaked Ts.\par P: agrees to ER now to r/o MI , ACS.

## 2020-01-22 NOTE — HISTORY OF PRESENT ILLNESS
[FreeTextEntry1] : Chest pain [de-identified] : 49M PMH DM2, CAD s/p PCI with KHARI x 2 in Nov presented with chest pain of 3 days duration The pain is substernal, intermittent, sharp in quality, and radiates to his L forearm. Nothing triggers or worsens the pain. He feels the pain at both rest and with physical exertion. Lasts for 5-10 minutes and resolves on its own. It is not similar to the pain he felt during his NSTEMI. \par In regards to his NSTEMI, he was in the CCU for burning epigastric/substernal CP w/ NSTEMI and found to have on LHC (11/17) w/ EF 55%, 60% Cx s/p KHARI, OM2 100% s/p KHARI, TTE 11/17 w/ EF 55% mild LVH and mild inferior wall hypokinesis,  and currently on ASA/Plavix (couldn't afford Brilinta). \par \par # DM\par Pt has long-standing DM, on insulin and metformin TID. He checks his FS every day. His FS today was 108. \par

## 2020-01-22 NOTE — REVIEW OF SYSTEMS
[Chest Pain] : chest pain [Fever] : no fever [Chills] : no chills [Discharge] : no discharge [Earache] : no earache [Fatigue] : no fatigue [Orthopena] : no orthopnea [Palpitations] : no palpitations [Hearing Loss] : no hearing loss [Shortness Of Breath] : no shortness of breath [Wheezing] : no wheezing [Paroxysmal Nocturnal Dyspnea] : no paroxysmal nocturnal dyspnea [Abdominal Pain] : no abdominal pain [Cough] : no cough [Nausea] : no nausea [Heartburn] : no heartburn [Vomiting] : no vomiting [Joint Pain] : no joint pain [Joint Stiffness] : no joint stiffness [Back Pain] : no back pain [Joint Swelling] : no joint swelling

## 2020-01-22 NOTE — PHYSICAL EXAM
[No Acute Distress] : no acute distress [Well Nourished] : well nourished [Normal Sclera/Conjunctiva] : normal sclera/conjunctiva [PERRL] : pupils equal round and reactive to light [Normal Outer Ear/Nose] : the outer ears and nose were normal in appearance [No Lymphadenopathy] : no lymphadenopathy [Normal Oropharynx] : the oropharynx was normal [Supple] : supple [No Accessory Muscle Use] : no accessory muscle use [No Respiratory Distress] : no respiratory distress  [Regular Rhythm] : with a regular rhythm [Normal Rate] : normal rate  [Normal S1, S2] : normal S1 and S2 [Soft] : abdomen soft [Non Tender] : non-tender [Non-distended] : non-distended [No HSM] : no HSM [Normal Bowel Sounds] : normal bowel sounds [de-identified] : +hepatojagular reflex up to mid neck at 45 degrees angle

## 2020-01-24 DIAGNOSIS — I10 ESSENTIAL (PRIMARY) HYPERTENSION: ICD-10-CM

## 2020-01-24 DIAGNOSIS — I25.10 ATHEROSCLEROTIC HEART DISEASE OF NATIVE CORONARY ARTERY WITHOUT ANGINA PECTORIS: ICD-10-CM

## 2020-01-24 DIAGNOSIS — K21.9 GASTRO-ESOPHAGEAL REFLUX DISEASE WITHOUT ESOPHAGITIS: ICD-10-CM

## 2020-01-28 DIAGNOSIS — E11.9 TYPE 2 DIABETES MELLITUS WITHOUT COMPLICATIONS: ICD-10-CM

## 2020-02-04 ENCOUNTER — APPOINTMENT (OUTPATIENT)
Dept: CARDIOLOGY | Facility: HOSPITAL | Age: 50
End: 2020-02-04

## 2020-02-04 ENCOUNTER — OUTPATIENT (OUTPATIENT)
Dept: OUTPATIENT SERVICES | Facility: HOSPITAL | Age: 50
LOS: 1 days | End: 2020-02-04
Payer: SELF-PAY

## 2020-02-04 VITALS
HEIGHT: 65 IN | SYSTOLIC BLOOD PRESSURE: 168 MMHG | OXYGEN SATURATION: 97 % | DIASTOLIC BLOOD PRESSURE: 80 MMHG | WEIGHT: 134 LBS | HEART RATE: 92 BPM | BODY MASS INDEX: 22.33 KG/M2

## 2020-02-04 DIAGNOSIS — I25.10 ATHEROSCLEROTIC HEART DISEASE OF NATIVE CORONARY ARTERY WITHOUT ANGINA PECTORIS: ICD-10-CM

## 2020-02-04 DIAGNOSIS — Z95.5 PRESENCE OF CORONARY ANGIOPLASTY IMPLANT AND GRAFT: Chronic | ICD-10-CM

## 2020-02-04 PROBLEM — I10 ESSENTIAL (PRIMARY) HYPERTENSION: Chronic | Status: ACTIVE | Noted: 2020-01-15

## 2020-02-04 PROBLEM — I21.9 ACUTE MYOCARDIAL INFARCTION, UNSPECIFIED: Chronic | Status: ACTIVE | Noted: 2020-01-15

## 2020-02-04 PROCEDURE — G0463: CPT

## 2020-02-05 NOTE — PHYSICAL EXAM
[Normal Appearance] : normal appearance [General Appearance - Well Developed] : well developed [General Appearance - Well Nourished] : well nourished [Normal Conjunctiva] : the conjunctiva exhibited no abnormalities [Normal Oral Mucosa] : normal oral mucosa [Normal Jugular Venous V Waves Present] : normal jugular venous V waves present [Normal Jugular Venous A Waves Present] : normal jugular venous A waves present [Auscultation Breath Sounds / Voice Sounds] : lungs were clear to auscultation bilaterally [Respiration, Rhythm And Depth] : normal respiratory rhythm and effort [Heart Rate And Rhythm] : heart rate and rhythm were normal [Heart Sounds] : normal S1 and S2 [Arterial Pulses Normal] : the arterial pulses were normal [Murmurs] : no murmurs present [Bowel Sounds] : normal bowel sounds [Abdomen Soft] : soft [Abnormal Walk] : normal gait [Abdomen Tenderness] : non-tender [Cyanosis, Localized] : no localized cyanosis [Nail Clubbing] : no clubbing of the fingernails [Skin Color & Pigmentation] : normal skin color and pigmentation [Skin Turgor] : normal skin turgor [] : no rash [Oriented To Time, Place, And Person] : oriented to person, place, and time [Impaired Insight] : insight and judgment were intact [Affect] : the affect was normal

## 2020-02-14 DIAGNOSIS — I10 ESSENTIAL (PRIMARY) HYPERTENSION: ICD-10-CM

## 2020-02-14 DIAGNOSIS — E78.5 HYPERLIPIDEMIA, UNSPECIFIED: ICD-10-CM

## 2020-02-14 DIAGNOSIS — I21.09 ST ELEVATION (STEMI) MYOCARDIAL INFARCTION INVOLVING OTHER CORONARY ARTERY OF ANTERIOR WALL: ICD-10-CM

## 2020-02-20 ENCOUNTER — RX RENEWAL (OUTPATIENT)
Age: 50
End: 2020-02-20

## 2020-02-28 ENCOUNTER — APPOINTMENT (OUTPATIENT)
Dept: OPHTHALMOLOGY | Facility: CLINIC | Age: 50
End: 2020-02-28

## 2020-03-17 ENCOUNTER — APPOINTMENT (OUTPATIENT)
Dept: INTERNAL MEDICINE | Facility: CLINIC | Age: 50
End: 2020-03-17

## 2020-03-24 NOTE — DISCUSSION/SUMMARY
[FreeTextEntry1] : Mr. Crocker is a 50 yo Welsh man with a PMHx of poorly controlled DM2 (A1c 10.8 11/2019), recent NSTEMI 11/16 s/p LHC w/ KHARI to 60% pCX and 100% OM2, here for follow up.\par \par #CAD/Recent NSTEMI:\par -TTE w/ normal LV function\par -Continue asa 81 daily, Brilinta 90 BID for at least 1 year post intervention\par -Continue atorvastatin 80 daily\par -Continue Toprol XL 50 daily\par -Continue lisinopril 5 mg daily\par -Counseled on aggressive risk factor modification including healthy diet, exercise and diabetes control\par \par #HTN: Well controlled today\par -Continue Toprol XL 50 daily\par -Continue Lisinopril 5 mg daily\par \par #HLD: LDL at goal\par -Continue Lipitor 80\par \par -RTC in 3 months\par

## 2020-03-24 NOTE — REVIEW OF SYSTEMS
[Negative] : Heme/Lymph [Abdominal Pain] : no abdominal pain [Nausea] : no nausea [Vomiting] : no vomiting

## 2020-03-24 NOTE — HISTORY OF PRESENT ILLNESS
[FreeTextEntry1] : Mr. Crocker is a 50 yo [place] man with a PMHx of poorly controlled DM2 (A1c 10.8 11/2019), recent NSTEMI 11/16/19 s/p LHC w/ KHARI to 60% pCX and 100% OM2, here for follow up. Patient was sent to the ED for chest pain at his last internal medicine visit on 1/15/20. Trops negative x2. Sent home w/o ischemic eval . Today, pt denies any chest pain, SOB, palpitations, TORO, PND, LE edema. Since his MI, patient has been exercising more and has been eating healthier foods including vegetables, lean meats and avoiding carbohydrates and sweets. He has been compliant with his medications including aspirin 81, Brilinta 90 BID, atorvastatin 80, Toprol 50, lisinopril 5, metformin 850 TID and Humalog. He follows with IM for DM management\par \par A1c: 10.8\par Cholesterol: 159, TG 88, HDL 51, LDL 90\par \par LHC 11/17/19:\par VENTRICLES: EF estimated was 55 %.\par CORONARY VESSELS: The coronary circulation is right dominant.\par LM:   --  LM: The vessel was medium sized. Angiography showed mild\par atherosclerosis with no flow limiting lesions.\par LAD:   --  Proximal LAD: The vessel was medium sized. Angiography showed\par mild atherosclerosis with no flow limiting lesions.\par CX:   --  Proximal circumflex: There was a 60 % stenosis.\par --  OM1: The vessel was small sized. Angiography showed mild\par atherosclerosis with no flow limiting lesions.\par --  OM2: There was a 100 % stenosis.\par RCA:   --  Proximal RCA: The vessel was large sized. Angiography showed\par mild atherosclerosis with no flow limiting lesions.\par --  RPDA: The vessel was medium sized. Angiography showed mild\par atherosclerosis with no flow limiting lesions.\par COMPLICATIONS: There were no complications.\par DIAGNOSTIC RECOMMENDATIONS: Coronary Angiography Summary:\par -single vessel coronary disease.\par -OM2 100% (culprit lesions).\par -proximal circumflex (60%).\par Coronary Interventional Summary:\par -successful KHARI to the OM2.\par -successful KHARI to the proximal circumflex\par \par TTE 11/17/19:\par Dimensions:    Normal Values:\par LA:     3.2    2.0 - 4.0 cm\par Ao:     3.1    2.0 - 3.8 cm\par SEPTUM: 1.1    0.6 - 1.2 cm\par PWT:    1.1    0.6 - 1.1 cm\par LVIDd:  4.6    3.0 - 5.6 cm\par LVIDs:  3.3    1.8 - 4.0 cm\par Derived variables:\par LVMI: 112 g/m2\par RWT: 0.47\par Fractional short: 28 %\par EF (Teicholtz): 55 %\par Doppler Peak Velocity (m/sec): MV=0.8 AoV=1.3\par ------------------------------------------------------------------------\par Observations:\par Mitral Valve: Normal mitral valve. Mild mitral\par regurgitation.  Peak mitral valve gradient equals 3 mm Hg,\par mean transmitral valve gradient equals 1 mm Hg.\par Aortic Valve/Aorta: Normal trileaflet aortic valve. Peak\par transaortic valve gradient equals 7 mm Hg. Peak left\par ventricular outflow tract gradient equals 4 mm Hg, mean\par gradient is equal to 2 mm Hg, LVOT velocity time integral\par equals 17 cm.\par Aortic Root: 3.1 cm.\par Left Atrium: LA volume index = 11 cc/m2.\par Left Ventricle: Mild inferior wall hypokinesis. Mild\par concentric left ventricular hypertrophy.\par Right Heart: Normal right atrium. Normal right ventricular\par size and function. Normal tricuspid valve. Mild tricuspid\par regurgitation. Normal pulmonic valve. Minimal pulmonic\par regurgitation.\par Pericardium/Pleura: Normal pericardium with no pericardial\par effusion.\par Hemodynamic: Estimated right atrial pressure is 8 mm Hg.\par Estimated right ventricular systolic pressure equals 35 mm\par Hg, assuming right atrial pressure equals 8 mm Hg,\par consistent with borderline pulmonary hypertension.\par ------------------------------------------------------------------------\par Conclusions:\par 1. Mild concentric left ventricular hypertrophy.\par 2. Mild inferior wall hypokinesis.\par

## 2020-04-28 RX ORDER — PEN NEEDLE, DIABETIC 29 G X1/2"
32G X 4 MM NEEDLE, DISPOSABLE MISCELLANEOUS
Qty: 90 | Refills: 3 | Status: DISCONTINUED | COMMUNITY
Start: 2017-07-21 | End: 2020-04-28

## 2020-05-22 ENCOUNTER — RX RENEWAL (OUTPATIENT)
Age: 50
End: 2020-05-22

## 2020-06-10 NOTE — ED CDU PROVIDER INITIAL DAY NOTE - PMH
CAD (coronary artery disease)    Diabetes    HTN (hypertension)    MI (myocardial infarction) Localized Dermabrasion With Wire Brush Text: The patient was draped in routine manner.  Localized dermabrasion using 3 x 17 mm wire brush was performed in routine manner to papillary dermis. This spot dermabrasion is being performed to complete skin cancer reconstruction. It also will eliminate the other sun damaged precancerous cells that are known to be part of the regional effect of a lifetime's worth of sun exposure. This localized dermabrasion is therapeutic and should not be considered cosmetic in any regard. Localized Dermabrasion Text: The patient was draped in routine manner.  Localized dermabrasion using 3 x 17 mm wire brush was performed in routine manner to papillary dermis. This spot dermabrasion is being performed to complete skin cancer reconstruction. It also will eliminate the other sun damaged precancerous cells that are known to be part of the regional effect of a lifetime's worth of sun exposure. This localized dermabrasion is therapeutic and should not be considered cosmetic in any regard.

## 2020-07-09 NOTE — ED ADULT NURSE NOTE - RESPIRATORY ASSESSMENT
Continue Regimen: Continue applying soolantra to affected areas QHS. \\n\\ncontinue applying rhofade to affected areas QAM. \\n\\nContinue taking doxycycline 100 mg once daily with food.
Initiate Treatment: Begin washing affected areas daily in the shower with plexion wash.
Detail Level: Zone
WDL

## 2020-07-21 ENCOUNTER — APPOINTMENT (OUTPATIENT)
Dept: CARDIOLOGY | Facility: HOSPITAL | Age: 50
End: 2020-07-21

## 2020-07-21 ENCOUNTER — OUTPATIENT (OUTPATIENT)
Dept: OUTPATIENT SERVICES | Facility: HOSPITAL | Age: 50
LOS: 1 days | End: 2020-07-21
Payer: SELF-PAY

## 2020-07-21 ENCOUNTER — NON-APPOINTMENT (OUTPATIENT)
Age: 50
End: 2020-07-21

## 2020-07-21 VITALS
DIASTOLIC BLOOD PRESSURE: 72 MMHG | HEIGHT: 65 IN | HEART RATE: 65 BPM | SYSTOLIC BLOOD PRESSURE: 112 MMHG | OXYGEN SATURATION: 97 %

## 2020-07-21 DIAGNOSIS — Z95.5 PRESENCE OF CORONARY ANGIOPLASTY IMPLANT AND GRAFT: Chronic | ICD-10-CM

## 2020-07-21 DIAGNOSIS — I25.10 ATHEROSCLEROTIC HEART DISEASE OF NATIVE CORONARY ARTERY WITHOUT ANGINA PECTORIS: ICD-10-CM

## 2020-07-21 PROCEDURE — 93005 ELECTROCARDIOGRAM TRACING: CPT

## 2020-07-21 PROCEDURE — G0463: CPT

## 2020-07-21 NOTE — PHYSICAL EXAM
[General Appearance - Well Developed] : well developed [Normal Appearance] : normal appearance [Normal Oral Mucosa] : normal oral mucosa [Normal Conjunctiva] : the conjunctiva exhibited no abnormalities [General Appearance - Well Nourished] : well nourished [Normal Jugular Venous A Waves Present] : normal jugular venous A waves present [Normal Jugular Venous V Waves Present] : normal jugular venous V waves present [Respiration, Rhythm And Depth] : normal respiratory rhythm and effort [Heart Sounds] : normal S1 and S2 [Heart Rate And Rhythm] : heart rate and rhythm were normal [Auscultation Breath Sounds / Voice Sounds] : lungs were clear to auscultation bilaterally [Murmurs] : no murmurs present [Arterial Pulses Normal] : the arterial pulses were normal [Bowel Sounds] : normal bowel sounds [Abdomen Soft] : soft [Abdomen Tenderness] : non-tender [Nail Clubbing] : no clubbing of the fingernails [Abnormal Walk] : normal gait [Skin Turgor] : normal skin turgor [Cyanosis, Localized] : no localized cyanosis [Skin Color & Pigmentation] : normal skin color and pigmentation [] : no rash [Oriented To Time, Place, And Person] : oriented to person, place, and time [Affect] : the affect was normal [Impaired Insight] : insight and judgment were intact

## 2020-07-22 ENCOUNTER — NON-APPOINTMENT (OUTPATIENT)
Age: 50
End: 2020-07-22

## 2020-07-22 DIAGNOSIS — I10 ESSENTIAL (PRIMARY) HYPERTENSION: ICD-10-CM

## 2020-07-22 DIAGNOSIS — I20.0 UNSTABLE ANGINA: ICD-10-CM

## 2020-07-22 DIAGNOSIS — E78.5 HYPERLIPIDEMIA, UNSPECIFIED: ICD-10-CM

## 2020-07-24 NOTE — REVIEW OF SYSTEMS
[Negative] : Endocrine [Chest Pain] : chest pain [Dyspnea on exertion] : dyspnea during exertion [Fever] : no fever [Headache] : no headache [Chills] : no chills [Lower Ext Edema] : no extremity edema [Feeling Fatigued] : not feeling fatigued [Palpitations] : no palpitations [Wheezing] : no wheezing [Cough] : no cough [Abdominal Pain] : no abdominal pain [Nausea] : no nausea [Vomiting] : no vomiting

## 2020-07-24 NOTE — HISTORY OF PRESENT ILLNESS
[FreeTextEntry1] : Mr. Crocker is a 49 yo Urdu man with a PMHx of poorly controlled DM2 (A1c 10.8 11/2019), HTN, HLD, NSTEMI 11/16/19 s/p LHC w/ KHARI to 60% pCX and 100% OM2, here for follow up. Today, pt states he has been having intermittent chest pain for the past 12 days. Initially came on while he was at rest. Described as sharp, 4/10, left sided w/o radiation, lasted for several hours w/o associated SOB. Since then he has noticed the pain more with exertion- says he is unable to walk more than 10 feet without stopping due to the pain. He has been compliant with all of his medication including DAPT, atorva, bb, ACEi. Denies any orthopnea, PND, LE edema, palpitations. He is currently chest pain free. No acute EKG changes.\par \par Pertinent labs:\par A1c: 10.8\par Cholesterol: 159, TG 88, HDL 51, LDL 90\par \par [facility] 11/17/19:\par VENTRICLES: EF estimated was 55 %.\par CORONARY VESSELS: The coronary circulation is right dominant.\par LM:   --  LM: The vessel was medium sized. Angiography showed mild\par atherosclerosis with no flow limiting lesions.\par LAD:   --  Proximal LAD: The vessel was medium sized. Angiography showed\par mild atherosclerosis with no flow limiting lesions.\par CX:   --  Proximal circumflex: There was a 60 % stenosis.\par --  OM1: The vessel was small sized. Angiography showed mild\par atherosclerosis with no flow limiting lesions.\par --  OM2: There was a 100 % stenosis.\par RCA:   --  Proximal RCA: The vessel was large sized. Angiography showed\par mild atherosclerosis with no flow limiting lesions.\par --  RPDA: The vessel was medium sized. Angiography showed mild\par atherosclerosis with no flow limiting lesions.\par COMPLICATIONS: There were no complications.\par DIAGNOSTIC RECOMMENDATIONS: Coronary Angiography Summary:\par -single vessel coronary disease.\par -OM2 100% (culprit lesions).\par -proximal circumflex (60%).\par Coronary Interventional Summary:\par -successful KHARI to the OM2.\par -successful KHARI to the proximal circumflex\par \par TTE 11/17/19:\par Dimensions:    Normal Values:\par LA:     3.2    2.0 - 4.0 cm\par Ao:     3.1    2.0 - 3.8 cm\par SEPTUM: 1.1    0.6 - 1.2 cm\par PWT:    1.1    0.6 - 1.1 cm\par LVIDd:  4.6    3.0 - 5.6 cm\par LVIDs:  3.3    1.8 - 4.0 cm\par Derived variables:\par LVMI: 112 g/m2\par RWT: 0.47\par Fractional short: 28 %\par EF (Teicholtz): 55 %\par Doppler Peak Velocity (m/sec): MV=0.8 AoV=1.3\par ------------------------------------------------------------------------\par Observations:\par Mitral Valve: Normal mitral valve. Mild mitral\par regurgitation.  Peak mitral valve gradient equals 3 mm Hg,\par mean transmitral valve gradient equals 1 mm Hg.\par Aortic Valve/Aorta: Normal trileaflet aortic valve. Peak\par transaortic valve gradient equals 7 mm Hg. Peak left\par ventricular outflow tract gradient equals 4 mm Hg, mean\par gradient is equal to 2 mm Hg, LVOT velocity time integral\par equals 17 cm.\par Aortic Root: 3.1 cm.\par Left Atrium: LA volume index = 11 cc/m2.\par Left Ventricle: Mild inferior wall hypokinesis. Mild\par concentric left ventricular hypertrophy.\par Right Heart: Normal right atrium. Normal right ventricular\par size and function. Normal tricuspid valve. Mild tricuspid\par regurgitation. Normal pulmonic valve. Minimal pulmonic\par regurgitation.\par Pericardium/Pleura: Normal pericardium with no pericardial\par effusion.\par Hemodynamic: Estimated right atrial pressure is 8 mm Hg.\par Estimated right ventricular systolic pressure equals 35 mm\par Hg, assuming right atrial pressure equals 8 mm Hg,\par consistent with borderline pulmonary hypertension.\par ------------------------------------------------------------------------\par Conclusions:\par 1. Mild concentric left ventricular hypertrophy.\par 2. Mild inferior wall hypokinesis.\par \par \par

## 2020-07-24 NOTE — DISCUSSION/SUMMARY
[FreeTextEntry1] : Mr. Crocker is a 50 yo [place] man with a PMHx of poorly controlled DM2, NSTEMI 11/16 s/p LHC w/ KHARI to 60% pCX and 100% OM2, here for follow up.\par \par #CAD/NSTEMI: Now with accelerated angina over the past 12 days. Currently chest pain free with no acute EKG changes.\par -Plan for LHC given history and sx\par -Continue asa 81 daily, plavix 75 mg daily\par -Continue atorvastatin 80 daily\par -Continue Toprol XL 50 daily\par -Continue lisinopril 5 mg daily\par \par #HTN: Well controlled today\par -Continue Toprol XL 50 daily\par -Continue Lisinopril 5 mg daily\par \par #HLD: LDL at goal\par -Continue Lipitor 80\par \par RTC ~ 2-3 weeks, after LHC. Advised to go to the hospital sooner if symptoms acutely worsen.\par \par \par

## 2020-08-10 ENCOUNTER — APPOINTMENT (OUTPATIENT)
Dept: DISASTER EMERGENCY | Facility: CLINIC | Age: 50
End: 2020-08-10

## 2020-08-14 ENCOUNTER — OUTPATIENT (OUTPATIENT)
Dept: OUTPATIENT SERVICES | Facility: HOSPITAL | Age: 50
LOS: 1 days | End: 2020-08-14
Payer: SELF-PAY

## 2020-08-14 DIAGNOSIS — Z11.59 ENCOUNTER FOR SCREENING FOR OTHER VIRAL DISEASES: ICD-10-CM

## 2020-08-14 DIAGNOSIS — Z95.5 PRESENCE OF CORONARY ANGIOPLASTY IMPLANT AND GRAFT: Chronic | ICD-10-CM

## 2020-08-14 LAB — SARS-COV-2 RNA SPEC QL NAA+PROBE: SIGNIFICANT CHANGE UP

## 2020-08-14 PROCEDURE — U0003: CPT

## 2020-08-17 ENCOUNTER — APPOINTMENT (OUTPATIENT)
Dept: DISASTER EMERGENCY | Facility: CLINIC | Age: 50
End: 2020-08-17

## 2020-08-17 ENCOUNTER — INPATIENT (INPATIENT)
Facility: HOSPITAL | Age: 50
LOS: 7 days | Discharge: HOME | DRG: 234 | End: 2020-08-25
Attending: THORACIC SURGERY (CARDIOTHORACIC VASCULAR SURGERY) | Admitting: INTERNAL MEDICINE
Payer: MEDICAID

## 2020-08-17 ENCOUNTER — TRANSCRIPTION ENCOUNTER (OUTPATIENT)
Age: 50
End: 2020-08-17

## 2020-08-17 VITALS
RESPIRATION RATE: 18 BRPM | HEART RATE: 68 BPM | OXYGEN SATURATION: 98 % | TEMPERATURE: 98 F | HEIGHT: 65 IN | WEIGHT: 138.01 LBS | DIASTOLIC BLOOD PRESSURE: 65 MMHG | SYSTOLIC BLOOD PRESSURE: 147 MMHG

## 2020-08-17 DIAGNOSIS — Z95.5 PRESENCE OF CORONARY ANGIOPLASTY IMPLANT AND GRAFT: Chronic | ICD-10-CM

## 2020-08-17 DIAGNOSIS — E11.9 TYPE 2 DIABETES MELLITUS WITHOUT COMPLICATIONS: ICD-10-CM

## 2020-08-17 DIAGNOSIS — I20.0 UNSTABLE ANGINA: ICD-10-CM

## 2020-08-17 DIAGNOSIS — I25.10 ATHEROSCLEROTIC HEART DISEASE OF NATIVE CORONARY ARTERY WITHOUT ANGINA PECTORIS: ICD-10-CM

## 2020-08-17 LAB
ALBUMIN SERPL ELPH-MCNC: 4.8 G/DL — SIGNIFICANT CHANGE UP (ref 3.3–5)
ALBUMIN SERPL ELPH-MCNC: 5 G/DL — SIGNIFICANT CHANGE UP (ref 3.3–5)
ALP SERPL-CCNC: 64 U/L — SIGNIFICANT CHANGE UP (ref 40–120)
ALP SERPL-CCNC: 68 U/L — SIGNIFICANT CHANGE UP (ref 40–120)
ALT FLD-CCNC: 29 U/L — SIGNIFICANT CHANGE UP (ref 10–45)
ALT FLD-CCNC: 32 U/L — SIGNIFICANT CHANGE UP (ref 10–45)
ANION GAP SERPL CALC-SCNC: 11 MMOL/L — SIGNIFICANT CHANGE UP (ref 5–17)
ANION GAP SERPL CALC-SCNC: 14 MMOL/L — SIGNIFICANT CHANGE UP (ref 5–17)
APPEARANCE UR: CLEAR — SIGNIFICANT CHANGE UP
APTT BLD: 32.3 SEC — SIGNIFICANT CHANGE UP (ref 27.5–35.5)
AST SERPL-CCNC: 27 U/L — SIGNIFICANT CHANGE UP (ref 10–40)
AST SERPL-CCNC: 29 U/L — SIGNIFICANT CHANGE UP (ref 10–40)
BILIRUB DIRECT SERPL-MCNC: 0.2 MG/DL — SIGNIFICANT CHANGE UP (ref 0–0.2)
BILIRUB INDIRECT FLD-MCNC: 1 MG/DL — SIGNIFICANT CHANGE UP (ref 0.2–1)
BILIRUB SERPL-MCNC: 1 MG/DL — SIGNIFICANT CHANGE UP (ref 0.2–1.2)
BILIRUB SERPL-MCNC: 1.2 MG/DL — SIGNIFICANT CHANGE UP (ref 0.2–1.2)
BILIRUB UR-MCNC: NEGATIVE — SIGNIFICANT CHANGE UP
BLD GP AB SCN SERPL QL: NEGATIVE — SIGNIFICANT CHANGE UP
BUN SERPL-MCNC: 13 MG/DL — SIGNIFICANT CHANGE UP (ref 7–23)
BUN SERPL-MCNC: 14 MG/DL — SIGNIFICANT CHANGE UP (ref 7–23)
CALCIUM SERPL-MCNC: 10 MG/DL — SIGNIFICANT CHANGE UP (ref 8.4–10.5)
CALCIUM SERPL-MCNC: 9.9 MG/DL — SIGNIFICANT CHANGE UP (ref 8.4–10.5)
CHLORIDE SERPL-SCNC: 100 MMOL/L — SIGNIFICANT CHANGE UP (ref 96–108)
CHLORIDE SERPL-SCNC: 103 MMOL/L — SIGNIFICANT CHANGE UP (ref 96–108)
CO2 SERPL-SCNC: 26 MMOL/L — SIGNIFICANT CHANGE UP (ref 22–31)
CO2 SERPL-SCNC: 27 MMOL/L — SIGNIFICANT CHANGE UP (ref 22–31)
COLOR SPEC: SIGNIFICANT CHANGE UP
CREAT SERPL-MCNC: 0.67 MG/DL — SIGNIFICANT CHANGE UP (ref 0.5–1.3)
CREAT SERPL-MCNC: 0.68 MG/DL — SIGNIFICANT CHANGE UP (ref 0.5–1.3)
DIFF PNL FLD: NEGATIVE — SIGNIFICANT CHANGE UP
FIBRINOGEN PPP-MCNC: 378 MG/DL — SIGNIFICANT CHANGE UP (ref 350–510)
GLUCOSE SERPL-MCNC: 124 MG/DL — HIGH (ref 70–99)
GLUCOSE SERPL-MCNC: 139 MG/DL — HIGH (ref 70–99)
GLUCOSE UR QL: NEGATIVE — SIGNIFICANT CHANGE UP
HCT VFR BLD CALC: 39.1 % — SIGNIFICANT CHANGE UP (ref 39–50)
HCT VFR BLD CALC: 41.1 % — SIGNIFICANT CHANGE UP (ref 39–50)
HGB BLD-MCNC: 13 G/DL — SIGNIFICANT CHANGE UP (ref 13–17)
HGB BLD-MCNC: 13.9 G/DL — SIGNIFICANT CHANGE UP (ref 13–17)
INR BLD: 1.04 RATIO — SIGNIFICANT CHANGE UP (ref 0.88–1.16)
KETONES UR-MCNC: NEGATIVE — SIGNIFICANT CHANGE UP
LEUKOCYTE ESTERASE UR-ACNC: NEGATIVE — SIGNIFICANT CHANGE UP
MCHC RBC-ENTMCNC: 30.7 PG — SIGNIFICANT CHANGE UP (ref 27–34)
MCHC RBC-ENTMCNC: 31.2 PG — SIGNIFICANT CHANGE UP (ref 27–34)
MCHC RBC-ENTMCNC: 33.2 GM/DL — SIGNIFICANT CHANGE UP (ref 32–36)
MCHC RBC-ENTMCNC: 33.8 GM/DL — SIGNIFICANT CHANGE UP (ref 32–36)
MCV RBC AUTO: 92.4 FL — SIGNIFICANT CHANGE UP (ref 80–100)
MCV RBC AUTO: 92.4 FL — SIGNIFICANT CHANGE UP (ref 80–100)
MRSA PCR RESULT.: SIGNIFICANT CHANGE UP
NITRITE UR-MCNC: NEGATIVE — SIGNIFICANT CHANGE UP
NRBC # BLD: 0 /100 WBCS — SIGNIFICANT CHANGE UP (ref 0–0)
NRBC # BLD: 0 /100 WBCS — SIGNIFICANT CHANGE UP (ref 0–0)
PA ADP PRP-ACNC: 206 PRU — SIGNIFICANT CHANGE UP (ref 194–417)
PH UR: 8 — SIGNIFICANT CHANGE UP (ref 5–8)
PLATELET # BLD AUTO: 227 K/UL — SIGNIFICANT CHANGE UP (ref 150–400)
PLATELET # BLD AUTO: 231 K/UL — SIGNIFICANT CHANGE UP (ref 150–400)
POTASSIUM SERPL-MCNC: 4.4 MMOL/L — SIGNIFICANT CHANGE UP (ref 3.5–5.3)
POTASSIUM SERPL-MCNC: 4.9 MMOL/L — SIGNIFICANT CHANGE UP (ref 3.5–5.3)
POTASSIUM SERPL-SCNC: 4.4 MMOL/L — SIGNIFICANT CHANGE UP (ref 3.5–5.3)
POTASSIUM SERPL-SCNC: 4.9 MMOL/L — SIGNIFICANT CHANGE UP (ref 3.5–5.3)
PROT SERPL-MCNC: 7.5 G/DL — SIGNIFICANT CHANGE UP (ref 6–8.3)
PROT SERPL-MCNC: 7.5 G/DL — SIGNIFICANT CHANGE UP (ref 6–8.3)
PROT UR-MCNC: NEGATIVE — SIGNIFICANT CHANGE UP
PROTHROM AB SERPL-ACNC: 12.3 SEC — SIGNIFICANT CHANGE UP (ref 10.6–13.6)
RBC # BLD: 4.23 M/UL — SIGNIFICANT CHANGE UP (ref 4.2–5.8)
RBC # BLD: 4.45 M/UL — SIGNIFICANT CHANGE UP (ref 4.2–5.8)
RBC # FLD: 13.5 % — SIGNIFICANT CHANGE UP (ref 10.3–14.5)
RBC # FLD: 13.5 % — SIGNIFICANT CHANGE UP (ref 10.3–14.5)
RH IG SCN BLD-IMP: POSITIVE — SIGNIFICANT CHANGE UP
RH IG SCN BLD-IMP: POSITIVE — SIGNIFICANT CHANGE UP
S AUREUS DNA NOSE QL NAA+PROBE: DETECTED
SARS-COV-2 RNA SPEC QL NAA+PROBE: SIGNIFICANT CHANGE UP
SODIUM SERPL-SCNC: 140 MMOL/L — SIGNIFICANT CHANGE UP (ref 135–145)
SODIUM SERPL-SCNC: 141 MMOL/L — SIGNIFICANT CHANGE UP (ref 135–145)
SP GR SPEC: 1.02 — SIGNIFICANT CHANGE UP (ref 1.01–1.02)
T3 SERPL-MCNC: 102 NG/DL — SIGNIFICANT CHANGE UP (ref 80–200)
T4 AB SER-ACNC: 7.9 UG/DL — SIGNIFICANT CHANGE UP (ref 4.6–12)
TSH SERPL-MCNC: 0.67 UIU/ML — SIGNIFICANT CHANGE UP (ref 0.27–4.2)
UROBILINOGEN FLD QL: NEGATIVE — SIGNIFICANT CHANGE UP
WBC # BLD: 11.33 K/UL — HIGH (ref 3.8–10.5)
WBC # BLD: 9.93 K/UL — SIGNIFICANT CHANGE UP (ref 3.8–10.5)
WBC # FLD AUTO: 11.33 K/UL — HIGH (ref 3.8–10.5)
WBC # FLD AUTO: 9.93 K/UL — SIGNIFICANT CHANGE UP (ref 3.8–10.5)

## 2020-08-17 PROCEDURE — 93010 ELECTROCARDIOGRAM REPORT: CPT

## 2020-08-17 PROCEDURE — 93880 EXTRACRANIAL BILAT STUDY: CPT | Mod: 26

## 2020-08-17 PROCEDURE — 93458 L HRT ARTERY/VENTRICLE ANGIO: CPT | Mod: 26

## 2020-08-17 PROCEDURE — 93306 TTE W/DOPPLER COMPLETE: CPT | Mod: 26

## 2020-08-17 PROCEDURE — 99152 MOD SED SAME PHYS/QHP 5/>YRS: CPT

## 2020-08-17 PROCEDURE — 99222 1ST HOSP IP/OBS MODERATE 55: CPT | Mod: 57

## 2020-08-17 RX ORDER — CHLORHEXIDINE GLUCONATE 213 G/1000ML
30 SOLUTION TOPICAL ONCE
Refills: 0 | Status: COMPLETED | OUTPATIENT
Start: 2020-08-17 | End: 2020-08-18

## 2020-08-17 RX ORDER — GLUCAGON INJECTION, SOLUTION 0.5 MG/.1ML
1 INJECTION, SOLUTION SUBCUTANEOUS ONCE
Refills: 0 | Status: DISCONTINUED | OUTPATIENT
Start: 2020-08-17 | End: 2020-08-18

## 2020-08-17 RX ORDER — DEXTROSE 50 % IN WATER 50 %
15 SYRINGE (ML) INTRAVENOUS ONCE
Refills: 0 | Status: DISCONTINUED | OUTPATIENT
Start: 2020-08-17 | End: 2020-08-18

## 2020-08-17 RX ORDER — DEXTROSE 50 % IN WATER 50 %
12.5 SYRINGE (ML) INTRAVENOUS ONCE
Refills: 0 | Status: DISCONTINUED | OUTPATIENT
Start: 2020-08-17 | End: 2020-08-18

## 2020-08-17 RX ORDER — DEXTROSE 50 % IN WATER 50 %
25 SYRINGE (ML) INTRAVENOUS ONCE
Refills: 0 | Status: DISCONTINUED | OUTPATIENT
Start: 2020-08-17 | End: 2020-08-18

## 2020-08-17 RX ORDER — LISINOPRIL 2.5 MG/1
5 TABLET ORAL DAILY
Refills: 0 | Status: DISCONTINUED | OUTPATIENT
Start: 2020-08-17 | End: 2020-08-17

## 2020-08-17 RX ORDER — CHLORHEXIDINE GLUCONATE 213 G/1000ML
1 SOLUTION TOPICAL ONCE
Refills: 0 | Status: COMPLETED | OUTPATIENT
Start: 2020-08-18 | End: 2020-08-18

## 2020-08-17 RX ORDER — ATORVASTATIN CALCIUM 80 MG/1
80 TABLET, FILM COATED ORAL AT BEDTIME
Refills: 0 | Status: DISCONTINUED | OUTPATIENT
Start: 2020-08-17 | End: 2020-08-18

## 2020-08-17 RX ORDER — INSULIN GLARGINE 100 [IU]/ML
6 INJECTION, SOLUTION SUBCUTANEOUS AT BEDTIME
Refills: 0 | Status: DISCONTINUED | OUTPATIENT
Start: 2020-08-17 | End: 2020-08-18

## 2020-08-17 RX ORDER — CHLORHEXIDINE GLUCONATE 213 G/1000ML
1 SOLUTION TOPICAL ONCE
Refills: 0 | Status: COMPLETED | OUTPATIENT
Start: 2020-08-17 | End: 2020-08-17

## 2020-08-17 RX ORDER — INSULIN LISPRO 100/ML
VIAL (ML) SUBCUTANEOUS AT BEDTIME
Refills: 0 | Status: DISCONTINUED | OUTPATIENT
Start: 2020-08-17 | End: 2020-08-18

## 2020-08-17 RX ORDER — INSULIN LISPRO 100/ML
2 VIAL (ML) SUBCUTANEOUS
Refills: 0 | Status: DISCONTINUED | OUTPATIENT
Start: 2020-08-17 | End: 2020-08-18

## 2020-08-17 RX ORDER — CEFUROXIME AXETIL 250 MG
1500 TABLET ORAL ONCE
Refills: 0 | Status: DISCONTINUED | OUTPATIENT
Start: 2020-08-17 | End: 2020-08-18

## 2020-08-17 RX ORDER — METOPROLOL TARTRATE 50 MG
50 TABLET ORAL DAILY
Refills: 0 | Status: DISCONTINUED | OUTPATIENT
Start: 2020-08-17 | End: 2020-08-18

## 2020-08-17 RX ORDER — ASPIRIN/CALCIUM CARB/MAGNESIUM 324 MG
81 TABLET ORAL DAILY
Refills: 0 | Status: DISCONTINUED | OUTPATIENT
Start: 2020-08-17 | End: 2020-08-18

## 2020-08-17 RX ORDER — INSULIN LISPRO 100/ML
VIAL (ML) SUBCUTANEOUS
Refills: 0 | Status: DISCONTINUED | OUTPATIENT
Start: 2020-08-17 | End: 2020-08-18

## 2020-08-17 RX ORDER — SODIUM CHLORIDE 9 MG/ML
1000 INJECTION, SOLUTION INTRAVENOUS
Refills: 0 | Status: DISCONTINUED | OUTPATIENT
Start: 2020-08-17 | End: 2020-08-18

## 2020-08-17 RX ORDER — FAMOTIDINE 10 MG/ML
20 INJECTION INTRAVENOUS
Refills: 0 | Status: DISCONTINUED | OUTPATIENT
Start: 2020-08-17 | End: 2020-08-18

## 2020-08-17 RX ADMIN — Medication 2: at 22:56

## 2020-08-17 RX ADMIN — Medication 2 UNIT(S): at 17:30

## 2020-08-17 RX ADMIN — INSULIN GLARGINE 6 UNIT(S): 100 INJECTION, SOLUTION SUBCUTANEOUS at 22:55

## 2020-08-17 RX ADMIN — ATORVASTATIN CALCIUM 80 MILLIGRAM(S): 80 TABLET, FILM COATED ORAL at 22:56

## 2020-08-17 RX ADMIN — Medication 50 MILLIGRAM(S): at 16:59

## 2020-08-17 RX ADMIN — FAMOTIDINE 20 MILLIGRAM(S): 10 INJECTION INTRAVENOUS at 16:59

## 2020-08-17 RX ADMIN — CHLORHEXIDINE GLUCONATE 1 APPLICATION(S): 213 SOLUTION TOPICAL at 18:20

## 2020-08-17 NOTE — H&P CARDIOLOGY - HISTORY OF PRESENT ILLNESS
50 M bilingual Khmer/english speaking, with PMHx DM2, HTN, HLD, NSTEMI 11/16/19, CAD-KHARI to 60% pCX and 100% OM2, presents with chest pain. Pt reports intermittent chest pain for the past 12 days. Initially came on while he was at rest. Described as sharp, 4/10, left sided w/o radiation, lasted for several hours w/o associated SOB. Since then he has noticed the pain more with exertion- says he is unable to walk more than 10 feet without stopping due to the pain. He has been compliant with all of his medication including DAPT, atorva, bb, ACEi. Denies any orthopnea, PND, LE edema, palpitations. Pt was referred for Cardiac Cath. 50 M bilingual Hungarian/English speaking, with PMHx DM2, HTN, HLD, NSTEMI 11/16/19, CAD-KHARI to 60% pCX and 100% OM2, presents with chest pain. Pt reports intermittent chest pain for the past 12 days. Initially came on while he was at rest. Described as sharp, 4/10, left sided w/o radiation, lasted for several hours w/o associated SOB. Since then he has noticed the pain more with exertion- says he is unable to walk more than 10 feet without stopping due to the pain. He has been compliant with all of his medication including DAPT, atorva, bb, ACEi. Denies any orthopnea, PND, LE edema, palpitations. Pt was referred for Cardiac Cath. 50 M bilingual Turkish/English speaking, with PMHx DM2 ([facility], Hgba1c unknown, controlled ), HTN, HLD, NSTEMI 11/16/19, CAD-KHARI to 60% pCX and 100% OM2, presents with chest pain. Pt reports intermittent chest pain for the past 12 days. Initially came on while he was at rest. Described as sharp, 4/10, left sided w/o radiation, lasted for several hours w/o associated SOB. Since then he has noticed the pain more with exertion- says he is unable to walk more than 10 feet without stopping due to the pain. He has been compliant with all of his medication including DAPT, atorva, bb, ACEi. Denies any orthopnea, PND, LE edema, palpitations. Pt was referred for Cardiac Cath by DR Blakely from the Cardiology Clinic.

## 2020-08-17 NOTE — CONSULT NOTE ADULT - ATTENDING COMMENTS
Pt seen and examined.  LM> 70%.  Unstable angina.  For CABG  Targets LAD, OM.  STS risk 1-2%.  Risks/benefits d/w pt.  Agree to proceed

## 2020-08-17 NOTE — PROGRESS NOTE ADULT - SUBJECTIVE AND OBJECTIVE BOX
Cardiac Surgery Pre-op Note:    CC: Patient is a 50y old  Male who presents with a chief complaint of Chest Pain/SOB (17 Aug 2020 12:57)    Referring Physician: Dr. Hammer	                                                                                                           Surgeon: Dr. Bright     Procedure: (Date) (Procedure)  CABG x2    Allergies    No Known Allergies    Intolerances      PAST MEDICAL & SURGICAL HISTORY:  MI (myocardial infarction)  CAD (coronary artery disease)  HTN (hypertension)  Diabetes  History of coronary artery stent placement      MEDICATIONS  (STANDING):  aspirin enteric coated 81 milliGRAM(s) Oral daily  atorvastatin 80 milliGRAM(s) Oral at bedtime  cefuroxime  IVPB 1500 milliGRAM(s) IV Intermittent once  chlorhexidine 0.12% Liquid 30 milliLiter(s) Swish and Spit once  chlorhexidine 4% Liquid 1 Application(s) Topical once  dextrose 5%. 1000 milliLiter(s) (50 mL/Hr) IV Continuous <Continuous>  dextrose 50% Injectable 12.5 Gram(s) IV Push once  dextrose 50% Injectable 25 Gram(s) IV Push once  dextrose 50% Injectable 25 Gram(s) IV Push once  famotidine    Tablet 20 milliGRAM(s) Oral two times a day  insulin glargine Injectable (LANTUS) 6 Unit(s) SubCutaneous at bedtime  insulin lispro (HumaLOG) corrective regimen sliding scale   SubCutaneous three times a day before meals  insulin lispro (HumaLOG) corrective regimen sliding scale   SubCutaneous at bedtime  insulin lispro Injectable (HumaLOG) 2 Unit(s) SubCutaneous three times a day before meals  metoprolol succinate ER 50 milliGRAM(s) Oral daily    MEDICATIONS  (PRN):  dextrose 40% Gel 15 Gram(s) Oral once PRN Blood Glucose LESS THAN 70 milliGRAM(s)/deciliter  glucagon  Injectable 1 milliGRAM(s) IntraMuscular once PRN Glucose LESS THAN 70 milligrams/deciliter        Labs:                        13.0   9.93  )-----------( 231      ( 17 Aug 2020 09:10 )             39.1     -    141  |  103  |  13  ----------------------------<  139<H>  4.9   |  27  |  0.68    Ca    9.9      17 Aug 2020 09:10    TPro  7.5  /  Alb  5.0  /  TBili  1.0  /  DBili  x   /  AST  29  /  ALT  29  /  AlkPhos  64  08-17      Blood Type:   HGB A1C:   Prealbumin:   Pro-BNP:   Thyroid Panel:   MRSA:  / MSSA:   Urinalysis Basic - ( 17 Aug 2020 15:16 )    Color: Light Yellow / Appearance: Clear / S.017 / pH: x  Gluc: x / Ketone: Negative  / Bili: Negative / Urobili: Negative   Blood: x / Protein: Negative / Nitrite: Negative   Leuk Esterase: Negative / RBC: x / WBC x   Sq Epi: x / Non Sq Epi: x / Bacteria:       CXR:     EKG:    Carotid Duplex:      PFT's:    Echocardiogram:    Cardiac catheterization:    Vein Mapping:    Gen: WN/WD NAD  Neuro: AAOx3, nonfocal  Pulm: CTA B/L  CV: RRR, S1S2  Abd: Soft, NT, ND +BS  Ext: No edema, + peripheral pulses      Pt has AICD/PPM [ ] Yes  [ ] No             Brand Name:  Pre-op Beta Blocker ordered within 24 hrs of surgery (CABG ONLY)?  [ ] Yes  [ ] No  If not, Why?  Type & Cross  [ ] Yes  [ ] No  NPO after Midnight [ ] Yes  [ ] No  Pre-op ABX ordered, to be taped on chart:  [ ] Yes  [ ] No     Hibiclens/Peridex ordered [ ] Yes  [ ] No  Intraop on Hold: PRBCs, CXR, KEISHA [ ]   Consent obtained  [ ] Yes  [ ] No Cardiac Surgery Pre-op Note:    CC: Patient is a 50y old  Male who presents with a chief complaint of Chest Pain/SOB (17 Aug 2020 12:57)    Referring Physician: Dr. Hammer	                                                                                                           Surgeon: Dr. Bright     Procedure: (Date) (Procedure)  CABG x2    Allergies    No Known Allergies    Intolerances      PAST MEDICAL & SURGICAL HISTORY:  MI (myocardial infarction)  CAD (coronary artery disease)  HTN (hypertension)  Diabetes  History of coronary artery stent placement      MEDICATIONS  (STANDING):  aspirin enteric coated 81 milliGRAM(s) Oral daily  atorvastatin 80 milliGRAM(s) Oral at bedtime  cefuroxime  IVPB 1500 milliGRAM(s) IV Intermittent once  chlorhexidine 0.12% Liquid 30 milliLiter(s) Swish and Spit once  chlorhexidine 4% Liquid 1 Application(s) Topical once  dextrose 5%. 1000 milliLiter(s) (50 mL/Hr) IV Continuous <Continuous>  dextrose 50% Injectable 12.5 Gram(s) IV Push once  dextrose 50% Injectable 25 Gram(s) IV Push once  dextrose 50% Injectable 25 Gram(s) IV Push once  famotidine    Tablet 20 milliGRAM(s) Oral two times a day  insulin glargine Injectable (LANTUS) 6 Unit(s) SubCutaneous at bedtime  insulin lispro (HumaLOG) corrective regimen sliding scale   SubCutaneous three times a day before meals  insulin lispro (HumaLOG) corrective regimen sliding scale   SubCutaneous at bedtime  insulin lispro Injectable (HumaLOG) 2 Unit(s) SubCutaneous three times a day before meals  metoprolol succinate ER 50 milliGRAM(s) Oral daily    MEDICATIONS  (PRN):  dextrose 40% Gel 15 Gram(s) Oral once PRN Blood Glucose LESS THAN 70 milliGRAM(s)/deciliter  glucagon  Injectable 1 milliGRAM(s) IntraMuscular once PRN Glucose LESS THAN 70 milligrams/deciliter        Labs:                        13.0   9.93  )-----------( 231      ( 17 Aug 2020 09:10 )             39.1     -    141  |  103  |  13  ----------------------------<  139<H>  4.9   |  27  |  0.68    Ca    9.9      17 Aug 2020 09:10    TPro  7.5  /  Alb  5.0  /  TBili  1.0  /  DBili  x   /  AST  29  /  ALT  29  /  AlkPhos  64        Blood Type:   HGB A1C:   Prealbumin:   Pro-BNP:   Thyroid Panel:   MRSA:  / MSSA:   Urinalysis Basic - ( 17 Aug 2020 15:16 )    Color: Light Yellow / Appearance: Clear / S.017 / pH: x  Gluc: x / Ketone: Negative  / Bili: Negative / Urobili: Negative   Blood: x / Protein: Negative / Nitrite: Negative   Leuk Esterase: Negative / RBC: x / WBC x   Sq Epi: x / Non Sq Epi: x / Bacteria:       CXR:     EKG:    Carotid Duplex:      PFT's:    Echocardiogram:  < from: Transthoracic Echocardiogram (20 @ 14:17) >  Observations:  Mitral Valve: Normal mitral valve. Minimal mitral  regurgitation.  Aortic Valve/Aorta: Normal aortic valve.  Normal aortic root size.  Left Atrium: Normal left atrium.  Left Ventricle: Normal left ventricular internal dimensions  and wall thicknesses.  Normal left ventricular systolic function. No segmental  wall motion abnormalities.  Normal diastolic function.  Right Heart: Normal right atrium. Normal right ventricular  size and function.  Normal tricuspidvalve. Minimal tricuspid regurgitation.  Normal pulmonic valve.  Pericardium/Pleura: Normal pericardium with no pericardial  effusion.  Hemodynamic: Estimated right atrial pressure is normal.  No evidence of pulmoanry hypertension.  No PFO seen with color Doppler.  ------------------------------------------------------------------------  Conclusions:  Normal echoacardiogram.    < end of copied text >    Cardiac catheterization:  < from: Cardiac Cath Lab - Adult (20 @ 11:00) >  CORONARY VESSELS: The coronary circulation is right dominant.  LM:   --  Proximal left main: Angiography showed severe atherosclerosis.  LAD:   --  LAD: Angiography showed mild moderate diffuse atherosclerosis.  CX:   --  Proximal circumflex: There was a 20 % stenosis at the site of a  prior stent.  --  OM1: There was a 10 % stenosis at the site of a prior stent.  RCA:   --  RCA: Angiography showed minor luminal irregularities with no  flow limiting lesions.  COMPLICATIONS: There were no complications.  DIAGNOSTIC RECOMMENDATIONS: Significant LM disease.  Recommend CTS evaluation.  INTERVENTIONAL RECOMMENDATIONS: Significant LM disease.  Recommend CTS evaluation.  Prepared and signed by  Sandy De Leon D.O.    < end of copied text >    Vein Mapping:    Gen: WN/WD NAD  Neuro: AAOx3, nonfocal  Pulm: CTA B/L  CV: RRR, S1S2  Abd: Soft, NT, ND +BS  Ext: No edema, + peripheral pulses      Pt has AICD/PPM [ ] Yes  [x ] No             Brand Name:  Pre-op Beta Blocker ordered within 24 hrs of surgery (CABG ONLY)?  [x ] Yes  [ ] No  If not, Why?  Type & Cross  [x ] Yes  [ ] No  NPO after Midnight [x ] Yes  [ ] No  Pre-op ABX ordered, to be taped on chart:  [x ] Yes  [ ] No     Hibiclens/Peridex ordered [x] Yes  [ ] No  Intraop on Hold: PRBCs, CXR, KEISHA [x ]   Consent obtained  [x ] Yes  [ ] No Cardiac Surgery Pre-op Note:    CC: Patient is a 50y old  Male who presents with a chief complaint of Chest Pain/SOB (17 Aug 2020 12:57)    Referring Physician: Dr. Hammer	                                                                                                           Surgeon: Dr. Bright     Procedure: (Date) (Procedure)  CABG x2    Allergies    No Known Allergies    Intolerances      PAST MEDICAL & SURGICAL HISTORY:  MI (myocardial infarction)  CAD (coronary artery disease)  HTN (hypertension)  Diabetes  History of coronary artery stent placement      MEDICATIONS  (STANDING):  aspirin enteric coated 81 milliGRAM(s) Oral daily  atorvastatin 80 milliGRAM(s) Oral at bedtime  cefuroxime  IVPB 1500 milliGRAM(s) IV Intermittent once  chlorhexidine 0.12% Liquid 30 milliLiter(s) Swish and Spit once  chlorhexidine 4% Liquid 1 Application(s) Topical once  dextrose 5%. 1000 milliLiter(s) (50 mL/Hr) IV Continuous <Continuous>  dextrose 50% Injectable 12.5 Gram(s) IV Push once  dextrose 50% Injectable 25 Gram(s) IV Push once  dextrose 50% Injectable 25 Gram(s) IV Push once  famotidine    Tablet 20 milliGRAM(s) Oral two times a day  insulin glargine Injectable (LANTUS) 6 Unit(s) SubCutaneous at bedtime  insulin lispro (HumaLOG) corrective regimen sliding scale   SubCutaneous three times a day before meals  insulin lispro (HumaLOG) corrective regimen sliding scale   SubCutaneous at bedtime  insulin lispro Injectable (HumaLOG) 2 Unit(s) SubCutaneous three times a day before meals  metoprolol succinate ER 50 milliGRAM(s) Oral daily    MEDICATIONS  (PRN):  dextrose 40% Gel 15 Gram(s) Oral once PRN Blood Glucose LESS THAN 70 milliGRAM(s)/deciliter  glucagon  Injectable 1 milliGRAM(s) IntraMuscular once PRN Glucose LESS THAN 70 milligrams/deciliter        Labs:                        13.0   9.93  )-----------( 231      ( 17 Aug 2020 09:10 )             39.1     -    141  |  103  |  13  ----------------------------<  139<H>  4.9   |  27  |  0.68    Ca    9.9      17 Aug 2020 09:10    TPro  7.5  /  Alb  5.0  /  TBili  1.0  /  DBili  x   /  AST  29  /  ALT  29  /  AlkPhos  64        Blood Type:   Type + Screen (20 @ 17:10)    ABO Interpretation: A    Rh Interpretation: Positive    Antibody Screen: Negative    HGB A1C:   Prealbumin:   Pro-BNP:   Thyroid Panel:   MRSA:  / MSSA:   Urinalysis Basic - ( 17 Aug 2020 15:16 )  Urinalysis (20 @ 15:16)    pH Urine: 8.0    Blood, Urine: Negative    Glucose Qualitative, Urine: Negative    Color: Light Yellow    Urine Appearance: Clear    Bilirubin: Negative    Ketone - Urine: Negative    Specific Gravity: 1.017    Protein, Urine: Negative    Urobilinogen: Negative    Nitrite: Negative    Leukocyte Esterase Concentration: Negative    Color: Light Yellow / Appearance: Clear / S.017 / pH: x  Gluc: x / Ketone: Negative  / Bili: Negative / Urobili: Negative   Blood: x / Protein: Negative / Nitrite: Negative   Leuk Esterase: Negative / RBC: x / WBC x   Sq Epi: x / Non Sq Epi: x / Bacteria:     CXR:     EKG:  < from: 12 Lead ECG (20 @ 08:32) >  Ventricular Rate 68 BPM    Atrial Rate 68 BPM  P-R Interval 122 ms  QRS Duration 74 ms  Q-T Interval 340 ms  QTC Calculation(Bezet) 361 ms  P Axis 46 degrees  R Axis 62 degrees  T Axis 18 degrees  Diagnosis Line NORMAL SINUS RHYTHM  MINIMAL VOLTAGE CRITERIA FOR LVH, MAY BE NORMAL VARIANT  ST ELEVATION, CONSIDER EARLY REPOLARIZATION, PERICARDITIS, OR INJURY  ABNORMAL ECG  WHEN COMPARED WITH ECG OF 15-ANDREI-2020 16:21,  NO SIGNIFICANT CHANGE WAS FOUND  Confirmed by MD SHERLYN, ELHAM ([id number]) on 2020 6:16:09 PM    < end of copied text >    Carotid Duplex:    < from: VA Duplex Carotid, Bilat (20 @ 15:23) >  FINDINGS:    Mild atheromatous intimal thickening and irregularity is present along the course of the carotid arteries.    No elevated velocities or abnormal waveforms are encountered.    Blood flow velocities are as follows:    RIGHT:  PROX CCA = 98 ;  DIST CCA = 109 ;  PROX ICA = 98 ;  DIST ICA = 106 ;  ECA = 49    LEFT:  PROX CCA = 120 ;  DIST CCA = 115 ;  PROX ICA = 69 ;  DIST ICA = 91 ;  ECA = 72    Antegrade flow is noted within both vertebral arteries.    IMPRESSION: No significant hemodynamic stenosis of either carotid artery.    < end of copied text >    PFT's:    Echocardiogram:  < from: Transthoracic Echocardiogram (20 @ 14:17) >  Observations:  Mitral Valve: Normal mitral valve. Minimal mitral  regurgitation.  Aortic Valve/Aorta: Normal aortic valve.  Normal aortic root size.  Left Atrium: Normal left atrium.  Left Ventricle: Normal left ventricular internal dimensions  and wall thicknesses.  Normal left ventricular systolic function. No segmental  wall motion abnormalities.  Normal diastolic function.  Right Heart: Normal right atrium. Normal right ventricular  size and function.  Normal tricuspidvalve. Minimal tricuspid regurgitation.  Normal pulmonic valve.  Pericardium/Pleura: Normal pericardium with no pericardial  effusion.  Hemodynamic: Estimated right atrial pressure is normal.  No evidence of pulmoanry hypertension.  No PFO seen with color Doppler.  ------------------------------------------------------------------------  Conclusions:  Normal echoacardiogram.    < end of copied text >    Cardiac catheterization:  < from: Cardiac Cath Lab - Adult (20 @ 11:00) >  CORONARY VESSELS: The coronary circulation is right dominant.  LM:   --  Proximal left main: Angiography showed severe atherosclerosis.  LAD:   --  LAD: Angiography showed mild moderate diffuse atherosclerosis.  CX:   --  Proximal circumflex: There was a 20 % stenosis at the site of a  prior stent.  --  OM1: There was a 10 % stenosis at the site of a prior stent.  RCA:   --  RCA: Angiography showed minor luminal irregularities with no  flow limiting lesions.  COMPLICATIONS: There were no complications.  DIAGNOSTIC RECOMMENDATIONS: Significant LM disease.  Recommend CTS evaluation.  INTERVENTIONAL RECOMMENDATIONS: Significant LM disease.  Recommend CTS evaluation.  Prepared and signed by  Sandy De Leon D.O.    < end of copied text >    Vein Mapping:    Gen: WN/WD NAD  Neuro: AAOx3, nonfocal  Pulm: CTA B/L  CV: RRR, S1S2  Abd: Soft, NT, ND +BS  Ext: No edema, + peripheral pulses      Pt has AICD/PPM [ ] Yes  [x ] No             Brand Name:  Pre-op Beta Blocker ordered within 24 hrs of surgery (CABG ONLY)?  [x ] Yes  [ ] No  If not, Why?  Type & Cross  [x ] Yes  [ ] No  NPO after Midnight [x ] Yes  [ ] No  Pre-op ABX ordered, to be taped on chart:  [x ] Yes  [ ] No     Hibiclens/Peridex ordered [x] Yes  [ ] No  Intraop on Hold: PRBCs, CXR, KEISHA [x ]   Consent obtained  [x ] Yes  [ ] No

## 2020-08-17 NOTE — CONSULT NOTE ADULT - SUBJECTIVE AND OBJECTIVE BOX
Subjective:   51 yo M seen in cath recovery s/p cath showing LM disease. Patient denies active SOB or CP at this time. R groin soft, no bleeding, no hematoma. Plan for OR with Dr. Kaila ramirez 20.     Past Medical History  MI (myocardial infarction)  CAD (coronary artery disease) s/p stents   HTN (hypertension)  Diabetes    Past Surgical History  History of coronary artery stent placement  No significant past surgical history    MEDICATIONS  (STANDING):  aspirin enteric coated 81 milliGRAM(s) Oral daily  atorvastatin 80 milliGRAM(s) Oral at bedtime  dextrose 5%. 1000 milliLiter(s) (50 mL/Hr) IV Continuous <Continuous>  dextrose 50% Injectable 12.5 Gram(s) IV Push once  dextrose 50% Injectable 25 Gram(s) IV Push once  dextrose 50% Injectable 25 Gram(s) IV Push once  famotidine    Tablet 20 milliGRAM(s) Oral two times a day  insulin glargine Injectable (LANTUS) 6 Unit(s) SubCutaneous at bedtime  insulin lispro (HumaLOG) corrective regimen sliding scale   SubCutaneous three times a day before meals  insulin lispro (HumaLOG) corrective regimen sliding scale   SubCutaneous at bedtime  insulin lispro Injectable (HumaLOG) 2 Unit(s) SubCutaneous three times a day before meals  lisinopril 5 milliGRAM(s) Oral daily  metoprolol succinate ER 50 milliGRAM(s) Oral daily    MEDICATIONS  (PRN):  dextrose 40% Gel 15 Gram(s) Oral once PRN Blood Glucose LESS THAN 70 milliGRAM(s)/deciliter  glucagon  Injectable 1 milliGRAM(s) IntraMuscular once PRN Glucose LESS THAN 70 milligrams/deciliter    Vital Signs Last 24 Hrs  T(C): 36.8 (20 @ 08:32), Max: 36.8 (20 @ 08:32)  T(F): 98.3 (20 @ 08:32), Max: 98.3 (20 @ 08:32)  HR: 64 (20 @ 12:30) (60 - 74)  BP: 119/71 (20 @ 12:30) (106/59 - 147/65)  RR: 16 (20 @ 12:30) (16 - 18)  SpO2: 97% (20 @ 12:30) (97% - 98%)           Daily Height in cm: 165.1 (17 Aug 2020 08:32)    Daily Weight in k.6 (17 Aug 2020 08:32)  Admit Wt: Drug Dosing Weight  Height (cm): 165.1 (17 Aug 2020 08:32)  Weight (kg): 62.6 (17 Aug 2020 08:32)  BMI (kg/m2): 23 (17 Aug 2020 08:32)  BSA (m2): 1.69 (17 Aug 2020 08:32)    Allergies: No Known Allergies      SOCIAL HISTORY:  Smoker: [ ] Yes  [X] No                 Pt denies  ETOH use: [ ] Yes  [X] No             Pt denies  Ilicit Drug use:  [ ] Yes  [X] No     Pt denies    FAMILY HISTORY:  FH: HTN (hypertension)  FH: CAD (coronary artery disease)    Review of Systems  GENERAL:  no weakness, fatigue, fevers or chills  NEURO: no dizziness, numbness, tingling or weakness  SKIN: no itching, burning, rashes, or lesions   HEENT: no visual changes;  no headache, no vertigo, no recent colds  RESPIRATORY: shortness of breath, no cough, sputum, wheezing  CARDIOVASCULAR:  chest pain,  or palpitations  GI: no abd pain. No N/V/D.  PERIPHERAL VASCULAR: no swelling, no tenderness, no erythema    PHYSICAL EXAM  General: Well nourished, well developed, NAD.                                              Neuro: Normal exam oriented to person/place & time with no focal motor or sensory  deficits.                    Eyes: Normal exam of conjunctiva & lids, pupils equally reactive.   ENT: Normal exam of nasal/oral mucosa with absence of cyanosis.   Neck: Normal exam of jugular veins, trachea & thyroid.   Chest: Normal lung exam with good air movement absence of wheezes, rales, or rhonchi.                                                                         CV:  Auscultation: normal S1S2, RRR   Carotids: No Bruits[X]  Abdominal Aorta: normal [X] nonpalpable[X]                                                                         GI: Normal exam of abdomen with no noted masses or tenderness. +[id number]                                                                                            Extremities: Normal no evidence of cyanosis or deformity, Edema: none  Lower Extremity Pulses: Right[+2DP] Left[+2DP] Varicosities[none]  SKIN : Normal exam to inspection & palpation.                                                           LABS:                        13.0   9.93  )-----------( 231      ( 17 Aug 2020 09:10 )             39.1     08-    141  |  103  |  13  ----------------------------<  139<H>  4.9   |  27  |  0.68    Ca    9.9      17 Aug 2020 09:10    TPro  7.5  /  Alb  5.0  /  TBili  1.0  /  DBili  x   /  AST  29  /  ALT  29  /  AlkPhos  64            Cardiac Cath:    TTE / KEISHA:

## 2020-08-17 NOTE — DISCUSSION/SUMMARY
[FreeTextEntry1] : 50 M bilingual Syriac/English speaking, with PMHx DM2 ([facility],\par Hgba1c unknown, controlled ), HTN, HLD, NSTEMI 11/16/19, CAD-KHARI to 60% pCX and\par 100% OM2, presents with chest pain. Pt reports intermittent chest pain for the\par past 12 days. Initially came on while he was at rest. Described as sharp, 4/10,\par left sided w/o radiation, lasted for several hours w/o associated SOB. Since\par then he has noticed the pain more with exertion- says he is unable to walk more\par than 10 feet without stopping due to the pain. He has been compliant with all\par of his medication including DAPT, atorva, bb, ACEi. Denies any orthopnea, PND,\par LE edema, palpitations. Pt was referred for Cardiac Cath by DR Blakely from the\par Cardiology Clinic.\par \par \par Impacct clinic to follow upon discharge.

## 2020-08-18 ENCOUNTER — APPOINTMENT (OUTPATIENT)
Dept: CARDIOTHORACIC SURGERY | Facility: CLINIC | Age: 50
End: 2020-08-18

## 2020-08-18 DIAGNOSIS — Z00.6 ENCOUNTER FOR EXAMINATION FOR NORMAL COMPARISON AND CONTROL IN CLINICAL RESEARCH PROGRAM: ICD-10-CM

## 2020-08-18 LAB
A1C WITH ESTIMATED AVERAGE GLUCOSE RESULT: 8.6 % — HIGH (ref 4–5.6)
ALBUMIN SERPL ELPH-MCNC: 3.7 G/DL — SIGNIFICANT CHANGE UP (ref 3.3–5)
ALBUMIN SERPL ELPH-MCNC: 4.7 G/DL — SIGNIFICANT CHANGE UP (ref 3.3–5)
ALP SERPL-CCNC: 33 U/L — LOW (ref 40–120)
ALP SERPL-CCNC: 38 U/L — LOW (ref 40–120)
ALT FLD-CCNC: 15 U/L — SIGNIFICANT CHANGE UP (ref 10–45)
ALT FLD-CCNC: 16 U/L — SIGNIFICANT CHANGE UP (ref 10–45)
ANION GAP SERPL CALC-SCNC: 11 MMOL/L — SIGNIFICANT CHANGE UP (ref 5–17)
ANION GAP SERPL CALC-SCNC: 12 MMOL/L — SIGNIFICANT CHANGE UP (ref 5–17)
ANISOCYTOSIS BLD QL: SLIGHT — SIGNIFICANT CHANGE UP
APTT BLD: 21 SEC — LOW (ref 27.5–35.5)
APTT BLD: 25.9 SEC — LOW (ref 27.5–35.5)
APTT BLD: 28.8 SEC — SIGNIFICANT CHANGE UP (ref 27.5–35.5)
AST SERPL-CCNC: 15 U/L — SIGNIFICANT CHANGE UP (ref 10–40)
AST SERPL-CCNC: 22 U/L — SIGNIFICANT CHANGE UP (ref 10–40)
BASOPHILS # BLD AUTO: 0 K/UL — SIGNIFICANT CHANGE UP (ref 0–0.2)
BASOPHILS NFR BLD AUTO: 0 % — SIGNIFICANT CHANGE UP (ref 0–2)
BILIRUB SERPL-MCNC: 1.5 MG/DL — HIGH (ref 0.2–1.2)
BILIRUB SERPL-MCNC: 1.7 MG/DL — HIGH (ref 0.2–1.2)
BUN SERPL-MCNC: 10 MG/DL — SIGNIFICANT CHANGE UP (ref 7–23)
BUN SERPL-MCNC: 13 MG/DL — SIGNIFICANT CHANGE UP (ref 7–23)
CALCIUM SERPL-MCNC: 10.1 MG/DL — SIGNIFICANT CHANGE UP (ref 8.4–10.5)
CALCIUM SERPL-MCNC: 8.5 MG/DL — SIGNIFICANT CHANGE UP (ref 8.4–10.5)
CHLORIDE SERPL-SCNC: 105 MMOL/L — SIGNIFICANT CHANGE UP (ref 96–108)
CHLORIDE SERPL-SCNC: 109 MMOL/L — HIGH (ref 96–108)
CK MB BLD-MCNC: 3 % — SIGNIFICANT CHANGE UP (ref 0–3.5)
CK MB CFR SERPL CALC: 4.5 NG/ML — SIGNIFICANT CHANGE UP (ref 0–6.7)
CK SERPL-CCNC: 149 U/L — SIGNIFICANT CHANGE UP (ref 30–200)
CO2 SERPL-SCNC: 23 MMOL/L — SIGNIFICANT CHANGE UP (ref 22–31)
CO2 SERPL-SCNC: 25 MMOL/L — SIGNIFICANT CHANGE UP (ref 22–31)
CREAT SERPL-MCNC: 0.65 MG/DL — SIGNIFICANT CHANGE UP (ref 0.5–1.3)
CREAT SERPL-MCNC: 0.67 MG/DL — SIGNIFICANT CHANGE UP (ref 0.5–1.3)
EOSINOPHIL # BLD AUTO: 0.31 K/UL — SIGNIFICANT CHANGE UP (ref 0–0.5)
EOSINOPHIL NFR BLD AUTO: 2 % — SIGNIFICANT CHANGE UP (ref 0–6)
ESTIMATED AVERAGE GLUCOSE: 200 MG/DL — HIGH (ref 68–114)
FIBRINOGEN PPP-MCNC: 211 MG/DL — LOW (ref 350–510)
FIBRINOGEN PPP-MCNC: 287 MG/DL — LOW (ref 350–510)
FIBRINOGEN PPP-MCNC: 351 MG/DL — SIGNIFICANT CHANGE UP (ref 350–510)
GAS PNL BLDA: SIGNIFICANT CHANGE UP
GLUCOSE BLDC GLUCOMTR-MCNC: 105 MG/DL — HIGH (ref 70–99)
GLUCOSE BLDC GLUCOMTR-MCNC: 119 MG/DL — HIGH (ref 70–99)
GLUCOSE BLDC GLUCOMTR-MCNC: 128 MG/DL — HIGH (ref 70–99)
GLUCOSE BLDC GLUCOMTR-MCNC: 131 MG/DL — HIGH (ref 70–99)
GLUCOSE BLDC GLUCOMTR-MCNC: 167 MG/DL — HIGH (ref 70–99)
GLUCOSE BLDC GLUCOMTR-MCNC: 312 MG/DL — HIGH (ref 70–99)
GLUCOSE BLDC GLUCOMTR-MCNC: 89 MG/DL — SIGNIFICANT CHANGE UP (ref 70–99)
GLUCOSE BLDC GLUCOMTR-MCNC: 89 MG/DL — SIGNIFICANT CHANGE UP (ref 70–99)
GLUCOSE SERPL-MCNC: 107 MG/DL — HIGH (ref 70–99)
GLUCOSE SERPL-MCNC: 99 MG/DL — SIGNIFICANT CHANGE UP (ref 70–99)
HCT VFR BLD CALC: 16.3 % — CRITICAL LOW (ref 39–50)
HCT VFR BLD CALC: 25.4 % — LOW (ref 39–50)
HCT VFR BLD CALC: 30.2 % — LOW (ref 39–50)
HGB BLD-MCNC: 10.5 G/DL — LOW (ref 13–17)
HGB BLD-MCNC: 5.8 G/DL — CRITICAL LOW (ref 13–17)
HGB BLD-MCNC: 9 G/DL — LOW (ref 13–17)
HYPOCHROMIA BLD QL: SIGNIFICANT CHANGE UP
INR BLD: 1.11 RATIO — SIGNIFICANT CHANGE UP (ref 0.88–1.16)
INR BLD: 1.28 RATIO — HIGH (ref 0.88–1.16)
INR BLD: 1.44 RATIO — HIGH (ref 0.88–1.16)
LYMPHOCYTES # BLD AUTO: 22 % — SIGNIFICANT CHANGE UP (ref 13–44)
LYMPHOCYTES # BLD AUTO: 3.43 K/UL — HIGH (ref 1–3.3)
MAGNESIUM SERPL-MCNC: 1.7 MG/DL — SIGNIFICANT CHANGE UP (ref 1.6–2.6)
MANUAL SMEAR VERIFICATION: SIGNIFICANT CHANGE UP
MCHC RBC-ENTMCNC: 30.8 PG — SIGNIFICANT CHANGE UP (ref 27–34)
MCHC RBC-ENTMCNC: 31.5 PG — SIGNIFICANT CHANGE UP (ref 27–34)
MCHC RBC-ENTMCNC: 32.2 PG — SIGNIFICANT CHANGE UP (ref 27–34)
MCHC RBC-ENTMCNC: 34.8 GM/DL — SIGNIFICANT CHANGE UP (ref 32–36)
MCHC RBC-ENTMCNC: 35.4 GM/DL — SIGNIFICANT CHANGE UP (ref 32–36)
MCHC RBC-ENTMCNC: 35.6 GM/DL — SIGNIFICANT CHANGE UP (ref 32–36)
MCV RBC AUTO: 88.6 FL — SIGNIFICANT CHANGE UP (ref 80–100)
MCV RBC AUTO: 88.8 FL — SIGNIFICANT CHANGE UP (ref 80–100)
MCV RBC AUTO: 90.6 FL — SIGNIFICANT CHANGE UP (ref 80–100)
MICROCYTES BLD QL: SLIGHT — SIGNIFICANT CHANGE UP
MONOCYTES # BLD AUTO: 0.62 K/UL — SIGNIFICANT CHANGE UP (ref 0–0.9)
MONOCYTES NFR BLD AUTO: 4 % — SIGNIFICANT CHANGE UP (ref 2–14)
NEUTROPHILS # BLD AUTO: 11.24 K/UL — HIGH (ref 1.8–7.4)
NEUTROPHILS NFR BLD AUTO: 66 % — SIGNIFICANT CHANGE UP (ref 43–77)
NEUTS BAND # BLD: 6 % — SIGNIFICANT CHANGE UP (ref 0–8)
NRBC # BLD: 0 /100 WBCS — SIGNIFICANT CHANGE UP (ref 0–0)
NRBC # BLD: 0 /100 WBCS — SIGNIFICANT CHANGE UP (ref 0–0)
NRBC # BLD: 0 /100 — SIGNIFICANT CHANGE UP (ref 0–0)
PHOSPHATE SERPL-MCNC: 2.7 MG/DL — SIGNIFICANT CHANGE UP (ref 2.5–4.5)
PLAT MORPH BLD: NORMAL — SIGNIFICANT CHANGE UP
PLATELET # BLD AUTO: 124 K/UL — LOW (ref 150–400)
PLATELET # BLD AUTO: 132 K/UL — LOW (ref 150–400)
PLATELET # BLD AUTO: 157 K/UL — SIGNIFICANT CHANGE UP (ref 150–400)
POTASSIUM SERPL-MCNC: 3.9 MMOL/L — SIGNIFICANT CHANGE UP (ref 3.5–5.3)
POTASSIUM SERPL-MCNC: 4 MMOL/L — SIGNIFICANT CHANGE UP (ref 3.5–5.3)
POTASSIUM SERPL-SCNC: 3.9 MMOL/L — SIGNIFICANT CHANGE UP (ref 3.5–5.3)
POTASSIUM SERPL-SCNC: 4 MMOL/L — SIGNIFICANT CHANGE UP (ref 3.5–5.3)
PROT SERPL-MCNC: 4.9 G/DL — LOW (ref 6–8.3)
PROT SERPL-MCNC: 6.4 G/DL — SIGNIFICANT CHANGE UP (ref 6–8.3)
PROTHROM AB SERPL-ACNC: 13.1 SEC — SIGNIFICANT CHANGE UP (ref 10.6–13.6)
PROTHROM AB SERPL-ACNC: 15 SEC — HIGH (ref 10.6–13.6)
PROTHROM AB SERPL-ACNC: 16.8 SEC — HIGH (ref 10.6–13.6)
RBC # BLD: 1.8 M/UL — LOW (ref 4.2–5.8)
RBC # BLD: 2.86 M/UL — LOW (ref 4.2–5.8)
RBC # BLD: 3.41 M/UL — LOW (ref 4.2–5.8)
RBC # FLD: 13.2 % — SIGNIFICANT CHANGE UP (ref 10.3–14.5)
RBC # FLD: 13.2 % — SIGNIFICANT CHANGE UP (ref 10.3–14.5)
RBC # FLD: 13.3 % — SIGNIFICANT CHANGE UP (ref 10.3–14.5)
RBC BLD AUTO: ABNORMAL
SODIUM SERPL-SCNC: 139 MMOL/L — SIGNIFICANT CHANGE UP (ref 135–145)
SODIUM SERPL-SCNC: 146 MMOL/L — HIGH (ref 135–145)
TROPONIN T, HIGH SENSITIVITY RESULT: 116 NG/L — HIGH (ref 0–51)
WBC # BLD: 15.61 K/UL — HIGH (ref 3.8–10.5)
WBC # BLD: 6.35 K/UL — SIGNIFICANT CHANGE UP (ref 3.8–10.5)
WBC # BLD: 7.49 K/UL — SIGNIFICANT CHANGE UP (ref 3.8–10.5)
WBC # FLD AUTO: 15.61 K/UL — HIGH (ref 3.8–10.5)
WBC # FLD AUTO: 6.35 K/UL — SIGNIFICANT CHANGE UP (ref 3.8–10.5)
WBC # FLD AUTO: 7.49 K/UL — SIGNIFICANT CHANGE UP (ref 3.8–10.5)

## 2020-08-18 PROCEDURE — 33533 CABG ARTERIAL SINGLE: CPT

## 2020-08-18 PROCEDURE — 99291 CRITICAL CARE FIRST HOUR: CPT

## 2020-08-18 PROCEDURE — 71045 X-RAY EXAM CHEST 1 VIEW: CPT | Mod: 26,77

## 2020-08-18 PROCEDURE — 94010 BREATHING CAPACITY TEST: CPT | Mod: 26

## 2020-08-18 PROCEDURE — 33517 CABG ARTERY-VEIN SINGLE: CPT

## 2020-08-18 PROCEDURE — 93010 ELECTROCARDIOGRAM REPORT: CPT

## 2020-08-18 PROCEDURE — 33508 ENDOSCOPIC VEIN HARVEST: CPT

## 2020-08-18 PROCEDURE — 71045 X-RAY EXAM CHEST 1 VIEW: CPT | Mod: 26

## 2020-08-18 PROCEDURE — 76998 US GUIDE INTRAOP: CPT | Mod: 26,59

## 2020-08-18 RX ORDER — ASPIRIN/CALCIUM CARB/MAGNESIUM 324 MG
81 TABLET ORAL DAILY
Refills: 0 | Status: DISCONTINUED | OUTPATIENT
Start: 2020-08-18 | End: 2020-08-25

## 2020-08-18 RX ORDER — CLOPIDOGREL BISULFATE 75 MG/1
75 TABLET, FILM COATED ORAL DAILY
Refills: 0 | Status: DISCONTINUED | OUTPATIENT
Start: 2020-08-19 | End: 2020-08-25

## 2020-08-18 RX ORDER — HYDROMORPHONE HYDROCHLORIDE 2 MG/ML
1 INJECTION INTRAMUSCULAR; INTRAVENOUS; SUBCUTANEOUS ONCE
Refills: 0 | Status: DISCONTINUED | OUTPATIENT
Start: 2020-08-18 | End: 2020-08-18

## 2020-08-18 RX ORDER — ALBUMIN HUMAN 25 %
250 VIAL (ML) INTRAVENOUS
Refills: 0 | Status: COMPLETED | OUTPATIENT
Start: 2020-08-18 | End: 2020-08-18

## 2020-08-18 RX ORDER — SODIUM CHLORIDE 9 MG/ML
1000 INJECTION, SOLUTION INTRAVENOUS
Refills: 0 | Status: DISCONTINUED | OUTPATIENT
Start: 2020-08-18 | End: 2020-08-19

## 2020-08-18 RX ORDER — CEFUROXIME AXETIL 250 MG
1500 TABLET ORAL EVERY 8 HOURS
Refills: 0 | Status: COMPLETED | OUTPATIENT
Start: 2020-08-18 | End: 2020-08-20

## 2020-08-18 RX ORDER — MEPERIDINE HYDROCHLORIDE 50 MG/ML
25 INJECTION INTRAMUSCULAR; INTRAVENOUS; SUBCUTANEOUS ONCE
Refills: 0 | Status: DISCONTINUED | OUTPATIENT
Start: 2020-08-18 | End: 2020-08-19

## 2020-08-18 RX ORDER — POTASSIUM CHLORIDE 20 MEQ
10 PACKET (EA) ORAL
Refills: 0 | Status: COMPLETED | OUTPATIENT
Start: 2020-08-18 | End: 2020-08-18

## 2020-08-18 RX ORDER — SODIUM CHLORIDE 9 MG/ML
1000 INJECTION INTRAMUSCULAR; INTRAVENOUS; SUBCUTANEOUS
Refills: 0 | Status: DISCONTINUED | OUTPATIENT
Start: 2020-08-18 | End: 2020-08-21

## 2020-08-18 RX ORDER — INSULIN HUMAN 100 [IU]/ML
3 INJECTION, SOLUTION SUBCUTANEOUS
Qty: 100 | Refills: 0 | Status: DISCONTINUED | OUTPATIENT
Start: 2020-08-18 | End: 2020-08-21

## 2020-08-18 RX ORDER — CALCIUM GLUCONATE 100 MG/ML
1 VIAL (ML) INTRAVENOUS ONCE
Refills: 0 | Status: COMPLETED | OUTPATIENT
Start: 2020-08-18 | End: 2020-08-18

## 2020-08-18 RX ORDER — METOCLOPRAMIDE HCL 10 MG
10 TABLET ORAL EVERY 8 HOURS
Refills: 0 | Status: COMPLETED | OUTPATIENT
Start: 2020-08-18 | End: 2020-08-20

## 2020-08-18 RX ORDER — CHLORHEXIDINE GLUCONATE 213 G/1000ML
1 SOLUTION TOPICAL
Refills: 0 | Status: DISCONTINUED | OUTPATIENT
Start: 2020-08-18 | End: 2020-08-21

## 2020-08-18 RX ORDER — CALCIUM GLUCONATE 100 MG/ML
2 VIAL (ML) INTRAVENOUS ONCE
Refills: 0 | Status: COMPLETED | OUTPATIENT
Start: 2020-08-18 | End: 2020-08-18

## 2020-08-18 RX ORDER — PROPOFOL 10 MG/ML
10 INJECTION, EMULSION INTRAVENOUS
Qty: 500 | Refills: 0 | Status: DISCONTINUED | OUTPATIENT
Start: 2020-08-18 | End: 2020-08-19

## 2020-08-18 RX ORDER — DEXMEDETOMIDINE HYDROCHLORIDE IN 0.9% SODIUM CHLORIDE 4 UG/ML
0.5 INJECTION INTRAVENOUS
Qty: 200 | Refills: 0 | Status: DISCONTINUED | OUTPATIENT
Start: 2020-08-18 | End: 2020-08-19

## 2020-08-18 RX ORDER — NICARDIPINE HYDROCHLORIDE 30 MG/1
5 CAPSULE, EXTENDED RELEASE ORAL
Qty: 40 | Refills: 0 | Status: DISCONTINUED | OUTPATIENT
Start: 2020-08-18 | End: 2020-08-19

## 2020-08-18 RX ORDER — AMINOCAPROIC ACID 500 MG/1
5 TABLET ORAL
Qty: 5 | Refills: 0 | Status: DISCONTINUED | OUTPATIENT
Start: 2020-08-18 | End: 2020-08-18

## 2020-08-18 RX ORDER — PANTOPRAZOLE SODIUM 20 MG/1
40 TABLET, DELAYED RELEASE ORAL DAILY
Refills: 0 | Status: DISCONTINUED | OUTPATIENT
Start: 2020-08-18 | End: 2020-08-19

## 2020-08-18 RX ORDER — DEXTROSE 50 % IN WATER 50 %
25 SYRINGE (ML) INTRAVENOUS
Refills: 0 | Status: DISCONTINUED | OUTPATIENT
Start: 2020-08-18 | End: 2020-08-19

## 2020-08-18 RX ORDER — POTASSIUM CHLORIDE 20 MEQ
10 PACKET (EA) ORAL
Refills: 0 | Status: COMPLETED | OUTPATIENT
Start: 2020-08-18 | End: 2020-08-19

## 2020-08-18 RX ORDER — MUPIROCIN 20 MG/G
1 OINTMENT TOPICAL
Refills: 0 | Status: DISCONTINUED | OUTPATIENT
Start: 2020-08-18 | End: 2020-08-18

## 2020-08-18 RX ORDER — SODIUM CHLORIDE 9 MG/ML
500 INJECTION, SOLUTION INTRAVENOUS ONCE
Refills: 0 | Status: COMPLETED | OUTPATIENT
Start: 2020-08-18 | End: 2020-08-18

## 2020-08-18 RX ORDER — POTASSIUM CHLORIDE 20 MEQ
10 PACKET (EA) ORAL
Refills: 0 | Status: DISCONTINUED | OUTPATIENT
Start: 2020-08-18 | End: 2020-08-19

## 2020-08-18 RX ORDER — METOCLOPRAMIDE HCL 10 MG
10 TABLET ORAL EVERY 8 HOURS
Refills: 0 | Status: DISCONTINUED | OUTPATIENT
Start: 2020-08-18 | End: 2020-08-18

## 2020-08-18 RX ORDER — AMINOCAPROIC ACID 500 MG/1
1 TABLET ORAL
Qty: 5 | Refills: 0 | Status: DISCONTINUED | OUTPATIENT
Start: 2020-08-18 | End: 2020-08-19

## 2020-08-18 RX ORDER — CHLORHEXIDINE GLUCONATE 213 G/1000ML
15 SOLUTION TOPICAL EVERY 12 HOURS
Refills: 0 | Status: DISCONTINUED | OUTPATIENT
Start: 2020-08-18 | End: 2020-08-19

## 2020-08-18 RX ORDER — MAGNESIUM SULFATE 500 MG/ML
2 VIAL (ML) INJECTION ONCE
Refills: 0 | Status: COMPLETED | OUTPATIENT
Start: 2020-08-18 | End: 2020-08-18

## 2020-08-18 RX ORDER — AMINOCAPROIC ACID 500 MG/1
5 TABLET ORAL
Qty: 5 | Refills: 0 | Status: COMPLETED | OUTPATIENT
Start: 2020-08-18 | End: 2020-08-18

## 2020-08-18 RX ORDER — ASPIRIN/CALCIUM CARB/MAGNESIUM 324 MG
300 TABLET ORAL ONCE
Refills: 0 | Status: DISCONTINUED | OUTPATIENT
Start: 2020-08-18 | End: 2020-08-19

## 2020-08-18 RX ORDER — NOREPINEPHRINE BITARTRATE/D5W 8 MG/250ML
0.05 PLASTIC BAG, INJECTION (ML) INTRAVENOUS
Qty: 8 | Refills: 0 | Status: DISCONTINUED | OUTPATIENT
Start: 2020-08-18 | End: 2020-08-19

## 2020-08-18 RX ORDER — DEXTROSE 50 % IN WATER 50 %
50 SYRINGE (ML) INTRAVENOUS
Refills: 0 | Status: DISCONTINUED | OUTPATIENT
Start: 2020-08-18 | End: 2020-08-19

## 2020-08-18 RX ORDER — MUPIROCIN 20 MG/G
1 OINTMENT TOPICAL
Refills: 0 | Status: COMPLETED | OUTPATIENT
Start: 2020-08-18 | End: 2020-08-23

## 2020-08-18 RX ADMIN — Medication 10 MILLIGRAM(S): at 15:58

## 2020-08-18 RX ADMIN — HYDROMORPHONE HYDROCHLORIDE 1 MILLIGRAM(S): 2 INJECTION INTRAMUSCULAR; INTRAVENOUS; SUBCUTANEOUS at 18:30

## 2020-08-18 RX ADMIN — Medication 200 GRAM(S): at 20:42

## 2020-08-18 RX ADMIN — MUPIROCIN 1 APPLICATION(S): 20 OINTMENT TOPICAL at 18:57

## 2020-08-18 RX ADMIN — Medication 100 MILLIEQUIVALENT(S): at 18:30

## 2020-08-18 RX ADMIN — CHLORHEXIDINE GLUCONATE 30 MILLILITER(S): 213 SOLUTION TOPICAL at 05:09

## 2020-08-18 RX ADMIN — Medication 125 MILLILITER(S): at 14:41

## 2020-08-18 RX ADMIN — HYDROMORPHONE HYDROCHLORIDE 1 MILLIGRAM(S): 2 INJECTION INTRAMUSCULAR; INTRAVENOUS; SUBCUTANEOUS at 15:43

## 2020-08-18 RX ADMIN — CHLORHEXIDINE GLUCONATE 15 MILLILITER(S): 213 SOLUTION TOPICAL at 18:58

## 2020-08-18 RX ADMIN — Medication 100 MILLIEQUIVALENT(S): at 14:35

## 2020-08-18 RX ADMIN — Medication 50 MILLIGRAM(S): at 05:09

## 2020-08-18 RX ADMIN — Medication 10 MILLIGRAM(S): at 22:58

## 2020-08-18 RX ADMIN — Medication 100 GRAM(S): at 16:32

## 2020-08-18 RX ADMIN — CHLORHEXIDINE GLUCONATE 1 APPLICATION(S): 213 SOLUTION TOPICAL at 05:10

## 2020-08-18 RX ADMIN — HYDROMORPHONE HYDROCHLORIDE 1 MILLIGRAM(S): 2 INJECTION INTRAMUSCULAR; INTRAVENOUS; SUBCUTANEOUS at 23:45

## 2020-08-18 RX ADMIN — Medication 125 MILLILITER(S): at 14:16

## 2020-08-18 RX ADMIN — FAMOTIDINE 20 MILLIGRAM(S): 10 INJECTION INTRAVENOUS at 05:09

## 2020-08-18 RX ADMIN — SODIUM CHLORIDE 3000 MILLILITER(S): 9 INJECTION, SOLUTION INTRAVENOUS at 19:00

## 2020-08-18 RX ADMIN — Medication 100 MILLIEQUIVALENT(S): at 14:17

## 2020-08-18 RX ADMIN — HYDROMORPHONE HYDROCHLORIDE 1 MILLIGRAM(S): 2 INJECTION INTRAMUSCULAR; INTRAVENOUS; SUBCUTANEOUS at 22:59

## 2020-08-18 RX ADMIN — Medication 100 MILLIEQUIVALENT(S): at 22:30

## 2020-08-18 RX ADMIN — Medication 100 MILLIGRAM(S): at 17:04

## 2020-08-18 RX ADMIN — Medication 100 MILLIEQUIVALENT(S): at 15:42

## 2020-08-18 RX ADMIN — Medication 50 GRAM(S): at 22:59

## 2020-08-18 RX ADMIN — Medication 125 MILLILITER(S): at 15:42

## 2020-08-18 RX ADMIN — AMINOCAPROIC ACID 250 GM/HR: 500 TABLET ORAL at 20:44

## 2020-08-18 RX ADMIN — Medication 100 MILLIEQUIVALENT(S): at 18:57

## 2020-08-18 RX ADMIN — AMINOCAPROIC ACID 250 GM/HR: 500 TABLET ORAL at 14:23

## 2020-08-18 RX ADMIN — Medication 100 MILLIEQUIVALENT(S): at 22:00

## 2020-08-18 RX ADMIN — Medication 125 MILLILITER(S): at 14:26

## 2020-08-18 RX ADMIN — SODIUM CHLORIDE 3000 MILLILITER(S): 9 INJECTION, SOLUTION INTRAVENOUS at 14:16

## 2020-08-18 RX ADMIN — HYDROMORPHONE HYDROCHLORIDE 1 MILLIGRAM(S): 2 INJECTION INTRAMUSCULAR; INTRAVENOUS; SUBCUTANEOUS at 17:30

## 2020-08-18 RX ADMIN — PANTOPRAZOLE SODIUM 40 MILLIGRAM(S): 20 TABLET, DELAYED RELEASE ORAL at 22:58

## 2020-08-18 RX ADMIN — HYDROMORPHONE HYDROCHLORIDE 1 MILLIGRAM(S): 2 INJECTION INTRAMUSCULAR; INTRAVENOUS; SUBCUTANEOUS at 16:15

## 2020-08-18 RX ADMIN — Medication 125 MILLILITER(S): at 14:14

## 2020-08-18 RX ADMIN — Medication 100 MILLIEQUIVALENT(S): at 20:43

## 2020-08-18 NOTE — PRE-ANESTHESIA EVALUATION ADULT - NSANTHPMHFT_GEN_ALL_CORE
PMH- HTN, HLD, T2DM, CAD- s/p KHARI 2019, ~2wks int CP with worsening on exertion, Cath (+) Severe LM Atherosclerosis.

## 2020-08-18 NOTE — PRE-ANESTHESIA EVALUATION ADULT - NSANTHLABRESULTSFT_GEN_ALL_CORE
COVID-19 PCR: NotDetec (17 Aug 2020 17:09)                          13.9   11.33 )-----------( 227      ( 17 Aug 2020 17:12 )             41.1   08-17    140  |  100  |  14  ----------------------------<  124<H>  4.4   |  26  |  0.67    Ca    10.0      17 Aug 2020 17:11    TPro  7.5  /  Alb  4.8  /  TBili  1.2  /  DBili  0.2  /  AST  27  /  ALT  32  /  AlkPhos  68  08-17      PT/INR - ( 17 Aug 2020 17:10 )   PT: 12.3 sec;   INR: 1.04 ratio    < from: Transthoracic Echocardiogram (08.17.20 @ 14:17) >            PTT - ( 17 Aug 2020 17:10 )  PTT:32.3 sec

## 2020-08-18 NOTE — PRE-ANESTHESIA EVALUATION ADULT - NSANTHVITALSIGNSFT_GEN_ALL_CORE
Vital Signs Last 24 Hrs  T(C): 36.8 (18 Aug 2020 06:32), Max: 37 (17 Aug 2020 19:42)  T(F): 98.2 (18 Aug 2020 06:32), Max: 98.6 (17 Aug 2020 19:42)  HR: 54 (18 Aug 2020 06:32) (54 - 74)  BP: 104/63 (18 Aug 2020 06:32) (104/63 - 147/65)  BP(mean): 76 (18 Aug 2020 05:00) (76 - 92)  RR: 18 (18 Aug 2020 06:32) (16 - 18)  SpO2: 97% (18 Aug 2020 06:32) (97% - 98%)

## 2020-08-18 NOTE — PRE-ANESTHESIA EVALUATION ADULT - NSRADCARDRESULTSFT_GEN_ALL_CORE
Observations:  Mitral Valve: Normal mitral valve. Minimal mitral  regurgitation.  Aortic Valve/Aorta: Normal aortic valve.  Normal aortic root size.  Left Atrium: Normal left atrium.  Left Ventricle: Normal left ventricular internal dimensions  and wall thicknesses.  Normal left ventricular systolic function. No segmental  wall motion abnormalities.  Normal diastolic function.  Right Heart: Normal right atrium. Normal right ventricular  size and function.  Normal tricuspidvalve. Minimal tricuspid regurgitation.  Normal pulmonic valve.  Pericardium/Pleura: Normal pericardium with no pericardial  effusion.  Hemodynamic: Estimated right atrial pressure is normal.  No evidence of pulmoanry hypertension.  No PFO seen with color Doppler.  ------------------------------------------------------------------------  Conclusions:  Normal echoacardiogram.  ------------------------------------------------------------------------  Confirmed on  8/17/2020 - 16:31:37 by Clay Marin MD, FASE    < end of copied text >  < from: Cardiac Cath Lab - Adult (08.17.20 @ 11:00) >    CORONARY VESSELS: The coronary circulation is right dominant.  LM:   --  Proximal left main: Angiography showed severe atherosclerosis.  LAD:   --  LAD: Angiography showed mild moderate diffuse atherosclerosis.  CX:   --  Proximal circumflex: There was a 20 % stenosis at the site of a  prior stent.  --  OM1: There was a 10 % stenosis at the site of a prior stent.  RCA:   --  RCA: Angiography showed minor luminal irregularities with no  flow limiting lesions.  COMPLICATIONS: There were no complications.  DIAGNOSTIC RECOMMENDATIONS: Significant LM disease.  Recommend CTS evaluation.  INTERVENTIONAL RECOMMENDATIONS: Significant LM disease.  Recommend CTS evaluation.    < end of copied text >

## 2020-08-18 NOTE — BRIEF OPERATIVE NOTE - NSICDXBRIEFPROCEDURE_GEN_ALL_CORE_FT
PROCEDURES:  Bypass graft, coronary artery, 1 arterial and 1 venous 18-Aug-2020 13:52:43  Miguel Bermudez

## 2020-08-18 NOTE — PROGRESS NOTE ADULT - SUBJECTIVE AND OBJECTIVE BOX
TARUN CERVANTES  MRN-[id number]  Patient is a 50y old  Male who presents with a chief complaint of PREOP CABG (17 Aug 2020 17:12)    HPI:  50 M bilingual Thai/English speaking, with PMHx DM2 (Endocrine Clinic, Hgba1c unknown, controlled ), HTN, HLD, NSTEMI 19, CAD-KHARI to 60% pCX and 100% OM2, presents with chest pain. Pt reports intermittent chest pain for the past 12 days. Initially came on while he was at rest. Described as sharp, 4/10, left sided w/o radiation, lasted for several hours w/o associated SOB. Since then he has noticed the pain more with exertion- says he is unable to walk more than 10 feet without stopping due to the pain. He has been compliant with all of his medication including DAPT, atorva, bb, ACEi. Denies any orthopnea, PND, LE edema, palpitations. Pt was referred for Cardiac Cath by DR Blakely from the Cardiology Clinic. (17 Aug 2020 08:32)      Surgery/Hospital course:   Admitted to [facility]   CABGx3    Today:  CABGx3    REVIEW OF SYSTEMS:  Unable to obtain due to patient being on ventilator.    Physical Exam:  Vital Signs Last 24 Hrs  T(C): 36.8 (18 Aug 2020 20:00), Max: 36.8 (18 Aug 2020 05:00)  T(F): 98.2 (18 Aug 2020 20:00), Max: 98.2 (18 Aug 2020 05:00)  HR: 83 (18 Aug 2020 23:30) (54 - 97)  BP: 111/69 (18 Aug 2020 23:30) (104/63 - 148/89)  BP(mean): 84 (18 Aug 2020 23:30) (76 - 112)  RR: 12 (18 Aug 2020 23:30) (10 - 18)  SpO2: 100% (18 Aug 2020 23:30) (97% - 100%)  Gen:  Sedated, with multiple lines  CNS: non focal 	  Neck: no JVD  RES : Vent sounds  Chest:   + chest tubes                     CVS: Regular  rhythm. Normal S1/S2  Abd: Soft, non-distended. Bowel sounds present.  Skin: No rash.  Ext:  no edema, A Line  PSY:  ============================I/O===========================   I&O's Detail    17 Aug 2020 07:  -  18 Aug 2020 07:00  --------------------------------------------------------  IN:    Oral Fluid: 240 mL  Total IN: 240 mL    OUT:    Voided: 550 mL  Total OUT: 550 mL    Total NET: -310 mL      18 Aug 2020 07:  -  18 Aug 2020 23:41  --------------------------------------------------------  IN:    Albumin 5%  - 250 mL: 1250 mL    aminocaproic acid Infusion: 250 mL    aminocaproic acid Infusion: 150 mL    Cryoprecipitate: 75 mL    dexmedetomidine Infusion: 147 mL    insulin regular Infusion: 20 mL    IV PiggyBack: 550 mL    Lactated Ringers IV Bolus: 1000 mL    niCARdipine Infusion: 65 mL    Packed Red Blood Cells: 700 mL    Plasma: 1000 mL    Platelets - Single Donor: 225 mL    propofol Infusion: 43.3 mL    sodium chloride 0.9%.: 100 mL  Total IN: 5575.3 mL    OUT:    Chest Tube: 70 mL    Chest Tube: 740 mL    Indwelling Catheter - Urethral: 4850 mL  Total OUT: 5660 mL    Total NET: -84.7 mL        ============================ LABS =========================                        10.5   6.35  )-----------( 132      ( 18 Aug 2020 21:14 )             30.2     08-18    146<H>  |  109<H>  |  10  ----------------------------<  99  3.9   |  25  |  0.65    Ca    10.1      18 Aug 2020 21:14  Phos  2.7     08-18  Mg     1.7     08-18    TPro  6.4  /  Alb  4.7  /  TBili  1.7<H>  /  DBili  x   /  AST  22  /  ALT  16  /  AlkPhos  38<L>  -18    LIVER FUNCTIONS - ( 18 Aug 2020 21:14 )  Alb: 4.7 g/dL / Pro: 6.4 g/dL / ALK PHOS: 38 U/L / ALT: 16 U/L / AST: 22 U/L / GGT: x           PT/INR - ( 18 Aug 2020 21:14 )   PT: 13.1 sec;   INR: 1.11 ratio         PTT - ( 18 Aug 2020 21:14 )  PTT:21.0 sec  ABG - ( 18 Aug 2020 21:07 )  pH, Arterial: 7.41  pH, Blood: x     /  pCO2: 42    /  pO2: 126   / HCO3: 26    / Base Excess: 1.7   /  SaO2: 98                Urinalysis Basic - ( 17 Aug 2020 15:16 )    Color: Light Yellow / Appearance: Clear / S.017 / pH: x  Gluc: x / Ketone: Negative  / Bili: Negative / Urobili: Negative   Blood: x / Protein: Negative / Nitrite: Negative   Leuk Esterase: Negative / RBC: x / WBC x   Sq Epi: x / Non Sq Epi: x / Bacteria: x      ======================Micro/Rad/Cardio=================  CXR: Reviewed  Echo: Reviewed  ======================================================  PAST MEDICAL & SURGICAL HISTORY:  MI (myocardial infarction)  CAD (coronary artery disease)  HTN (hypertension)  Diabetes  History of coronary artery stent placement    ====================ASSESSMENT ==============  Diabetes  CAD S/P CABGx3    ====================== NEUROLOGY=====================  Sedated with Precedex and Propofol drips for vent synchrony  Meperidine for analgesia    ==================== RESPIRATORY======================  Respiratory status required full ventilatory support, close monitoring of respiratory rate and breathing pattern, the following of ABG’s with A-line monitoring, continuous pulse oximetry monitoring    Mechanical Ventilation:  Mode: AC/ CMV (Assist Control/ Continuous Mandatory Ventilation)  RR (machine): 12  TV (machine): 500  FiO2: 50  PEEP: 5  ITime: 1  MAP: 8  PIP: 23      ====================CARDIOVASCULAR==================  CAD S/P CABGx3 8/18  ASA for graft patency  Blood pressure support with Cardene and Levo gtts    ===================HEMATOLOGIC/ONC ===================  Monitor H&H, PLT  Amicar for post-op bleeding  Monitor hemoglobin and hematocrit levels    ===================== RENAL =========================  Continue to monitor I/Os, BUN/Cr, and urine output.     ==================== GASTROINTESTINAL===================  NPO after recent procedure, advance diet as tolerated.   Protonix for stress ulcer prophylaxis  Reglan for gut motility    =======================    ENDOCRINE  =====================  Hx of Diabetes, glucose control with insulin gtt, titrate as per insulin drip protocol along with hourly glucose checks    ========================INFECTIOUS DISEASE================  Continue Cefuroxime for perioperative antibiotic coverage      Patient requires continuous monitoring with bedside rhythm monitoring, arterial line, pulse oximetry, ventilator monitoring; intermittent blood gas analysis. Care plan discussed with ICU care team. Patient remains critical; required more than usual ICU care.     By signing my name below, I, Obey Melgar, attest that this documentation has been prepared under the direction and in the presence of Connor Singh MD.  Electronically signed: Sumit Randhawa, 20 @ 23:41    I, Connor Singh, personally performed the services described in this documentation. All medical record entries made by the sumit were at my direction and in my presence. I have reviewed the chart and agree that the record reflects my personal performance and is accurate and complete  Electronically signed: Connor Singh, 20 @ 23:41

## 2020-08-18 NOTE — PRE-ANESTHESIA EVALUATION ADULT - NSANTHINPATMEDSFT_GEN_ALL_CORE
MEDICATIONS  (STANDING):  aspirin enteric coated 81 milliGRAM(s) Oral daily  atorvastatin 80 milliGRAM(s) Oral at bedtime  cefuroxime  IVPB 1500 milliGRAM(s) IV Intermittent once  dextrose 5%. 1000 milliLiter(s) (50 mL/Hr) IV Continuous <Continuous>  dextrose 50% Injectable 12.5 Gram(s) IV Push once  dextrose 50% Injectable 25 Gram(s) IV Push once  dextrose 50% Injectable 25 Gram(s) IV Push once  famotidine    Tablet 20 milliGRAM(s) Oral two times a day  insulin glargine Injectable (LANTUS) 6 Unit(s) SubCutaneous at bedtime  insulin lispro (HumaLOG) corrective regimen sliding scale   SubCutaneous three times a day before meals  insulin lispro (HumaLOG) corrective regimen sliding scale   SubCutaneous at bedtime  insulin lispro Injectable (HumaLOG) 2 Unit(s) SubCutaneous three times a day before meals  metoprolol succinate ER 50 milliGRAM(s) Oral daily    MEDICATIONS  (PRN):  dextrose 40% Gel 15 Gram(s) Oral once PRN Blood Glucose LESS THAN 70 milliGRAM(s)/deciliter  glucagon  Injectable 1 milliGRAM(s) IntraMuscular once PRN Glucose LESS THAN 70 milligrams/deciliter

## 2020-08-18 NOTE — PROGRESS NOTE ADULT - SUBJECTIVE AND OBJECTIVE BOX
CRITICAL CARE ATTENDING - CTICU    MEDICATIONS  (STANDING):  albumin human  5% IVPB 250 milliLiter(s) IV Intermittent every 10 minutes  aspirin enteric coated 81 milliGRAM(s) Oral daily  aspirin Suppository 300 milliGRAM(s) Rectal once  cefuroxime  IVPB 1500 milliGRAM(s) IV Intermittent every 8 hours  chlorhexidine 2% Cloths 1 Application(s) Topical <User Schedule>  dextrose 5%. 1000 milliLiter(s) (15 mL/Hr) IV Continuous <Continuous>  dextrose 50% Injectable 50 milliLiter(s) IV Push every 15 minutes  dextrose 50% Injectable 25 milliLiter(s) IV Push every 15 minutes  insulin regular Infusion 3 Unit(s)/Hr (3 mL/Hr) IV Continuous <Continuous>  lactated ringers Bolus 500 milliLiter(s) IV Bolus once  meperidine     Injectable 25 milliGRAM(s) IV Push once  metoclopramide Injectable 10 milliGRAM(s) IV Push every 8 hours  mupirocin 2% Ointment 1 Application(s) Both Nostrils two times a day  pantoprazole  Injectable 40 milliGRAM(s) IV Push daily  potassium chloride  10 mEq/50 mL IVPB 10 milliEquivalent(s) IV Intermittent every 1 hour  potassium chloride  10 mEq/50 mL IVPB 10 milliEquivalent(s) IV Intermittent every 1 hour  potassium chloride  10 mEq/50 mL IVPB 10 milliEquivalent(s) IV Intermittent every 1 hour  sodium chloride 0.9%. 1000 milliLiter(s) (10 mL/Hr) IV Continuous <Continuous>                                    13.9   11.33 )-----------( 227      ( 17 Aug 2020 17:12 )             41.1           140  |  100  |  14  ----------------------------<  124<H>  4.4   |  26  |  0.67    Ca    10.0      17 Aug 2020 17:11    TPro  7.5  /  Alb  4.8  /  TBili  1.2  /  DBili  0.2  /  AST  27  /  ALT  32  /  AlkPhos  68        PT/INR - ( 17 Aug 2020 17:10 )   PT: 12.3 sec;   INR: 1.04 ratio         PTT - ( 17 Aug 2020 17:10 )  PTT:32.3 sec    Mode: AC/ CMV (Assist Control/ Continuous Mandatory Ventilation)  RR (machine): 10  TV (machine): 500  FiO2: 100  PEEP: 5  ITime: 1  MAP: 8  PIP: 23      Daily Height in cm: 165.1 (18 Aug 2020 08:02)    Daily Weight in k.9 (18 Aug 2020 05:00)      08-17 @ 07:01  -  08-18 @ 07:00  --------------------------------------------------------  IN: 240 mL / OUT: 550 mL / NET: -310 mL        Critically Ill patient  : [ ] preoperative ,   [ x] post operative    Requires :  [ x] Arterial Line   [x ] Central Line  [ ] PA catheter  [ ] IABP  [ ] ECMO  [ ] LVAD  [x ] Ventilator  [x ] pacemaker [ ] Impella.                      [ x] ABG's     [x ] Pulse Oxymetry Monitoring  Bedside evaluation , monitoring , treatment of hemodynamics , fluids , IVP/ IVCD meds.        Diagnosis:     Op day - CABG X 2 L    Hypotension    Hypovolemia    Hemodynamic lability,  instability. Requires IVCD [x ] vasopressors - on / off  [ ] inotropes  [ ] vasodilator  [ x]IVSS fluid  to maintain MAP, perfusion, C.I.     ECG     Chest Tube Drainage     Temporary pacemaker (TPM) interrogation and setting.     Ventilator Management:  [ x]AC-rest    [x ]CPAP-PS Wean this PM   [ ]Trach Collar     [ ]Extubate    [ ] T-Piece  [ ]peep>5     Requires chest PT, pulmonary toilet, ambu bagging, suctioning to maintain SaO2,  patent airway and treat atelectasis.     IVCD Insulin                         Discussed with CT surgeon, Physician's Assistant - Nurse Practitioner- Critical care medicine team.   Dicussed at  AM / PM rounds.   Chart, labs , films reviewed.    Total Time:  30 min

## 2020-08-18 NOTE — BRIEF OPERATIVE NOTE - NSICDXBRIEFPREOP_GEN_ALL_CORE_FT
PRE-OP DIAGNOSIS:  Coronary artery disease involving left main coronary artery 18-Aug-2020 13:53:00  Miguel Bermudez

## 2020-08-18 NOTE — BRIEF OPERATIVE NOTE - NSICDXBRIEFPOSTOP_GEN_ALL_CORE_FT
POST-OP DIAGNOSIS:  Coronary artery disease involving left main coronary artery 18-Aug-2020 13:53:08  Miguel Bermudez

## 2020-08-18 NOTE — PRE-OP CHECKLIST - IV STARTED
no
62 yo female with h/o DM, HTN, asthma, in the ER c/o heavy cough for the past 3 days. Pt mentioned she feels tired and exhausted  after having multiple  prolong episodes of cough, c/o chest soreness, c/o frequent sweats and generalized weakness. Pt denies fever or chills, denies hemoptysis, denies CP, abdominal pain, denies sick contact or recent travel. Pt runs out of Albuterol and nebulizer solution and was afraid to stay home tonight.

## 2020-08-19 LAB
ALBUMIN SERPL ELPH-MCNC: 4.8 G/DL — SIGNIFICANT CHANGE UP (ref 3.3–5)
ALP SERPL-CCNC: 44 U/L — SIGNIFICANT CHANGE UP (ref 40–120)
ALT FLD-CCNC: 20 U/L — SIGNIFICANT CHANGE UP (ref 10–45)
ANION GAP SERPL CALC-SCNC: 13 MMOL/L — SIGNIFICANT CHANGE UP (ref 5–17)
APTT BLD: 27.4 SEC — LOW (ref 27.5–35.5)
AST SERPL-CCNC: 23 U/L — SIGNIFICANT CHANGE UP (ref 10–40)
BASOPHILS # BLD AUTO: 0.01 K/UL — SIGNIFICANT CHANGE UP (ref 0–0.2)
BASOPHILS NFR BLD AUTO: 0.1 % — SIGNIFICANT CHANGE UP (ref 0–2)
BILIRUB SERPL-MCNC: 1.9 MG/DL — HIGH (ref 0.2–1.2)
BUN SERPL-MCNC: 9 MG/DL — SIGNIFICANT CHANGE UP (ref 7–23)
CALCIUM SERPL-MCNC: 9.6 MG/DL — SIGNIFICANT CHANGE UP (ref 8.4–10.5)
CHLORIDE SERPL-SCNC: 106 MMOL/L — SIGNIFICANT CHANGE UP (ref 96–108)
CK MB BLD-MCNC: 1.5 % — SIGNIFICANT CHANGE UP (ref 0–3.5)
CK MB CFR SERPL CALC: 3.6 NG/ML — SIGNIFICANT CHANGE UP (ref 0–6.7)
CK SERPL-CCNC: 234 U/L — HIGH (ref 30–200)
CO2 SERPL-SCNC: 23 MMOL/L — SIGNIFICANT CHANGE UP (ref 22–31)
CREAT SERPL-MCNC: 0.76 MG/DL — SIGNIFICANT CHANGE UP (ref 0.5–1.3)
EOSINOPHIL # BLD AUTO: 0.09 K/UL — SIGNIFICANT CHANGE UP (ref 0–0.5)
EOSINOPHIL NFR BLD AUTO: 1.2 % — SIGNIFICANT CHANGE UP (ref 0–6)
FIBRINOGEN PPP-MCNC: 211 MG/DL — LOW (ref 242–442)
GAS PNL BLDA: SIGNIFICANT CHANGE UP
GLUCOSE BLDC GLUCOMTR-MCNC: 104 MG/DL — HIGH (ref 70–99)
GLUCOSE BLDC GLUCOMTR-MCNC: 106 MG/DL — HIGH (ref 70–99)
GLUCOSE BLDC GLUCOMTR-MCNC: 106 MG/DL — HIGH (ref 70–99)
GLUCOSE BLDC GLUCOMTR-MCNC: 110 MG/DL — HIGH (ref 70–99)
GLUCOSE BLDC GLUCOMTR-MCNC: 154 MG/DL — HIGH (ref 70–99)
GLUCOSE BLDC GLUCOMTR-MCNC: 184 MG/DL — HIGH (ref 70–99)
GLUCOSE BLDC GLUCOMTR-MCNC: 187 MG/DL — HIGH (ref 70–99)
GLUCOSE BLDC GLUCOMTR-MCNC: 215 MG/DL — HIGH (ref 70–99)
GLUCOSE BLDC GLUCOMTR-MCNC: 227 MG/DL — HIGH (ref 70–99)
GLUCOSE BLDC GLUCOMTR-MCNC: 237 MG/DL — HIGH (ref 70–99)
GLUCOSE BLDC GLUCOMTR-MCNC: 248 MG/DL — HIGH (ref 70–99)
GLUCOSE BLDC GLUCOMTR-MCNC: 263 MG/DL — HIGH (ref 70–99)
GLUCOSE BLDC GLUCOMTR-MCNC: 276 MG/DL — HIGH (ref 70–99)
GLUCOSE BLDC GLUCOMTR-MCNC: 285 MG/DL — HIGH (ref 70–99)
GLUCOSE BLDC GLUCOMTR-MCNC: 98 MG/DL — SIGNIFICANT CHANGE UP (ref 70–99)
GLUCOSE SERPL-MCNC: 128 MG/DL — HIGH (ref 70–99)
HCT VFR BLD CALC: 32.9 % — LOW (ref 39–50)
HGB BLD-MCNC: 11.2 G/DL — LOW (ref 13–17)
IMM GRANULOCYTES NFR BLD AUTO: 0.3 % — SIGNIFICANT CHANGE UP (ref 0–1.5)
INR BLD: 1.05 RATIO — SIGNIFICANT CHANGE UP (ref 0.88–1.16)
LYMPHOCYTES # BLD AUTO: 1.55 K/UL — SIGNIFICANT CHANGE UP (ref 1–3.3)
LYMPHOCYTES # BLD AUTO: 21.5 % — SIGNIFICANT CHANGE UP (ref 13–44)
MAGNESIUM SERPL-MCNC: 2.5 MG/DL — SIGNIFICANT CHANGE UP (ref 1.6–2.6)
MCHC RBC-ENTMCNC: 30.5 PG — SIGNIFICANT CHANGE UP (ref 27–34)
MCHC RBC-ENTMCNC: 34 GM/DL — SIGNIFICANT CHANGE UP (ref 32–36)
MCV RBC AUTO: 89.6 FL — SIGNIFICANT CHANGE UP (ref 80–100)
MONOCYTES # BLD AUTO: 0.38 K/UL — SIGNIFICANT CHANGE UP (ref 0–0.9)
MONOCYTES NFR BLD AUTO: 5.3 % — SIGNIFICANT CHANGE UP (ref 2–14)
NEUTROPHILS # BLD AUTO: 5.16 K/UL — SIGNIFICANT CHANGE UP (ref 1.8–7.4)
NEUTROPHILS NFR BLD AUTO: 71.6 % — SIGNIFICANT CHANGE UP (ref 43–77)
NRBC # BLD: 0 /100 WBCS — SIGNIFICANT CHANGE UP (ref 0–0)
PHOSPHATE SERPL-MCNC: 3.5 MG/DL — SIGNIFICANT CHANGE UP (ref 2.5–4.5)
PLATELET # BLD AUTO: 152 K/UL — SIGNIFICANT CHANGE UP (ref 150–400)
POTASSIUM SERPL-MCNC: 4.5 MMOL/L — SIGNIFICANT CHANGE UP (ref 3.5–5.3)
POTASSIUM SERPL-SCNC: 4.5 MMOL/L — SIGNIFICANT CHANGE UP (ref 3.5–5.3)
PROT SERPL-MCNC: 6.7 G/DL — SIGNIFICANT CHANGE UP (ref 6–8.3)
PROTHROM AB SERPL-ACNC: 12.5 SEC — SIGNIFICANT CHANGE UP (ref 10.6–13.6)
RBC # BLD: 3.67 M/UL — LOW (ref 4.2–5.8)
RBC # FLD: 13.3 % — SIGNIFICANT CHANGE UP (ref 10.3–14.5)
SODIUM SERPL-SCNC: 142 MMOL/L — SIGNIFICANT CHANGE UP (ref 135–145)
TROPONIN T, HIGH SENSITIVITY RESULT: 126 NG/L — HIGH (ref 0–51)
WBC # BLD: 7.21 K/UL — SIGNIFICANT CHANGE UP (ref 3.8–10.5)
WBC # FLD AUTO: 7.21 K/UL — SIGNIFICANT CHANGE UP (ref 3.8–10.5)

## 2020-08-19 PROCEDURE — 36620 INSERTION CATHETER ARTERY: CPT

## 2020-08-19 PROCEDURE — 71045 X-RAY EXAM CHEST 1 VIEW: CPT | Mod: 26

## 2020-08-19 PROCEDURE — 93010 ELECTROCARDIOGRAM REPORT: CPT | Mod: 77

## 2020-08-19 PROCEDURE — 99291 CRITICAL CARE FIRST HOUR: CPT

## 2020-08-19 PROCEDURE — 71045 X-RAY EXAM CHEST 1 VIEW: CPT | Mod: 26,77

## 2020-08-19 PROCEDURE — 93010 ELECTROCARDIOGRAM REPORT: CPT

## 2020-08-19 RX ORDER — ATORVASTATIN CALCIUM 80 MG/1
80 TABLET, FILM COATED ORAL AT BEDTIME
Refills: 0 | Status: DISCONTINUED | OUTPATIENT
Start: 2020-08-19 | End: 2020-08-25

## 2020-08-19 RX ORDER — HYDRALAZINE HCL 50 MG
5 TABLET ORAL ONCE
Refills: 0 | Status: COMPLETED | OUTPATIENT
Start: 2020-08-19 | End: 2020-08-19

## 2020-08-19 RX ORDER — OXYCODONE HYDROCHLORIDE 5 MG/1
10 TABLET ORAL EVERY 6 HOURS
Refills: 0 | Status: DISCONTINUED | OUTPATIENT
Start: 2020-08-19 | End: 2020-08-19

## 2020-08-19 RX ORDER — ALBUMIN HUMAN 25 %
250 VIAL (ML) INTRAVENOUS ONCE
Refills: 0 | Status: COMPLETED | OUTPATIENT
Start: 2020-08-19 | End: 2020-08-19

## 2020-08-19 RX ORDER — ENOXAPARIN SODIUM 100 MG/ML
40 INJECTION SUBCUTANEOUS DAILY
Refills: 0 | Status: DISCONTINUED | OUTPATIENT
Start: 2020-08-19 | End: 2020-08-25

## 2020-08-19 RX ORDER — LACTULOSE 10 G/15ML
20 SOLUTION ORAL
Refills: 0 | Status: DISCONTINUED | OUTPATIENT
Start: 2020-08-19 | End: 2020-08-19

## 2020-08-19 RX ORDER — METOPROLOL TARTRATE 50 MG
25 TABLET ORAL ONCE
Refills: 0 | Status: COMPLETED | OUTPATIENT
Start: 2020-08-19 | End: 2020-08-19

## 2020-08-19 RX ORDER — LABETALOL HCL 100 MG
10 TABLET ORAL ONCE
Refills: 0 | Status: COMPLETED | OUTPATIENT
Start: 2020-08-19 | End: 2020-08-19

## 2020-08-19 RX ORDER — PANTOPRAZOLE SODIUM 20 MG/1
40 TABLET, DELAYED RELEASE ORAL DAILY
Refills: 0 | Status: DISCONTINUED | OUTPATIENT
Start: 2020-08-19 | End: 2020-08-25

## 2020-08-19 RX ORDER — NICARDIPINE HYDROCHLORIDE 30 MG/1
5 CAPSULE, EXTENDED RELEASE ORAL
Qty: 40 | Refills: 0 | Status: DISCONTINUED | OUTPATIENT
Start: 2020-08-19 | End: 2020-08-20

## 2020-08-19 RX ORDER — POTASSIUM CHLORIDE 20 MEQ
10 PACKET (EA) ORAL
Refills: 0 | Status: COMPLETED | OUTPATIENT
Start: 2020-08-19 | End: 2020-08-19

## 2020-08-19 RX ORDER — METOPROLOL TARTRATE 50 MG
50 TABLET ORAL EVERY 12 HOURS
Refills: 0 | Status: DISCONTINUED | OUTPATIENT
Start: 2020-08-19 | End: 2020-08-19

## 2020-08-19 RX ORDER — ACETAMINOPHEN 500 MG
1000 TABLET ORAL ONCE
Refills: 0 | Status: COMPLETED | OUTPATIENT
Start: 2020-08-19 | End: 2020-08-19

## 2020-08-19 RX ORDER — METOPROLOL TARTRATE 50 MG
10 TABLET ORAL EVERY 6 HOURS
Refills: 0 | Status: DISCONTINUED | OUTPATIENT
Start: 2020-08-19 | End: 2020-08-20

## 2020-08-19 RX ORDER — FENTANYL CITRATE 50 UG/ML
25 INJECTION INTRAVENOUS ONCE
Refills: 0 | Status: DISCONTINUED | OUTPATIENT
Start: 2020-08-19 | End: 2020-08-19

## 2020-08-19 RX ORDER — HYDROMORPHONE HYDROCHLORIDE 2 MG/ML
0.5 INJECTION INTRAMUSCULAR; INTRAVENOUS; SUBCUTANEOUS ONCE
Refills: 0 | Status: DISCONTINUED | OUTPATIENT
Start: 2020-08-19 | End: 2020-08-19

## 2020-08-19 RX ORDER — OXYCODONE AND ACETAMINOPHEN 5; 325 MG/1; MG/1
1 TABLET ORAL EVERY 4 HOURS
Refills: 0 | Status: DISCONTINUED | OUTPATIENT
Start: 2020-08-19 | End: 2020-08-19

## 2020-08-19 RX ORDER — INSULIN GLARGINE 100 [IU]/ML
10 INJECTION, SOLUTION SUBCUTANEOUS AT BEDTIME
Refills: 0 | Status: DISCONTINUED | OUTPATIENT
Start: 2020-08-19 | End: 2020-08-19

## 2020-08-19 RX ORDER — OXYCODONE HYDROCHLORIDE 5 MG/1
5 TABLET ORAL EVERY 6 HOURS
Refills: 0 | Status: DISCONTINUED | OUTPATIENT
Start: 2020-08-19 | End: 2020-08-19

## 2020-08-19 RX ORDER — OXYCODONE AND ACETAMINOPHEN 5; 325 MG/1; MG/1
2 TABLET ORAL EVERY 6 HOURS
Refills: 0 | Status: DISCONTINUED | OUTPATIENT
Start: 2020-08-19 | End: 2020-08-19

## 2020-08-19 RX ORDER — SENNA PLUS 8.6 MG/1
2 TABLET ORAL ONCE
Refills: 0 | Status: COMPLETED | OUTPATIENT
Start: 2020-08-19 | End: 2020-08-19

## 2020-08-19 RX ORDER — NICARDIPINE HYDROCHLORIDE 30 MG/1
5 CAPSULE, EXTENDED RELEASE ORAL
Qty: 40 | Refills: 0 | Status: DISCONTINUED | OUTPATIENT
Start: 2020-08-19 | End: 2020-08-19

## 2020-08-19 RX ORDER — POLYETHYLENE GLYCOL 3350 17 G/17G
17 POWDER, FOR SOLUTION ORAL DAILY
Refills: 0 | Status: DISCONTINUED | OUTPATIENT
Start: 2020-08-19 | End: 2020-08-25

## 2020-08-19 RX ORDER — SIMETHICONE 80 MG/1
80 TABLET, CHEWABLE ORAL EVERY 6 HOURS
Refills: 0 | Status: DISCONTINUED | OUTPATIENT
Start: 2020-08-19 | End: 2020-08-25

## 2020-08-19 RX ORDER — METOPROLOL TARTRATE 50 MG
25 TABLET ORAL EVERY 12 HOURS
Refills: 0 | Status: DISCONTINUED | OUTPATIENT
Start: 2020-08-19 | End: 2020-08-19

## 2020-08-19 RX ORDER — ATORVASTATIN CALCIUM 80 MG/1
40 TABLET, FILM COATED ORAL AT BEDTIME
Refills: 0 | Status: DISCONTINUED | OUTPATIENT
Start: 2020-08-19 | End: 2020-08-19

## 2020-08-19 RX ORDER — LABETALOL HCL 100 MG
0.5 TABLET ORAL
Qty: 400 | Refills: 0 | Status: DISCONTINUED | OUTPATIENT
Start: 2020-08-19 | End: 2020-08-19

## 2020-08-19 RX ADMIN — Medication 5 MILLIGRAM(S): at 16:47

## 2020-08-19 RX ADMIN — Medication 100 MILLIEQUIVALENT(S): at 00:00

## 2020-08-19 RX ADMIN — Medication 400 MILLIGRAM(S): at 02:29

## 2020-08-19 RX ADMIN — Medication 100 MILLIGRAM(S): at 02:06

## 2020-08-19 RX ADMIN — OXYCODONE AND ACETAMINOPHEN 2 TABLET(S): 5; 325 TABLET ORAL at 14:09

## 2020-08-19 RX ADMIN — OXYCODONE AND ACETAMINOPHEN 2 TABLET(S): 5; 325 TABLET ORAL at 13:39

## 2020-08-19 RX ADMIN — ENOXAPARIN SODIUM 40 MILLIGRAM(S): 100 INJECTION SUBCUTANEOUS at 12:59

## 2020-08-19 RX ADMIN — Medication 100 MILLIGRAM(S): at 08:46

## 2020-08-19 RX ADMIN — MUPIROCIN 1 APPLICATION(S): 20 OINTMENT TOPICAL at 17:33

## 2020-08-19 RX ADMIN — SIMETHICONE 80 MILLIGRAM(S): 80 TABLET, CHEWABLE ORAL at 16:30

## 2020-08-19 RX ADMIN — LACTULOSE 20 GRAM(S): 10 SOLUTION ORAL at 15:20

## 2020-08-19 RX ADMIN — Medication 81 MILLIGRAM(S): at 08:30

## 2020-08-19 RX ADMIN — ATORVASTATIN CALCIUM 80 MILLIGRAM(S): 80 TABLET, FILM COATED ORAL at 21:21

## 2020-08-19 RX ADMIN — NICARDIPINE HYDROCHLORIDE 25 MG/HR: 30 CAPSULE, EXTENDED RELEASE ORAL at 21:29

## 2020-08-19 RX ADMIN — Medication 5 MILLIGRAM(S): at 16:34

## 2020-08-19 RX ADMIN — Medication 10 MILLIGRAM(S): at 13:00

## 2020-08-19 RX ADMIN — FENTANYL CITRATE 25 MICROGRAM(S): 50 INJECTION INTRAVENOUS at 14:45

## 2020-08-19 RX ADMIN — Medication 125 MILLILITER(S): at 08:30

## 2020-08-19 RX ADMIN — Medication 25 MILLIGRAM(S): at 10:14

## 2020-08-19 RX ADMIN — NICARDIPINE HYDROCHLORIDE 25 MG/HR: 30 CAPSULE, EXTENDED RELEASE ORAL at 17:17

## 2020-08-19 RX ADMIN — Medication 1000 MILLIGRAM(S): at 03:00

## 2020-08-19 RX ADMIN — Medication 50 MILLIEQUIVALENT(S): at 05:00

## 2020-08-19 RX ADMIN — SENNA PLUS 2 TABLET(S): 8.6 TABLET ORAL at 14:36

## 2020-08-19 RX ADMIN — HYDROMORPHONE HYDROCHLORIDE 0.5 MILLIGRAM(S): 2 INJECTION INTRAMUSCULAR; INTRAVENOUS; SUBCUTANEOUS at 11:20

## 2020-08-19 RX ADMIN — HYDROMORPHONE HYDROCHLORIDE 0.5 MILLIGRAM(S): 2 INJECTION INTRAMUSCULAR; INTRAVENOUS; SUBCUTANEOUS at 11:33

## 2020-08-19 RX ADMIN — Medication 400 MILLIGRAM(S): at 18:35

## 2020-08-19 RX ADMIN — Medication 10 MILLIGRAM(S): at 18:55

## 2020-08-19 RX ADMIN — MUPIROCIN 1 APPLICATION(S): 20 OINTMENT TOPICAL at 06:00

## 2020-08-19 RX ADMIN — Medication 125 MILLILITER(S): at 10:34

## 2020-08-19 RX ADMIN — POLYETHYLENE GLYCOL 3350 17 GRAM(S): 17 POWDER, FOR SOLUTION ORAL at 10:19

## 2020-08-19 RX ADMIN — Medication 10 MILLIGRAM(S): at 22:58

## 2020-08-19 RX ADMIN — CHLORHEXIDINE GLUCONATE 15 MILLILITER(S): 213 SOLUTION TOPICAL at 05:35

## 2020-08-19 RX ADMIN — FENTANYL CITRATE 25 MICROGRAM(S): 50 INJECTION INTRAVENOUS at 14:30

## 2020-08-19 RX ADMIN — Medication 1000 MILLIGRAM(S): at 19:05

## 2020-08-19 RX ADMIN — Medication 25 MILLIGRAM(S): at 15:05

## 2020-08-19 RX ADMIN — Medication 30 MG/MIN: at 19:48

## 2020-08-19 RX ADMIN — CHLORHEXIDINE GLUCONATE 1 APPLICATION(S): 213 SOLUTION TOPICAL at 05:36

## 2020-08-19 RX ADMIN — Medication 10 MILLIGRAM(S): at 21:21

## 2020-08-19 RX ADMIN — Medication 100 MILLIGRAM(S): at 17:19

## 2020-08-19 RX ADMIN — CLOPIDOGREL BISULFATE 75 MILLIGRAM(S): 75 TABLET, FILM COATED ORAL at 12:59

## 2020-08-19 RX ADMIN — Medication 10 MILLIGRAM(S): at 06:00

## 2020-08-19 RX ADMIN — Medication 50 MILLIGRAM(S): at 17:33

## 2020-08-19 NOTE — PHYSICAL THERAPY INITIAL EVALUATION ADULT - GENERAL OBSERVATIONS, REHAB EVAL
Pt rec'd seated in OOB chair in NAD, +chest tube, +external pacer, +O2 via NC, +srinivasan, & ICU monitoring

## 2020-08-19 NOTE — PROGRESS NOTE ADULT - SUBJECTIVE AND OBJECTIVE BOX
TARUN CERVANTES  MRN-[id number]  Patient is a 50y old  Male who presents with a chief complaint of PREOP CABG (18 Aug 2020 23:40)    HPI:  50 M bilingual Yakut/English speaking, with PMHx DM2 ([facility], Hgba1c unknown, controlled ), HTN, HLD, NSTEMI 19, CAD-KHARI to 60% pCX and 100% OM2, presents with chest pain. Pt reports intermittent chest pain for the past 12 days. Initially came on while he was at rest. Described as sharp, 4/10, left sided w/o radiation, lasted for several hours w/o associated SOB. Since then he has noticed the pain more with exertion- says he is unable to walk more than 10 feet without stopping due to the pain. He has been compliant with all of his medication including DAPT, atorva, bb, ACEi. Denies any orthopnea, PND, LE edema, palpitations. Pt was referred for Cardiac Cath by DR Blakely from the [facility]iology Clinic. (17 Aug 2020 08:32)      Surgery/Hospital course:   CABG x3     REVIEW OF SYSTEMS:  Gen: No fever  EYES/ENT: No visual changes;  No vertigo or throat pain   NECK: No pain   RES:  No shortness of breath or Cough  CARD: No chest pain   GI: No abdominal pain  : No dysuria  NEURO: No weakness  SKIN: No itching, rashes     Vital Signs Last 24 Hrs  T(C): 37.6 (19 Aug 2020 07:00), Max: 37.6 (19 Aug 2020 07:00)  T(F): 99.7 (19 Aug 2020 07:00), Max: 99.7 (19 Aug 2020 07:00)  HR: 75 (19 Aug 2020 07:30) (54 - 97)  BP: 100/65 (19 Aug 2020 07:30) (90/58 - 148/89)  BP(mean): 78 (19 Aug 2020 07:30) (68 - 112)  RR: 22 (19 Aug 2020 07:30) (10 - 38)  SpO2: 99% (19 Aug 2020 07:30) (97% - 100%)    ============================I/O===========================   I&O's Detail    18 Aug 2020 07:01  -  19 Aug 2020 07:00  --------------------------------------------------------  IN:    Albumin 5%  - 250 mL: 1250 mL    aminocaproic acid Infusion: 250 mL    aminocaproic acid Infusion: 250 mL    Cryoprecipitate: 75 mL    dexmedetomidine Infusion: 186 mL    insulin regular Infusion: 21 mL    IV PiggyBack: 750 mL    Lactated Ringers IV Bolus: 1000 mL    niCARdipine Infusion: 95 mL    norepinephrine Infusion: 6.9 mL    Packed Red Blood Cells: 700 mL    Plasma: 1000 mL    Platelets - Single Donor: 225 mL    propofol Infusion: 54.2 mL    sodium chloride 0.9%.: 180 mL  Total IN: 6043.1 mL    OUT:    Chest Tube: 770 mL    Chest Tube: 110 mL    Indwelling Catheter - Urethral: 5810 mL  Total OUT: 6690 mL    Total NET: -646.9 mL        ============================ LABS =========================                        11.2   7.21  )-----------( 152      ( 19 Aug 2020 01:47 )             32.9     08-    142  |  106  |  9   ----------------------------<  128<H>  4.5   |  23  |  0.76    Ca    9.6      19 Aug 2020 01:48  Phos  3.5     -  Mg     2.5         TPro  6.7  /  Alb  4.8  /  TBili  1.9<H>  /  DBili  x   /  AST  23  /  ALT  20  /  AlkPhos  44  08-    LIVER FUNCTIONS - ( 19 Aug 2020 01:48 )  Alb: 4.8 g/dL / Pro: 6.7 g/dL / ALK PHOS: 44 U/L / ALT: 20 U/L / AST: 23 U/L / GGT: x           PT/INR - ( 19 Aug 2020 01:47 )   PT: 12.5 sec;   INR: 1.05 ratio         PTT - ( 19 Aug 2020 01:47 )  PTT:27.4 sec  ABG - ( 19 Aug 2020 06:39 )  pH, Arterial: 7.33  pH, Blood: x     /  pCO2: 43    /  pO2: 119   / HCO3: 22    / Base Excess: -2.9  /  SaO2: 98                Urinalysis Basic - ( 17 Aug 2020 15:16 )    Color: Light Yellow / Appearance: Clear / S.017 / pH: x  Gluc: x / Ketone: Negative  / Bili: Negative / Urobili: Negative   Blood: x / Protein: Negative / Nitrite: Negative   Leuk Esterase: Negative / RBC: x / WBC x   Sq Epi: x / Non Sq Epi: x / Bacteria: x      ======================Microbiology/Radadiology=================  CXR: Reviewed  Echo: Reviewed   ======================================================  PAST MEDICAL & SURGICAL HISTORY:  MI (myocardial infarction)  CAD (coronary artery disease)  HTN (hypertension)  Diabetes  History of coronary artery stent placement    ====================ASSESSMENT ==============  CAD s/p CABG x3 on    Diabetes mellitus       Plan:  ====================== NEUROLOGY=====================  Sedated with Precedex drip   Meperidine for analgesia     dexMEDEtomidine Infusion 0.5 MICROgram(s)/kG/Hr (7.49 mL/Hr) IV Continuous <Continuous>  meperidine     Injectable 25 milliGRAM(s) IV Push once    ==================== RESPIRATORY======================  Extubated this AM, continue supplemental O2 via NC, SpO2 %  Encourage incentive spirometry, continue pulse ox monitoring, follow ABGs     ====================CARDIOVASCULAR==================  CAD s/p CABGx3 on    Hemodynamically stable, not actively on any pressors  Continue invasive hemodynamic monitoring   ASA/Statin/Plavix for graft patency     aspirin enteric coated 81 milliGRAM(s) Oral daily  atorvastatin 80 milliGRAM(s) Oral at bedtime  clopidogrel Tablet 75 milliGRAM(s) Oral daily    ===================HEMATOLOGIC/ONC ===================  Monitor H&H, transfuse prn   Amicar drip for post op bleeding     aminocaproic acid Infusion 1 Gm/Hr (50 mL/Hr) IV Continuous <Continuous>    ===================== RENAL =========================  Continue monitoring urine output, I&Os, BUN/Cr    ==================== GASTROINTESTINAL===================  NPO, advance diet as tolerated   Reglan for gut motility     dextrose 5%. 1000 milliLiter(s) (15 mL/Hr) IV Continuous <Continuous>  GI prophylaxis, pantoprazole  Injectable 40 milliGRAM(s) IV Push daily  potassium chloride  10 mEq/50 mL IVPB 10 milliEquivalent(s) IV Intermittent every 1 hour  sodium chloride 0.9%. 1000 milliLiter(s) (10 mL/Hr) IV Continuous <Continuous>  metoclopramide Injectable 10 milliGRAM(s) IV Push every 8 hours    =======================    ENDOCRINE  =====================  Hx of DM, glucose control with insulin drip     dextrose 50% Injectable 50 milliLiter(s) IV Push every 15 minutes  dextrose 50% Injectable 25 milliLiter(s) IV Push every 15 minutes  insulin regular Infusion 3 Unit(s)/Hr (3 mL/Hr) IV Continuous <Continuous>    ========================INFECTIOUS DISEASE================  Continue cefuroxime for perioperative coverage     cefuroxime  IVPB 1500 milliGRAM(s) IV Intermittent every 8 hours      UPDATES:  TIME:     Patient requires continuous monitoring with bedside rhythm monitoring, pulse ox monitoring, and intermittent blood gas analysis. Care plan discussed with ICU care team. Patient remained critical and at risk for life threatening decompensation.    By signing my name below, I, Angelina Flores, attest that this documentation has been prepared under the direction and in the presence of Deacon Snow MD   Electronically signed: Shawanda Villeda, 20 @ 07:41    I, Deacon Snow, personally performed the services described in this documentation. all medical record entries made by the scribe were at my direction and in my presence. I have reviewed the chart and agree that the record reflects my personal performance and is accurate and complete  Electronically signed: Deacon Snow MD TARUN CERVANTES  MRN-[id number]  Patient is a 50y old  Male who presents with a chief complaint of PREOP CABG (18 Aug 2020 23:40)    HPI:  50 M bilingual Albanian/English speaking, with PMHx DM2 ([facility], Hgba1c unknown, controlled ), HTN, HLD, NSTEMI 19, CAD-KHARI to 60% pCX and 100% OM2, presents with chest pain. Pt reports intermittent chest pain for the past 12 days. Initially came on while he was at rest. Described as sharp, 4/10, left sided w/o radiation, lasted for several hours w/o associated SOB. Since then he has noticed the pain more with exertion- says he is unable to walk more than 10 feet without stopping due to the pain. He has been compliant with all of his medication including DAPT, atorva, bb, ACEi. Denies any orthopnea, PND, LE edema, palpitations. Pt was referred for Cardiac Cath by DR Blakely from the [facility]iology Clinic. (17 Aug 2020 08:32)      Surgery/Hospital course:   CABG x3     REVIEW OF SYSTEMS:  Gen: No fever  EYES/ENT: No visual changes;  No vertigo or throat pain   NECK: No pain   RES:  No shortness of breath or Cough  CARD: No chest pain   GI: No abdominal pain  : No dysuria  NEURO: No weakness  SKIN: No itching, rashes     Vital Signs Last 24 Hrs  T(C): 37.6 (19 Aug 2020 07:00), Max: 37.6 (19 Aug 2020 07:00)  T(F): 99.7 (19 Aug 2020 07:00), Max: 99.7 (19 Aug 2020 07:00)  HR: 75 (19 Aug 2020 07:30) (54 - 97)  BP: 100/65 (19 Aug 2020 07:30) (90/58 - 148/89)  BP(mean): 78 (19 Aug 2020 07:30) (68 - 112)  RR: 22 (19 Aug 2020 07:30) (10 - 38)  SpO2: 99% (19 Aug 2020 07:30) (97% - 100%)    ============================I/O===========================   I&O's Detail    18 Aug 2020 07:01  -  19 Aug 2020 07:00  --------------------------------------------------------  IN:    Albumin 5%  - 250 mL: 1250 mL    aminocaproic acid Infusion: 250 mL    aminocaproic acid Infusion: 250 mL    Cryoprecipitate: 75 mL    dexmedetomidine Infusion: 186 mL    insulin regular Infusion: 21 mL    IV PiggyBack: 750 mL    Lactated Ringers IV Bolus: 1000 mL    niCARdipine Infusion: 95 mL    norepinephrine Infusion: 6.9 mL    Packed Red Blood Cells: 700 mL    Plasma: 1000 mL    Platelets - Single Donor: 225 mL    propofol Infusion: 54.2 mL    sodium chloride 0.9%.: 180 mL  Total IN: 6043.1 mL    OUT:    Chest Tube: 770 mL    Chest Tube: 110 mL    Indwelling Catheter - Urethral: 5810 mL  Total OUT: 6690 mL    Total NET: -646.9 mL        ============================ LABS =========================                        11.2   7.21  )-----------( 152      ( 19 Aug 2020 01:47 )             32.9     08-    142  |  106  |  9   ----------------------------<  128<H>  4.5   |  23  |  0.76    Ca    9.6      19 Aug 2020 01:48  Phos  3.5     -  Mg     2.5         TPro  6.7  /  Alb  4.8  /  TBili  1.9<H>  /  DBili  x   /  AST  23  /  ALT  20  /  AlkPhos  44  08-    LIVER FUNCTIONS - ( 19 Aug 2020 01:48 )  Alb: 4.8 g/dL / Pro: 6.7 g/dL / ALK PHOS: 44 U/L / ALT: 20 U/L / AST: 23 U/L / GGT: x           PT/INR - ( 19 Aug 2020 01:47 )   PT: 12.5 sec;   INR: 1.05 ratio         PTT - ( 19 Aug 2020 01:47 )  PTT:27.4 sec  ABG - ( 19 Aug 2020 06:39 )  pH, Arterial: 7.33  pH, Blood: x     /  pCO2: 43    /  pO2: 119   / HCO3: 22    / Base Excess: -2.9  /  SaO2: 98                Urinalysis Basic - ( 17 Aug 2020 15:16 )    Color: Light Yellow / Appearance: Clear / S.017 / pH: x  Gluc: x / Ketone: Negative  / Bili: Negative / Urobili: Negative   Blood: x / Protein: Negative / Nitrite: Negative   Leuk Esterase: Negative / RBC: x / WBC x   Sq Epi: x / Non Sq Epi: x / Bacteria: x      ======================Microbiology/Radadiology=================  CXR: Reviewed  Echo: Reviewed   ======================================================  PAST MEDICAL & SURGICAL HISTORY:  MI (myocardial infarction)  CAD (coronary artery disease)  HTN (hypertension)  Diabetes  History of coronary artery stent placement    ====================ASSESSMENT ==============  CAD s/p CABG x3 on    Diabetes mellitus       Plan:  ====================== NEUROLOGY=====================  Sedated with Precedex drip   Meperidine for analgesia     dexMEDEtomidine Infusion 0.5 MICROgram(s)/kG/Hr (7.49 mL/Hr) IV Continuous <Continuous>  meperidine     Injectable 25 milliGRAM(s) IV Push once    ==================== RESPIRATORY======================  Extubated this AM, continue supplemental O2 via NC, SpO2 %  Encourage incentive spirometry, continue pulse ox monitoring, follow ABGs     ====================CARDIOVASCULAR==================  CAD s/p CABGx3 on    Hemodynamically stable, not actively on any pressors  Continue invasive hemodynamic monitoring   ASA/Statin/Plavix for graft patency     aspirin enteric coated 81 milliGRAM(s) Oral daily  atorvastatin 80 milliGRAM(s) Oral at bedtime  clopidogrel Tablet 75 milliGRAM(s) Oral daily    ===================HEMATOLOGIC/ONC ===================  Monitor H&H, transfuse prn   Amicar drip for post op bleeding     aminocaproic acid Infusion 1 Gm/Hr (50 mL/Hr) IV Continuous <Continuous>    ===================== RENAL =========================  Continue monitoring urine output, I&Os, BUN/Cr    ==================== GASTROINTESTINAL===================  NPO, advance diet as tolerated   Reglan for gut motility     dextrose 5%. 1000 milliLiter(s) (15 mL/Hr) IV Continuous <Continuous>  GI prophylaxis, pantoprazole  Injectable 40 milliGRAM(s) IV Push daily  potassium chloride  10 mEq/50 mL IVPB 10 milliEquivalent(s) IV Intermittent every 1 hour  sodium chloride 0.9%. 1000 milliLiter(s) (10 mL/Hr) IV Continuous <Continuous>  metoclopramide Injectable 10 milliGRAM(s) IV Push every 8 hours    =======================    ENDOCRINE  =====================  Hx of DM, glucose control with insulin drip     dextrose 50% Injectable 50 milliLiter(s) IV Push every 15 minutes  dextrose 50% Injectable 25 milliLiter(s) IV Push every 15 minutes  insulin regular Infusion 3 Unit(s)/Hr (3 mL/Hr) IV Continuous <Continuous>    ========================INFECTIOUS DISEASE================  Continue cefuroxime for perioperative coverage     cefuroxime  IVPB 1500 milliGRAM(s) IV Intermittent every 8 hours    UPDATES:  S/p CABG, uncomplicated  ASA, Plavix, Lipitor; add Lopressor  Extubated this AM; O2 sats high 90s on nasal cannula  DASH/diabetic diet  Normal renal function with adequate urine output  H/H low but acceptable  Afebrile, no antibiotics  Sugars controlled    TIME: 45 minutes    Patient requires continuous monitoring with bedside rhythm monitoring, pulse ox monitoring, and intermittent blood gas analysis. Care plan discussed with ICU care team. Patient remained critical and at risk for life threatening decompensation.    By signing my name below, I, Angelina Flores, attest that this documentation has been prepared under the direction and in the presence of Deacon Snow MD   Electronically signed: Shawanda Villeda, 20 @ 07:41    I, Deacon Snow, personally performed the services described in this documentation. all medical record entries made by the scribe were at my direction and in my presence. I have reviewed the chart and agree that the record reflects my personal performance and is accurate and complete  Electronically signed: Deacon Snow MD TARUN CERVANTES  MRN-[id number]  Patient is a 50y old  Male who presents with a chief complaint of PREOP CABG (18 Aug 2020 23:40)    HPI:  50 M bilingual Thai/English speaking, with PMHx DM2 ([facility], Hgba1c unknown, controlled ), HTN, HLD, NSTEMI 19, CAD-KHARI to 60% pCX and 100% OM2, presents with chest pain. Pt reports intermittent chest pain for the past 12 days. Initially came on while he was at rest. Described as sharp, 4/10, left sided w/o radiation, lasted for several hours w/o associated SOB. Since then he has noticed the pain more with exertion- says he is unable to walk more than 10 feet without stopping due to the pain. He has been compliant with all of his medication including DAPT, atorva, bb, ACEi. Denies any orthopnea, PND, LE edema, palpitations. Pt was referred for Cardiac Cath by DR Blakely from the [facility]. (17 Aug 2020 08:32)      Surgery/Hospital course:   CABG x3     REVIEW OF SYSTEMS:  Gen: No fever  EYES/ENT: No visual changes;  No vertigo or throat pain   NECK: No pain   RES:  No shortness of breath or Cough  CARD: No chest pain   GI: No abdominal pain  : No dysuria  NEURO: No weakness  SKIN: No itching, rashes     Vital Signs Last 24 Hrs  T(C): 37.6 (19 Aug 2020 07:00), Max: 37.6 (19 Aug 2020 07:00)  T(F): 99.7 (19 Aug 2020 07:00), Max: 99.7 (19 Aug 2020 07:00)  HR: 75 (19 Aug 2020 07:30) (54 - 97)  BP: 100/65 (19 Aug 2020 07:30) (90/58 - 148/89)  BP(mean): 78 (19 Aug 2020 07:30) (68 - 112)  RR: 22 (19 Aug 2020 07:30) (10 - 38)  SpO2: 99% (19 Aug 2020 07:30) (97% - 100%)    ============================I/O===========================   I&O's Detail    18 Aug 2020 07:01  -  19 Aug 2020 07:00  --------------------------------------------------------  IN:    Albumin 5%  - 250 mL: 1250 mL    aminocaproic acid Infusion: 250 mL    aminocaproic acid Infusion: 250 mL    Cryoprecipitate: 75 mL    dexmedetomidine Infusion: 186 mL    insulin regular Infusion: 21 mL    IV PiggyBack: 750 mL    Lactated Ringers IV Bolus: 1000 mL    niCARdipine Infusion: 95 mL    norepinephrine Infusion: 6.9 mL    Packed Red Blood Cells: 700 mL    Plasma: 1000 mL    Platelets - Single Donor: 225 mL    propofol Infusion: 54.2 mL    sodium chloride 0.9%.: 180 mL  Total IN: 6043.1 mL    OUT:    Chest Tube: 770 mL    Chest Tube: 110 mL    Indwelling Catheter - Urethral: 5810 mL  Total OUT: 6690 mL    Total NET: -646.9 mL        ============================ LABS =========================                        11.2   7.21  )-----------( 152      ( 19 Aug 2020 01:47 )             32.9     08-    142  |  106  |  9   ----------------------------<  128<H>  4.5   |  23  |  0.76    Ca    9.6      19 Aug 2020 01:48  Phos  3.5     -  Mg     2.5         TPro  6.7  /  Alb  4.8  /  TBili  1.9<H>  /  DBili  x   /  AST  23  /  ALT  20  /  AlkPhos  44  08-    LIVER FUNCTIONS - ( 19 Aug 2020 01:48 )  Alb: 4.8 g/dL / Pro: 6.7 g/dL / ALK PHOS: 44 U/L / ALT: 20 U/L / AST: 23 U/L / GGT: x           PT/INR - ( 19 Aug 2020 01:47 )   PT: 12.5 sec;   INR: 1.05 ratio         PTT - ( 19 Aug 2020 01:47 )  PTT:27.4 sec  ABG - ( 19 Aug 2020 06:39 )  pH, Arterial: 7.33  pH, Blood: x     /  pCO2: 43    /  pO2: 119   / HCO3: 22    / Base Excess: -2.9  /  SaO2: 98                Urinalysis Basic - ( 17 Aug 2020 15:16 )    Color: Light Yellow / Appearance: Clear / S.017 / pH: x  Gluc: x / Ketone: Negative  / Bili: Negative / Urobili: Negative   Blood: x / Protein: Negative / Nitrite: Negative   Leuk Esterase: Negative / RBC: x / WBC x   Sq Epi: x / Non Sq Epi: x / Bacteria: x      ======================Microbiology/Radadiology=================  CXR: Reviewed  Echo: Reviewed   ======================================================  PAST MEDICAL & SURGICAL HISTORY:  MI (myocardial infarction)  CAD (coronary artery disease)  HTN (hypertension)  Diabetes  History of coronary artery stent placement    ====================ASSESSMENT ==============  CAD s/p CABG x3 on    Diabetes mellitus       Plan:  ====================== NEUROLOGY=====================  Sedated with Precedex drip   Meperidine for analgesia     dexMEDEtomidine Infusion 0.5 MICROgram(s)/kG/Hr (7.49 mL/Hr) IV Continuous <Continuous>  meperidine     Injectable 25 milliGRAM(s) IV Push once    ==================== RESPIRATORY======================  Extubated this AM, continue supplemental O2 via NC, SpO2 %  Encourage incentive spirometry, continue pulse ox monitoring, follow ABGs     ====================CARDIOVASCULAR==================  CAD s/p CABGx3 on    Hemodynamically stable, not actively on any pressors  Continue invasive hemodynamic monitoring   ASA/Statin/Plavix for graft patency     aspirin enteric coated 81 milliGRAM(s) Oral daily  atorvastatin 80 milliGRAM(s) Oral at bedtime  clopidogrel Tablet 75 milliGRAM(s) Oral daily    ===================HEMATOLOGIC/ONC ===================  Monitor H&H, transfuse prn   Amicar drip for post op bleeding     aminocaproic acid Infusion 1 Gm/Hr (50 mL/Hr) IV Continuous <Continuous>    ===================== RENAL =========================  Continue monitoring urine output, I&Os, BUN/Cr    ==================== GASTROINTESTINAL===================  NPO, advance diet as tolerated   Reglan for gut motility     dextrose 5%. 1000 milliLiter(s) (15 mL/Hr) IV Continuous <Continuous>  GI prophylaxis, pantoprazole  Injectable 40 milliGRAM(s) IV Push daily  potassium chloride  10 mEq/50 mL IVPB 10 milliEquivalent(s) IV Intermittent every 1 hour  sodium chloride 0.9%. 1000 milliLiter(s) (10 mL/Hr) IV Continuous <Continuous>  metoclopramide Injectable 10 milliGRAM(s) IV Push every 8 hours    =======================    ENDOCRINE  =====================  Hx of DM, glucose control with insulin drip     dextrose 50% Injectable 50 milliLiter(s) IV Push every 15 minutes  dextrose 50% Injectable 25 milliLiter(s) IV Push every 15 minutes  insulin regular Infusion 3 Unit(s)/Hr (3 mL/Hr) IV Continuous <Continuous>    ========================INFECTIOUS DISEASE================  Continue cefuroxime for perioperative coverage     cefuroxime  IVPB 1500 milliGRAM(s) IV Intermittent every 8 hours    UPDATES:  S/p CABG, uncomplicated  ASA, Plavix, Lipitor; add Lopressor  Extubated this AM; O2 sats high 90s on nasal cannula  DASH/diabetic diet  Normal renal function with adequate urine output  H/H low but acceptable  Afebrile, no antibiotics  Sugars controlled on insulin drip    TIME: 45 minutes    Patient requires continuous monitoring with bedside rhythm monitoring, pulse ox monitoring, and intermittent blood gas analysis. Care plan discussed with ICU care team. Patient remained critical and at risk for life threatening decompensation.    By signing my name below, I, Angelina Flores, attest that this documentation has been prepared under the direction and in the presence of Deacon Snow MD   Electronically signed: Shawanda Villeda, 20 @ 07:41    I, Deacon Snow, personally performed the services described in this documentation. all medical record entries made by the debbieibtimmy were at my direction and in my presence. I have reviewed the chart and agree that the record reflects my personal performance and is accurate and complete  Electronically signed: Deacon Snow MD

## 2020-08-19 NOTE — PHYSICAL THERAPY INITIAL EVALUATION ADULT - PERTINENT HX OF CURRENT PROBLEM, REHAB EVAL
Pt is a 51 y/o M with PMH DM2, HTN, NSTEMI 11/16/19, CAD-KHARI to 60% pCX and 100% OM2. Presented with chest pain. Reported intermittent chest pain for the past 12 days. Initially, came on while he was at rest. He described it as sharp, 4/10, & left sided w/o radiation. It lasted for several hours w/o associated SOB. Since then he has noticed the pain more with exertion- says he is unable to walk more than 10 feet without stopping due to the pain. CONT....

## 2020-08-19 NOTE — AIRWAY REMOVAL NOTE  ADULT & PEDS - ARTIFICAL AIRWAY REMOVAL COMMENTS
Written order for extubation verified. The patient was identified by full name and birth date compared to the identification band. Present during the procedure was LUMA MATIAS

## 2020-08-19 NOTE — PHYSICAL THERAPY INITIAL EVALUATION ADULT - PRECAUTIONS/LIMITATIONS, REHAB EVAL
Pt was referred for Cardiac Cath from the Cardiology Clinic. Pt now s/p CABG x3 on 8/18./sternal precautions

## 2020-08-19 NOTE — PHYSICAL THERAPY INITIAL EVALUATION ADULT - ADDITIONAL COMMENTS
Pt reports living alone in an apartment. There are no steps to enter and none once inside. PTA, pt was independent with all functional mobility and ADL's without the use of an AD.

## 2020-08-19 NOTE — PROCEDURE NOTE - NSPROCDETAILS_GEN_ALL_CORE
hemostasis with direct pressure, dressing applied/Seldinger technique/positive blood return obtained via catheter/sutured in place/location identified, draped/prepped, sterile technique used, needle inserted/introduced/connected to a pressurized flush line/all materials/supplies accounted for at end of procedure

## 2020-08-20 LAB
ALBUMIN SERPL ELPH-MCNC: 4.7 G/DL — SIGNIFICANT CHANGE UP (ref 3.3–5)
ALP SERPL-CCNC: 44 U/L — SIGNIFICANT CHANGE UP (ref 40–120)
ALT FLD-CCNC: 18 U/L — SIGNIFICANT CHANGE UP (ref 10–45)
ANION GAP SERPL CALC-SCNC: 12 MMOL/L — SIGNIFICANT CHANGE UP (ref 5–17)
AST SERPL-CCNC: 20 U/L — SIGNIFICANT CHANGE UP (ref 10–40)
BILIRUB SERPL-MCNC: 1.6 MG/DL — HIGH (ref 0.2–1.2)
BUN SERPL-MCNC: 13 MG/DL — SIGNIFICANT CHANGE UP (ref 7–23)
CALCIUM SERPL-MCNC: 9.4 MG/DL — SIGNIFICANT CHANGE UP (ref 8.4–10.5)
CHLORIDE SERPL-SCNC: 104 MMOL/L — SIGNIFICANT CHANGE UP (ref 96–108)
CO2 SERPL-SCNC: 22 MMOL/L — SIGNIFICANT CHANGE UP (ref 22–31)
CREAT SERPL-MCNC: 0.66 MG/DL — SIGNIFICANT CHANGE UP (ref 0.5–1.3)
GAS PNL BLDA: SIGNIFICANT CHANGE UP
GLUCOSE BLDC GLUCOMTR-MCNC: 107 MG/DL — HIGH (ref 70–99)
GLUCOSE BLDC GLUCOMTR-MCNC: 108 MG/DL — HIGH (ref 70–99)
GLUCOSE BLDC GLUCOMTR-MCNC: 110 MG/DL — HIGH (ref 70–99)
GLUCOSE BLDC GLUCOMTR-MCNC: 112 MG/DL — HIGH (ref 70–99)
GLUCOSE BLDC GLUCOMTR-MCNC: 116 MG/DL — HIGH (ref 70–99)
GLUCOSE BLDC GLUCOMTR-MCNC: 118 MG/DL — HIGH (ref 70–99)
GLUCOSE BLDC GLUCOMTR-MCNC: 119 MG/DL — HIGH (ref 70–99)
GLUCOSE BLDC GLUCOMTR-MCNC: 125 MG/DL — HIGH (ref 70–99)
GLUCOSE BLDC GLUCOMTR-MCNC: 133 MG/DL — HIGH (ref 70–99)
GLUCOSE BLDC GLUCOMTR-MCNC: 137 MG/DL — HIGH (ref 70–99)
GLUCOSE BLDC GLUCOMTR-MCNC: 141 MG/DL — HIGH (ref 70–99)
GLUCOSE BLDC GLUCOMTR-MCNC: 143 MG/DL — HIGH (ref 70–99)
GLUCOSE BLDC GLUCOMTR-MCNC: 150 MG/DL — HIGH (ref 70–99)
GLUCOSE BLDC GLUCOMTR-MCNC: 150 MG/DL — HIGH (ref 70–99)
GLUCOSE BLDC GLUCOMTR-MCNC: 152 MG/DL — HIGH (ref 70–99)
GLUCOSE BLDC GLUCOMTR-MCNC: 160 MG/DL — HIGH (ref 70–99)
GLUCOSE BLDC GLUCOMTR-MCNC: 167 MG/DL — HIGH (ref 70–99)
GLUCOSE BLDC GLUCOMTR-MCNC: 80 MG/DL — SIGNIFICANT CHANGE UP (ref 70–99)
GLUCOSE BLDC GLUCOMTR-MCNC: 87 MG/DL — SIGNIFICANT CHANGE UP (ref 70–99)
GLUCOSE BLDC GLUCOMTR-MCNC: 96 MG/DL — SIGNIFICANT CHANGE UP (ref 70–99)
GLUCOSE BLDC GLUCOMTR-MCNC: 99 MG/DL — SIGNIFICANT CHANGE UP (ref 70–99)
GLUCOSE SERPL-MCNC: 135 MG/DL — HIGH (ref 70–99)
HCT VFR BLD CALC: 31 % — LOW (ref 39–50)
HGB BLD-MCNC: 10.4 G/DL — LOW (ref 13–17)
MAGNESIUM SERPL-MCNC: 2.1 MG/DL — SIGNIFICANT CHANGE UP (ref 1.6–2.6)
MCHC RBC-ENTMCNC: 30.1 PG — SIGNIFICANT CHANGE UP (ref 27–34)
MCHC RBC-ENTMCNC: 33.5 GM/DL — SIGNIFICANT CHANGE UP (ref 32–36)
MCV RBC AUTO: 89.6 FL — SIGNIFICANT CHANGE UP (ref 80–100)
NRBC # BLD: 0 /100 WBCS — SIGNIFICANT CHANGE UP (ref 0–0)
PHOSPHATE SERPL-MCNC: 1.4 MG/DL — LOW (ref 2.5–4.5)
PLATELET # BLD AUTO: 127 K/UL — LOW (ref 150–400)
POTASSIUM BLDA-SCNC: 3.3 MMOL/L — LOW (ref 3.5–5.3)
POTASSIUM BLDA-SCNC: 4 MMOL/L — SIGNIFICANT CHANGE UP (ref 3.5–5.3)
POTASSIUM SERPL-MCNC: 3.4 MMOL/L — LOW (ref 3.5–5.3)
POTASSIUM SERPL-SCNC: 3.4 MMOL/L — LOW (ref 3.5–5.3)
PROT SERPL-MCNC: 6.8 G/DL — SIGNIFICANT CHANGE UP (ref 6–8.3)
RBC # BLD: 3.46 M/UL — LOW (ref 4.2–5.8)
RBC # FLD: 13.9 % — SIGNIFICANT CHANGE UP (ref 10.3–14.5)
SODIUM SERPL-SCNC: 138 MMOL/L — SIGNIFICANT CHANGE UP (ref 135–145)
WBC # BLD: 13.03 K/UL — HIGH (ref 3.8–10.5)
WBC # FLD AUTO: 13.03 K/UL — HIGH (ref 3.8–10.5)

## 2020-08-20 PROCEDURE — 93010 ELECTROCARDIOGRAM REPORT: CPT

## 2020-08-20 PROCEDURE — 71045 X-RAY EXAM CHEST 1 VIEW: CPT | Mod: 26

## 2020-08-20 PROCEDURE — 99233 SBSQ HOSP IP/OBS HIGH 50: CPT

## 2020-08-20 RX ORDER — LACTULOSE 10 G/15ML
10 SOLUTION ORAL EVERY 6 HOURS
Refills: 0 | Status: DISCONTINUED | OUTPATIENT
Start: 2020-08-20 | End: 2020-08-21

## 2020-08-20 RX ORDER — POTASSIUM CHLORIDE 20 MEQ
10 PACKET (EA) ORAL
Refills: 0 | Status: COMPLETED | OUTPATIENT
Start: 2020-08-20 | End: 2020-08-20

## 2020-08-20 RX ORDER — MAGNESIUM SULFATE 500 MG/ML
1 VIAL (ML) INJECTION ONCE
Refills: 0 | Status: COMPLETED | OUTPATIENT
Start: 2020-08-20 | End: 2020-08-20

## 2020-08-20 RX ORDER — METOPROLOL TARTRATE 50 MG
50 TABLET ORAL EVERY 12 HOURS
Refills: 0 | Status: DISCONTINUED | OUTPATIENT
Start: 2020-08-20 | End: 2020-08-20

## 2020-08-20 RX ORDER — METOPROLOL TARTRATE 50 MG
50 TABLET ORAL EVERY 8 HOURS
Refills: 0 | Status: DISCONTINUED | OUTPATIENT
Start: 2020-08-20 | End: 2020-08-25

## 2020-08-20 RX ORDER — ACETAMINOPHEN 500 MG
1000 TABLET ORAL ONCE
Refills: 0 | Status: COMPLETED | OUTPATIENT
Start: 2020-08-20 | End: 2020-08-20

## 2020-08-20 RX ORDER — LABETALOL HCL 100 MG
20 TABLET ORAL ONCE
Refills: 0 | Status: COMPLETED | OUTPATIENT
Start: 2020-08-20 | End: 2020-08-20

## 2020-08-20 RX ORDER — AMLODIPINE BESYLATE 2.5 MG/1
5 TABLET ORAL DAILY
Refills: 0 | Status: DISCONTINUED | OUTPATIENT
Start: 2020-08-20 | End: 2020-08-20

## 2020-08-20 RX ORDER — LISINOPRIL 2.5 MG/1
5 TABLET ORAL DAILY
Refills: 0 | Status: DISCONTINUED | OUTPATIENT
Start: 2020-08-20 | End: 2020-08-25

## 2020-08-20 RX ADMIN — Medication 50 MILLIEQUIVALENT(S): at 10:39

## 2020-08-20 RX ADMIN — CLOPIDOGREL BISULFATE 75 MILLIGRAM(S): 75 TABLET, FILM COATED ORAL at 12:25

## 2020-08-20 RX ADMIN — ENOXAPARIN SODIUM 40 MILLIGRAM(S): 100 INJECTION SUBCUTANEOUS at 12:26

## 2020-08-20 RX ADMIN — Medication 20 MILLIGRAM(S): at 00:24

## 2020-08-20 RX ADMIN — PANTOPRAZOLE SODIUM 40 MILLIGRAM(S): 20 TABLET, DELAYED RELEASE ORAL at 12:25

## 2020-08-20 RX ADMIN — Medication 50 MILLIEQUIVALENT(S): at 12:26

## 2020-08-20 RX ADMIN — Medication 50 MILLIEQUIVALENT(S): at 02:00

## 2020-08-20 RX ADMIN — Medication 50 MILLIEQUIVALENT(S): at 00:25

## 2020-08-20 RX ADMIN — Medication 400 MILLIGRAM(S): at 06:10

## 2020-08-20 RX ADMIN — MUPIROCIN 1 APPLICATION(S): 20 OINTMENT TOPICAL at 05:04

## 2020-08-20 RX ADMIN — Medication 81 MILLIGRAM(S): at 12:25

## 2020-08-20 RX ADMIN — LACTULOSE 10 GRAM(S): 10 SOLUTION ORAL at 17:03

## 2020-08-20 RX ADMIN — Medication 2.5 MILLIGRAM(S): at 08:47

## 2020-08-20 RX ADMIN — Medication 50 MILLIEQUIVALENT(S): at 04:10

## 2020-08-20 RX ADMIN — Medication 50 MILLIGRAM(S): at 21:57

## 2020-08-20 RX ADMIN — Medication 1000 MILLIGRAM(S): at 13:53

## 2020-08-20 RX ADMIN — Medication 50 MILLIEQUIVALENT(S): at 05:00

## 2020-08-20 RX ADMIN — Medication 10 MILLIGRAM(S): at 05:04

## 2020-08-20 RX ADMIN — POLYETHYLENE GLYCOL 3350 17 GRAM(S): 17 POWDER, FOR SOLUTION ORAL at 12:27

## 2020-08-20 RX ADMIN — ATORVASTATIN CALCIUM 80 MILLIGRAM(S): 80 TABLET, FILM COATED ORAL at 21:57

## 2020-08-20 RX ADMIN — Medication 50 MILLIEQUIVALENT(S): at 03:32

## 2020-08-20 RX ADMIN — Medication 83.33 MILLIMOLE(S): at 03:25

## 2020-08-20 RX ADMIN — LACTULOSE 10 GRAM(S): 10 SOLUTION ORAL at 23:07

## 2020-08-20 RX ADMIN — Medication 10 MILLIGRAM(S): at 11:27

## 2020-08-20 RX ADMIN — Medication 50 MILLIGRAM(S): at 17:03

## 2020-08-20 RX ADMIN — Medication 50 MILLIGRAM(S): at 11:27

## 2020-08-20 RX ADMIN — Medication 50 MILLIEQUIVALENT(S): at 01:00

## 2020-08-20 RX ADMIN — LISINOPRIL 5 MILLIGRAM(S): 2.5 TABLET ORAL at 12:25

## 2020-08-20 RX ADMIN — Medication 100 MILLIGRAM(S): at 01:09

## 2020-08-20 RX ADMIN — Medication 0.1 MILLIGRAM(S): at 00:25

## 2020-08-20 RX ADMIN — CHLORHEXIDINE GLUCONATE 1 APPLICATION(S): 213 SOLUTION TOPICAL at 05:04

## 2020-08-20 RX ADMIN — Medication 400 MILLIGRAM(S): at 13:23

## 2020-08-20 RX ADMIN — Medication 50 MILLIEQUIVALENT(S): at 10:00

## 2020-08-20 RX ADMIN — Medication 100 GRAM(S): at 11:26

## 2020-08-20 RX ADMIN — LACTULOSE 10 GRAM(S): 10 SOLUTION ORAL at 11:27

## 2020-08-20 RX ADMIN — Medication 1000 MILLIGRAM(S): at 06:25

## 2020-08-20 NOTE — DIETITIAN INITIAL EVALUATION ADULT. - OTHER INFO
Pt seen for LOS.     Pt reports a good PO intake PTA. States he does not eat beef or pork. Follows a diet low in salt, sugar and he counts his calories.   Breakfast: Whole wheat toast, diet jelly. Lunch: rice and salad, Dinner: Rice, chicken, fish. Drinks water or diet soda.     Pt checks BG levels PTA with usual results of 120ml/dl. Takes metformin and Humulin 70/30. Has an endocrinologist and HgbA1c is a 8.6% indicating suboptimal control.   UBW reported to be 135lbs. Pt was admitted at 132lbs, he denies any recent changes.     Pt with post op ileus. Has OGT to LWS. Out Pt not documented, but per PA plan to advance diet to clear liquids today.  Pt denies any GI distress at this time. States no BM but has positive bowel sounds and is passing gas.     RD briefly reviewed importance of increased nutrient needs to help promote post op wound healing. As feasible RD did encouraged adequate protein intake and reviewed protein options. Pt verbalized understanding.    Pt aware that RD remains available to monitor for diet advancement as well as to review diet education.     Pt denies GI distress, chewing/swallowing difficulty, takes multivitamin and B complex. NKFA

## 2020-08-20 NOTE — DIETITIAN INITIAL EVALUATION ADULT. - ENERGY NEEDS
Ht: 5'5", Wt: 132lbs, BMI: 21.9kg/m2, IBW: 136lbs(+/-10%), 97%IBW  Pertinent information: 50 year old male with PMHx of T2DM, HTN, HLD, NSTEMI, CAD with KHARI. Presents with chest pain. S/p cardiac cath with multi vessel disease. Pt now s/p CABG x2 on 8/18. Pt extubated on 8/19.   +2 generalized Edema, Skin: intact

## 2020-08-20 NOTE — PROGRESS NOTE ADULT - SUBJECTIVE AND OBJECTIVE BOX
TARUN CERVANTES  MRN-[id number]  Patient is a 50y old  Male who presents with a chief complaint of CABG (19 Aug 2020 07:41)    HPI:  50 M bilingual Welsh/English speaking, with PMHx DM2 ([facility], Hgba1c unknown, controlled ), HTN, HLD, NSTEMI 11/16/19, CAD-KHARI to 60% pCX and 100% OM2, presents with chest pain. Pt reports intermittent chest pain for the past 12 days. Initially came on while he was at rest. Described as sharp, 4/10, left sided w/o radiation, lasted for several hours w/o associated SOB. Since then he has noticed the pain more with exertion- says he is unable to walk more than 10 feet without stopping due to the pain. He has been compliant with all of his medication including DAPT, atorva, bb, ACEi. Denies any orthopnea, PND, LE edema, palpitations. Pt was referred for Cardiac Cath by DR Blakely from the [facility]iology [facility]. (17 Aug 2020 08:32)      Surgery/Hospital course:  8/18 CABG x3     REVIEW OF SYSTEMS:  Gen: No fever  EYES/ENT: No visual changes;  No vertigo or throat pain   NECK: No pain   RES:  No shortness of breath or Cough  CARD: No chest pain   GI: No abdominal pain  : No dysuria  NEURO: No weakness  SKIN: No itching, rashes     Vital Signs Last 24 Hrs  T(C): 37.2 (20 Aug 2020 03:00), Max: 38.3 (19 Aug 2020 13:00)  T(F): 99 (20 Aug 2020 03:00), Max: 100.9 (19 Aug 2020 13:00)  HR: 80 (20 Aug 2020 07:00) (72 - 125)  BP: 158/84 (19 Aug 2020 18:00) (100/65 - 158/84)  BP(mean): 114 (19 Aug 2020 18:00) (78 - 114)  RR: 29 (20 Aug 2020 07:00) (6 - 44)  SpO2: 93% (20 Aug 2020 07:00) (92% - 100%)    ============================I/O===========================   I&O's Detail    19 Aug 2020 07:01  -  20 Aug 2020 07:00  --------------------------------------------------------  IN:    Albumin 5%  - 250 mL: 500 mL    insulin regular Infusion: 84 mL    IV PiggyBack: 800 mL    Labetalol Infusion: 180 mL    niCARdipine Infusion: 210 mL    Oral Fluid: 150 mL    sodium chloride 0.9%.: 240 mL    Solution: 416.5 mL  Total IN: 2580.5 mL    OUT:    Chest Tube: 330 mL    Chest Tube: 120 mL    Indwelling Catheter - Urethral: 1690 mL  Total OUT: 2140 mL    Total NET: 440.5 mL        ============================ LABS =========================                        10.4   13.03 )-----------( 127      ( 20 Aug 2020 00:27 )             31.0     08-20    138  |  104  |  13  ----------------------------<  135<H>  3.4<L>   |  22  |  0.66    Ca    9.4      20 Aug 2020 00:27  Phos  1.4     08-20  Mg     2.1     08-20    TPro  6.8  /  Alb  4.7  /  TBili  1.6<H>  /  DBili  x   /  AST  20  /  ALT  18  /  AlkPhos  44  08-20    LIVER FUNCTIONS - ( 20 Aug 2020 00:27 )  Alb: 4.7 g/dL / Pro: 6.8 g/dL / ALK PHOS: 44 U/L / ALT: 18 U/L / AST: 20 U/L / GGT: x           PT/INR - ( 19 Aug 2020 01:47 )   PT: 12.5 sec;   INR: 1.05 ratio         PTT - ( 19 Aug 2020 01:47 )  PTT:27.4 sec  ABG - ( 20 Aug 2020 03:15 )  pH, Arterial: 7.44  pH, Blood: x     /  pCO2: 32    /  pO2: 63    / HCO3: 22    / Base Excess: -1.3  /  SaO2: 92                  ======================Microbiology/Radadiology=================  Echo: Reviewed   CXR: Reviewed  ======================================================  PAST MEDICAL & SURGICAL HISTORY:  MI (myocardial infarction)  CAD (coronary artery disease)  HTN (hypertension)  Diabetes  History of coronary artery stent placement    ====================ASSESSMENT ==============  CAD s/p CABG x3 on 8/18   Diabetes mellitus     Plan:  ====================== NEUROLOGY=====================  Off sedation, continue to monitor neuro status     ==================== RESPIRATORY======================  Comfortable on RA, SpO2 %   Encourage incentive spirometry, continue pulse ox monitoring, follow ABGs     ====================CARDIOVASCULAR==================  CAD s/p CABGx3 on 8/18   Blood pressure support with Toprol and IV Cardene   ASA/Statin/Plavix for graft patency     aspirin enteric coated 81 milliGRAM(s) Oral daily  atorvastatin 80 milliGRAM(s) Oral at bedtime  clopidogrel Tablet 75 milliGRAM(s) Oral daily  metoprolol tartrate Injectable 10 milliGRAM(s) IV Push every 6 hours  niCARdipine Infusion 5 mG/Hr (25 mL/Hr) IV Continuous <Continuous>    ===================HEMATOLOGIC/ONC ===================  Monitor H&H, transfuse prn     VTE prophylaxis, enoxaparin Injectable 40 milliGRAM(s) SubCutaneous daily    ===================== RENAL =========================  Continue monitoring urine output, I&Os, BUN/Cr    ==================== GASTROINTESTINAL===================  NPO, advance diet as tolerated   Bowel regimen with Miralax     GI prophylaxis, pantoprazole  Injectable 40 milliGRAM(s) IV Push daily  polyethylene glycol 3350 17 Gram(s) Oral daily  simethicone 80 milliGRAM(s) Chew every 6 hours PRN Gas  sodium chloride 0.9%. 1000 milliLiter(s) (10 mL/Hr) IV Continuous <Continuous>    =======================    ENDOCRINE  =====================  Hx of DM, glucose control with insulin drip     insulin regular Infusion 3 Unit(s)/Hr (3 mL/Hr) IV Continuous <Continuous>    ========================INFECTIOUS DISEASE================  Temp 100.9F, WBC rising 7->13      UPDATES:  TIME:     Patient requires continuous monitoring with bedside rhythm monitoring, pulse ox monitoring, and intermittent blood gas analysis. Care plan discussed with ICU care team. Patient remained critical and at risk for life threatening decompensation.    By signing my name below, I, Angelina Flores, attest that this documentation has been prepared under the direction and in the presence of Deacon Snow MD   Electronically signed: Shawanda Villeda, 08-20-20 @ 07:22    I, Deacon Snow, personally performed the services described in this documentation. all medical record entries made by the scribe were at my direction and in my presence. I have reviewed the chart and agree that the record reflects my personal performance and is accurate and complete  Electronically signed: Deacon Snow MD TARUN CERVANTES  MRN-[id number]  Patient is a 50y old  Male who presents with a chief complaint of CABG (19 Aug 2020 07:41)    HPI:  50 M bilingual Turkmen/English speaking, with PMHx DM2 ([facility], Hgba1c unknown, controlled ), HTN, HLD, NSTEMI 11/16/19, CAD-KHARI to 60% pCX and 100% OM2, presents with chest pain. Pt reports intermittent chest pain for the past 12 days. Initially came on while he was at rest. Described as sharp, 4/10, left sided w/o radiation, lasted for several hours w/o associated SOB. Since then he has noticed the pain more with exertion- says he is unable to walk more than 10 feet without stopping due to the pain. He has been compliant with all of his medication including DAPT, atorva, bb, ACEi. Denies any orthopnea, PND, LE edema, palpitations. Pt was referred for Cardiac Cath by DR Blakely from the [facility]. (17 Aug 2020 08:32)      Surgery/Hospital course:  8/18 CABG x3     REVIEW OF SYSTEMS:  Gen: No fever  EYES/ENT: No visual changes;  No vertigo or throat pain   NECK: No pain   RES:  No shortness of breath or Cough  CARD: No chest pain   GI: No abdominal pain  : No dysuria  NEURO: No weakness  SKIN: No itching, rashes     Vital Signs Last 24 Hrs  T(C): 37.2 (20 Aug 2020 03:00), Max: 38.3 (19 Aug 2020 13:00)  T(F): 99 (20 Aug 2020 03:00), Max: 100.9 (19 Aug 2020 13:00)  HR: 80 (20 Aug 2020 07:00) (72 - 125)  BP: 158/84 (19 Aug 2020 18:00) (100/65 - 158/84)  BP(mean): 114 (19 Aug 2020 18:00) (78 - 114)  RR: 29 (20 Aug 2020 07:00) (6 - 44)  SpO2: 93% (20 Aug 2020 07:00) (92% - 100%)    ============================I/O===========================   I&O's Detail    19 Aug 2020 07:01  -  20 Aug 2020 07:00  --------------------------------------------------------  IN:    Albumin 5%  - 250 mL: 500 mL    insulin regular Infusion: 84 mL    IV PiggyBack: 800 mL    Labetalol Infusion: 180 mL    niCARdipine Infusion: 210 mL    Oral Fluid: 150 mL    sodium chloride 0.9%.: 240 mL    Solution: 416.5 mL  Total IN: 2580.5 mL    OUT:    Chest Tube: 330 mL    Chest Tube: 120 mL    Indwelling Catheter - Urethral: 1690 mL  Total OUT: 2140 mL    Total NET: 440.5 mL        ============================ LABS =========================                        10.4   13.03 )-----------( 127      ( 20 Aug 2020 00:27 )             31.0     08-20    138  |  104  |  13  ----------------------------<  135<H>  3.4<L>   |  22  |  0.66    Ca    9.4      20 Aug 2020 00:27  Phos  1.4     08-20  Mg     2.1     08-20    TPro  6.8  /  Alb  4.7  /  TBili  1.6<H>  /  DBili  x   /  AST  20  /  ALT  18  /  AlkPhos  44  08-20    LIVER FUNCTIONS - ( 20 Aug 2020 00:27 )  Alb: 4.7 g/dL / Pro: 6.8 g/dL / ALK PHOS: 44 U/L / ALT: 18 U/L / AST: 20 U/L / GGT: x           PT/INR - ( 19 Aug 2020 01:47 )   PT: 12.5 sec;   INR: 1.05 ratio         PTT - ( 19 Aug 2020 01:47 )  PTT:27.4 sec  ABG - ( 20 Aug 2020 03:15 )  pH, Arterial: 7.44  pH, Blood: x     /  pCO2: 32    /  pO2: 63    / HCO3: 22    / Base Excess: -1.3  /  SaO2: 92                  ======================Microbiology/Radadiology=================  Echo: Reviewed   CXR: Reviewed  ======================================================  PAST MEDICAL & SURGICAL HISTORY:  MI (myocardial infarction)  CAD (coronary artery disease)  HTN (hypertension)  Diabetes  History of coronary artery stent placement    ====================ASSESSMENT ==============  CAD s/p CABG x3 on 8/18   Diabetes mellitus     Plan:  ====================== NEUROLOGY=====================  Off sedation, continue to monitor neuro status     ==================== RESPIRATORY======================  Comfortable on RA, SpO2 %   Encourage incentive spirometry, continue pulse ox monitoring, follow ABGs     ====================CARDIOVASCULAR==================  CAD s/p CABGx3 on 8/18   Blood pressure support with Toprol and IV Cardene   ASA/Statin/Plavix for graft patency     aspirin enteric coated 81 milliGRAM(s) Oral daily  atorvastatin 80 milliGRAM(s) Oral at bedtime  clopidogrel Tablet 75 milliGRAM(s) Oral daily  metoprolol tartrate Injectable 10 milliGRAM(s) IV Push every 6 hours  niCARdipine Infusion 5 mG/Hr (25 mL/Hr) IV Continuous <Continuous>    ===================HEMATOLOGIC/ONC ===================  Monitor H&H, transfuse prn     VTE prophylaxis, enoxaparin Injectable 40 milliGRAM(s) SubCutaneous daily    ===================== RENAL =========================  Continue monitoring urine output, I&Os, BUN/Cr    ==================== GASTROINTESTINAL===================  NPO, advance diet as tolerated   Bowel regimen with Miralax     GI prophylaxis, pantoprazole  Injectable 40 milliGRAM(s) IV Push daily  polyethylene glycol 3350 17 Gram(s) Oral daily  simethicone 80 milliGRAM(s) Chew every 6 hours PRN Gas  sodium chloride 0.9%. 1000 milliLiter(s) (10 mL/Hr) IV Continuous <Continuous>    =======================    ENDOCRINE  =====================  Hx of DM, glucose control with insulin drip     insulin regular Infusion 3 Unit(s)/Hr (3 mL/Hr) IV Continuous <Continuous>    ========================INFECTIOUS DISEASE================  Temp 100.9F, WBC rising 7->13    UPDATES:  S/p CABG, uncomplicated  ASA, Plavix, Lipitor; on Lopressor  O2 sats high 90s on nasal cannula  NPO with NGT in place due to likely ileus - on Reglan and Lactulose  Normal renal function with adequate urine output  H/H low but acceptable  Afebrile, no antibiotics  Sugars controlled on insulin drip    Patient requires continuous monitoring with bedside rhythm monitoring, pulse ox monitoring, and intermittent blood gas analysis. Care plan discussed with ICU care team. Patient remained critical and at risk for life threatening decompensation.    By signing my name below, I, Angelina Flores, attest that this documentation has been prepared under the direction and in the presence of Deacon Snow MD   Electronically signed: Shawanda Villeda, 08-20-20 @ 07:22    I, Deacon Snow, personally performed the services described in this documentation. all medical record entries made by the debbieibe were at my direction and in my presence. I have reviewed the chart and agree that the record reflects my personal performance and is accurate and complete  Electronically signed: Deacon Snow MD

## 2020-08-20 NOTE — DIETITIAN INITIAL EVALUATION ADULT. - ADD RECOMMEND
1. as medically feasible, advance diet to diabetic clear liquid diets followed by a Consistent CHO, DASH diet as tolerated . 2. Trend GI tolerance, 3. would benefit from diet education when more appropriate 4. Encourage adequate PO intake as feasible

## 2020-08-20 NOTE — DIETITIAN INITIAL EVALUATION ADULT. - PERTINENT LABORATORY DATA
08-20 @ 00:27: Sodium 138, Potassium 3.4<L>, Chloride 104, Calcium 9.4, Magnesium 2.1, Phosphorus 1.4<L>, BUN 13, Creatinine 0.66, <H>, Alk Phos 44, ALT/SGPT 18, AST/SGOT 20, HbA1c 8.6%, Total Protein 6.8, Albumin 4.7, Prealbumin --, Total Bilirubin 1.6<H>, Direct Bilirubin --, Hemoglobin 10.4<L>, Hematocrit 31.0<L>  BG level: 8/19: 104-285, 8/20:

## 2020-08-21 ENCOUNTER — APPOINTMENT (OUTPATIENT)
Dept: OPHTHALMOLOGY | Facility: CLINIC | Age: 50
End: 2020-08-21

## 2020-08-21 DIAGNOSIS — I10 ESSENTIAL (PRIMARY) HYPERTENSION: ICD-10-CM

## 2020-08-21 DIAGNOSIS — Z95.1 PRESENCE OF AORTOCORONARY BYPASS GRAFT: ICD-10-CM

## 2020-08-21 DIAGNOSIS — E11.65 TYPE 2 DIABETES MELLITUS WITH HYPERGLYCEMIA: ICD-10-CM

## 2020-08-21 DIAGNOSIS — E78.5 HYPERLIPIDEMIA, UNSPECIFIED: ICD-10-CM

## 2020-08-21 DIAGNOSIS — K56.7 ILEUS, UNSPECIFIED: ICD-10-CM

## 2020-08-21 LAB
ALBUMIN SERPL ELPH-MCNC: 4.2 G/DL — SIGNIFICANT CHANGE UP (ref 3.3–5)
ALP SERPL-CCNC: 49 U/L — SIGNIFICANT CHANGE UP (ref 40–120)
ALT FLD-CCNC: 15 U/L — SIGNIFICANT CHANGE UP (ref 10–45)
ANION GAP SERPL CALC-SCNC: 13 MMOL/L — SIGNIFICANT CHANGE UP (ref 5–17)
AST SERPL-CCNC: 23 U/L — SIGNIFICANT CHANGE UP (ref 10–40)
BASOPHILS # BLD AUTO: 0.02 K/UL — SIGNIFICANT CHANGE UP (ref 0–0.2)
BASOPHILS NFR BLD AUTO: 0.1 % — SIGNIFICANT CHANGE UP (ref 0–2)
BILIRUB SERPL-MCNC: 2.2 MG/DL — HIGH (ref 0.2–1.2)
BLD GP AB SCN SERPL QL: NEGATIVE — SIGNIFICANT CHANGE UP
BUN SERPL-MCNC: 13 MG/DL — SIGNIFICANT CHANGE UP (ref 7–23)
CALCIUM SERPL-MCNC: 9.1 MG/DL — SIGNIFICANT CHANGE UP (ref 8.4–10.5)
CHLORIDE SERPL-SCNC: 107 MMOL/L — SIGNIFICANT CHANGE UP (ref 96–108)
CO2 SERPL-SCNC: 21 MMOL/L — LOW (ref 22–31)
CREAT SERPL-MCNC: 0.69 MG/DL — SIGNIFICANT CHANGE UP (ref 0.5–1.3)
EOSINOPHIL # BLD AUTO: 0.09 K/UL — SIGNIFICANT CHANGE UP (ref 0–0.5)
EOSINOPHIL NFR BLD AUTO: 0.6 % — SIGNIFICANT CHANGE UP (ref 0–6)
GLUCOSE BLDC GLUCOMTR-MCNC: 166 MG/DL — HIGH (ref 70–99)
GLUCOSE BLDC GLUCOMTR-MCNC: 194 MG/DL — HIGH (ref 70–99)
GLUCOSE BLDC GLUCOMTR-MCNC: 210 MG/DL — HIGH (ref 70–99)
GLUCOSE BLDC GLUCOMTR-MCNC: 239 MG/DL — HIGH (ref 70–99)
GLUCOSE BLDC GLUCOMTR-MCNC: 282 MG/DL — HIGH (ref 70–99)
GLUCOSE SERPL-MCNC: 165 MG/DL — HIGH (ref 70–99)
HCT VFR BLD CALC: 32.3 % — LOW (ref 39–50)
HGB BLD-MCNC: 10.9 G/DL — LOW (ref 13–17)
IMM GRANULOCYTES NFR BLD AUTO: 0.5 % — SIGNIFICANT CHANGE UP (ref 0–1.5)
LYMPHOCYTES # BLD AUTO: 13.3 % — SIGNIFICANT CHANGE UP (ref 13–44)
LYMPHOCYTES # BLD AUTO: 2 K/UL — SIGNIFICANT CHANGE UP (ref 1–3.3)
MAGNESIUM SERPL-MCNC: 1.8 MG/DL — SIGNIFICANT CHANGE UP (ref 1.6–2.6)
MCHC RBC-ENTMCNC: 30.4 PG — SIGNIFICANT CHANGE UP (ref 27–34)
MCHC RBC-ENTMCNC: 33.7 GM/DL — SIGNIFICANT CHANGE UP (ref 32–36)
MCV RBC AUTO: 90 FL — SIGNIFICANT CHANGE UP (ref 80–100)
MONOCYTES # BLD AUTO: 1.02 K/UL — HIGH (ref 0–0.9)
MONOCYTES NFR BLD AUTO: 6.8 % — SIGNIFICANT CHANGE UP (ref 2–14)
NEUTROPHILS # BLD AUTO: 11.82 K/UL — HIGH (ref 1.8–7.4)
NEUTROPHILS NFR BLD AUTO: 78.7 % — HIGH (ref 43–77)
NRBC # BLD: 0 /100 WBCS — SIGNIFICANT CHANGE UP (ref 0–0)
PHOSPHATE SERPL-MCNC: 1.4 MG/DL — LOW (ref 2.5–4.5)
PLATELET # BLD AUTO: 138 K/UL — LOW (ref 150–400)
POTASSIUM SERPL-MCNC: 3.8 MMOL/L — SIGNIFICANT CHANGE UP (ref 3.5–5.3)
POTASSIUM SERPL-MCNC: 4 MMOL/L — SIGNIFICANT CHANGE UP (ref 3.5–5.3)
POTASSIUM SERPL-SCNC: 3.8 MMOL/L — SIGNIFICANT CHANGE UP (ref 3.5–5.3)
POTASSIUM SERPL-SCNC: 4 MMOL/L — SIGNIFICANT CHANGE UP (ref 3.5–5.3)
PROT SERPL-MCNC: 6.7 G/DL — SIGNIFICANT CHANGE UP (ref 6–8.3)
RBC # BLD: 3.59 M/UL — LOW (ref 4.2–5.8)
RBC # FLD: 14.2 % — SIGNIFICANT CHANGE UP (ref 10.3–14.5)
RH IG SCN BLD-IMP: POSITIVE — SIGNIFICANT CHANGE UP
SODIUM SERPL-SCNC: 141 MMOL/L — SIGNIFICANT CHANGE UP (ref 135–145)
WBC # BLD: 15.03 K/UL — HIGH (ref 3.8–10.5)
WBC # FLD AUTO: 15.03 K/UL — HIGH (ref 3.8–10.5)

## 2020-08-21 PROCEDURE — 99254 IP/OBS CNSLTJ NEW/EST MOD 60: CPT

## 2020-08-21 PROCEDURE — 71045 X-RAY EXAM CHEST 1 VIEW: CPT | Mod: 26

## 2020-08-21 PROCEDURE — 99233 SBSQ HOSP IP/OBS HIGH 50: CPT

## 2020-08-21 RX ORDER — INSULIN GLARGINE 100 [IU]/ML
10 INJECTION, SOLUTION SUBCUTANEOUS AT BEDTIME
Refills: 0 | Status: DISCONTINUED | OUTPATIENT
Start: 2020-08-21 | End: 2020-08-21

## 2020-08-21 RX ORDER — ACETAMINOPHEN 500 MG
650 TABLET ORAL EVERY 6 HOURS
Refills: 0 | Status: DISCONTINUED | OUTPATIENT
Start: 2020-08-21 | End: 2020-08-25

## 2020-08-21 RX ORDER — INSULIN LISPRO 100/ML
3 VIAL (ML) SUBCUTANEOUS
Refills: 0 | Status: DISCONTINUED | OUTPATIENT
Start: 2020-08-21 | End: 2020-08-21

## 2020-08-21 RX ORDER — SODIUM CHLORIDE 9 MG/ML
3 INJECTION INTRAMUSCULAR; INTRAVENOUS; SUBCUTANEOUS EVERY 8 HOURS
Refills: 0 | Status: DISCONTINUED | OUTPATIENT
Start: 2020-08-21 | End: 2020-08-25

## 2020-08-21 RX ORDER — LACTULOSE 10 G/15ML
10 SOLUTION ORAL EVERY 6 HOURS
Refills: 0 | Status: DISCONTINUED | OUTPATIENT
Start: 2020-08-21 | End: 2020-08-24

## 2020-08-21 RX ORDER — SENNA PLUS 8.6 MG/1
2 TABLET ORAL AT BEDTIME
Refills: 0 | Status: DISCONTINUED | OUTPATIENT
Start: 2020-08-21 | End: 2020-08-25

## 2020-08-21 RX ORDER — POTASSIUM CHLORIDE 20 MEQ
20 PACKET (EA) ORAL ONCE
Refills: 0 | Status: COMPLETED | OUTPATIENT
Start: 2020-08-21 | End: 2020-08-21

## 2020-08-21 RX ORDER — INSULIN GLARGINE 100 [IU]/ML
12 INJECTION, SOLUTION SUBCUTANEOUS AT BEDTIME
Refills: 0 | Status: DISCONTINUED | OUTPATIENT
Start: 2020-08-21 | End: 2020-08-22

## 2020-08-21 RX ORDER — INSULIN LISPRO 100/ML
VIAL (ML) SUBCUTANEOUS
Refills: 0 | Status: DISCONTINUED | OUTPATIENT
Start: 2020-08-21 | End: 2020-08-21

## 2020-08-21 RX ORDER — HUMAN INSULIN 100 [IU]/ML
5 INJECTION, SUSPENSION SUBCUTANEOUS ONCE
Refills: 0 | Status: COMPLETED | OUTPATIENT
Start: 2020-08-21 | End: 2020-08-21

## 2020-08-21 RX ORDER — INSULIN LISPRO 100/ML
VIAL (ML) SUBCUTANEOUS
Refills: 0 | Status: DISCONTINUED | OUTPATIENT
Start: 2020-08-21 | End: 2020-08-22

## 2020-08-21 RX ORDER — INSULIN LISPRO 100/ML
4 VIAL (ML) SUBCUTANEOUS ONCE
Refills: 0 | Status: COMPLETED | OUTPATIENT
Start: 2020-08-21 | End: 2020-08-21

## 2020-08-21 RX ORDER — MAGNESIUM SULFATE 500 MG/ML
2 VIAL (ML) INJECTION ONCE
Refills: 0 | Status: COMPLETED | OUTPATIENT
Start: 2020-08-21 | End: 2020-08-21

## 2020-08-21 RX ORDER — INSULIN LISPRO 100/ML
4 VIAL (ML) SUBCUTANEOUS
Refills: 0 | Status: DISCONTINUED | OUTPATIENT
Start: 2020-08-21 | End: 2020-08-22

## 2020-08-21 RX ORDER — INSULIN LISPRO 100/ML
5 VIAL (ML) SUBCUTANEOUS ONCE
Refills: 0 | Status: COMPLETED | OUTPATIENT
Start: 2020-08-21 | End: 2020-08-21

## 2020-08-21 RX ADMIN — POLYETHYLENE GLYCOL 3350 17 GRAM(S): 17 POWDER, FOR SOLUTION ORAL at 11:26

## 2020-08-21 RX ADMIN — Medication 4: at 08:32

## 2020-08-21 RX ADMIN — Medication 50 MILLIGRAM(S): at 14:33

## 2020-08-21 RX ADMIN — Medication 20 MILLIEQUIVALENT(S): at 03:53

## 2020-08-21 RX ADMIN — Medication 3 UNIT(S): at 14:42

## 2020-08-21 RX ADMIN — MUPIROCIN 1 APPLICATION(S): 20 OINTMENT TOPICAL at 05:11

## 2020-08-21 RX ADMIN — Medication 1: at 17:32

## 2020-08-21 RX ADMIN — MUPIROCIN 1 APPLICATION(S): 20 OINTMENT TOPICAL at 19:12

## 2020-08-21 RX ADMIN — Medication 3 UNIT(S): at 08:32

## 2020-08-21 RX ADMIN — Medication 50 GRAM(S): at 03:53

## 2020-08-21 RX ADMIN — INSULIN GLARGINE 12 UNIT(S): 100 INJECTION, SOLUTION SUBCUTANEOUS at 21:24

## 2020-08-21 RX ADMIN — SODIUM CHLORIDE 3 MILLILITER(S): 9 INJECTION INTRAMUSCULAR; INTRAVENOUS; SUBCUTANEOUS at 21:29

## 2020-08-21 RX ADMIN — SENNA PLUS 2 TABLET(S): 8.6 TABLET ORAL at 21:24

## 2020-08-21 RX ADMIN — CLOPIDOGREL BISULFATE 75 MILLIGRAM(S): 75 TABLET, FILM COATED ORAL at 11:26

## 2020-08-21 RX ADMIN — Medication 4 UNIT(S): at 17:32

## 2020-08-21 RX ADMIN — PANTOPRAZOLE SODIUM 40 MILLIGRAM(S): 20 TABLET, DELAYED RELEASE ORAL at 11:26

## 2020-08-21 RX ADMIN — LISINOPRIL 5 MILLIGRAM(S): 2.5 TABLET ORAL at 05:10

## 2020-08-21 RX ADMIN — SODIUM CHLORIDE 3 MILLILITER(S): 9 INJECTION INTRAMUSCULAR; INTRAVENOUS; SUBCUTANEOUS at 14:34

## 2020-08-21 RX ADMIN — Medication 50 MILLIGRAM(S): at 21:24

## 2020-08-21 RX ADMIN — ATORVASTATIN CALCIUM 80 MILLIGRAM(S): 80 TABLET, FILM COATED ORAL at 21:27

## 2020-08-21 RX ADMIN — Medication 81 MILLIGRAM(S): at 11:26

## 2020-08-21 RX ADMIN — Medication 4 UNIT(S): at 21:23

## 2020-08-21 RX ADMIN — HUMAN INSULIN 5 UNIT(S): 100 INJECTION, SUSPENSION SUBCUTANEOUS at 08:32

## 2020-08-21 RX ADMIN — Medication 5 UNIT(S): at 13:00

## 2020-08-21 RX ADMIN — Medication 2: at 14:43

## 2020-08-21 RX ADMIN — Medication 6: at 21:23

## 2020-08-21 RX ADMIN — Medication 50 MILLIGRAM(S): at 05:10

## 2020-08-21 RX ADMIN — Medication 83.33 MILLIMOLE(S): at 03:53

## 2020-08-21 RX ADMIN — CHLORHEXIDINE GLUCONATE 1 APPLICATION(S): 213 SOLUTION TOPICAL at 05:10

## 2020-08-21 RX ADMIN — ENOXAPARIN SODIUM 40 MILLIGRAM(S): 100 INJECTION SUBCUTANEOUS at 11:26

## 2020-08-21 NOTE — CONSULT NOTE ADULT - ASSESSMENT
49 y/o M w/ uncontrolled Type 2 DM on insulin at home complicated by neuropathy and CAD, HTN, HLD admitted with chest pain now s/p CABG.
50 M bilingual Italian/English speaking, with PMHx DM2 ([facility], Hgba1c unknown, controlled ), HTN, HLD, NSTEMI 11/16/19, CAD-KHARI to 60% pCX and 100% OM2, presents with chest pain. Pt reports intermittent chest pain for the past 12 days. Initially came on while he was at rest. Described as sharp, 4/10, left sided w/o radiation, lasted for several hours w/o associated SOB. Since then he has noticed the pain more with exertion- says he is unable to walk more than 10 feet without stopping due to the pain. He has been compliant with all of his medication including DAPT, atorva, bb, ACEi. Denies any orthopnea, PND, LE edema, palpitations. Pt was referred for Cardiac Cath by DR Blakely from the [facility]. (17 Aug 2020 08:32). Patient is s/p cath showing LM disease (60-70%) by Dr. Hammer. CT surgery consulted, plan for CABGx2 tmmrw 8/18/20 with Dr. Bright.     8/17/20 PREOP workup in progress. Plan for TTE, Carotids, P2Y12, and preop labs. No active chest pain. No Heparin gtt as per Dr. Bright.

## 2020-08-21 NOTE — CONSULT NOTE ADULT - SUBJECTIVE AND OBJECTIVE BOX
HPI:  51 y/o M w/ hx of Type 2 DM x > 10 years complicated by neuropathy and CAD. At home on 70/30 10 units with breakfast and 5 units with dinner also metformin 850mg TID. Adherent to insulin. Injects in abdomen with rotation of sites. Glucose checked 2-3x/week with values generally 120-130 without hypoglycemia. Rare glucose values > 200. Eats rive and roti. Has been eating less sweets. Denies polyuria, polydipsia, nausea or vomiting. + po intake now.   Also hx of HTN, HLD, NSTEMI 11/16/19, CAD-KHARI to 60% pCX and 100% OM2, presented with chest pain. Pt reported intermittent chest pain for 12 days. Initially came on while he was at rest. Described as sharp, 4/10, left sided w/o radiation, lasted for several hours w/o associated SOB. Since then he has noticed the pain more with exertion- says he is unable to walk more than 10 feet without stopping due to the pain. He has been compliant with all of his medication including DAPT, atorva, bb, ACEi. Denies any orthopnea, PND, LE edema, palpitations. Pt was referred for Cardiac Cath by DR Blakely [facility]. (17 Aug 2020 08:32) now s/p CABG.      PAST MEDICAL & SURGICAL HISTORY:  MI (myocardial infarction)  CAD (coronary artery disease)  HTN (hypertension)  Diabetes  History of coronary artery stent placement      FAMILY HISTORY:  FH: HTN (hypertension)  FH: CAD (coronary artery disease)  Father- Type 2 DM       Social History: No tobacco or alcohol use    Outpatient Medications:  · 	HumuLIN 70/30 subcutaneous suspension: Last Dose Taken:  , 5 unit(s) subcutaneous once a day (at bedtime)  · 	HumuLIN 70/30 subcutaneous suspension: Last Dose Taken:  , 10 unit(s) subcutaneous once a day in AM  · 	Metoprolol Succinate ER 50 mg oral tablet, extended release: Last Dose Taken:  , 1 tab(s) orally once a day   · 	famotidine 20 mg oral tablet: Last Dose Taken:  , 1 tab(s) orally 2 times a day  · 	atorvastatin 80 mg oral tablet: Last Dose Taken:  , 1 tab(s) orally once a day (at bedtime)  · 	lisinopril 5 mg oral tablet: Last Dose Taken:  , 1 tab(s) orally once a day  · 	aspirin 81 mg oral delayed release capsule: Last Dose Taken:  ,  orally once a day  · 	metFORMIN 850 mg oral tablet: Last Dose Taken:  , 1 tab(s) orally 3 times a day  	  · 	Plavix 75 mg oral tablet: Last Dose Taken:  , 1 tab(s) orally once a day    MEDICATIONS  (STANDING):  aspirin enteric coated 81 milliGRAM(s) Oral daily  atorvastatin 80 milliGRAM(s) Oral at bedtime  clopidogrel Tablet 75 milliGRAM(s) Oral daily  enoxaparin Injectable 40 milliGRAM(s) SubCutaneous daily  insulin glargine Injectable (LANTUS) 12 Unit(s) SubCutaneous at bedtime  insulin lispro (HumaLOG) corrective regimen sliding scale   SubCutaneous three times a day before meals  insulin lispro Injectable (HumaLOG) 4 Unit(s) SubCutaneous three times a day with meals  lisinopril 5 milliGRAM(s) Oral daily  metoprolol tartrate 50 milliGRAM(s) Oral every 8 hours  mupirocin 2% Nasal 1 Application(s) Nasal two times a day  pantoprazole  Injectable 40 milliGRAM(s) IV Push daily  polyethylene glycol 3350 17 Gram(s) Oral daily  senna 2 Tablet(s) Oral at bedtime  sodium chloride 0.9% lock flush 3 milliLiter(s) IV Push every 8 hours    MEDICATIONS  (PRN):  acetaminophen   Tablet .. 650 milliGRAM(s) Oral every 6 hours PRN Mild Pain (1 - 3)  lactulose Syrup 10 Gram(s) Oral every 6 hours PRN constipation  simethicone 80 milliGRAM(s) Chew every 6 hours PRN Gas      Allergies    No Known Allergies    Intolerances      Review of Systems:  Constitutional: No fever, No change in weight  Eyes: No blurry vision  Neuro: No headache, +neuropathy  HEENT: No throat pain  Cardiovascular: + chest soreness s/p CABG  Respiratory: No SOB  GI: No nausea or vomiting  : No polyuria  Skin: no rash  Psych: no depression  Endocrine: No polydipsia, No heat or cold intolerance, rest as noted in HPI  Hem/lymph: no swelling    All other review of systems negative      PHYSICAL EXAM:  VITALS: T(C): 37.8 (08-21-20 @ 12:31)  T(F): 100.1 (08-21-20 @ 12:31), Max: 100.1 (08-21-20 @ 12:31)  HR: 89 (08-21-20 @ 12:31) (75 - 112)  BP: 143/90 (08-21-20 @ 12:31) (125/79 - 169/89)  RR:  (17 - 44)  SpO2:  (94% - 98%)  Wt(kg): --  GENERAL: NAD at this time  EYES: No proptosis, EOMI  HEENT:  Atraumatic, Normocephalic,   THYROID: Normal size, no palpable nodules  RESPIRATORY: Clear to auscultation bilaterally, full excursion, non-labored  CARDIOVASCULAR: Regular rhythm; +systolic murmur; no peripheral edema  GI: Soft, nontender, non distended, normal bowel sounds  SKIN: +sternal wound with dressing  MUSCULOSKELETAL: normal strength  NEURO: follows commands  PSYCH: Alert and oriented x 3, normal affect, normal mood  CUSHING'S SIGNS: no striae      POCT Blood Glucose.: 166 mg/dL (08-21-20 @ 14:39)  POCT Blood Glucose.: 210 mg/dL (08-21-20 @ 12:29)  POCT Blood Glucose.: 239 mg/dL (08-21-20 @ 08:17)  POCT Blood Glucose.: 125 mg/dL (08-20-20 @ 22:04)  POCT Blood Glucose.: 107 mg/dL (08-20-20 @ 20:12)  POCT Blood Glucose.: 99 mg/dL (08-20-20 @ 19:26)  POCT Blood Glucose.: 87 mg/dL (08-20-20 @ 18:52)  POCT Blood Glucose.: 110 mg/dL (08-20-20 @ 18:17)  POCT Blood Glucose.: 116 mg/dL (08-20-20 @ 17:13)  POCT Blood Glucose.: 137 mg/dL (08-20-20 @ 16:09)  POCT Blood Glucose.: 150 mg/dL (08-20-20 @ 15:06)  POCT Blood Glucose.: 133 mg/dL (08-20-20 @ 13:49)  POCT Blood Glucose.: 80 mg/dL (08-20-20 @ 13:12)  POCT Blood Glucose.: 118 mg/dL (08-20-20 @ 12:00)  POCT Blood Glucose.: 119 mg/dL (08-20-20 @ 10:57)  POCT Blood Glucose.: 160 mg/dL (08-20-20 @ 10:06)  POCT Blood Glucose.: 143 mg/dL (08-20-20 @ 09:02)  POCT Blood Glucose.: 112 mg/dL (08-20-20 @ 08:02)  POCT Blood Glucose.: 141 mg/dL (08-20-20 @ 06:47)  POCT Blood Glucose.: 150 mg/dL (08-20-20 @ 05:51)  POCT Blood Glucose.: 167 mg/dL (08-20-20 @ 05:01)  POCT Blood Glucose.: 152 mg/dL (08-20-20 @ 04:00)  POCT Blood Glucose.: 96 mg/dL (08-20-20 @ 02:00)  POCT Blood Glucose.: 108 mg/dL (08-20-20 @ 01:04)  POCT Blood Glucose.: 154 mg/dL (08-19-20 @ 23:03)  POCT Blood Glucose.: 187 mg/dL (08-19-20 @ 21:57)  POCT Blood Glucose.: 215 mg/dL (08-19-20 @ 20:55)  POCT Blood Glucose.: 227 mg/dL (08-19-20 @ 20:06)  POCT Blood Glucose.: 263 mg/dL (08-19-20 @ 18:42)  POCT Blood Glucose.: 248 mg/dL (08-19-20 @ 18:00)  POCT Blood Glucose.: 237 mg/dL (08-19-20 @ 16:56)  POCT Blood Glucose.: 285 mg/dL (08-19-20 @ 15:55)  POCT Blood Glucose.: 276 mg/dL (08-19-20 @ 13:59)  POCT Blood Glucose.: 184 mg/dL (08-19-20 @ 12:54)  POCT Blood Glucose.: 104 mg/dL (08-19-20 @ 11:57)  POCT Blood Glucose.: 110 mg/dL (08-19-20 @ 11:10)  POCT Blood Glucose.: 106 mg/dL (08-19-20 @ 10:28)  POCT Blood Glucose.: 106 mg/dL (08-19-20 @ 08:51)  POCT Blood Glucose.: 98 mg/dL (08-19-20 @ 02:59)  POCT Blood Glucose.: 89 mg/dL (08-18-20 @ 22:27)  POCT Blood Glucose.: 89 mg/dL (08-18-20 @ 22:08)  POCT Blood Glucose.: 119 mg/dL (08-18-20 @ 20:15)  POCT Blood Glucose.: 128 mg/dL (08-18-20 @ 18:48)  POCT Blood Glucose.: 167 mg/dL (08-18-20 @ 17:15)                              10.9   15.03 )-----------( 138      ( 21 Aug 2020 01:37 )             32.3       08-21    x   |  x   |  x   ----------------------------<  x   4.0   |  x   |  x     EGFR if : x   EGFR if non : x     Ca    9.1      08-21  Mg     1.8     08-21  Phos  1.4     08-21    TPro  6.7  /  Alb  4.2  /  TBili  2.2<H>  /  DBili  x   /  AST  23  /  ALT  15  /  AlkPhos  49  08-21    Thyroid Function Tests:  08-17 @ 22:41 TSH 0.67 FreeT4 -- T3 102 Anti TPO -- Anti Thyroglobulin Ab -- TSI --            Radiology: HPI:  51 y/o M w/ hx of Type 2 DM x > 18 years complicated by neuropathy and CAD. At home on 70/30 10 units with breakfast and 5 units with dinner also metformin 850mg TID. Adherent to insulin. Injects in abdomen with rotation of sites. Glucose checked 2-3x/week with values generally 120-130 without hypoglycemia. Rare glucose values > 200. Eats rice and roti. Has been eating less sweets. Denies polyuria, polydipsia, nausea or vomiting. + po intake now. Walks for exercise 25-30 min.   Also hx of HTN, HLD, NSTEMI 11/16/19, CAD-KHARI to 60% pCX and 100% OM2, presented with chest pain. Pt reported intermittent chest pain for 12 days. Initially came on while he was at rest. Described as sharp, 4/10, left sided w/o radiation, lasted for several hours w/o associated SOB. Since then he has noticed the pain more with exertion- says he is unable to walk more than 10 feet without stopping due to the pain. He has been compliant with all of his medication including DAPT, atorva, bb, ACEi. Denies any orthopnea, PND, LE edema, palpitations. Pt was referred for Cardiac Cath by DR Blakely from the [facility]. (17 Aug 2020 08:32) now s/p CABG.      PAST MEDICAL & SURGICAL HISTORY:  MI (myocardial infarction)  CAD (coronary artery disease)  HTN (hypertension)  Diabetes  History of coronary artery stent placement      FAMILY HISTORY:  FH: HTN (hypertension)  FH: CAD (coronary artery disease)  Father- Type 2 DM       Social History: No tobacco or alcohol use    Outpatient Medications:  · 	HumuLIN 70/30 subcutaneous suspension: Last Dose Taken:  , 5 unit(s) subcutaneous once a day (at bedtime)  · 	HumuLIN 70/30 subcutaneous suspension: Last Dose Taken:  , 10 unit(s) subcutaneous once a day in AM  · 	Metoprolol Succinate ER 50 mg oral tablet, extended release: Last Dose Taken:  , 1 tab(s) orally once a day   · 	famotidine 20 mg oral tablet: Last Dose Taken:  , 1 tab(s) orally 2 times a day  · 	atorvastatin 80 mg oral tablet: Last Dose Taken:  , 1 tab(s) orally once a day (at bedtime)  · 	lisinopril 5 mg oral tablet: Last Dose Taken:  , 1 tab(s) orally once a day  · 	aspirin 81 mg oral delayed release capsule: Last Dose Taken:  ,  orally once a day  · 	metFORMIN 850 mg oral tablet: Last Dose Taken:  , 1 tab(s) orally 3 times a day  	  · 	Plavix 75 mg oral tablet: Last Dose Taken:  , 1 tab(s) orally once a day    MEDICATIONS  (STANDING):  aspirin enteric coated 81 milliGRAM(s) Oral daily  atorvastatin 80 milliGRAM(s) Oral at bedtime  clopidogrel Tablet 75 milliGRAM(s) Oral daily  enoxaparin Injectable 40 milliGRAM(s) SubCutaneous daily  insulin glargine Injectable (LANTUS) 12 Unit(s) SubCutaneous at bedtime  insulin lispro (HumaLOG) corrective regimen sliding scale   SubCutaneous three times a day before meals  insulin lispro Injectable (HumaLOG) 4 Unit(s) SubCutaneous three times a day with meals  lisinopril 5 milliGRAM(s) Oral daily  metoprolol tartrate 50 milliGRAM(s) Oral every 8 hours  mupirocin 2% Nasal 1 Application(s) Nasal two times a day  pantoprazole  Injectable 40 milliGRAM(s) IV Push daily  polyethylene glycol 3350 17 Gram(s) Oral daily  senna 2 Tablet(s) Oral at bedtime  sodium chloride 0.9% lock flush 3 milliLiter(s) IV Push every 8 hours    MEDICATIONS  (PRN):  acetaminophen   Tablet .. 650 milliGRAM(s) Oral every 6 hours PRN Mild Pain (1 - 3)  lactulose Syrup 10 Gram(s) Oral every 6 hours PRN constipation  simethicone 80 milliGRAM(s) Chew every 6 hours PRN Gas      Allergies    No Known Allergies    Intolerances      Review of Systems:  Constitutional: No fever, No change in weight  Eyes: No blurry vision  Neuro: No headache, +neuropathy  HEENT: No throat pain  Cardiovascular: + chest soreness s/p CABG  Respiratory: No SOB  GI: No nausea or vomiting  : No polyuria  Skin: no rash  Psych: no depression  Endocrine: No polydipsia, No heat or cold intolerance, rest as noted in HPI  Hem/lymph: no swelling    All other review of systems negative      PHYSICAL EXAM:  VITALS: T(C): 37.8 (08-21-20 @ 12:31)  T(F): 100.1 (08-21-20 @ 12:31), Max: 100.1 (08-21-20 @ 12:31)  HR: 89 (08-21-20 @ 12:31) (75 - 112)  BP: 143/90 (08-21-20 @ 12:31) (125/79 - 169/89)  RR:  (17 - 44)  SpO2:  (94% - 98%)  Wt(kg): --  GENERAL: NAD at this time  EYES: No proptosis, EOMI  HEENT:  Atraumatic, Normocephalic,   THYROID: Normal size, no palpable nodules  RESPIRATORY: Clear to auscultation bilaterally, full excursion, non-labored  CARDIOVASCULAR: Regular rhythm; +systolic murmur; no peripheral edema  GI: Soft, nontender, non distended, normal bowel sounds  SKIN: +sternal wound with dressing  MUSCULOSKELETAL: normal strength  NEURO: follows commands  PSYCH: Alert and oriented x 3, normal affect, normal mood  CUSHING'S SIGNS: no striae      POCT Blood Glucose.: 166 mg/dL (08-21-20 @ 14:39)  POCT Blood Glucose.: 210 mg/dL (08-21-20 @ 12:29)  POCT Blood Glucose.: 239 mg/dL (08-21-20 @ 08:17)  POCT Blood Glucose.: 125 mg/dL (08-20-20 @ 22:04)  POCT Blood Glucose.: 107 mg/dL (08-20-20 @ 20:12)  POCT Blood Glucose.: 99 mg/dL (08-20-20 @ 19:26)  POCT Blood Glucose.: 87 mg/dL (08-20-20 @ 18:52)  POCT Blood Glucose.: 110 mg/dL (08-20-20 @ 18:17)  POCT Blood Glucose.: 116 mg/dL (08-20-20 @ 17:13)  POCT Blood Glucose.: 137 mg/dL (08-20-20 @ 16:09)  POCT Blood Glucose.: 150 mg/dL (08-20-20 @ 15:06)  POCT Blood Glucose.: 133 mg/dL (08-20-20 @ 13:49)  POCT Blood Glucose.: 80 mg/dL (08-20-20 @ 13:12)  POCT Blood Glucose.: 118 mg/dL (08-20-20 @ 12:00)  POCT Blood Glucose.: 119 mg/dL (08-20-20 @ 10:57)  POCT Blood Glucose.: 160 mg/dL (08-20-20 @ 10:06)  POCT Blood Glucose.: 143 mg/dL (08-20-20 @ 09:02)  POCT Blood Glucose.: 112 mg/dL (08-20-20 @ 08:02)  POCT Blood Glucose.: 141 mg/dL (08-20-20 @ 06:47)  POCT Blood Glucose.: 150 mg/dL (08-20-20 @ 05:51)  POCT Blood Glucose.: 167 mg/dL (08-20-20 @ 05:01)  POCT Blood Glucose.: 152 mg/dL (08-20-20 @ 04:00)  POCT Blood Glucose.: 96 mg/dL (08-20-20 @ 02:00)  POCT Blood Glucose.: 108 mg/dL (08-20-20 @ 01:04)  POCT Blood Glucose.: 154 mg/dL (08-19-20 @ 23:03)  POCT Blood Glucose.: 187 mg/dL (08-19-20 @ 21:57)  POCT Blood Glucose.: 215 mg/dL (08-19-20 @ 20:55)  POCT Blood Glucose.: 227 mg/dL (08-19-20 @ 20:06)  POCT Blood Glucose.: 263 mg/dL (08-19-20 @ 18:42)  POCT Blood Glucose.: 248 mg/dL (08-19-20 @ 18:00)  POCT Blood Glucose.: 237 mg/dL (08-19-20 @ 16:56)  POCT Blood Glucose.: 285 mg/dL (08-19-20 @ 15:55)  POCT Blood Glucose.: 276 mg/dL (08-19-20 @ 13:59)  POCT Blood Glucose.: 184 mg/dL (08-19-20 @ 12:54)  POCT Blood Glucose.: 104 mg/dL (08-19-20 @ 11:57)  POCT Blood Glucose.: 110 mg/dL (08-19-20 @ 11:10)  POCT Blood Glucose.: 106 mg/dL (08-19-20 @ 10:28)  POCT Blood Glucose.: 106 mg/dL (08-19-20 @ 08:51)  POCT Blood Glucose.: 98 mg/dL (08-19-20 @ 02:59)  POCT Blood Glucose.: 89 mg/dL (08-18-20 @ 22:27)  POCT Blood Glucose.: 89 mg/dL (08-18-20 @ 22:08)  POCT Blood Glucose.: 119 mg/dL (08-18-20 @ 20:15)  POCT Blood Glucose.: 128 mg/dL (08-18-20 @ 18:48)  POCT Blood Glucose.: 167 mg/dL (08-18-20 @ 17:15)                              10.9   15.03 )-----------( 138      ( 21 Aug 2020 01:37 )             32.3       08-21    x   |  x   |  x   ----------------------------<  x   4.0   |  x   |  x     EGFR if : x   EGFR if non : x     Ca    9.1      08-21  Mg     1.8     08-21  Phos  1.4     08-21    TPro  6.7  /  Alb  4.2  /  TBili  2.2<H>  /  DBili  x   /  AST  23  /  ALT  15  /  AlkPhos  49  08-21    Thyroid Function Tests:  08-17 @ 22:41 TSH 0.67 FreeT4 -- T3 102 Anti TPO -- Anti Thyroglobulin Ab -- TSI --            Radiology:

## 2020-08-21 NOTE — PROGRESS NOTE ADULT - SUBJECTIVE AND OBJECTIVE BOX
TARUN CERVANTES  MRN-[id number]  Patient is a 50y old  Male who presents with a chief complaint of CABG (20 Aug 2020 07:21)    HPI:  50 M bilingual Sinhala/English speaking, with PMHx DM2 ([facility], Hgba1c unknown, controlled ), HTN, HLD, NSTEMI 11/16/19, CAD-KHARI to 60% pCX and 100% OM2, presents with chest pain. Pt reports intermittent chest pain for the past 12 days. Initially came on while he was at rest. Described as sharp, 4/10, left sided w/o radiation, lasted for several hours w/o associated SOB. Since then he has noticed the pain more with exertion- says he is unable to walk more than 10 feet without stopping due to the pain. He has been compliant with all of his medication including DAPT, atorva, bb, ACEi. Denies any orthopnea, PND, LE edema, palpitations. Pt was referred for Cardiac Cath by DR Blakely from the [facility]iology Clinic. (17 Aug 2020 08:32)      Surgery/Hospital course:  8/18 CABG x3     REVIEW OF SYSTEMS:  Gen: No fever  EYES/ENT: No visual changes;  No vertigo or throat pain   NECK: No pain   RES:  No shortness of breath or Cough  CARD: No chest pain   GI: No abdominal pain  : No dysuria  NEURO: No weakness  SKIN: No itching, rashes     Vital Signs Last 24 Hrs  T(C): 36.7 (21 Aug 2020 04:00), Max: 37.6 (20 Aug 2020 08:00)  T(F): 98 (21 Aug 2020 04:00), Max: 99.7 (20 Aug 2020 08:00)  HR: 79 (21 Aug 2020 07:00) (79 - 112)  BP: 155/72 (21 Aug 2020 07:00) (123/67 - 169/89)  BP(mean): 105 (21 Aug 2020 07:00) (90 - 121)  RR: 27 (21 Aug 2020 07:00) (23 - 44)  SpO2: 98% (21 Aug 2020 07:00) (91% - 99%)    ============================I/O===========================   I&O's Detail    20 Aug 2020 07:01  -  21 Aug 2020 07:00  --------------------------------------------------------  IN:    insulin regular Infusion: 46 mL    IV PiggyBack: 549.9 mL    niCARdipine Infusion: 15 mL    Oral Fluid: 120 mL    sodium chloride 0.9%.: 240 mL    Solution: 166.6 mL  Total IN: 1137.5 mL    OUT:    Chest Tube: 70 mL    Chest Tube: 120 mL    Indwelling Catheter - Urethral: 125 mL    Nasoenteral Tube: 50 mL    Voided: 1300 mL  Total OUT: 1665 mL    Total NET: -527.5 mL        ============================ LABS =========================                        10.9   15.03 )-----------( 138      ( 21 Aug 2020 01:37 )             32.3     08-21    x   |  x   |  x   ----------------------------<  x   4.0   |  x   |  x     Ca    9.1      21 Aug 2020 01:37  Phos  1.4     08-21  Mg     1.8     08-21    TPro  6.7  /  Alb  4.2  /  TBili  2.2<H>  /  DBili  x   /  AST  23  /  ALT  15  /  AlkPhos  49  08-21    LIVER FUNCTIONS - ( 21 Aug 2020 01:37 )  Alb: 4.2 g/dL / Pro: 6.7 g/dL / ALK PHOS: 49 U/L / ALT: 15 U/L / AST: 23 U/L / GGT: x             ABG - ( 20 Aug 2020 03:15 )  pH, Arterial: 7.44  pH, Blood: x     /  pCO2: 32    /  pO2: 63    / HCO3: 22    / Base Excess: -1.3  /  SaO2: 92                  ======================Microbiology/Radadiology=================  Echo: Reviewed   CXR: Reviewed  ======================================================  PAST MEDICAL & SURGICAL HISTORY:  MI (myocardial infarction)  CAD (coronary artery disease)  HTN (hypertension)  Diabetes  History of coronary artery stent placement    ====================ASSESSMENT ==============  CAD s/p CABG x3 on 8/18   Diabetes mellitus   Hypertension  ileus     Plan:  ====================== NEUROLOGY=====================  Off sedation, continue to monitor neuro status     ==================== RESPIRATORY======================  Comfortable on RA, SpO2 95-98%   Encourage incentive spirometry, continue pulse ox monitoring, follow ABGs     ====================CARDIOVASCULAR==================  CAD s/p CABGx3 on 8/18   Blood pressure support with Lisinopril and Lopressor  ASA/Statin/Plavix for graft patency     lisinopril 5 milliGRAM(s) Oral daily  metoprolol tartrate 50 milliGRAM(s) Oral every 8 hours  aspirin enteric coated 81 milliGRAM(s) Oral daily  clopidogrel Tablet 75 milliGRAM(s) Oral daily  atorvastatin 80 milliGRAM(s) Oral at bedtime    ===================HEMATOLOGIC/ONC ===================  Monitor H&H, transfuse prn     VTE prophylaxis, enoxaparin Injectable 40 milliGRAM(s) SubCutaneous daily    ===================== RENAL =========================  Continue monitoring urine output, I&Os, BUN/Cr    ==================== GASTROINTESTINAL===================  Tolerating clear liquid diet  Bowel regimen with Miralax   Bisacodyl for constipation     bisacodyl Suppository 10 milliGRAM(s) Rectal daily PRN Constipation  lactulose Syrup 10 Gram(s) Oral every 6 hours  GI prophylaxis,  pantoprazole  Injectable 40 milliGRAM(s) IV Push daily  polyethylene glycol 3350 17 Gram(s) Oral daily  simethicone 80 milliGRAM(s) Chew every 6 hours PRN Gas  sodium chloride 0.9%. 1000 milliLiter(s) (10 mL/Hr) IV Continuous <Continuous>    =======================    ENDOCRINE  =====================  Hx of DM, glucose control with insulin drip     insulin glargine Injectable (LANTUS) 10 Unit(s) SubCutaneous at bedtime  insulin lispro (HumaLOG) corrective regimen sliding scale   SubCutaneous three times a day before meals  insulin lispro Injectable (HumaLOG) 3 Unit(s) SubCutaneous three times a day with meals    ========================INFECTIOUS DISEASE================  Temp Afebrile, WBC rising 13->15    UPDATES:    TIME:     Patient requires continuous monitoring with bedside rhythm monitoring, pulse ox monitoring, and intermittent blood gas analysis. Care plan discussed with ICU care team. Patient remained critical and at risk for life threatening decompensation.    By signing my name below, IKeesha, attest that this documentation has been prepared under the direction and in the presence of Deacon Snow MD   Electronically signed: Shawanda Brizuela, 08-21-20 @ 07:52    I, Deacon Snow, , personally performed the services described in this documentation. all medical record entries made by the scribe were at my direction and in my presence. I have reviewed the chart and agree that the record reflects my personal performance and is accurate and complete  Electronically signed: Deacon Snow MD TARUN CERVANTES  MRN-[id number]  Patient is a 50y old  Male who presents with a chief complaint of CABG (20 Aug 2020 07:21)    HPI:  50 M bilingual Estonian/English speaking, with PMHx DM2 ([facility], Hgba1c unknown, controlled ), HTN, HLD, NSTEMI 11/16/19, CAD-KHARI to 60% pCX and 100% OM2, presents with chest pain. Pt reports intermittent chest pain for the past 12 days. Initially came on while he was at rest. Described as sharp, 4/10, left sided w/o radiation, lasted for several hours w/o associated SOB. Since then he has noticed the pain more with exertion- says he is unable to walk more than 10 feet without stopping due to the pain. He has been compliant with all of his medication including DAPT, atorva, bb, ACEi. Denies any orthopnea, PND, LE edema, palpitations. Pt was referred for Cardiac Cath by DR Blakely from the [facility]iology Clinic. (17 Aug 2020 08:32)      Surgery/Hospital course:  8/18 CABG x3     REVIEW OF SYSTEMS:  Gen: No fever  EYES/ENT: No visual changes;  No vertigo or throat pain   NECK: No pain   RES:  No shortness of breath or Cough  CARD: No chest pain   GI: No abdominal pain  : No dysuria  NEURO: No weakness  SKIN: No itching, rashes     Vital Signs Last 24 Hrs  T(C): 36.7 (21 Aug 2020 04:00), Max: 37.6 (20 Aug 2020 08:00)  T(F): 98 (21 Aug 2020 04:00), Max: 99.7 (20 Aug 2020 08:00)  HR: 79 (21 Aug 2020 07:00) (79 - 112)  BP: 155/72 (21 Aug 2020 07:00) (123/67 - 169/89)  BP(mean): 105 (21 Aug 2020 07:00) (90 - 121)  RR: 27 (21 Aug 2020 07:00) (23 - 44)  SpO2: 98% (21 Aug 2020 07:00) (91% - 99%)    ============================I/O===========================   I&O's Detail    20 Aug 2020 07:01  -  21 Aug 2020 07:00  --------------------------------------------------------  IN:    insulin regular Infusion: 46 mL    IV PiggyBack: 549.9 mL    niCARdipine Infusion: 15 mL    Oral Fluid: 120 mL    sodium chloride 0.9%.: 240 mL    Solution: 166.6 mL  Total IN: 1137.5 mL    OUT:    Chest Tube: 70 mL    Chest Tube: 120 mL    Indwelling Catheter - Urethral: 125 mL    Nasoenteral Tube: 50 mL    Voided: 1300 mL  Total OUT: 1665 mL    Total NET: -527.5 mL        ============================ LABS =========================                        10.9   15.03 )-----------( 138      ( 21 Aug 2020 01:37 )             32.3     08-21    x   |  x   |  x   ----------------------------<  x   4.0   |  x   |  x     Ca    9.1      21 Aug 2020 01:37  Phos  1.4     08-21  Mg     1.8     08-21    TPro  6.7  /  Alb  4.2  /  TBili  2.2<H>  /  DBili  x   /  AST  23  /  ALT  15  /  AlkPhos  49  08-21    LIVER FUNCTIONS - ( 21 Aug 2020 01:37 )  Alb: 4.2 g/dL / Pro: 6.7 g/dL / ALK PHOS: 49 U/L / ALT: 15 U/L / AST: 23 U/L / GGT: x             ABG - ( 20 Aug 2020 03:15 )  pH, Arterial: 7.44  pH, Blood: x     /  pCO2: 32    /  pO2: 63    / HCO3: 22    / Base Excess: -1.3  /  SaO2: 92                  ======================Microbiology/Radadiology=================  Echo: Reviewed   CXR: Reviewed  ======================================================  PAST MEDICAL & SURGICAL HISTORY:  MI (myocardial infarction)  CAD (coronary artery disease)  HTN (hypertension)  Diabetes  History of coronary artery stent placement    ====================ASSESSMENT ==============  CAD s/p CABG x3 on 8/18   Diabetes mellitus   Hypertension  ileus     Plan:  ====================== NEUROLOGY=====================  Off sedation, continue to monitor neuro status     ==================== RESPIRATORY======================  Comfortable on RA, SpO2 95-98%   Encourage incentive spirometry, continue pulse ox monitoring, follow ABGs     ====================CARDIOVASCULAR==================  CAD s/p CABGx3 on 8/18   Blood pressure support with Lisinopril and Lopressor  ASA/Statin/Plavix for graft patency     lisinopril 5 milliGRAM(s) Oral daily  metoprolol tartrate 50 milliGRAM(s) Oral every 8 hours  aspirin enteric coated 81 milliGRAM(s) Oral daily  clopidogrel Tablet 75 milliGRAM(s) Oral daily  atorvastatin 80 milliGRAM(s) Oral at bedtime    ===================HEMATOLOGIC/ONC ===================  Monitor H&H, transfuse prn     VTE prophylaxis, enoxaparin Injectable 40 milliGRAM(s) SubCutaneous daily    ===================== RENAL =========================  Continue monitoring urine output, I&Os, BUN/Cr    ==================== GASTROINTESTINAL===================  Tolerating clear liquid diet  Bowel regimen with Miralax   Bisacodyl for constipation     bisacodyl Suppository 10 milliGRAM(s) Rectal daily PRN Constipation  lactulose Syrup 10 Gram(s) Oral every 6 hours  GI prophylaxis,  pantoprazole  Injectable 40 milliGRAM(s) IV Push daily  polyethylene glycol 3350 17 Gram(s) Oral daily  simethicone 80 milliGRAM(s) Chew every 6 hours PRN Gas  sodium chloride 0.9%. 1000 milliLiter(s) (10 mL/Hr) IV Continuous <Continuous>    =======================    ENDOCRINE  =====================  Hx of DM, glucose control with insulin drip     insulin glargine Injectable (LANTUS) 10 Unit(s) SubCutaneous at bedtime  insulin lispro (HumaLOG) corrective regimen sliding scale   SubCutaneous three times a day before meals  insulin lispro Injectable (HumaLOG) 3 Unit(s) SubCutaneous three times a day with meals    ========================INFECTIOUS DISEASE================  Temp Afebrile, WBC rising 13->15    UPDATES:  S/p CABG, uncomplicated  ASA, Plavix, Lipitor; increasing Lopressor  O2 sats high 90s on room air  Clears diet after resolution of likely ileus - advance diet  Normal renal function with adequate urine output  H/H low but acceptable  Afebrile, no antibiotics  Sugars poorly controlled on Lantus and Humalog - may increase    Patient requires continuous monitoring with bedside rhythm monitoring, pulse ox monitoring, and intermittent blood gas analysis. Care plan discussed with ICU care team. Patient remained critical and at risk for life threatening decompensation.    By signing my name below, I, Keesha Paul, attest that this documentation has been prepared under the direction and in the presence of Deacon Snow MD   Electronically signed: Shawanda Brizuela, 08-21-20 @ 07:52    I, Deacon Snow, , personally performed the services described in this documentation. all medical record entries made by the scribe were at my direction and in my presence. I have reviewed the chart and agree that the record reflects my personal performance and is accurate and complete  Electronically signed: Deacon Snow MD

## 2020-08-21 NOTE — CONSULT NOTE ADULT - PROBLEM SELECTOR RECOMMENDATION 2
Goal BP <130/80 c/w lisinopril
Graciela (Dr. Galindo) consulted for DM control   Continue on Lantus and Humalog and ISS

## 2020-08-21 NOTE — CONSULT NOTE ADULT - PROBLEM SELECTOR PROBLEM 1
Type 2 diabetes mellitus with hyperglycemia, with long-term current use of insulin
CAD (coronary artery disease)

## 2020-08-21 NOTE — PROGRESS NOTE ADULT - SUBJECTIVE AND OBJECTIVE BOX
Subjective:  "Feel ok, a little hungry"  OOB chair,        Tele:  SR 70s           V/S:                    T(F): 100.1 (20 @ 12:31), Max: 100.1 (20 @ 12:31)  HR: 89 (20 @ 12:31) (75 - 112)  BP: 143/90 (20 @ 12:31) (125/79 - 169/89)  RR: 18 (20 @ 12:31) (17 - 44)  SpO2: 95% (20 @ 12:31) (94% - 99%)  Wt(kg): --      LV EF:      Labs:      x   |  x   |  x   ----------------------------<  x   4.0   |  x   |  x     Ca    9.1      21 Aug 2020 01:37  Phos  1.4       Mg     1.8         TPro  6.7  /  Alb  4.2  /  TBili  2.2<H>  /  DBili  x   /  AST  23  /  ALT  15  /  AlkPhos  49                                 10.9   15.03 )-----------( 138      ( 21 Aug 2020 01:37 )             32.3             CAPILLARY BLOOD GLUCOSE  125 (20 Aug 2020 22:00)  107 (20 Aug 2020 20:00)  87 (20 Aug 2020 19:00)  110 (20 Aug 2020 18:00)  116 (20 Aug 2020 17:00)  137 (20 Aug 2020 16:00)      POCT Blood Glucose.: 166 mg/dL (21 Aug 2020 14:39)  POCT Blood Glucose.: 210 mg/dL (21 Aug 2020 12:29)  POCT Blood Glucose.: 239 mg/dL (21 Aug 2020 08:17)  POCT Blood Glucose.: 125 mg/dL (20 Aug 2020 22:04)  POCT Blood Glucose.: 107 mg/dL (20 Aug 2020 20:12)  POCT Blood Glucose.: 99 mg/dL (20 Aug 2020 19:26)  POCT Blood Glucose.: 87 mg/dL (20 Aug 2020 18:52)  POCT Blood Glucose.: 110 mg/dL (20 Aug 2020 18:17)  POCT Blood Glucose.: 116 mg/dL (20 Aug 2020 17:13)  POCT Blood Glucose.: 137 mg/dL (20 Aug 2020 16:09)           CXR:    I&O's Detail    20 Aug 2020 07:  -  21 Aug 2020 07:00  --------------------------------------------------------  IN:    insulin regular Infusion: 46 mL    IV PiggyBack: 549.9 mL    niCARdipine Infusion: 15 mL    Oral Fluid: 120 mL    sodium chloride 0.9%: 240 mL    Solution: 166.6 mL  Total IN: 1137.5 mL    OUT:    Chest Tube: 70 mL    Chest Tube: 120 mL    Indwelling Catheter - Urethral: 125 mL    Nasoenteral Tube: 50 mL    Voided: 1300 mL  Total OUT: 1665 mL    Total NET: -527.5 mL      21 Aug 2020 07:  -  21 Aug 2020 15:47  --------------------------------------------------------  IN:  Total IN: 0 mL    OUT:  Total OUT: 0 mL    Total NET: 0 mL          CHEST TUBE:  [ ] YES [ ] NO  OUTPUT:     per 24 hours    AIR LEAKS:  [ ] YES [ ] NO      KAPIL DRAIN:   [ ] YES [ ] NO  OUTPUT:     per 24 hours    EPICARDIAL WIRES:  [ ] YES [ ] NO      BOWEL MOVEMENT:  [ ] YES [ ] NO      Daily     Daily Weight in k.9 (21 Aug 2020 00:00)  Medications:  acetaminophen   Tablet .. 650 milliGRAM(s) Oral every 6 hours PRN  aspirin enteric coated 81 milliGRAM(s) Oral daily  atorvastatin 80 milliGRAM(s) Oral at bedtime  clopidogrel Tablet 75 milliGRAM(s) Oral daily  enoxaparin Injectable 40 milliGRAM(s) SubCutaneous daily  insulin glargine Injectable (LANTUS) 10 Unit(s) SubCutaneous at bedtime  insulin lispro (HumaLOG) corrective regimen sliding scale   SubCutaneous three times a day before meals  insulin lispro Injectable (HumaLOG) 3 Unit(s) SubCutaneous three times a day with meals  lactulose Syrup 10 Gram(s) Oral every 6 hours PRN  lisinopril 5 milliGRAM(s) Oral daily  metoprolol tartrate 50 milliGRAM(s) Oral every 8 hours  mupirocin 2% Nasal 1 Application(s) Nasal two times a day  pantoprazole  Injectable 40 milliGRAM(s) IV Push daily  polyethylene glycol 3350 17 Gram(s) Oral daily  senna 2 Tablet(s) Oral at bedtime  simethicone 80 milliGRAM(s) Chew every 6 hours PRN  sodium chloride 0.9% lock flush 3 milliLiter(s) IV Push every 8 hours        Physical Exam:    Gen: WN/WD NAD    Neuro: AAOx3, nonfocal    Pulm: CTA B/  LDemonstrates proper use incentive spirometry    CV: RRR, S1S2 VVI  45 / 10    Abd: Soft, NT, ND +BS  Reports BM this afternoon    Ext: No edema, + peripheral pulses RLE  EVH site w/ DSD     MT sternum stable, occlusive dsg in place                 PAST MEDICAL & SURGICAL HISTORY:  MI (myocardial infarction)  CAD (coronary artery disease)  HTN (hypertension)  Diabetes  History of coronary artery stent placement

## 2020-08-21 NOTE — CONSULT NOTE ADULT - PROBLEM SELECTOR RECOMMENDATION 9
Diabetes Education and Nutrition Eval  Start Lantus 12 units qhs  Start Humalog 4 units qac  Low correction scale qac + bedtime  Goal glucose 100-180  Outpt. endo follow-up  Outpt. optho, podiatry, micro/cr  Plan to d/c on 70/30 + metformin
8/17 - s/p cath LM disease   TTE, carotids ordered   Pending P2Y12   Continue ASA, Toprol 50xL, Atorvastatin   Lisinopril 5mg QD d'cd for renal optimization preop

## 2020-08-22 LAB
ANION GAP SERPL CALC-SCNC: 14 MMOL/L — SIGNIFICANT CHANGE UP (ref 5–17)
BASOPHILS # BLD AUTO: 0.04 K/UL — SIGNIFICANT CHANGE UP (ref 0–0.2)
BASOPHILS NFR BLD AUTO: 0.3 % — SIGNIFICANT CHANGE UP (ref 0–2)
BUN SERPL-MCNC: 16 MG/DL — SIGNIFICANT CHANGE UP (ref 7–23)
CALCIUM SERPL-MCNC: 8.9 MG/DL — SIGNIFICANT CHANGE UP (ref 8.4–10.5)
CHLORIDE SERPL-SCNC: 103 MMOL/L — SIGNIFICANT CHANGE UP (ref 96–108)
CO2 SERPL-SCNC: 21 MMOL/L — LOW (ref 22–31)
CREAT SERPL-MCNC: 0.66 MG/DL — SIGNIFICANT CHANGE UP (ref 0.5–1.3)
EOSINOPHIL # BLD AUTO: 0.53 K/UL — HIGH (ref 0–0.5)
EOSINOPHIL NFR BLD AUTO: 4.2 % — SIGNIFICANT CHANGE UP (ref 0–6)
GLUCOSE BLDC GLUCOMTR-MCNC: 171 MG/DL — HIGH (ref 70–99)
GLUCOSE BLDC GLUCOMTR-MCNC: 179 MG/DL — HIGH (ref 70–99)
GLUCOSE BLDC GLUCOMTR-MCNC: 182 MG/DL — HIGH (ref 70–99)
GLUCOSE BLDC GLUCOMTR-MCNC: 185 MG/DL — HIGH (ref 70–99)
GLUCOSE BLDC GLUCOMTR-MCNC: 197 MG/DL — HIGH (ref 70–99)
GLUCOSE SERPL-MCNC: 182 MG/DL — HIGH (ref 70–99)
HCT VFR BLD CALC: 35.2 % — LOW (ref 39–50)
HGB BLD-MCNC: 12.1 G/DL — LOW (ref 13–17)
IMM GRANULOCYTES NFR BLD AUTO: 0.5 % — SIGNIFICANT CHANGE UP (ref 0–1.5)
LYMPHOCYTES # BLD AUTO: 26.8 % — SIGNIFICANT CHANGE UP (ref 13–44)
LYMPHOCYTES # BLD AUTO: 3.37 K/UL — HIGH (ref 1–3.3)
MCHC RBC-ENTMCNC: 30.6 PG — SIGNIFICANT CHANGE UP (ref 27–34)
MCHC RBC-ENTMCNC: 34.4 GM/DL — SIGNIFICANT CHANGE UP (ref 32–36)
MCV RBC AUTO: 89.1 FL — SIGNIFICANT CHANGE UP (ref 80–100)
MONOCYTES # BLD AUTO: 1.17 K/UL — HIGH (ref 0–0.9)
MONOCYTES NFR BLD AUTO: 9.3 % — SIGNIFICANT CHANGE UP (ref 2–14)
NEUTROPHILS # BLD AUTO: 7.42 K/UL — HIGH (ref 1.8–7.4)
NEUTROPHILS NFR BLD AUTO: 58.9 % — SIGNIFICANT CHANGE UP (ref 43–77)
NRBC # BLD: 0 /100 WBCS — SIGNIFICANT CHANGE UP (ref 0–0)
PLATELET # BLD AUTO: 187 K/UL — SIGNIFICANT CHANGE UP (ref 150–400)
POTASSIUM SERPL-MCNC: 3.8 MMOL/L — SIGNIFICANT CHANGE UP (ref 3.5–5.3)
POTASSIUM SERPL-SCNC: 3.8 MMOL/L — SIGNIFICANT CHANGE UP (ref 3.5–5.3)
RBC # BLD: 3.95 M/UL — LOW (ref 4.2–5.8)
RBC # FLD: 13.6 % — SIGNIFICANT CHANGE UP (ref 10.3–14.5)
SODIUM SERPL-SCNC: 138 MMOL/L — SIGNIFICANT CHANGE UP (ref 135–145)
WBC # BLD: 12.59 K/UL — HIGH (ref 3.8–10.5)
WBC # FLD AUTO: 12.59 K/UL — HIGH (ref 3.8–10.5)

## 2020-08-22 PROCEDURE — 99232 SBSQ HOSP IP/OBS MODERATE 35: CPT

## 2020-08-22 PROCEDURE — 71045 X-RAY EXAM CHEST 1 VIEW: CPT | Mod: 26

## 2020-08-22 RX ORDER — INSULIN GLARGINE 100 [IU]/ML
15 INJECTION, SOLUTION SUBCUTANEOUS AT BEDTIME
Refills: 0 | Status: DISCONTINUED | OUTPATIENT
Start: 2020-08-22 | End: 2020-08-25

## 2020-08-22 RX ORDER — DEXTROSE 50 % IN WATER 50 %
12.5 SYRINGE (ML) INTRAVENOUS ONCE
Refills: 0 | Status: DISCONTINUED | OUTPATIENT
Start: 2020-08-22 | End: 2020-08-25

## 2020-08-22 RX ORDER — INSULIN LISPRO 100/ML
6 VIAL (ML) SUBCUTANEOUS
Refills: 0 | Status: DISCONTINUED | OUTPATIENT
Start: 2020-08-22 | End: 2020-08-23

## 2020-08-22 RX ORDER — INSULIN LISPRO 100/ML
VIAL (ML) SUBCUTANEOUS AT BEDTIME
Refills: 0 | Status: DISCONTINUED | OUTPATIENT
Start: 2020-08-22 | End: 2020-08-25

## 2020-08-22 RX ORDER — INSULIN LISPRO 100/ML
VIAL (ML) SUBCUTANEOUS
Refills: 0 | Status: DISCONTINUED | OUTPATIENT
Start: 2020-08-22 | End: 2020-08-25

## 2020-08-22 RX ORDER — SODIUM CHLORIDE 9 MG/ML
1000 INJECTION, SOLUTION INTRAVENOUS
Refills: 0 | Status: DISCONTINUED | OUTPATIENT
Start: 2020-08-22 | End: 2020-08-25

## 2020-08-22 RX ORDER — DEXTROSE 50 % IN WATER 50 %
25 SYRINGE (ML) INTRAVENOUS ONCE
Refills: 0 | Status: DISCONTINUED | OUTPATIENT
Start: 2020-08-22 | End: 2020-08-25

## 2020-08-22 RX ORDER — GLUCAGON INJECTION, SOLUTION 0.5 MG/.1ML
1 INJECTION, SOLUTION SUBCUTANEOUS ONCE
Refills: 0 | Status: DISCONTINUED | OUTPATIENT
Start: 2020-08-22 | End: 2020-08-25

## 2020-08-22 RX ORDER — DEXTROSE 50 % IN WATER 50 %
15 SYRINGE (ML) INTRAVENOUS ONCE
Refills: 0 | Status: DISCONTINUED | OUTPATIENT
Start: 2020-08-22 | End: 2020-08-25

## 2020-08-22 RX ADMIN — SODIUM CHLORIDE 3 MILLILITER(S): 9 INJECTION INTRAMUSCULAR; INTRAVENOUS; SUBCUTANEOUS at 15:01

## 2020-08-22 RX ADMIN — Medication 4 UNIT(S): at 08:23

## 2020-08-22 RX ADMIN — Medication 50 MILLIGRAM(S): at 21:29

## 2020-08-22 RX ADMIN — Medication 650 MILLIGRAM(S): at 21:30

## 2020-08-22 RX ADMIN — SODIUM CHLORIDE 3 MILLILITER(S): 9 INJECTION INTRAMUSCULAR; INTRAVENOUS; SUBCUTANEOUS at 06:23

## 2020-08-22 RX ADMIN — Medication 2: at 08:23

## 2020-08-22 RX ADMIN — INSULIN GLARGINE 15 UNIT(S): 100 INJECTION, SOLUTION SUBCUTANEOUS at 21:42

## 2020-08-22 RX ADMIN — Medication 6 UNIT(S): at 17:38

## 2020-08-22 RX ADMIN — PANTOPRAZOLE SODIUM 40 MILLIGRAM(S): 20 TABLET, DELAYED RELEASE ORAL at 12:12

## 2020-08-22 RX ADMIN — LISINOPRIL 5 MILLIGRAM(S): 2.5 TABLET ORAL at 06:22

## 2020-08-22 RX ADMIN — Medication 81 MILLIGRAM(S): at 12:11

## 2020-08-22 RX ADMIN — Medication 650 MILLIGRAM(S): at 06:22

## 2020-08-22 RX ADMIN — Medication 50 MILLIGRAM(S): at 06:22

## 2020-08-22 RX ADMIN — CLOPIDOGREL BISULFATE 75 MILLIGRAM(S): 75 TABLET, FILM COATED ORAL at 12:11

## 2020-08-22 RX ADMIN — Medication 2: at 12:12

## 2020-08-22 RX ADMIN — MUPIROCIN 1 APPLICATION(S): 20 OINTMENT TOPICAL at 06:22

## 2020-08-22 RX ADMIN — SODIUM CHLORIDE 3 MILLILITER(S): 9 INJECTION INTRAMUSCULAR; INTRAVENOUS; SUBCUTANEOUS at 21:30

## 2020-08-22 RX ADMIN — ENOXAPARIN SODIUM 40 MILLIGRAM(S): 100 INJECTION SUBCUTANEOUS at 12:11

## 2020-08-22 RX ADMIN — Medication 1: at 17:39

## 2020-08-22 RX ADMIN — ATORVASTATIN CALCIUM 80 MILLIGRAM(S): 80 TABLET, FILM COATED ORAL at 21:29

## 2020-08-22 RX ADMIN — Medication 650 MILLIGRAM(S): at 22:00

## 2020-08-22 RX ADMIN — Medication 4 UNIT(S): at 12:12

## 2020-08-22 RX ADMIN — MUPIROCIN 1 APPLICATION(S): 20 OINTMENT TOPICAL at 17:38

## 2020-08-22 RX ADMIN — Medication 50 MILLIGRAM(S): at 15:02

## 2020-08-22 RX ADMIN — Medication 650 MILLIGRAM(S): at 07:32

## 2020-08-22 NOTE — PROGRESS NOTE ADULT - SUBJECTIVE AND OBJECTIVE BOX
VITAL SIGNS    Vital Signs Last 24 Hrs  T(C): 37.2 (22 Aug 2020 05:00), Max: 37.9 (21 Aug 2020 16:30)  T(F): 99 (22 Aug 2020 05:00), Max: 100.2 (21 Aug 2020 16:30)  HR: 80 (22 Aug 2020 12:33) (80 - 103)  BP: 147/89 (22 Aug 2020 12:33) (134/82 - 154/94)  BP(mean): 101 (22 Aug 2020 05:00) (101 - 114)  RR: 18 (22 Aug 2020 12:33) (18 - 18)  SpO2: 98% (22 Aug 2020 12:33) (93% - 98%)     Daily     Daily Weight in k.4 (23 Aug 2020 08:07)  Admit Wt: Drug Dosing Weight  Height (cm): 165.1 (18 Aug 2020 08:02)  Weight (kg): 59.9 (18 Aug 2020 08:02)  BMI (kg/m2): 22 (18 Aug 2020 08:02)  BSA (m2): 1.66 (18 Aug 2020 08:02)      CAPILLARY BLOOD GLUCOSE      POCT Blood Glucose.: 155 mg/dL (23 Aug 2020 07:56)  POCT Blood Glucose.: 197 mg/dL (22 Aug 2020 21:20)  POCT Blood Glucose.: 179 mg/dL (22 Aug 2020 17:12)  POCT Blood Glucose.: 182 mg/dL (22 Aug 2020 11:56)          MEDICATIONS  acetaminophen   Tablet .. 650 milliGRAM(s) Oral every 6 hours PRN  aspirin enteric coated 81 milliGRAM(s) Oral daily  atorvastatin 80 milliGRAM(s) Oral at bedtime  clopidogrel Tablet 75 milliGRAM(s) Oral daily  dextrose 40% Gel 15 Gram(s) Oral once PRN  dextrose 5%. 1000 milliLiter(s) IV Continuous <Continuous>  dextrose 50% Injectable 12.5 Gram(s) IV Push once  dextrose 50% Injectable 25 Gram(s) IV Push once  dextrose 50% Injectable 25 Gram(s) IV Push once  enoxaparin Injectable 40 milliGRAM(s) SubCutaneous daily  glucagon  Injectable 1 milliGRAM(s) IntraMuscular once PRN  insulin glargine Injectable (LANTUS) 15 Unit(s) SubCutaneous at bedtime  insulin lispro (HumaLOG) corrective regimen sliding scale   SubCutaneous three times a day before meals  insulin lispro (HumaLOG) corrective regimen sliding scale   SubCutaneous at bedtime  insulin lispro Injectable (HumaLOG) 6 Unit(s) SubCutaneous three times a day with meals  lactulose Syrup 10 Gram(s) Oral every 6 hours PRN  lisinopril 5 milliGRAM(s) Oral daily  metoprolol tartrate 50 milliGRAM(s) Oral every 8 hours  pantoprazole  Injectable 40 milliGRAM(s) IV Push daily  polyethylene glycol 3350 17 Gram(s) Oral daily  senna 2 Tablet(s) Oral at bedtime  simethicone 80 milliGRAM(s) Chew every 6 hours PRN  sodium chloride 0.9% lock flush 3 milliLiter(s) IV Push every 8 hours      >>> <<<  PHYSICAL EXAM  Subjective: NAD   Neurology: alert and oriented x 3, nonfocal, no gross deficits  CV : s1s2  Sternal Wound :  CDI , Stable  Lungs: CTA   Abdomen: soft, NT,ND, (+ )BM  :  voiding  Extremities: -c/c/e      LABS  -    139  |  103  |  15  ----------------------------<  142<H>  3.8   |  25  |  0.82    Ca    9.5      23 Aug 2020 06:08                                   12.2   11.75 )-----------( 222      ( 23 Aug 2020 06:08 )             36.6                 PAST MEDICAL & SURGICAL HISTORY:  MI (myocardial infarction)  CAD (coronary artery disease)  HTN (hypertension)  Diabetes  History of coronary artery stent placement

## 2020-08-22 NOTE — PROVIDER CONTACT NOTE (OTHER) - RECOMMENDATIONS
PA changed sliding scale. Ordered extra 4units of humalog to be given along with new bedtime sliding scale and give 12 units of Lantus. Repeat Blood sugar FS at 0200. Insulin pump??

## 2020-08-22 NOTE — PROGRESS NOTE ADULT - SUBJECTIVE AND OBJECTIVE BOX
Diabetes Follow up note:    Chief complaint: T2DM    Interval Hx: BG values slightly above goal. Pt seen at bedside. Reports tolerating POs and cut back on carb intake today. Concerned he isn't ready for discharge as he lives alone. Uses Humulin 70/30 insulin and Metformin at home. Follows @ [facility].     Review of Systems:  General: no complaints.   GI: Tolerating POs. Denies N/V/D/Abd pain  CV: Denies CP/SOB  ENDO: No S&Sx of hypoglycemia  MEDS:  atorvastatin 80 milliGRAM(s) Oral at bedtime  insulin glargine Injectable (LANTUS) 12 Unit(s) SubCutaneous at bedtime  insulin lispro (HumaLOG) corrective regimen sliding scale   SubCutaneous three times a day before meals  insulin lispro Injectable (HumaLOG) 4 Unit(s) SubCutaneous three times a day with meals      Allergies    No Known Allergies        PE:  General: Male sitting in chair. NAD>   Vital Signs Last 24 Hrs  T(C): 37.2 (22 Aug 2020 05:00), Max: 37.9 (21 Aug 2020 16:30)  T(F): 99 (22 Aug 2020 05:00), Max: 100.2 (21 Aug 2020 16:30)  HR: 80 (22 Aug 2020 12:33) (80 - 103)  BP: 147/89 (22 Aug 2020 12:33) (134/82 - 154/94)  BP(mean): 101 (22 Aug 2020 05:00) (101 - 114)  RR: 18 (22 Aug 2020 12:33) (18 - 18)  SpO2: 98% (22 Aug 2020 12:33) (93% - 98%)  Chest; Midsternal dsg c/d/i  Abd: Soft, NT,ND,   Extremities: Warm  Neuro: A&O X3    LABS:  POCT Blood Glucose.: 182 mg/dL (08-22-20 @ 11:56)  POCT Blood Glucose.: 185 mg/dL (08-22-20 @ 07:45)  POCT Blood Glucose.: 171 mg/dL (08-22-20 @ 01:58)  POCT Blood Glucose.: 282 mg/dL (08-21-20 @ 21:13)  POCT Blood Glucose.: 194 mg/dL (08-21-20 @ 16:59)  POCT Blood Glucose.: 166 mg/dL (08-21-20 @ 14:39)  POCT Blood Glucose.: 210 mg/dL (08-21-20 @ 12:29)  POCT Blood Glucose.: 239 mg/dL (08-21-20 @ 08:17)  POCT Blood Glucose.: 125 mg/dL (08-20-20 @ 22:04)  POCT Blood Glucose.: 107 mg/dL (08-20-20 @ 20:12)  POCT Blood Glucose.: 99 mg/dL (08-20-20 @ 19:26)  POCT Blood Glucose.: 87 mg/dL (08-20-20 @ 18:52)  POCT Blood Glucose.: 110 mg/dL (08-20-20 @ 18:17)  POCT Blood Glucose.: 116 mg/dL (08-20-20 @ 17:13)  POCT Blood Glucose.: 137 mg/dL (08-20-20 @ 16:09)  POCT Blood Glucose.: 150 mg/dL (08-20-20 @ 15:06)  POCT Blood Glucose.: 133 mg/dL (08-20-20 @ 13:49)  POCT Blood Glucose.: 80 mg/dL (08-20-20 @ 13:12)  POCT Blood Glucose.: 118 mg/dL (08-20-20 @ 12:00)  POCT Blood Glucose.: 119 mg/dL (08-20-20 @ 10:57)  POCT Blood Glucose.: 160 mg/dL (08-20-20 @ 10:06)  POCT Blood Glucose.: 143 mg/dL (08-20-20 @ 09:02)  POCT Blood Glucose.: 112 mg/dL (08-20-20 @ 08:02)  POCT Blood Glucose.: 141 mg/dL (08-20-20 @ 06:47)  POCT Blood Glucose.: 150 mg/dL (08-20-20 @ 05:51)  POCT Blood Glucose.: 167 mg/dL (08-20-20 @ 05:01)  POCT Blood Glucose.: 152 mg/dL (08-20-20 @ 04:00)  POCT Blood Glucose.: 96 mg/dL (08-20-20 @ 02:00)  POCT Blood Glucose.: 108 mg/dL (08-20-20 @ 01:04)  POCT Blood Glucose.: 154 mg/dL (08-19-20 @ 23:03)  POCT Blood Glucose.: 187 mg/dL (08-19-20 @ 21:57)  POCT Blood Glucose.: 215 mg/dL (08-19-20 @ 20:55)  POCT Blood Glucose.: 227 mg/dL (08-19-20 @ 20:06)  POCT Blood Glucose.: 263 mg/dL (08-19-20 @ 18:42)  POCT Blood Glucose.: 248 mg/dL (08-19-20 @ 18:00)  POCT Blood Glucose.: 237 mg/dL (08-19-20 @ 16:56)  POCT Blood Glucose.: 285 mg/dL (08-19-20 @ 15:55)                            12.1   12.59 )-----------( 187      ( 22 Aug 2020 06:20 )             35.2       08-22    138  |  103  |  16  ----------------------------<  182<H>  3.8   |  21<L>  |  0.66    Ca    8.9      22 Aug 2020 06:20  Phos  1.4     08-21  Mg     1.8     08-21    TPro  6.7  /  Alb  4.2  /  TBili  2.2<H>  /  DBili  x   /  AST  23  /  ALT  15  /  AlkPhos  49  08-21      Thyroid Function Tests:  08-17 @ 22:41 TSH 0.67 FreeT4 -- T3 102 Anti TPO -- Anti Thyroglobulin Ab -- TSI --      A1C with Estimated Average Glucose Result: 8.6 % (08-18-20 @ 00:42)  Hemoglobin A1C, Whole Blood: 10.8 % (11-16-19 @ 10:30)          Contact number: [email or web address] [phone] or [phone]

## 2020-08-23 LAB
ANION GAP SERPL CALC-SCNC: 11 MMOL/L — SIGNIFICANT CHANGE UP (ref 5–17)
BUN SERPL-MCNC: 15 MG/DL — SIGNIFICANT CHANGE UP (ref 7–23)
CALCIUM SERPL-MCNC: 9.5 MG/DL — SIGNIFICANT CHANGE UP (ref 8.4–10.5)
CHLORIDE SERPL-SCNC: 103 MMOL/L — SIGNIFICANT CHANGE UP (ref 96–108)
CO2 SERPL-SCNC: 25 MMOL/L — SIGNIFICANT CHANGE UP (ref 22–31)
CREAT SERPL-MCNC: 0.82 MG/DL — SIGNIFICANT CHANGE UP (ref 0.5–1.3)
GLUCOSE BLDC GLUCOMTR-MCNC: 141 MG/DL — HIGH (ref 70–99)
GLUCOSE BLDC GLUCOMTR-MCNC: 155 MG/DL — HIGH (ref 70–99)
GLUCOSE BLDC GLUCOMTR-MCNC: 201 MG/DL — HIGH (ref 70–99)
GLUCOSE BLDC GLUCOMTR-MCNC: 226 MG/DL — HIGH (ref 70–99)
GLUCOSE SERPL-MCNC: 142 MG/DL — HIGH (ref 70–99)
HCT VFR BLD CALC: 36.6 % — LOW (ref 39–50)
HGB BLD-MCNC: 12.2 G/DL — LOW (ref 13–17)
MCHC RBC-ENTMCNC: 29.8 PG — SIGNIFICANT CHANGE UP (ref 27–34)
MCHC RBC-ENTMCNC: 33.3 GM/DL — SIGNIFICANT CHANGE UP (ref 32–36)
MCV RBC AUTO: 89.5 FL — SIGNIFICANT CHANGE UP (ref 80–100)
NRBC # BLD: 0 /100 WBCS — SIGNIFICANT CHANGE UP (ref 0–0)
PLATELET # BLD AUTO: 222 K/UL — SIGNIFICANT CHANGE UP (ref 150–400)
POTASSIUM SERPL-MCNC: 3.8 MMOL/L — SIGNIFICANT CHANGE UP (ref 3.5–5.3)
POTASSIUM SERPL-SCNC: 3.8 MMOL/L — SIGNIFICANT CHANGE UP (ref 3.5–5.3)
RBC # BLD: 4.09 M/UL — LOW (ref 4.2–5.8)
RBC # FLD: 13.3 % — SIGNIFICANT CHANGE UP (ref 10.3–14.5)
SODIUM SERPL-SCNC: 139 MMOL/L — SIGNIFICANT CHANGE UP (ref 135–145)
WBC # BLD: 11.75 K/UL — HIGH (ref 3.8–10.5)
WBC # FLD AUTO: 11.75 K/UL — HIGH (ref 3.8–10.5)

## 2020-08-23 PROCEDURE — 93010 ELECTROCARDIOGRAM REPORT: CPT

## 2020-08-23 PROCEDURE — 99232 SBSQ HOSP IP/OBS MODERATE 35: CPT

## 2020-08-23 RX ORDER — AMIODARONE HYDROCHLORIDE 400 MG/1
200 TABLET ORAL DAILY
Refills: 0 | Status: CANCELLED | OUTPATIENT
Start: 2020-08-27 | End: 2020-08-25

## 2020-08-23 RX ORDER — AMIODARONE HYDROCHLORIDE 400 MG/1
150 TABLET ORAL ONCE
Refills: 0 | Status: COMPLETED | OUTPATIENT
Start: 2020-08-23 | End: 2020-08-23

## 2020-08-23 RX ORDER — METOPROLOL TARTRATE 50 MG
5 TABLET ORAL ONCE
Refills: 0 | Status: COMPLETED | OUTPATIENT
Start: 2020-08-23 | End: 2020-08-23

## 2020-08-23 RX ORDER — AMIODARONE HYDROCHLORIDE 400 MG/1
TABLET ORAL
Refills: 0 | Status: DISCONTINUED | OUTPATIENT
Start: 2020-08-23 | End: 2020-08-25

## 2020-08-23 RX ORDER — OXYCODONE AND ACETAMINOPHEN 5; 325 MG/1; MG/1
1 TABLET ORAL EVERY 4 HOURS
Refills: 0 | Status: DISCONTINUED | OUTPATIENT
Start: 2020-08-23 | End: 2020-08-25

## 2020-08-23 RX ORDER — INSULIN LISPRO 100/ML
7 VIAL (ML) SUBCUTANEOUS
Refills: 0 | Status: DISCONTINUED | OUTPATIENT
Start: 2020-08-23 | End: 2020-08-24

## 2020-08-23 RX ORDER — AMIODARONE HYDROCHLORIDE 400 MG/1
400 TABLET ORAL EVERY 8 HOURS
Refills: 0 | Status: DISCONTINUED | OUTPATIENT
Start: 2020-08-23 | End: 2020-08-25

## 2020-08-23 RX ADMIN — AMIODARONE HYDROCHLORIDE 400 MILLIGRAM(S): 400 TABLET ORAL at 22:20

## 2020-08-23 RX ADMIN — Medication 6 UNIT(S): at 08:05

## 2020-08-23 RX ADMIN — ATORVASTATIN CALCIUM 80 MILLIGRAM(S): 80 TABLET, FILM COATED ORAL at 22:20

## 2020-08-23 RX ADMIN — SODIUM CHLORIDE 3 MILLILITER(S): 9 INJECTION INTRAMUSCULAR; INTRAVENOUS; SUBCUTANEOUS at 05:58

## 2020-08-23 RX ADMIN — CLOPIDOGREL BISULFATE 75 MILLIGRAM(S): 75 TABLET, FILM COATED ORAL at 11:24

## 2020-08-23 RX ADMIN — OXYCODONE AND ACETAMINOPHEN 1 TABLET(S): 5; 325 TABLET ORAL at 15:20

## 2020-08-23 RX ADMIN — OXYCODONE AND ACETAMINOPHEN 1 TABLET(S): 5; 325 TABLET ORAL at 23:07

## 2020-08-23 RX ADMIN — LISINOPRIL 5 MILLIGRAM(S): 2.5 TABLET ORAL at 05:56

## 2020-08-23 RX ADMIN — Medication 50 MILLIGRAM(S): at 05:56

## 2020-08-23 RX ADMIN — Medication 50 MILLIGRAM(S): at 22:20

## 2020-08-23 RX ADMIN — Medication 5 MILLIGRAM(S): at 14:45

## 2020-08-23 RX ADMIN — Medication 81 MILLIGRAM(S): at 11:24

## 2020-08-23 RX ADMIN — SODIUM CHLORIDE 3 MILLILITER(S): 9 INJECTION INTRAMUSCULAR; INTRAVENOUS; SUBCUTANEOUS at 14:11

## 2020-08-23 RX ADMIN — Medication 2: at 17:12

## 2020-08-23 RX ADMIN — Medication 650 MILLIGRAM(S): at 11:46

## 2020-08-23 RX ADMIN — SENNA PLUS 2 TABLET(S): 8.6 TABLET ORAL at 22:21

## 2020-08-23 RX ADMIN — Medication 6 UNIT(S): at 12:22

## 2020-08-23 RX ADMIN — AMIODARONE HYDROCHLORIDE 600 MILLIGRAM(S): 400 TABLET ORAL at 15:00

## 2020-08-23 RX ADMIN — MUPIROCIN 1 APPLICATION(S): 20 OINTMENT TOPICAL at 05:57

## 2020-08-23 RX ADMIN — Medication 650 MILLIGRAM(S): at 05:56

## 2020-08-23 RX ADMIN — POLYETHYLENE GLYCOL 3350 17 GRAM(S): 17 POWDER, FOR SOLUTION ORAL at 11:24

## 2020-08-23 RX ADMIN — PANTOPRAZOLE SODIUM 40 MILLIGRAM(S): 20 TABLET, DELAYED RELEASE ORAL at 11:24

## 2020-08-23 RX ADMIN — Medication 2: at 12:22

## 2020-08-23 RX ADMIN — Medication 1: at 08:05

## 2020-08-23 RX ADMIN — SODIUM CHLORIDE 3 MILLILITER(S): 9 INJECTION INTRAMUSCULAR; INTRAVENOUS; SUBCUTANEOUS at 22:21

## 2020-08-23 RX ADMIN — OXYCODONE AND ACETAMINOPHEN 1 TABLET(S): 5; 325 TABLET ORAL at 22:35

## 2020-08-23 RX ADMIN — INSULIN GLARGINE 15 UNIT(S): 100 INJECTION, SOLUTION SUBCUTANEOUS at 22:11

## 2020-08-23 RX ADMIN — Medication 50 MILLIGRAM(S): at 14:11

## 2020-08-23 RX ADMIN — ENOXAPARIN SODIUM 40 MILLIGRAM(S): 100 INJECTION SUBCUTANEOUS at 11:24

## 2020-08-23 RX ADMIN — Medication 7 UNIT(S): at 17:13

## 2020-08-23 RX ADMIN — OXYCODONE AND ACETAMINOPHEN 1 TABLET(S): 5; 325 TABLET ORAL at 12:46

## 2020-08-23 RX ADMIN — Medication 650 MILLIGRAM(S): at 15:20

## 2020-08-23 NOTE — PROVIDER CONTACT NOTE (OTHER) - ACTION/TREATMENT ORDERED:
will cont to monitor
PA changed sliding scale. Ordered extra 4units of humalog to be given along with new bedtime sliding scale and give 12 units of Lantus. Repeat Blood sugar FS at 0200. Insulin pump??

## 2020-08-23 NOTE — PROGRESS NOTE ADULT - SUBJECTIVE AND OBJECTIVE BOX
VITAL SIGNS    Telemetry:    Vital Signs Last 24 Hrs  T(C): 37.9 (20 @ 04:22), Max: 38 (20 @ 19:15)  T(F): 100.3 (20 @ 04:22), Max: 100.4 (20 @ 19:15)  HR: 87 (20 @ 04:22) (80 - 89)  BP: 156/90 (20 @ 04:22) (137/90 - 156/90)  RR: 18 (20 @ 04:22) (18 - 18)  SpO2: 92% (20 @ 04:22) (92% - 98%)             @ 07:01  -   @ 07:00  --------------------------------------------------------  IN: 780 mL / OUT: 1800 mL / NET: -1020 mL     @ 07:01  -   @ 11:05  --------------------------------------------------------  IN: 240 mL / OUT: 0 mL / NET: 240 mL       Daily     Daily Weight in k.4 (23 Aug 2020 08:07)  Admit Wt: Drug Dosing Weight  Height (cm): 165.1 (18 Aug 2020 08:02)  Weight (kg): 59.9 (18 Aug 2020 08:02)  BMI (kg/m2): 22 (18 Aug 2020 08:02)  BSA (m2): 1.66 (18 Aug 2020 08:02)      CAPILLARY BLOOD GLUCOSE      POCT Blood Glucose.: 155 mg/dL (23 Aug 2020 07:56)  POCT Blood Glucose.: 197 mg/dL (22 Aug 2020 21:20)  POCT Blood Glucose.: 179 mg/dL (22 Aug 2020 17:12)  POCT Blood Glucose.: 182 mg/dL (22 Aug 2020 11:56)          MEDICATIONS  acetaminophen   Tablet .. 650 milliGRAM(s) Oral every 6 hours PRN  aspirin enteric coated 81 milliGRAM(s) Oral daily  atorvastatin 80 milliGRAM(s) Oral at bedtime  clopidogrel Tablet 75 milliGRAM(s) Oral daily  dextrose 40% Gel 15 Gram(s) Oral once PRN  dextrose 5%. 1000 milliLiter(s) IV Continuous <Continuous>  dextrose 50% Injectable 12.5 Gram(s) IV Push once  dextrose 50% Injectable 25 Gram(s) IV Push once  dextrose 50% Injectable 25 Gram(s) IV Push once  enoxaparin Injectable 40 milliGRAM(s) SubCutaneous daily  glucagon  Injectable 1 milliGRAM(s) IntraMuscular once PRN  insulin glargine Injectable (LANTUS) 15 Unit(s) SubCutaneous at bedtime  insulin lispro (HumaLOG) corrective regimen sliding scale   SubCutaneous three times a day before meals  insulin lispro (HumaLOG) corrective regimen sliding scale   SubCutaneous at bedtime  insulin lispro Injectable (HumaLOG) 6 Unit(s) SubCutaneous three times a day with meals  lactulose Syrup 10 Gram(s) Oral every 6 hours PRN  lisinopril 5 milliGRAM(s) Oral daily  metoprolol tartrate 50 milliGRAM(s) Oral every 8 hours  pantoprazole  Injectable 40 milliGRAM(s) IV Push daily  polyethylene glycol 3350 17 Gram(s) Oral daily  senna 2 Tablet(s) Oral at bedtime  simethicone 80 milliGRAM(s) Chew every 6 hours PRN  sodium chloride 0.9% lock flush 3 milliLiter(s) IV Push every 8 hours      >>> <<<  PHYSICAL EXAM  Subjective:  Neurology: alert and oriented x 3, nonfocal, no gross deficits  CV :  Sternal Wound :  CDI , Stable  Lungs:  Abdomen: soft, NT,ND, ( )BM  :  voiding  Extremities:       LABS  -    139  |  103  |  15  ----------------------------<  142<H>  3.8   |  25  |  0.82    Ca    9.5      23 Aug 2020 06:08                                   12.2   11.75 )-----------( 222      ( 23 Aug 2020 06:08 )             36.6                 PAST MEDICAL & SURGICAL HISTORY:  MI (myocardial infarction)  CAD (coronary artery disease)  HTN (hypertension)  Diabetes  History of coronary artery stent placement

## 2020-08-23 NOTE — PROVIDER CONTACT NOTE (OTHER) - ASSESSMENT
pt resting in bed, no c/o or s+s of pain or distress
Dinner - 100%, Previous shift . Received pre-meal and sliding scale.

## 2020-08-23 NOTE — PROGRESS NOTE ADULT - SUBJECTIVE AND OBJECTIVE BOX
Diabetes Follow up note:    Chief complaint: T2DM    Interval Hx: BG values mid 100s-low 200s. Pt seen at bedside eating lunch. Reports feeling better overall and eating more consistently. Discharge planning for home tomorrow.     Review of Systems:  General: denies pain  GI: Tolerating POs. Denies N/V/D/Abd pain  CV: Denies CP/SOB  ENDO: No S&Sx of hypoglycemia  MEDS:  atorvastatin 80 milliGRAM(s) Oral at bedtime    insulin glargine Injectable (LANTUS) 15 Unit(s) SubCutaneous at bedtime  insulin lispro (HumaLOG) corrective regimen sliding scale   SubCutaneous three times a day before meals  insulin lispro (HumaLOG) corrective regimen sliding scale   SubCutaneous at bedtime  insulin lispro Injectable (HumaLOG) 6 Unit(s) SubCutaneous three times a day with meals      Allergies    No Known Allergies      PE:  General: Male lying in bed. NAD.   Vital Signs Last 24 Hrs  T(C): 37.9 (23 Aug 2020 04:22), Max: 38 (22 Aug 2020 19:15)  T(F): 100.3 (23 Aug 2020 04:22), Max: 100.4 (22 Aug 2020 19:15)  HR: 87 (23 Aug 2020 04:22) (87 - 89)  BP: 156/90 (23 Aug 2020 04:22) (137/90 - 156/90)  BP(mean): --  RR: 18 (23 Aug 2020 04:22) (18 - 18)  SpO2: 92% (23 Aug 2020 04:22) (92% - 95%)  Chest: Midsternal incision c/d/i  Abd: Soft, NT,ND,   Extremities: Warm  Neuro: A&O X3    LABS:  POCT Blood Glucose.: 201 mg/dL (08-23-20 @ 11:54)  POCT Blood Glucose.: 155 mg/dL (08-23-20 @ 07:56)  POCT Blood Glucose.: 197 mg/dL (08-22-20 @ 21:20)  POCT Blood Glucose.: 179 mg/dL (08-22-20 @ 17:12)  POCT Blood Glucose.: 182 mg/dL (08-22-20 @ 11:56)  POCT Blood Glucose.: 185 mg/dL (08-22-20 @ 07:45)  POCT Blood Glucose.: 171 mg/dL (08-22-20 @ 01:58)  POCT Blood Glucose.: 282 mg/dL (08-21-20 @ 21:13)  POCT Blood Glucose.: 194 mg/dL (08-21-20 @ 16:59)  POCT Blood Glucose.: 166 mg/dL (08-21-20 @ 14:39)  POCT Blood Glucose.: 210 mg/dL (08-21-20 @ 12:29)  POCT Blood Glucose.: 239 mg/dL (08-21-20 @ 08:17)  POCT Blood Glucose.: 125 mg/dL (08-20-20 @ 22:04)  POCT Blood Glucose.: 107 mg/dL (08-20-20 @ 20:12)  POCT Blood Glucose.: 99 mg/dL (08-20-20 @ 19:26)  POCT Blood Glucose.: 87 mg/dL (08-20-20 @ 18:52)  POCT Blood Glucose.: 110 mg/dL (08-20-20 @ 18:17)  POCT Blood Glucose.: 116 mg/dL (08-20-20 @ 17:13)  POCT Blood Glucose.: 137 mg/dL (08-20-20 @ 16:09)  POCT Blood Glucose.: 150 mg/dL (08-20-20 @ 15:06)  POCT Blood Glucose.: 133 mg/dL (08-20-20 @ 13:49)                            12.2   11.75 )-----------( 222      ( 23 Aug 2020 06:08 )             36.6       08-23    139  |  103  |  15  ----------------------------<  142<H>  3.8   |  25  |  0.82    Ca    9.5      23 Aug 2020 06:08        Thyroid Function Tests:  08-17 @ 22:41 TSH 0.67 FreeT4 -- T3 102 Anti TPO -- Anti Thyroglobulin Ab -- TSI --      A1C with Estimated Average Glucose Result: 8.6 % (08-18-20 @ 00:42)  Hemoglobin A1C, Whole Blood: 10.8 % (11-16-19 @ 10:30)          Contact number: [email or web address] [phone] or [phone]

## 2020-08-23 NOTE — CHART NOTE - NSCHARTNOTEFT_GEN_A_CORE
Called to bedside for aflutter 160's at 1450  Lopressor 5 IV push and Amiodarone 150 IVSS administered converting to afib 100's  Amiodarone load initiated.  Pt reports relief of palpitations

## 2020-08-24 ENCOUNTER — TRANSCRIPTION ENCOUNTER (OUTPATIENT)
Age: 50
End: 2020-08-24

## 2020-08-24 DIAGNOSIS — I48.0 PAROXYSMAL ATRIAL FIBRILLATION: ICD-10-CM

## 2020-08-24 LAB
ANION GAP SERPL CALC-SCNC: 12 MMOL/L — SIGNIFICANT CHANGE UP (ref 5–17)
APPEARANCE UR: CLEAR — SIGNIFICANT CHANGE UP
BACTERIA # UR AUTO: 0 — SIGNIFICANT CHANGE UP
BILIRUB UR-MCNC: NEGATIVE — SIGNIFICANT CHANGE UP
BUN SERPL-MCNC: 19 MG/DL — SIGNIFICANT CHANGE UP (ref 7–23)
CALCIUM SERPL-MCNC: 9.2 MG/DL — SIGNIFICANT CHANGE UP (ref 8.4–10.5)
CHLORIDE SERPL-SCNC: 101 MMOL/L — SIGNIFICANT CHANGE UP (ref 96–108)
CO2 SERPL-SCNC: 26 MMOL/L — SIGNIFICANT CHANGE UP (ref 22–31)
COLOR SPEC: YELLOW — SIGNIFICANT CHANGE UP
CREAT SERPL-MCNC: 0.8 MG/DL — SIGNIFICANT CHANGE UP (ref 0.5–1.3)
DIFF PNL FLD: NEGATIVE — SIGNIFICANT CHANGE UP
EPI CELLS # UR: 0 /HPF — SIGNIFICANT CHANGE UP
GLUCOSE BLDC GLUCOMTR-MCNC: 112 MG/DL — HIGH (ref 70–99)
GLUCOSE BLDC GLUCOMTR-MCNC: 112 MG/DL — HIGH (ref 70–99)
GLUCOSE BLDC GLUCOMTR-MCNC: 168 MG/DL — HIGH (ref 70–99)
GLUCOSE BLDC GLUCOMTR-MCNC: 268 MG/DL — HIGH (ref 70–99)
GLUCOSE SERPL-MCNC: 146 MG/DL — HIGH (ref 70–99)
GLUCOSE UR QL: ABNORMAL
HCT VFR BLD CALC: 34.7 % — LOW (ref 39–50)
HGB BLD-MCNC: 12 G/DL — LOW (ref 13–17)
HYALINE CASTS # UR AUTO: 1 /LPF — SIGNIFICANT CHANGE UP (ref 0–2)
KETONES UR-MCNC: NEGATIVE — SIGNIFICANT CHANGE UP
LEUKOCYTE ESTERASE UR-ACNC: NEGATIVE — SIGNIFICANT CHANGE UP
MCHC RBC-ENTMCNC: 30.8 PG — SIGNIFICANT CHANGE UP (ref 27–34)
MCHC RBC-ENTMCNC: 34.6 GM/DL — SIGNIFICANT CHANGE UP (ref 32–36)
MCV RBC AUTO: 89 FL — SIGNIFICANT CHANGE UP (ref 80–100)
NITRITE UR-MCNC: NEGATIVE — SIGNIFICANT CHANGE UP
NRBC # BLD: 0 /100 WBCS — SIGNIFICANT CHANGE UP (ref 0–0)
PH UR: 6 — SIGNIFICANT CHANGE UP (ref 5–8)
PLATELET # BLD AUTO: 249 K/UL — SIGNIFICANT CHANGE UP (ref 150–400)
POTASSIUM SERPL-MCNC: 4 MMOL/L — SIGNIFICANT CHANGE UP (ref 3.5–5.3)
POTASSIUM SERPL-SCNC: 4 MMOL/L — SIGNIFICANT CHANGE UP (ref 3.5–5.3)
PROT UR-MCNC: ABNORMAL
RBC # BLD: 3.9 M/UL — LOW (ref 4.2–5.8)
RBC # FLD: 13.3 % — SIGNIFICANT CHANGE UP (ref 10.3–14.5)
RBC CASTS # UR COMP ASSIST: 3 /HPF — SIGNIFICANT CHANGE UP (ref 0–4)
SODIUM SERPL-SCNC: 139 MMOL/L — SIGNIFICANT CHANGE UP (ref 135–145)
SP GR SPEC: 1.03 — HIGH (ref 1.01–1.02)
UROBILINOGEN FLD QL: ABNORMAL
WBC # BLD: 13.08 K/UL — HIGH (ref 3.8–10.5)
WBC # FLD AUTO: 13.08 K/UL — HIGH (ref 3.8–10.5)
WBC UR QL: 1 /HPF — SIGNIFICANT CHANGE UP (ref 0–5)

## 2020-08-24 PROCEDURE — 99232 SBSQ HOSP IP/OBS MODERATE 35: CPT

## 2020-08-24 RX ORDER — INSULIN LISPRO 100/ML
9 VIAL (ML) SUBCUTANEOUS
Refills: 0 | Status: DISCONTINUED | OUTPATIENT
Start: 2020-08-24 | End: 2020-08-25

## 2020-08-24 RX ADMIN — INSULIN GLARGINE 15 UNIT(S): 100 INJECTION, SOLUTION SUBCUTANEOUS at 21:52

## 2020-08-24 RX ADMIN — OXYCODONE AND ACETAMINOPHEN 1 TABLET(S): 5; 325 TABLET ORAL at 13:50

## 2020-08-24 RX ADMIN — POLYETHYLENE GLYCOL 3350 17 GRAM(S): 17 POWDER, FOR SOLUTION ORAL at 12:02

## 2020-08-24 RX ADMIN — OXYCODONE AND ACETAMINOPHEN 1 TABLET(S): 5; 325 TABLET ORAL at 08:30

## 2020-08-24 RX ADMIN — Medication 50 MILLIGRAM(S): at 21:53

## 2020-08-24 RX ADMIN — SODIUM CHLORIDE 3 MILLILITER(S): 9 INJECTION INTRAMUSCULAR; INTRAVENOUS; SUBCUTANEOUS at 13:20

## 2020-08-24 RX ADMIN — AMIODARONE HYDROCHLORIDE 400 MILLIGRAM(S): 400 TABLET ORAL at 21:53

## 2020-08-24 RX ADMIN — OXYCODONE AND ACETAMINOPHEN 1 TABLET(S): 5; 325 TABLET ORAL at 13:19

## 2020-08-24 RX ADMIN — Medication 3: at 12:00

## 2020-08-24 RX ADMIN — AMIODARONE HYDROCHLORIDE 400 MILLIGRAM(S): 400 TABLET ORAL at 06:15

## 2020-08-24 RX ADMIN — Medication 7 UNIT(S): at 12:01

## 2020-08-24 RX ADMIN — SENNA PLUS 2 TABLET(S): 8.6 TABLET ORAL at 21:53

## 2020-08-24 RX ADMIN — SODIUM CHLORIDE 3 MILLILITER(S): 9 INJECTION INTRAMUSCULAR; INTRAVENOUS; SUBCUTANEOUS at 21:55

## 2020-08-24 RX ADMIN — Medication 50 MILLIGRAM(S): at 06:15

## 2020-08-24 RX ADMIN — Medication 81 MILLIGRAM(S): at 12:02

## 2020-08-24 RX ADMIN — Medication 1: at 07:57

## 2020-08-24 RX ADMIN — PANTOPRAZOLE SODIUM 40 MILLIGRAM(S): 20 TABLET, DELAYED RELEASE ORAL at 12:01

## 2020-08-24 RX ADMIN — ENOXAPARIN SODIUM 40 MILLIGRAM(S): 100 INJECTION SUBCUTANEOUS at 12:01

## 2020-08-24 RX ADMIN — Medication 9 UNIT(S): at 17:40

## 2020-08-24 RX ADMIN — CLOPIDOGREL BISULFATE 75 MILLIGRAM(S): 75 TABLET, FILM COATED ORAL at 12:01

## 2020-08-24 RX ADMIN — LISINOPRIL 5 MILLIGRAM(S): 2.5 TABLET ORAL at 06:15

## 2020-08-24 RX ADMIN — ATORVASTATIN CALCIUM 80 MILLIGRAM(S): 80 TABLET, FILM COATED ORAL at 21:53

## 2020-08-24 RX ADMIN — LACTULOSE 10 GRAM(S): 10 SOLUTION ORAL at 06:16

## 2020-08-24 RX ADMIN — AMIODARONE HYDROCHLORIDE 400 MILLIGRAM(S): 400 TABLET ORAL at 13:19

## 2020-08-24 RX ADMIN — Medication 50 MILLIGRAM(S): at 13:19

## 2020-08-24 RX ADMIN — SODIUM CHLORIDE 3 MILLILITER(S): 9 INJECTION INTRAMUSCULAR; INTRAVENOUS; SUBCUTANEOUS at 06:15

## 2020-08-24 RX ADMIN — Medication 7 UNIT(S): at 07:57

## 2020-08-24 RX ADMIN — OXYCODONE AND ACETAMINOPHEN 1 TABLET(S): 5; 325 TABLET ORAL at 07:57

## 2020-08-24 NOTE — PROGRESS NOTE ADULT - SUBJECTIVE AND OBJECTIVE BOX
Chief Complaint/Follow-up on: T2D    Subjective: Patient eating well. Denies eating outside food.   Has 5/10 indescribable cp when he inhales. No palpitation or sob.     MEDICATIONS  (STANDING):  aMIOdarone    Tablet 400 milliGRAM(s) Oral every 8 hours  aMIOdarone    Tablet   Oral   aspirin enteric coated 81 milliGRAM(s) Oral daily  atorvastatin 80 milliGRAM(s) Oral at bedtime  clopidogrel Tablet 75 milliGRAM(s) Oral daily  dextrose 5%. 1000 milliLiter(s) (50 mL/Hr) IV Continuous <Continuous>  dextrose 50% Injectable 12.5 Gram(s) IV Push once  dextrose 50% Injectable 25 Gram(s) IV Push once  dextrose 50% Injectable 25 Gram(s) IV Push once  enoxaparin Injectable 40 milliGRAM(s) SubCutaneous daily  insulin glargine Injectable (LANTUS) 15 Unit(s) SubCutaneous at bedtime  insulin lispro (HumaLOG) corrective regimen sliding scale   SubCutaneous three times a day before meals  insulin lispro (HumaLOG) corrective regimen sliding scale   SubCutaneous at bedtime  insulin lispro Injectable (HumaLOG) 9 Unit(s) SubCutaneous three times a day before meals  lisinopril 5 milliGRAM(s) Oral daily  metoprolol tartrate 50 milliGRAM(s) Oral every 8 hours  pantoprazole  Injectable 40 milliGRAM(s) IV Push daily  polyethylene glycol 3350 17 Gram(s) Oral daily  senna 2 Tablet(s) Oral at bedtime  sodium chloride 0.9% lock flush 3 milliLiter(s) IV Push every 8 hours    MEDICATIONS  (PRN):  acetaminophen   Tablet .. 650 milliGRAM(s) Oral every 6 hours PRN Mild Pain (1 - 3)  dextrose 40% Gel 15 Gram(s) Oral once PRN Blood Glucose LESS THAN 70 milliGRAM(s)/deciLiter  glucagon  Injectable 1 milliGRAM(s) IntraMuscular once PRN Glucose <70 milliGRAM(s)/deciLiter  lactulose Syrup 10 Gram(s) Oral every 6 hours PRN constipation  oxycodone    5 mG/acetaminophen 325 mG 1 Tablet(s) Oral every 4 hours PRN Moderate Pain (4 - 6)  simethicone 80 milliGRAM(s) Chew every 6 hours PRN Gas      PHYSICAL EXAM:  VITALS: T(C): 36.6 (08-24-20 @ 13:06)  T(F): 97.9 (08-24-20 @ 13:06), Max: 99.9 (08-23-20 @ 19:46)  HR: 77 (08-24-20 @ 13:06) (71 - 88)  BP: 144/78 (08-24-20 @ 13:06) (115/90 - 151/82)  RR:  (18 - 18)  SpO2:  (92% - 97%)  Wt(kg): --  GENERAL: NAD, well-groomed, well-developed  RESPIRATORY: Clear to auscultation bilaterally; No rales, rhonchi, wheezing, or rubs  CARDIOVASCULAR: Regular rate and rhythm; No murmurs  GI: Soft, nontender, non distended, normal bowel sounds  SKIN: well-healing sternal wound - no erythema/exudate    POCT Blood Glucose.: 268 mg/dL (08-24-20 @ 11:45)  POCT Blood Glucose.: 168 mg/dL (08-24-20 @ 07:43)  POCT Blood Glucose.: 141 mg/dL (08-23-20 @ 21:13)  POCT Blood Glucose.: 226 mg/dL (08-23-20 @ 16:49)  POCT Blood Glucose.: 201 mg/dL (08-23-20 @ 11:54)  POCT Blood Glucose.: 155 mg/dL (08-23-20 @ 07:56)  POCT Blood Glucose.: 197 mg/dL (08-22-20 @ 21:20)  POCT Blood Glucose.: 179 mg/dL (08-22-20 @ 17:12)  POCT Blood Glucose.: 182 mg/dL (08-22-20 @ 11:56)  POCT Blood Glucose.: 185 mg/dL (08-22-20 @ 07:45)  POCT Blood Glucose.: 171 mg/dL (08-22-20 @ 01:58)  POCT Blood Glucose.: 282 mg/dL (08-21-20 @ 21:13)  POCT Blood Glucose.: 194 mg/dL (08-21-20 @ 16:59)    08-24    139  |  101  |  19  ----------------------------<  146<H>  4.0   |  26  |  0.80    EGFR if : 119  EGFR if non : 103    Ca    9.2      08-24            Thyroid Function Tests:  08-17 @ 22:41 TSH 0.67  T3 102

## 2020-08-24 NOTE — DISCHARGE NOTE NURSING/CASE MANAGEMENT/SOCIAL WORK - NSDPLANG ASIS_GEN_ALL_CORE
November 14, 2019       Ramona Campos, ABDIRIZAK  [address]  [place] [address]  VIA In Basket      Patient: Miller Gutierrez   YOB: 1982   Date of Visit: 11/12/2019       Dear Dr. Campos:    Thank you for referring Miller Guiterrez to me for evaluation. Below are my notes for this visit with him.    If you have questions, please do not hesitate to call me. I look forward to following your patient along with you.      Sincerely,        Vamshi Sullivan MD        CC: No Recipients  Jessy LAMBERT De Los Santos  11/12/2019  1:54 PM  Signed  Miller is scheduled for laparoscopic bilateral inguinal hernia repair on December 4th.  Surgery instructions mailed out and encouraged to call with any questions.     Vamshi Sullivan MD  11/14/2019 12:44 PM  Sign when Signing Visit    [facility]  SURGERY HISTORY AND PHYSICAL    CHIEF COMPLAINT:  Consultation (Right Inguinal Hernia)       HISTORY OF PRESENT ILLNESS:    Miller Gutierrez is a 37 year old male seen today for right inguinal hernia.  He noticed it several years ago.  It has grown in size.  It causes some general discomfort in the groin.  It has always been reducible.  He has done a lot of lifting over the course of his life.  He notes that it has become more symptomatic with shoveling recently.  He does a lot of traveling for work.  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      HISTORIES:  ALLERGIES:  No Known Allergies  No current outpatient medications on file.     No current facility-administered medications for this visit.      Past Medical History:   Diagnosis Date   • Anxiety and depression    • Eczema      Past Surgical History:   Procedure Laterality Date   • Alveolar cleft closure w/ bone graft  1984   • Ankle ligament reconstruction  2010     Social History     Tobacco Use   Smoking Status Never Smoker   Smokeless Tobacco Never Used     Social History     Substance and Sexual Activity   Alcohol Use Yes   • Alcohol/week: 20.0 standard  drinks   • Types: 20 Standard drinks or equivalent per week    Comment:  will drink 3-4 alcoholic beverages 4-5 nights a week       REVIEW OF SYSTEMS:    Constitutional:  Patient denies fever, chills, tiredness or malaise.    Eyes:  Denies change in visual acuity, pain, burning or itching.    Immunologic:  Denies hives, seasonal allergies.    HENT:  Denies sinus problems, ear infections, nasal congestion or sore throat.    Respiratory:  Denies cough, shortness of breath.    Cardiovascular:  Denies chest pain, edema.    Gastrointestinal:  Denies abdominal pain, nausea, vomiting, bloody stools or diarrhea.    Genitourinary:  Denies urine retention, painful urination, urinary frequency, blood in urine or nocturia.    Musculoskeletal:  Denies back pain, neck pain, joint pain or leg swelling.    Integument:  Denies rash, itching.    Neurologic:  Denies headache, focal weakness or sensory changes.    Endocrine:  Denies polyuria, polydipsia or temperature intolerance.    Lymphatic:  Denies swollen glands, weight loss.    Psychiatric:  Denies anxiety, depression, hallucinations, irritability or sleeping problems.    PHYSICAL EXAM:    Vital Signs:    Visit Vitals  /72 (BP Location: LUE - Left upper extremity, Patient Position: Sitting, Cuff Size: Regular)   Pulse 78   Temp 97.3 °F (36.3 °C) (Temporal)   Resp 16   Ht 5' 10\" (1.778 m)   Wt 78.8 kg   BMI 24.94 kg/m²     Constitutional:  The patient is alert, oriented and cooperative.   Integument:  Warm. Dry. No erythema. No rash.    HENT:  Normocephalic. Atraumatic. Bilateral external ears normal.   Neck: Normal range of motion. No tenderness. Supple. No stridor.    Cardiovascular:  Normal heart rate. Normal rhythm. No murmurs. No rubs. No gallops.    Respiratory:  Normal breath sounds. No respiratory distress. No wheezing. No chest tenderness.  Abdomen:  Bilateral inguinal hernias right greater than left.  Both are reducible.  They are nontender.    LABORATORY DATA:     Lab Results   Component Value Date    SODIUM 143 03/18/2019    POTASSIUM 4.9 03/18/2019    CHLORIDE 105 03/18/2019    CO2 31 03/18/2019    BUN 17 03/18/2019    CREATININE 0.97 03/18/2019    GLUCOSE 90 03/18/2019    WBC 4.6 03/18/2019    HCT 45.0 03/18/2019    HGB 15.5 03/18/2019     03/18/2019    AST 23 03/18/2019    GPT 45 03/18/2019    ALKPT 59 03/18/2019    BILIRUBIN 0.7 03/18/2019      TSH (mcUnits/mL)   Date Value   10/12/2016 0.845      No results found for: COL, UAPP, USPG, UPH, UPROT, UGLU, UKET, UBILI, URBC, UNITR, UROB, UWBC     IMAGING STUDIES:     None    ASSESSMENT:    1. Right inguinal hernia         There are no contraindications to the proposed anesthesia.     PLAN:    No follow-ups on file.    During the course of our visit we discussed the operative management of inguinal hernias.  I have offered him a laparoscopic bilateral inguinal hernia repair.  The risks and benefits were discussed.  He had an opportunity to ask all questions.    The {Patient and [id number]::\"patient\"} indicated understanding of the diagnosis and agreed with the plan of care.              No

## 2020-08-24 NOTE — PROGRESS NOTE ADULT - SUBJECTIVE AND OBJECTIVE BOX
VITAL SIGNS    Telemetry:  SR 60-80  Vital Signs Last 24 Hrs  T(C): 36.6 (20 @ 13:06), Max: 37.7 (20 @ 19:46)  T(F): 97.9 (20 @ 13:06), Max: 99.9 (20 @ 19:46)  HR: 77 (20 @ 13:06) (71 - 200)  BP: 144/78 (20 @ 13:06) (113/82 - 151/82)  RR: 18 (20 @ 13:06) (18 - 18)  SpO2: 97% (20 @ 13:06) (92% - 97%)             @ 07:01  -   @ 07:00  --------------------------------------------------------  IN: 1050 mL / OUT: 1000 mL / NET: 50 mL     @ 07:01  -   @ 13:18  --------------------------------------------------------  IN: 260 mL / OUT: 300 mL / NET: -40 mL       Daily     Daily Weight in k.6 (24 Aug 2020 07:14)  Admit Wt: Drug Dosing Weight  Height (cm): 165.1 (18 Aug 2020 08:02)  Weight (kg): 59.9 (18 Aug 2020 08:02)  BMI (kg/m2): 22 (18 Aug 2020 08:02)  BSA (m2): 1.66 (18 Aug 2020 08:02)      CAPILLARY BLOOD GLUCOSE      POCT Blood Glucose.: 268 mg/dL (24 Aug 2020 11:45)  POCT Blood Glucose.: 168 mg/dL (24 Aug 2020 07:43)  POCT Blood Glucose.: 141 mg/dL (23 Aug 2020 21:13)  POCT Blood Glucose.: 226 mg/dL (23 Aug 2020 16:49)          MEDICATIONS  acetaminophen   Tablet .. 650 milliGRAM(s) Oral every 6 hours PRN  aMIOdarone    Tablet 400 milliGRAM(s) Oral every 8 hours  aMIOdarone    Tablet   Oral   aspirin enteric coated 81 milliGRAM(s) Oral daily  atorvastatin 80 milliGRAM(s) Oral at bedtime  clopidogrel Tablet 75 milliGRAM(s) Oral daily  dextrose 40% Gel 15 Gram(s) Oral once PRN  dextrose 5%. 1000 milliLiter(s) IV Continuous <Continuous>  dextrose 50% Injectable 12.5 Gram(s) IV Push once  dextrose 50% Injectable 25 Gram(s) IV Push once  dextrose 50% Injectable 25 Gram(s) IV Push once  enoxaparin Injectable 40 milliGRAM(s) SubCutaneous daily  glucagon  Injectable 1 milliGRAM(s) IntraMuscular once PRN  insulin glargine Injectable (LANTUS) 15 Unit(s) SubCutaneous at bedtime  insulin lispro (HumaLOG) corrective regimen sliding scale   SubCutaneous three times a day before meals  insulin lispro (HumaLOG) corrective regimen sliding scale   SubCutaneous at bedtime  insulin lispro Injectable (HumaLOG) 7 Unit(s) SubCutaneous three times a day with meals  lactulose Syrup 10 Gram(s) Oral every 6 hours PRN  lisinopril 5 milliGRAM(s) Oral daily  metoprolol tartrate 50 milliGRAM(s) Oral every 8 hours  oxycodone    5 mG/acetaminophen 325 mG 1 Tablet(s) Oral every 4 hours PRN  pantoprazole  Injectable 40 milliGRAM(s) IV Push daily  polyethylene glycol 3350 17 Gram(s) Oral daily  senna 2 Tablet(s) Oral at bedtime  simethicone 80 milliGRAM(s) Chew every 6 hours PRN  sodium chloride 0.9% lock flush 3 milliLiter(s) IV Push every 8 hours      >>> <<<  PHYSICAL EXAM  Subjective: NAD  Neurology: alert and oriented x 3, nonfocal, no gross deficits  CV : s1s2  Sternal Wound :  CDI , Stable  Lungs: CTA b/l  Abdomen: soft, NT,ND, (+ )BM  :  voiding  Extremities: -c/c/e      LABS  -    139  |  101  |  19  ----------------------------<  146<H>  4.0   |  26  |  0.80    Ca    9.2      24 Aug 2020 06:28                                   12.0   13.08 )-----------( 249      ( 24 Aug 2020 06:28 )             34.7                 PAST MEDICAL & SURGICAL HISTORY:  MI (myocardial infarction)  CAD (coronary artery disease)  HTN (hypertension)  Diabetes  History of coronary artery stent placement

## 2020-08-25 ENCOUNTER — APPOINTMENT (OUTPATIENT)
Dept: CARDIOLOGY | Facility: HOSPITAL | Age: 50
End: 2020-08-25

## 2020-08-25 ENCOUNTER — TRANSCRIPTION ENCOUNTER (OUTPATIENT)
Age: 50
End: 2020-08-25

## 2020-08-25 VITALS
RESPIRATION RATE: 18 BRPM | HEART RATE: 69 BPM | TEMPERATURE: 99 F | OXYGEN SATURATION: 95 % | DIASTOLIC BLOOD PRESSURE: 75 MMHG | SYSTOLIC BLOOD PRESSURE: 120 MMHG

## 2020-08-25 LAB
ANION GAP SERPL CALC-SCNC: 13 MMOL/L — SIGNIFICANT CHANGE UP (ref 5–17)
BUN SERPL-MCNC: 13 MG/DL — SIGNIFICANT CHANGE UP (ref 7–23)
CALCIUM SERPL-MCNC: 9.5 MG/DL — SIGNIFICANT CHANGE UP (ref 8.4–10.5)
CHLORIDE SERPL-SCNC: 100 MMOL/L — SIGNIFICANT CHANGE UP (ref 96–108)
CO2 SERPL-SCNC: 24 MMOL/L — SIGNIFICANT CHANGE UP (ref 22–31)
CREAT SERPL-MCNC: 0.77 MG/DL — SIGNIFICANT CHANGE UP (ref 0.5–1.3)
GLUCOSE BLDC GLUCOMTR-MCNC: 118 MG/DL — HIGH (ref 70–99)
GLUCOSE BLDC GLUCOMTR-MCNC: 125 MG/DL — HIGH (ref 70–99)
GLUCOSE BLDC GLUCOMTR-MCNC: 153 MG/DL — HIGH (ref 70–99)
GLUCOSE SERPL-MCNC: 97 MG/DL — SIGNIFICANT CHANGE UP (ref 70–99)
HCT VFR BLD CALC: 35.1 % — LOW (ref 39–50)
HGB BLD-MCNC: 11.8 G/DL — LOW (ref 13–17)
MCHC RBC-ENTMCNC: 30.3 PG — SIGNIFICANT CHANGE UP (ref 27–34)
MCHC RBC-ENTMCNC: 33.6 GM/DL — SIGNIFICANT CHANGE UP (ref 32–36)
MCV RBC AUTO: 90 FL — SIGNIFICANT CHANGE UP (ref 80–100)
NRBC # BLD: 0 /100 WBCS — SIGNIFICANT CHANGE UP (ref 0–0)
PLATELET # BLD AUTO: 302 K/UL — SIGNIFICANT CHANGE UP (ref 150–400)
POTASSIUM SERPL-MCNC: 3.9 MMOL/L — SIGNIFICANT CHANGE UP (ref 3.5–5.3)
POTASSIUM SERPL-SCNC: 3.9 MMOL/L — SIGNIFICANT CHANGE UP (ref 3.5–5.3)
RBC # BLD: 3.9 M/UL — LOW (ref 4.2–5.8)
RBC # FLD: 13.4 % — SIGNIFICANT CHANGE UP (ref 10.3–14.5)
SODIUM SERPL-SCNC: 137 MMOL/L — SIGNIFICANT CHANGE UP (ref 135–145)
WBC # BLD: 13.24 K/UL — HIGH (ref 3.8–10.5)
WBC # FLD AUTO: 13.24 K/UL — HIGH (ref 3.8–10.5)

## 2020-08-25 PROCEDURE — 82435 ASSAY OF BLOOD CHLORIDE: CPT

## 2020-08-25 PROCEDURE — 81003 URINALYSIS AUTO W/O SCOPE: CPT

## 2020-08-25 PROCEDURE — 87640 STAPH A DNA AMP PROBE: CPT

## 2020-08-25 PROCEDURE — P9012: CPT

## 2020-08-25 PROCEDURE — 81001 URINALYSIS AUTO W/SCOPE: CPT

## 2020-08-25 PROCEDURE — 83605 ASSAY OF LACTIC ACID: CPT

## 2020-08-25 PROCEDURE — 36430 TRANSFUSION BLD/BLD COMPNT: CPT

## 2020-08-25 PROCEDURE — 85576 BLOOD PLATELET AGGREGATION: CPT

## 2020-08-25 PROCEDURE — 85610 PROTHROMBIN TIME: CPT

## 2020-08-25 PROCEDURE — P9045: CPT

## 2020-08-25 PROCEDURE — P9037: CPT

## 2020-08-25 PROCEDURE — 94002 VENT MGMT INPAT INIT DAY: CPT

## 2020-08-25 PROCEDURE — 85384 FIBRINOGEN ACTIVITY: CPT

## 2020-08-25 PROCEDURE — 82962 GLUCOSE BLOOD TEST: CPT

## 2020-08-25 PROCEDURE — 71045 X-RAY EXAM CHEST 1 VIEW: CPT

## 2020-08-25 PROCEDURE — C1769: CPT

## 2020-08-25 PROCEDURE — 99152 MOD SED SAME PHYS/QHP 5/>YRS: CPT

## 2020-08-25 PROCEDURE — U0003: CPT

## 2020-08-25 PROCEDURE — 85730 THROMBOPLASTIN TIME PARTIAL: CPT

## 2020-08-25 PROCEDURE — 86923 COMPATIBILITY TEST ELECTRIC: CPT

## 2020-08-25 PROCEDURE — 82550 ASSAY OF CK (CPK): CPT

## 2020-08-25 PROCEDURE — P9059: CPT

## 2020-08-25 PROCEDURE — 82803 BLOOD GASES ANY COMBINATION: CPT

## 2020-08-25 PROCEDURE — C1887: CPT

## 2020-08-25 PROCEDURE — 93458 L HRT ARTERY/VENTRICLE ANGIO: CPT

## 2020-08-25 PROCEDURE — 83735 ASSAY OF MAGNESIUM: CPT

## 2020-08-25 PROCEDURE — 97161 PT EVAL LOW COMPLEX 20 MIN: CPT

## 2020-08-25 PROCEDURE — C1751: CPT

## 2020-08-25 PROCEDURE — 99232 SBSQ HOSP IP/OBS MODERATE 35: CPT

## 2020-08-25 PROCEDURE — 94010 BREATHING CAPACITY TEST: CPT

## 2020-08-25 PROCEDURE — 84484 ASSAY OF TROPONIN QUANT: CPT

## 2020-08-25 PROCEDURE — 84295 ASSAY OF SERUM SODIUM: CPT

## 2020-08-25 PROCEDURE — 85014 HEMATOCRIT: CPT

## 2020-08-25 PROCEDURE — 82330 ASSAY OF CALCIUM: CPT

## 2020-08-25 PROCEDURE — 86901 BLOOD TYPING SEROLOGIC RH(D): CPT

## 2020-08-25 PROCEDURE — 82947 ASSAY GLUCOSE BLOOD QUANT: CPT

## 2020-08-25 PROCEDURE — 93880 EXTRACRANIAL BILAT STUDY: CPT

## 2020-08-25 PROCEDURE — 80048 BASIC METABOLIC PNL TOTAL CA: CPT

## 2020-08-25 PROCEDURE — 84443 ASSAY THYROID STIM HORMONE: CPT

## 2020-08-25 PROCEDURE — 84480 ASSAY TRIIODOTHYRONINE (T3): CPT

## 2020-08-25 PROCEDURE — 86891 AUTOLOGOUS BLOOD OP SALVAGE: CPT

## 2020-08-25 PROCEDURE — 86900 BLOOD TYPING SEROLOGIC ABO: CPT

## 2020-08-25 PROCEDURE — 93306 TTE W/DOPPLER COMPLETE: CPT

## 2020-08-25 PROCEDURE — 87641 MR-STAPH DNA AMP PROBE: CPT

## 2020-08-25 PROCEDURE — 86850 RBC ANTIBODY SCREEN: CPT

## 2020-08-25 PROCEDURE — 80053 COMPREHEN METABOLIC PANEL: CPT

## 2020-08-25 PROCEDURE — 82553 CREATINE MB FRACTION: CPT

## 2020-08-25 PROCEDURE — 93005 ELECTROCARDIOGRAM TRACING: CPT

## 2020-08-25 PROCEDURE — P9016: CPT

## 2020-08-25 PROCEDURE — 97116 GAIT TRAINING THERAPY: CPT

## 2020-08-25 PROCEDURE — 84100 ASSAY OF PHOSPHORUS: CPT

## 2020-08-25 PROCEDURE — C1889: CPT

## 2020-08-25 PROCEDURE — 83036 HEMOGLOBIN GLYCOSYLATED A1C: CPT

## 2020-08-25 PROCEDURE — P9041: CPT

## 2020-08-25 PROCEDURE — 84132 ASSAY OF SERUM POTASSIUM: CPT

## 2020-08-25 PROCEDURE — 85027 COMPLETE CBC AUTOMATED: CPT

## 2020-08-25 PROCEDURE — 84436 ASSAY OF TOTAL THYROXINE: CPT

## 2020-08-25 PROCEDURE — P9047: CPT

## 2020-08-25 PROCEDURE — C1894: CPT

## 2020-08-25 PROCEDURE — 93010 ELECTROCARDIOGRAM REPORT: CPT

## 2020-08-25 PROCEDURE — 80076 HEPATIC FUNCTION PANEL: CPT

## 2020-08-25 PROCEDURE — 82565 ASSAY OF CREATININE: CPT

## 2020-08-25 RX ORDER — ASPIRIN/CALCIUM CARB/MAGNESIUM 324 MG
1 TABLET ORAL
Qty: 30 | Refills: 0
Start: 2020-08-25 | End: 2020-09-23

## 2020-08-25 RX ORDER — POTASSIUM CHLORIDE 20 MEQ
20 PACKET (EA) ORAL ONCE
Refills: 0 | Status: COMPLETED | OUTPATIENT
Start: 2020-08-25 | End: 2020-08-25

## 2020-08-25 RX ORDER — LISINOPRIL 2.5 MG/1
1 TABLET ORAL
Qty: 30 | Refills: 0
Start: 2020-08-25 | End: 2020-09-23

## 2020-08-25 RX ORDER — INSULIN NPH HUM/REG INSULIN HM 70-30/ML
8 VIAL (ML) SUBCUTANEOUS
Qty: 1 | Refills: 3
Start: 2020-08-25 | End: 2020-12-22

## 2020-08-25 RX ORDER — ACETAMINOPHEN 500 MG
2 TABLET ORAL
Qty: 0 | Refills: 0 | DISCHARGE
Start: 2020-08-25

## 2020-08-25 RX ORDER — METOPROLOL TARTRATE 50 MG
1 TABLET ORAL
Qty: 90 | Refills: 0
Start: 2020-08-25 | End: 2020-09-23

## 2020-08-25 RX ORDER — METFORMIN HYDROCHLORIDE 850 MG/1
1 TABLET ORAL
Qty: 90 | Refills: 0
Start: 2020-08-25 | End: 2020-09-23

## 2020-08-25 RX ORDER — ATORVASTATIN CALCIUM 80 MG/1
1 TABLET, FILM COATED ORAL
Qty: 30 | Refills: 0
Start: 2020-08-25 | End: 2020-09-23

## 2020-08-25 RX ORDER — INSULIN NPH HUM/REG INSULIN HM 70-30/ML
20 VIAL (ML) SUBCUTANEOUS
Qty: 0 | Refills: 0 | DISCHARGE
Start: 2020-08-25

## 2020-08-25 RX ORDER — ASPIRIN/CALCIUM CARB/MAGNESIUM 324 MG
0 TABLET ORAL
Qty: 0 | Refills: 0 | DISCHARGE

## 2020-08-25 RX ORDER — AMIODARONE HYDROCHLORIDE 400 MG/1
1 TABLET ORAL
Qty: 30 | Refills: 0
Start: 2020-08-25 | End: 2020-09-23

## 2020-08-25 RX ORDER — METFORMIN HYDROCHLORIDE 850 MG/1
1 TABLET ORAL
Qty: 0 | Refills: 0 | DISCHARGE

## 2020-08-25 RX ORDER — CLOPIDOGREL BISULFATE 75 MG/1
1 TABLET, FILM COATED ORAL
Qty: 30 | Refills: 0
Start: 2020-08-25 | End: 2020-09-23

## 2020-08-25 RX ORDER — SIMETHICONE 80 MG/1
1 TABLET, CHEWABLE ORAL
Qty: 0 | Refills: 0 | DISCHARGE
Start: 2020-08-25

## 2020-08-25 RX ORDER — POLYETHYLENE GLYCOL 3350 17 G/17G
17 POWDER, FOR SOLUTION ORAL
Qty: 0 | Refills: 0 | DISCHARGE
Start: 2020-08-25

## 2020-08-25 RX ORDER — CLOPIDOGREL BISULFATE 75 MG/1
1 TABLET, FILM COATED ORAL
Qty: 0 | Refills: 0 | DISCHARGE

## 2020-08-25 RX ORDER — INSULIN NPH HUM/REG INSULIN HM 70-30/ML
14 VIAL (ML) SUBCUTANEOUS
Qty: 1 | Refills: 0
Start: 2020-08-25

## 2020-08-25 RX ORDER — SENNA PLUS 8.6 MG/1
2 TABLET ORAL
Qty: 0 | Refills: 0 | DISCHARGE
Start: 2020-08-25

## 2020-08-25 RX ADMIN — POLYETHYLENE GLYCOL 3350 17 GRAM(S): 17 POWDER, FOR SOLUTION ORAL at 05:29

## 2020-08-25 RX ADMIN — Medication 9 UNIT(S): at 08:17

## 2020-08-25 RX ADMIN — AMIODARONE HYDROCHLORIDE 400 MILLIGRAM(S): 400 TABLET ORAL at 05:24

## 2020-08-25 RX ADMIN — AMIODARONE HYDROCHLORIDE 400 MILLIGRAM(S): 400 TABLET ORAL at 15:29

## 2020-08-25 RX ADMIN — SODIUM CHLORIDE 3 MILLILITER(S): 9 INJECTION INTRAMUSCULAR; INTRAVENOUS; SUBCUTANEOUS at 05:24

## 2020-08-25 RX ADMIN — SODIUM CHLORIDE 3 MILLILITER(S): 9 INJECTION INTRAMUSCULAR; INTRAVENOUS; SUBCUTANEOUS at 14:30

## 2020-08-25 RX ADMIN — Medication 20 MILLIEQUIVALENT(S): at 09:01

## 2020-08-25 RX ADMIN — CLOPIDOGREL BISULFATE 75 MILLIGRAM(S): 75 TABLET, FILM COATED ORAL at 09:02

## 2020-08-25 RX ADMIN — Medication 50 MILLIGRAM(S): at 05:24

## 2020-08-25 RX ADMIN — Medication 9 UNIT(S): at 17:10

## 2020-08-25 RX ADMIN — Medication 81 MILLIGRAM(S): at 09:03

## 2020-08-25 RX ADMIN — Medication 1: at 11:55

## 2020-08-25 RX ADMIN — Medication 9 UNIT(S): at 11:55

## 2020-08-25 RX ADMIN — Medication 50 MILLIGRAM(S): at 15:29

## 2020-08-25 RX ADMIN — LISINOPRIL 5 MILLIGRAM(S): 2.5 TABLET ORAL at 05:24

## 2020-08-25 RX ADMIN — ENOXAPARIN SODIUM 40 MILLIGRAM(S): 100 INJECTION SUBCUTANEOUS at 12:02

## 2020-08-25 RX ADMIN — PANTOPRAZOLE SODIUM 40 MILLIGRAM(S): 20 TABLET, DELAYED RELEASE ORAL at 12:02

## 2020-08-25 NOTE — PROGRESS NOTE ADULT - PROBLEM SELECTOR PLAN 3
-c/w Atorvastatin 80mg qhs    pager: [phone]   office: [phone]    -I spent a total time of 25 mins with the patient at bedside/on unit of which more than 50% of time was spent on counseling/coordination of care.
-c/w Atorvastatin 80mg qhs    discussed w/pt and CT surgery NP  pager: [phone]   office: [phone]    -I spent a total time of 25 mins with the patient at bedside/on unit of which more than 50% of time was spent on counseling/coordination of care.
-c/w Atorvastatin 80mg qhs  -discussed w/pt and CT surgery NP  Kathy Boyce MD  Pager: [phone], between 9am-6pm  Nights and Weekends: [phone]
ASA statin betablocker ACE CAD  Maintain pacing wires  PPI/DVT prophylaxis
ASA statin betablocker ACE CAD  Maintain pacing wires  PPI?DVT prophylaxis
Goal BP <130/80 c/w lisinopril and metoprolol.    pager: [phone]   office: [phone]    -M spent a total time of 25 mins with the patient at bedside/on unit of which more than 50% of time was spent on counseling/coordination of care.

## 2020-08-25 NOTE — DISCHARGE NOTE PROVIDER - NSDCACTIVITY_GEN_ALL_CORE
Walking - Outdoors allowed/Sex allowed/Do not make important decisions/Walking - Indoors allowed/Stairs allowed/Showering allowed/Do not drive or operate machinery/No heavy lifting/straining

## 2020-08-25 NOTE — PROGRESS NOTE ADULT - PROBLEM SELECTOR PROBLEM 1
Type 2 diabetes mellitus with hyperglycemia, with long-term current use of insulin
Ileus
Type 2 diabetes mellitus with hyperglycemia, with long-term current use of insulin
CAD (coronary artery disease)

## 2020-08-25 NOTE — PROGRESS NOTE ADULT - SUBJECTIVE AND OBJECTIVE BOX
Diabetes Follow up note:    Chief complaint: T2DM    Interval Hx: BG values improved/at goal. Tolerating POs. Going home later this afternoon. Reviewed discharge plan w/pt.     Review of Systems:  General: no complaints/denies pain  GI: Tolerating POs. Denies N/V/D/Abd pain  CV: Denies CP/SOB  ENDO: No S&Sx of hypoglycemia  MEDS:  atorvastatin 80 milliGRAM(s) Oral at bedtime    insulin glargine Injectable (LANTUS) 15 Unit(s) SubCutaneous at bedtime  insulin lispro (HumaLOG) corrective regimen sliding scale   SubCutaneous three times a day before meals  insulin lispro (HumaLOG) corrective regimen sliding scale   SubCutaneous at bedtime  insulin lispro Injectable (HumaLOG) 9 Unit(s) SubCutaneous three times a day before meals      Allergies    No Known Allergies        PE:  General: Male lying in bed. NAD.   Vital Signs Last 24 Hrs  T(C): 37.2 (25 Aug 2020 11:52), Max: 37.5 (24 Aug 2020 23:53)  T(F): 99 (25 Aug 2020 11:52), Max: 99.5 (24 Aug 2020 23:53)  HR: 69 (25 Aug 2020 11:52) (64 - 72)  BP: 120/75 (25 Aug 2020 11:52) (120/75 - 143/88)  BP(mean): 89 (25 Aug 2020 05:00) (89 - 106)  RR: 18 (25 Aug 2020 11:52) (18 - 18)  SpO2: 95% (25 Aug 2020 11:52) (91% - 95%)  Chest: midsternal incision josue. C/d/i.   Abd: Soft, NT,ND,   Extremities: Warm  Neuro: A&O X3    LABS:  POCT Blood Glucose.: 153 mg/dL (08-25-20 @ 11:37)  POCT Blood Glucose.: 118 mg/dL (08-25-20 @ 07:43)  POCT Blood Glucose.: 112 mg/dL (08-24-20 @ 21:37)  POCT Blood Glucose.: 112 mg/dL (08-24-20 @ 17:05)  POCT Blood Glucose.: 268 mg/dL (08-24-20 @ 11:45)  POCT Blood Glucose.: 168 mg/dL (08-24-20 @ 07:43)  POCT Blood Glucose.: 141 mg/dL (08-23-20 @ 21:13)  POCT Blood Glucose.: 226 mg/dL (08-23-20 @ 16:49)  POCT Blood Glucose.: 201 mg/dL (08-23-20 @ 11:54)  POCT Blood Glucose.: 155 mg/dL (08-23-20 @ 07:56)  POCT Blood Glucose.: 197 mg/dL (08-22-20 @ 21:20)  POCT Blood Glucose.: 179 mg/dL (08-22-20 @ 17:12)                            11.8   13.24 )-----------( 302      ( 25 Aug 2020 06:01 )             35.1       08-25    137  |  100  |  13  ----------------------------<  97  3.9   |  24  |  0.77    Ca    9.5      25 Aug 2020 05:59        Thyroid Function Tests:  08-17 @ 22:41 TSH 0.67 FreeT4 -- T3 102 Anti TPO -- Anti Thyroglobulin Ab -- TSI --      A1C with Estimated Average Glucose Result: 8.6 % (08-18-20 @ 00:42)  Hemoglobin A1C, Whole Blood: 10.8 % (11-16-19 @ 10:30)          Contact number: katarina [phone] or [phone]

## 2020-08-25 NOTE — PROGRESS NOTE ADULT - ASSESSMENT
51 y/o M w/ uncontrolled Type 2 DM on insulin at home complicated by neuropathy and CAD, HTN, HLD admitted with chest pain now s/p CABG. BG values slightly above goal on basal/bolus regimen. Discussed A1C and BG goals for optimal wound healing w/pt. Tolerating POs. Uses insulin independently at home. BG goal (100-180mg/dl).
49 y/o M w/ uncontrolled Type 2 DM on insulin at home complicated by neuropathy and CAD, HTN, HLD admitted with chest pain now s/p C2L 8/18/20  Post op extubated <24 hours, Blood pressure support with Toprol and IV Cardene   8/22 Transferred to [facility]. Blood pressure support with Lisinopril and Lopressor  ASA/Statin/Plavix for graft patency  8/23 Lantus 15 units QHS and Humalog 6 units AC meals increased Plan to d/c on 70/30 + metformin. Anticipate discharge tomorrow.  8/24 Maintaining SR on amiodarone load. Continue to monitor. Anticipate discharge home Tues/Wed.
49 y/o M w/ uncontrolled Type 2 DM on insulin at home complicated by neuropathy and CAD, HTN, HLD admitted with chest pain now s/p C2L 8/18/20  Post op extubated <24 hours, Blood pressure support with Toprol and IV Cardene   8/22 Transferred to [facility]. Blood pressure support with Lisinopril and Lopressor  ASA/Statin/Plavix for graft patency. PW removed
49 y/o M w/ uncontrolled Type 2 DM on insulin at home complicated by neuropathy and CAD, HTN, HLD admitted with chest pain now s/p CABG. BG values slightly above goal on basal/bolus regimen. Discussed A1C and BG goals for optimal wound healing w/pt. Tolerating POs. Uses insulin independently at home. BG goal (100-180mg/dl).
51 y/o M w/ uncontrolled Type 2 DM on insulin at home complicated by neuropathy and CAD, HTN, HLD admitted with chest pain now s/p C2L 8/18/20  Post op extubated <24 hours, Blood pressure support with Toprol and IV Cardene   8/22 Transferred to [facility]. Blood pressure support with Lisinopril and Lopressor  ASA/Statin/Plavix for graft patency  8/23 Lantus 15 units QHS and Humalog 6 units AC meals increased Plan to d/c on 70/30 + metformin. Anticipate discharge tomorrow.
51 y/o M w/ uncontrolled Type 2 DM on insulin at home complicated by neuropathy and CAD, HTN, HLD admitted with chest pain now s/p CABG. BG values slightly above goal during the day but at goal with fasting.
51 y/o M w/ uncontrolled Type 2 DM on insulin at home complicated by neuropathy and CAD, HTN, HLD admitted with chest pain now s/p CABG. Tolerating POs w/improvement of glycemic control. To be transitioned back to mixed insulin at discharge. BG goal (100-180mg/dl)
CAD s/p CABG x2 on 8/18   Diabetes mellitus   Hypertension  ileus   8/21 Tx 2 Delcid  BM x 2 Diet progressed
50 M bilingual Portuguese/English speaking, with PMHx DM2 ([facility], Hgba1c unknown, controlled ), HTN, HLD, NSTEMI 11/16/19, CAD-KHARI to 60% pCX and 100% OM2, presents with chest pain. Pt reports intermittent chest pain for the past 12 days. Initially came on while he was at rest. Described as sharp, 4/10, left sided w/o radiation, lasted for several hours w/o associated SOB. Since then he has noticed the pain more with exertion- says he is unable to walk more than 10 feet without stopping due to the pain. He has been compliant with all of his medication including DAPT, atorva, bb, ACEi. Denies any orthopnea, PND, LE edema, palpitations. Pt was referred for Cardiac Cath by DR Blakely from the Cardiology Clinic. (17 Aug 2020 08:32). Patient is s/p cath showing LM disease (60-70%) by Dr. Hammer. CT surgery consulted, plan for CABGx2 tmmrw 8/18/20 with Dr. Bright.     8/17/20 PREOP workup in progress. Plan for TTE, Carotids, P2Y12, and preop labs. No active chest pain. No Heparin gtt as per Dr. Bright.

## 2020-08-25 NOTE — PROGRESS NOTE ADULT - PROBLEM SELECTOR PROBLEM 3
Hyperlipidemia, unspecified hyperlipidemia type
Hypertension, unspecified type
S/P CABG x 2

## 2020-08-25 NOTE — DISCHARGE NOTE PROVIDER - NSDCFUADDINST_GEN_ALL_CORE_FT
no driving until cleared by Dr. Bright  refer to cardiac surgery do and don't checklist  call Dr. Bright for fever > 101  check blood sugar levels before meals and at bedtime- record levels

## 2020-08-25 NOTE — DISCHARGE NOTE PROVIDER - HOSPITAL COURSE
49 y/o M w/ uncontrolled Type 2 DM on insulin at home complicated by neuropathy and CAD, HTN, HLD admitted with chest pain now s/p C2L 8/18/20    Post op extubated <24 hours, Blood pressure support with Toprol and IV Cardene     8/22 Transferred to [facility]. Blood pressure support with Lisinopril and Lopressor    ASA/Statin/Plavix for graft patency    8/23 Lantus 15 units QHS and Humalog 6 units AC meals increased Plan to d/c on 70/30 + metformin. Anticipate discharge tomorrow.    8/24 Maintaining SR on amiodarone load. Continue to monitor. Anticipate discharge home Tues/Wed.    8/25 VSS ; Discharge pt home today as per Dr. levine on amio 200 qd ekg done  today

## 2020-08-25 NOTE — DISCHARGE NOTE PROVIDER - NSDCCPCAREPLAN_GEN_ALL_CORE_FT
PRINCIPAL DISCHARGE DIAGNOSIS  Diagnosis: S/P CABG x 2  Assessment and Plan of Treatment: shower daily  weigh yourself daily  continue current prescriptions as ordered  increase activity as tolerated   no added salt; low fat; low cholesterol, low salt diabetic diet.   check blood sugar levels before meals and at bedtime- record levels   follow up with Cardiologist in 1-2 weeks. Call to schedule appointment.  follow up with cardiac surgeon, Dr. Bright on Sept 10th at 9:15 am. Call to confirm appointment. [phone]  follow up with endocrinologist, Dr. Boyce, in 1-2 weeks. CAll to schedule appointment.

## 2020-08-25 NOTE — PROGRESS NOTE ADULT - PROVIDER SPECIALTY LIST ADULT
CT Surgery
Critical Care
Endocrinology

## 2020-08-25 NOTE — PROGRESS NOTE ADULT - PROBLEM SELECTOR PROBLEM 2
Hypertension, unspecified type
Diabetes
Hyperlipidemia, unspecified hyperlipidemia type
Hypertension, unspecified type
Hypertension, unspecified type
Diabetes

## 2020-08-25 NOTE — PROGRESS NOTE ADULT - PROBLEM SELECTOR PLAN 1
-test BG AC/HS  -Increase Lantus 15 units QHS  -Increase Humalog 6 units AC meals  -Change Humalog to low correction scale AC and Low HS scale  -Plan to d/c on 70/30 + metformin.  f/u outpt @ [address] [facility] medicine clinic
-test BG AC/HS  -c/w Lantus 15 units QHS  -Increase Humalog 7 units AC meals  -c/w Humalog low correction scale AC and Low HS scale  Discharge plan:  Restart Humulin 70/30 14 units in AM AC breakfast and 8 units AC Dinner  Restart Metformin 850mg TID  f/u outpt @ [address] [facility] medicine clinic.   f/u outpt @ [address] [facility] medicine [facility]
-test BG AC/HS  -continue Lantus 15 units SQ QHS  -Increase Humalog to 9 units SQ AC meals  -continue Humalog low correction scale AC and Low HS scale  Discharge plan:  Restart Humulin 70/30: 14 units SQ 30 minutes before breakfast and 8 units SQ 30 minutes before Dinner  Restart Metformin 850mg TID  f/u outpt @ [address] [facility] [phone]
BM x 2 today  Diet progressed  Ambulating independently
Inpatient:  -test BG AC/HS  -c/w Lantus 15 units QHS  -c/w Humalog 9 units AC meals  -c/w Humalog low correction scale AC and Low HS scale  Discharge plan:  Restart Humulin 70/30: 14 units SQ 30 minutes before breakfast and 8 units SQ 30 minutes before Dinner  Restart Metformin 850mg TID  f/u outpt @ [address] [facility] [phone].
8/17 - s/p cath LM disease   TTE, carotids ordered   Pending P2Y12   Continue ASA, Toprol 50xL, Atorvastatin   Lisinopril 5mg QD d'cd for renal optimization preop.

## 2020-08-25 NOTE — DISCHARGE NOTE PROVIDER - NSDCMRMEDTOKEN_GEN_ALL_CORE_FT
acetaminophen 325 mg oral tablet: 2 tab(s) orally every 6 hours, As needed, Mild Pain (1 - 3)  amiodarone 200 mg oral tablet: 1 tab(s) orally once a day   aspirin 81 mg oral delayed release tablet: 1 tab(s) orally once a day  atorvastatin 80 mg oral tablet: 1 tab(s) orally once a day (at bedtime)  clopidogrel 75 mg oral tablet: 1 tab(s) orally once a day  famotidine 20 mg oral tablet: 1 tab(s) orally 2 times a day  HumuLIN 70/30 subcutaneous suspension: 14 unit(s) subcutaneous once a day  in AM prior to breakfast  HumuLIN 70/30 subcutaneous suspension: 8 unit(s) subcutaneous once a day - take before dinner   lisinopril 5 mg oral tablet: 1 tab(s) orally once a day  metFORMIN 850 mg oral tablet: 1 tab(s) orally 3 times a day   metoprolol tartrate 50 mg oral tablet: 1 tab(s) orally every 8 hours  oxycodone-acetaminophen 5 mg-325 mg oral tablet: 1 tab(s) orally every 6 hours, As Needed -Moderate Pain (4 - 6) MDD:4  polyethylene glycol 3350 oral powder for reconstitution: 17 gram(s) orally once a day  senna oral tablet: 2 tab(s) orally once a day (at bedtime)  simethicone 80 mg oral tablet, chewable: 1 tab(s) orally every 6 hours, As needed, Gas

## 2020-08-25 NOTE — PROGRESS NOTE ADULT - PROBLEM SELECTOR PLAN 2
Goal BP <130/80 c/w lisinopril and metoprolol
-c/w Atorvastatin 80mg qhs
Endo following  -Increase Lantus 15 units QHS  -Increase Humalog 6 units AC meals  -Change Humalog to low correction scale AC and Low HS scale  -Plan to d/c on 70/30 + metformin.  Consistent carb diet
Endo following  Continue humalog, lantus  Consistent carb diet
Goal BP <130/80 c/w lisinopril and metoprolol.
Goal BP <130/80 c/w lisinopril and metoprolol.
Endo consulted   Continue Lantus and Humalog

## 2020-08-25 NOTE — DISCHARGE NOTE PROVIDER - NSDCPNSUBOBJ_GEN_ALL_CORE
VSS    tele: RSR 60-70    midsternal incision cdi josue - sternum stable    lungs clear, RR easy unlabored    + bs nt nd + bm; neg n/v/d    LE: neg calf tenderness, neg LE edema, ppp bilaterally, Rt SVG site cdi josue        Discharge pt home today 8/25 as per Dr. Bright

## 2020-08-25 NOTE — DISCHARGE NOTE PROVIDER - CARE PROVIDERS DIRECT ADDRESSES
,[email or web address]

## 2020-08-26 ENCOUNTER — TRANSCRIPTION ENCOUNTER (OUTPATIENT)
Age: 50
End: 2020-08-26

## 2020-08-26 RX ORDER — METOPROLOL SUCCINATE 50 MG/1
50 TABLET, EXTENDED RELEASE ORAL DAILY
Qty: 90 | Refills: 3 | Status: DISCONTINUED | COMMUNITY
Start: 2019-11-26 | End: 2020-08-26

## 2020-08-26 RX ORDER — ACETAMINOPHEN 325 MG/1
325 TABLET ORAL EVERY 6 HOURS
Refills: 0 | Status: ACTIVE | COMMUNITY
Start: 2020-08-26

## 2020-08-27 ENCOUNTER — APPOINTMENT (OUTPATIENT)
Dept: CARE COORDINATION | Facility: HOME HEALTH | Age: 50
End: 2020-08-27
Payer: SELF-PAY

## 2020-08-27 PROCEDURE — 99024 POSTOP FOLLOW-UP VISIT: CPT

## 2020-08-27 NOTE — HISTORY OF PRESENT ILLNESS
[Home] : at home, [unfilled] , at the time of the visit. [Other Location: e.g. Home (Enter Location, City,State)___] : at [unfilled] [Verbal consent obtained from patient] : the patient, [unfilled] [FreeTextEntry1] : 50M s/p CABG Dr. Bright. Pt rents a room in a house, has access to shared bathroom and kitchen. has uncle and cousin who stop by to help and drop off meds and food

## 2020-09-10 ENCOUNTER — RESULT REVIEW (OUTPATIENT)
Age: 50
End: 2020-09-10

## 2020-09-10 ENCOUNTER — APPOINTMENT (OUTPATIENT)
Dept: CARDIOTHORACIC SURGERY | Facility: CLINIC | Age: 50
End: 2020-09-10
Payer: SELF-PAY

## 2020-09-10 ENCOUNTER — OUTPATIENT (OUTPATIENT)
Dept: OUTPATIENT SERVICES | Facility: HOSPITAL | Age: 50
LOS: 1 days | End: 2020-09-10
Payer: SELF-PAY

## 2020-09-10 VITALS
WEIGHT: 130.5 LBS | HEIGHT: 65 IN | BODY MASS INDEX: 21.74 KG/M2 | TEMPERATURE: 98.2 F | DIASTOLIC BLOOD PRESSURE: 78 MMHG | OXYGEN SATURATION: 98 % | SYSTOLIC BLOOD PRESSURE: 118 MMHG | HEART RATE: 62 BPM | RESPIRATION RATE: 12 BRPM

## 2020-09-10 DIAGNOSIS — Z95.1 PRESENCE OF AORTOCORONARY BYPASS GRAFT: ICD-10-CM

## 2020-09-10 DIAGNOSIS — Z95.5 PRESENCE OF CORONARY ANGIOPLASTY IMPLANT AND GRAFT: Chronic | ICD-10-CM

## 2020-09-10 PROCEDURE — 71046 X-RAY EXAM CHEST 2 VIEWS: CPT | Mod: 26

## 2020-09-10 PROCEDURE — 71046 X-RAY EXAM CHEST 2 VIEWS: CPT

## 2020-09-10 PROCEDURE — 99024 POSTOP FOLLOW-UP VISIT: CPT

## 2020-09-24 ENCOUNTER — TRANSCRIPTION ENCOUNTER (OUTPATIENT)
Age: 50
End: 2020-09-24

## 2020-09-24 ENCOUNTER — APPOINTMENT (OUTPATIENT)
Dept: CARDIOTHORACIC SURGERY | Facility: CLINIC | Age: 50
End: 2020-09-24
Payer: SELF-PAY

## 2020-09-24 VITALS
DIASTOLIC BLOOD PRESSURE: 64 MMHG | HEART RATE: 64 BPM | BODY MASS INDEX: 21.83 KG/M2 | OXYGEN SATURATION: 97 % | TEMPERATURE: 98.3 F | RESPIRATION RATE: 14 BRPM | HEIGHT: 65 IN | SYSTOLIC BLOOD PRESSURE: 105 MMHG | WEIGHT: 131 LBS

## 2020-09-24 PROCEDURE — 99024 POSTOP FOLLOW-UP VISIT: CPT

## 2020-09-24 RX ORDER — FUROSEMIDE 20 MG/1
20 TABLET ORAL DAILY
Qty: 30 | Refills: 0 | Status: COMPLETED | COMMUNITY
Start: 2020-09-10 | End: 2020-09-24

## 2020-09-24 RX ORDER — ISOPROPYL ALCOHOL 70 ML/100ML
SWAB TOPICAL
Qty: 2 | Refills: 3 | Status: COMPLETED | COMMUNITY
Start: 2017-07-17 | End: 2020-09-24

## 2020-09-24 RX ORDER — CALCIUM CARB/VITAMIN D3/VIT K1 500-100-40
31G X 5/16" TABLET,CHEWABLE ORAL
Qty: 90 | Refills: 3 | Status: COMPLETED | COMMUNITY
Start: 2020-04-28 | End: 2020-09-24

## 2020-09-24 RX ORDER — AMIODARONE HYDROCHLORIDE 200 MG/1
200 TABLET ORAL DAILY
Refills: 0 | Status: COMPLETED | COMMUNITY
Start: 2020-08-26 | End: 2020-09-24

## 2020-09-24 RX ORDER — SPIRONOLACTONE 25 MG/1
25 TABLET ORAL DAILY
Qty: 30 | Refills: 0 | Status: COMPLETED | COMMUNITY
Start: 2020-09-10 | End: 2020-09-24

## 2020-09-24 RX ORDER — BLOOD SUGAR DIAGNOSTIC
STRIP MISCELLANEOUS
Qty: 30 | Refills: 3 | Status: COMPLETED | COMMUNITY
Start: 2017-07-17 | End: 2020-09-24

## 2020-09-24 RX ORDER — POLYETHYLENE GLYCOL 3350 17 G/17G
17 POWDER, FOR SOLUTION ORAL
Refills: 0 | Status: COMPLETED | COMMUNITY
Start: 2020-08-26 | End: 2020-09-24

## 2020-09-24 RX ORDER — OXYCODONE AND ACETAMINOPHEN 5; 325 MG/1; MG/1
5-325 TABLET ORAL
Refills: 0 | Status: COMPLETED | COMMUNITY
Start: 2020-08-26 | End: 2020-09-24

## 2020-09-24 RX ORDER — BLOOD-GLUCOSE METER
W/DEVICE KIT MISCELLANEOUS
Qty: 1 | Refills: 0 | Status: COMPLETED | COMMUNITY
Start: 2017-07-17 | End: 2020-09-24

## 2020-09-28 NOTE — ED ADULT NURSE NOTE - DISCHARGE DATE/TIME
Hemostasis: Electrocautery Bill 77138 For Specimen Handling/Conveyance To Laboratory?: no Additional Anticipated Plans: If malignant consider Mohs surgery Notification Instructions: Patient will be notified of biopsy results. However, patient instructed to call the office if not contacted within 2 weeks. Billing Type: Third-Party Bill Silver Nitrate Text: The wound bed was treated with silver nitrate after the biopsy was performed. Dressing: bandage Type Of Destruction Used: Electrodesiccation and Curettage Anesthesia Type: 1% lidocaine without epinephrine Curettage Text: The wound bed was treated with curettage after the biopsy was performed. Biopsy Method: Dermablade Additional Anesthesia Volume In Cc (Will Not Render If 0): 0 Detail Level: Detailed Cryotherapy Text: The wound bed was treated with cryotherapy after the biopsy was performed. Information: Selecting Yes will display possible errors in your note based on the variables you have selected. This validation is only offered as a suggestion for you. PLEASE NOTE THAT THE VALIDATION TEXT WILL BE REMOVED WHEN YOU FINALIZE YOUR NOTE. IF YOU WANT TO FAX A PRELIMINARY NOTE YOU WILL NEED TO TOGGLE THIS TO 'NO' IF YOU DO NOT WANT IT IN YOUR FAXED NOTE. Wound Care: Aquaphor Biopsy Type: H and E Depth Of Biopsy: dermis Anesthesia Volume In Cc: 0.5 Was A Bandage Applied: Yes Post-Care Instructions: I reviewed with the patient in detail post-care instructions. Patient is to keep the biopsy site dry overnight, and then apply bacitracin twice daily until healed. Patient may apply hydrogen peroxide soaks to remove any crusting. Electrodesiccation And Curettage Text: The wound bed was treated with electrodesiccation and curettage after the biopsy was performed. Consent: Verbal consent was obtained and risks were reviewed including but not limited to scarring, infection, bleeding, scabbing, incomplete removal, nerve damage and allergy to anesthesia. Electrodesiccation Text: The wound bed was treated with electrodesiccation after the biopsy was performed. 15-Jul-2017 23:17

## 2020-10-06 ENCOUNTER — APPOINTMENT (OUTPATIENT)
Dept: CARDIOLOGY | Facility: HOSPITAL | Age: 50
End: 2020-10-06

## 2020-10-06 ENCOUNTER — OUTPATIENT (OUTPATIENT)
Dept: OUTPATIENT SERVICES | Facility: HOSPITAL | Age: 50
LOS: 1 days | End: 2020-10-06
Payer: SELF-PAY

## 2020-10-06 VITALS
HEIGHT: 65 IN | HEART RATE: 70 BPM | WEIGHT: 131 LBS | OXYGEN SATURATION: 99 % | DIASTOLIC BLOOD PRESSURE: 68 MMHG | BODY MASS INDEX: 21.83 KG/M2 | SYSTOLIC BLOOD PRESSURE: 107 MMHG

## 2020-10-06 DIAGNOSIS — Z95.5 PRESENCE OF CORONARY ANGIOPLASTY IMPLANT AND GRAFT: Chronic | ICD-10-CM

## 2020-10-06 DIAGNOSIS — I25.10 ATHEROSCLEROTIC HEART DISEASE OF NATIVE CORONARY ARTERY WITHOUT ANGINA PECTORIS: ICD-10-CM

## 2020-10-06 PROCEDURE — G0463: CPT

## 2020-10-06 PROCEDURE — 93005 ELECTROCARDIOGRAM TRACING: CPT

## 2020-10-12 DIAGNOSIS — E78.5 HYPERLIPIDEMIA, UNSPECIFIED: ICD-10-CM

## 2020-10-12 DIAGNOSIS — I10 ESSENTIAL (PRIMARY) HYPERTENSION: ICD-10-CM

## 2020-10-15 NOTE — REVIEW OF SYSTEMS
[Negative] : Heme/Lymph [Fever] : no fever [Headache] : no headache [Chills] : no chills [Feeling Fatigued] : not feeling fatigued [Shortness Of Breath] : no shortness of breath [Dyspnea on exertion] : not dyspnea during exertion [Chest Pain] : no chest pain [Lower Ext Edema] : no extremity edema [Palpitations] : no palpitations [Cough] : no cough [Wheezing] : no wheezing [Abdominal Pain] : no abdominal pain [Nausea] : no nausea [Vomiting] : no vomiting

## 2020-10-15 NOTE — PHYSICAL EXAM
[General Appearance - Well Developed] : well developed [Normal Appearance] : normal appearance [General Appearance - Well Nourished] : well nourished [Normal Conjunctiva] : the conjunctiva exhibited no abnormalities [Normal Oral Mucosa] : normal oral mucosa [Normal Jugular Venous A Waves Present] : normal jugular venous A waves present [Normal Jugular Venous V Waves Present] : normal jugular venous V waves present [Respiration, Rhythm And Depth] : normal respiratory rhythm and effort [Auscultation Breath Sounds / Voice Sounds] : lungs were clear to auscultation bilaterally [Heart Rate And Rhythm] : heart rate and rhythm were normal [Heart Sounds] : normal S1 and S2 [Murmurs] : no murmurs present [Arterial Pulses Normal] : the arterial pulses were normal [Bowel Sounds] : normal bowel sounds [Abdomen Soft] : soft [Abdomen Tenderness] : non-tender [Abnormal Walk] : normal gait [Nail Clubbing] : no clubbing of the fingernails [Cyanosis, Localized] : no localized cyanosis [Skin Color & Pigmentation] : normal skin color and pigmentation [Skin Turgor] : normal skin turgor [] : no rash [Oriented To Time, Place, And Person] : oriented to person, place, and time [Impaired Insight] : insight and judgment were intact [Affect] : the affect was normal [FreeTextEntry1] : Candacei

## 2020-10-15 NOTE — DISCUSSION/SUMMARY
[FreeTextEntry1] : Mr. Crocker is a 48 yo English man with a PMHx of poorly controlled DM2, NSTEMI 11/16 s/p LHC w/ KHARI to 60% pCX and 100% OM2, s/p 2V CABG on 8/18 here for follow up.\par \par #CAD: S/p NSTEMI, now s/p 2V CABG. Appears euvolemic today. \par -Continue asa 81 daily, plavix 75 mg daily\par -Continue atorvastatin 80 daily\par -Continue Toprol XL 50 daily\par -Continue lisinopril 5 mg daily\par -Continue to hold lasix, shannan. Continue to monitor daily weights\par -Exercise as tolerated\par \par #HTN: Well controlled today\par -Continue Toprol XL 50 daily\par -Continue Lisinopril 5 mg daily\par \par #HLD: LDL at goal\par -Continue Lipitor 80\par \par RTC in 1 month\par

## 2020-10-15 NOTE — HISTORY OF PRESENT ILLNESS
[FreeTextEntry1] : Mr. Crocker is a 49 yo Yi man with a PMHx of poorly controlled DM2 (A1c 10.8 11/2019), HTN, HLD, NSTEMI 11/16/19 s/p LHC w/ KHARI to 60% pCX and 100% OM2, now s/p 2V CABG w/ Dr. Bright on 8/18 here for follow up. At follow up visit w/ CTSX, was started on spironolactone 25mg daily and lasix 20mg daily for volume overload. He reports feeling well today. Denies any chest pain. Reports improving SOB. He report mild MSI incisional pain which has been improving. He has been compliant with all his medications. He has started light exercise and continues to eat healthy. \par \par Pertinent labs:\par A1c: 10.8\par Cholesterol: 159, TG 88, HDL 51, LDL 90\par \par LHC 11/17/19:\par VENTRICLES: EF estimated was 55 %.\par CORONARY VESSELS: The coronary circulation is right dominant.\par LM:   --  LM: The vessel was medium sized. Angiography showed mild\par atherosclerosis with no flow limiting lesions.\par LAD:   --  Proximal LAD: The vessel was medium sized. Angiography showed\par mild atherosclerosis with no flow limiting lesions.\par CX:   --  Proximal circumflex: There was a 60 % stenosis.\par --  OM1: The vessel was small sized. Angiography showed mild\par atherosclerosis with no flow limiting lesions.\par --  OM2: There was a 100 % stenosis.\par RCA:   --  Proximal RCA: The vessel was large sized. Angiography showed\par mild atherosclerosis with no flow limiting lesions.\par --  RPDA: The vessel was medium sized. Angiography showed mild\par atherosclerosis with no flow limiting lesions.\par COMPLICATIONS: There were no complications.\par DIAGNOSTIC RECOMMENDATIONS: Coronary Angiography Summary:\par -single vessel coronary disease.\par -OM2 100% (culprit lesions).\par -proximal circumflex (60%).\par Coronary Interventional Summary:\par -successful KHARI to the OM2.\par -successful KHARI to the proximal circumflex\par \par TTE 11/17/19:\par Dimensions:    Normal Values:\par LA:     3.2    2.0 - 4.0 cm\par Ao:     3.1    2.0 - 3.8 cm\par SEPTUM: 1.1    0.6 - 1.2 cm\par PWT:    1.1    0.6 - 1.1 cm\par LVIDd:  4.6    3.0 - 5.6 cm\par LVIDs:  3.3    1.8 - 4.0 cm\par Derived variables:\par LVMI: 112 g/m2\par RWT: 0.47\par Fractional short: 28 %\par EF (Teicholtz): 55 %\par Doppler Peak Velocity (m/sec): MV=0.8 AoV=1.3\par ------------------------------------------------------------------------\par Observations:\par Mitral Valve: Normal mitral valve. Mild mitral\par regurgitation.  Peak mitral valve gradient equals 3 mm Hg,\par mean transmitral valve gradient equals 1 mm Hg.\par Aortic Valve/Aorta: Normal trileaflet aortic valve. Peak\par transaortic valve gradient equals 7 mm Hg. Peak left\par ventricular outflow tract gradient equals 4 mm Hg, mean\par gradient is equal to 2 mm Hg, LVOT velocity time integral\par equals 17 cm.\par Aortic Root: 3.1 cm.\par Left Atrium: LA volume index = 11 cc/m2.\par Left Ventricle: Mild inferior wall hypokinesis. Mild\par concentric left ventricular hypertrophy.\par Right Heart: Normal right atrium. Normal right ventricular\par size and function. Normal tricuspid valve. Mild tricuspid\par regurgitation. Normal pulmonic valve. Minimal pulmonic\par regurgitation.\par Pericardium/Pleura: Normal pericardium with no pericardial\par effusion.\par Hemodynamic: Estimated right atrial pressure is 8 mm Hg.\par Estimated right ventricular systolic pressure equals 35 mm\par Hg, assuming right atrial pressure equals 8 mm Hg,\par consistent with borderline pulmonary hypertension.\par ------------------------------------------------------------------------\par Conclusions:\par 1. Mild concentric left ventricular hypertrophy.\par 2. Mild inferior wall hypokinesis.\par \par \par

## 2020-11-10 ENCOUNTER — APPOINTMENT (OUTPATIENT)
Dept: CARDIOLOGY | Facility: HOSPITAL | Age: 50
End: 2020-11-10

## 2020-11-10 ENCOUNTER — OUTPATIENT (OUTPATIENT)
Dept: OUTPATIENT SERVICES | Facility: HOSPITAL | Age: 50
LOS: 1 days | End: 2020-11-10
Payer: SELF-PAY

## 2020-11-10 VITALS
SYSTOLIC BLOOD PRESSURE: 110 MMHG | WEIGHT: 139 LBS | OXYGEN SATURATION: 99 % | DIASTOLIC BLOOD PRESSURE: 71 MMHG | BODY MASS INDEX: 23.16 KG/M2 | HEART RATE: 77 BPM | HEIGHT: 65 IN

## 2020-11-10 DIAGNOSIS — I25.10 ATHEROSCLEROTIC HEART DISEASE OF NATIVE CORONARY ARTERY WITHOUT ANGINA PECTORIS: ICD-10-CM

## 2020-11-10 DIAGNOSIS — Z95.5 PRESENCE OF CORONARY ANGIOPLASTY IMPLANT AND GRAFT: Chronic | ICD-10-CM

## 2020-11-10 PROCEDURE — G0463: CPT

## 2020-11-12 ENCOUNTER — NON-APPOINTMENT (OUTPATIENT)
Age: 50
End: 2020-11-12

## 2020-11-13 NOTE — PHYSICAL EXAM
[General Appearance - Well Developed] : well developed [Normal Appearance] : normal appearance [General Appearance - Well Nourished] : well nourished [Normal Conjunctiva] : the conjunctiva exhibited no abnormalities [Normal Oral Mucosa] : normal oral mucosa [Normal Jugular Venous A Waves Present] : normal jugular venous A waves present [Normal Jugular Venous V Waves Present] : normal jugular venous V waves present [Respiration, Rhythm And Depth] : normal respiratory rhythm and effort [Auscultation Breath Sounds / Voice Sounds] : lungs were clear to auscultation bilaterally [Heart Rate And Rhythm] : heart rate and rhythm were normal [Heart Sounds] : normal S1 and S2 [Murmurs] : no murmurs present [Arterial Pulses Normal] : the arterial pulses were normal [Bowel Sounds] : normal bowel sounds [Abdomen Soft] : soft [Abdomen Tenderness] : non-tender [Abnormal Walk] : normal gait [Nail Clubbing] : no clubbing of the fingernails [Cyanosis, Localized] : no localized cyanosis [Skin Color & Pigmentation] : normal skin color and pigmentation [Skin Turgor] : normal skin turgor [] : no rash [Oriented To Time, Place, And Person] : oriented to person, place, and time [Impaired Insight] : insight and judgment were intact [Affect] : the affect was normal [FreeTextEntry1] : Midline incision well healed. No sternal dehiscence.

## 2020-11-13 NOTE — HISTORY OF PRESENT ILLNESS
[FreeTextEntry1] : Mr. Crocker is a 49 yo [place] man with a PMHx of poorly controlled DM2 (A1c 10.8 11/2019), HTN, HLD, NSTEMI 11/16/19 s/p LHC w/ KHARI to 60% pCX and 100% OM2, now s/p 2V CABG w/ Dr. Bright on 8/18 here for follow up. Today, complains of chest pain which has been present since his CABG. Described as sharp/pinching, worse to touch. Sternotomy wound appears well healed, no dehiscence or drainage. Otherwise complains of mild TORO, which has been improving since his surgery. No orthopnea, PND, LE edema. He has been trying to walk more since his surgery and has maintained a healthy diet. He is complaint with all of his medications.\par \par Pertinent labs:\par A1c: 10.8\par Cholesterol: 159, TG 88, HDL 51, LDL 90\par \par LHC 11/17/19:\par VENTRICLES: EF estimated was 55 %.\par CORONARY VESSELS: The coronary circulation is right dominant.\par LM:   --  LM: The vessel was medium sized. Angiography showed mild\par atherosclerosis with no flow limiting lesions.\par LAD:   --  Proximal LAD: The vessel was medium sized. Angiography showed\par mild atherosclerosis with no flow limiting lesions.\par CX:   --  Proximal circumflex: There was a 60 % stenosis.\par --  OM1: The vessel was small sized. Angiography showed mild\par atherosclerosis with no flow limiting lesions.\par --  OM2: There was a 100 % stenosis.\par RCA:   --  Proximal RCA: The vessel was large sized. Angiography showed\par mild atherosclerosis with no flow limiting lesions.\par --  RPDA: The vessel was medium sized. Angiography showed mild\par atherosclerosis with no flow limiting lesions.\par COMPLICATIONS: There were no complications.\par DIAGNOSTIC RECOMMENDATIONS: Coronary Angiography Summary:\par -single vessel coronary disease.\par -OM2 100% (culprit lesions).\par -proximal circumflex (60%).\par Coronary Interventional Summary:\par -successful KHARI to the OM2.\par -successful KHARI to the proximal circumflex\par \par TTE 11/17/19:\par Dimensions:    Normal Values:\par LA:     3.2    2.0 - 4.0 cm\par Ao:     3.1    2.0 - 3.8 cm\par SEPTUM: 1.1    0.6 - 1.2 cm\par PWT:    1.1    0.6 - 1.1 cm\par LVIDd:  4.6    3.0 - 5.6 cm\par LVIDs:  3.3    1.8 - 4.0 cm\par Derived variables:\par LVMI: 112 g/m2\par RWT: 0.47\par Fractional short: 28 %\par EF (Teicholtz): 55 %\par Doppler Peak Velocity (m/sec): MV=0.8 AoV=1.3\par ------------------------------------------------------------------------\par Observations:\par Mitral Valve: Normal mitral valve. Mild mitral\par regurgitation.  Peak mitral valve gradient equals 3 mm Hg,\par mean transmitral valve gradient equals 1 mm Hg.\par Aortic Valve/Aorta: Normal trileaflet aortic valve. Peak\par transaortic valve gradient equals 7 mm Hg. Peak left\par ventricular outflow tract gradient equals 4 mm Hg, mean\par gradient is equal to 2 mm Hg, LVOT velocity time integral\par equals 17 cm.\par Aortic Root: 3.1 cm.\par Left Atrium: LA volume index = 11 cc/m2.\par Left Ventricle: Mild inferior wall hypokinesis. Mild\par concentric left ventricular hypertrophy.\par Right Heart: Normal right atrium. Normal right ventricular\par size and function. Normal tricuspid valve. Mild tricuspid\par regurgitation. Normal pulmonic valve. Minimal pulmonic\par regurgitation.\par Pericardium/Pleura: Normal pericardium with no pericardial\par effusion.\par Hemodynamic: Estimated right atrial pressure is 8 mm Hg.\par Estimated right ventricular systolic pressure equals 35 mm\par Hg, assuming right atrial pressure equals 8 mm Hg,\par consistent with borderline pulmonary hypertension.\par ------------------------------------------------------------------------\par Conclusions:\par 1. Mild concentric left ventricular hypertrophy.\par 2. Mild inferior wall hypokinesis.\par \par \par

## 2020-11-13 NOTE — DISCUSSION/SUMMARY
[FreeTextEntry1] : Mr. Crocker is a 49 yo Faro[place] man with a PMHx of poorly controlled DM2, NSTEMI 11/16 s/p LHC w/ KHARI to 60% pCX and 100% OM2, s/p 2V CABG on 8/18 here for follow up.\par \par #CAD: S/p NSTEMI, now s/p 2V CABG\par -Continue asa 81 daily, plavix 75 mg daily\par -Continue atorvastatin 80 daily\par -Continue Toprol XL 50 daily\par -Sternal wound appears well healed. Tylenol PRN for pain.\par -Referral made for cardiac rehab\par \par #HTN: Well controlled today\par -Continue Toprol XL 50 daily\par -Continue Lisinopril 5 mg daily\par \par #HLD: LDL at goal\par -Continue Lipitor 80\par \par RTC in 3 months or sooner PRN\par

## 2020-11-16 DIAGNOSIS — I10 ESSENTIAL (PRIMARY) HYPERTENSION: ICD-10-CM

## 2020-11-16 DIAGNOSIS — E78.5 HYPERLIPIDEMIA, UNSPECIFIED: ICD-10-CM

## 2020-11-30 ENCOUNTER — RX RENEWAL (OUTPATIENT)
Age: 50
End: 2020-11-30

## 2020-12-22 ENCOUNTER — APPOINTMENT (OUTPATIENT)
Dept: CARDIOLOGY | Facility: HOSPITAL | Age: 50
End: 2020-12-22

## 2020-12-22 ENCOUNTER — OUTPATIENT (OUTPATIENT)
Dept: OUTPATIENT SERVICES | Facility: HOSPITAL | Age: 50
LOS: 1 days | End: 2020-12-22
Payer: SELF-PAY

## 2020-12-22 VITALS
HEIGHT: 65 IN | BODY MASS INDEX: 21.99 KG/M2 | HEART RATE: 65 BPM | OXYGEN SATURATION: 97 % | DIASTOLIC BLOOD PRESSURE: 67 MMHG | SYSTOLIC BLOOD PRESSURE: 101 MMHG | WEIGHT: 132 LBS

## 2020-12-22 DIAGNOSIS — Z83.3 FAMILY HISTORY OF DIABETES MELLITUS: ICD-10-CM

## 2020-12-22 DIAGNOSIS — Z82.3 FAMILY HISTORY OF STROKE: ICD-10-CM

## 2020-12-22 DIAGNOSIS — I25.10 ATHEROSCLEROTIC HEART DISEASE OF NATIVE CORONARY ARTERY WITHOUT ANGINA PECTORIS: ICD-10-CM

## 2020-12-22 DIAGNOSIS — Z95.5 PRESENCE OF CORONARY ANGIOPLASTY IMPLANT AND GRAFT: Chronic | ICD-10-CM

## 2020-12-22 PROCEDURE — G0463: CPT

## 2020-12-22 PROCEDURE — 93005 ELECTROCARDIOGRAM TRACING: CPT

## 2020-12-22 RX ORDER — METOPROLOL TARTRATE 50 MG/1
50 TABLET, FILM COATED ORAL EVERY 8 HOURS
Qty: 270 | Refills: 1 | Status: DISCONTINUED | COMMUNITY
Start: 1900-01-01 | End: 2020-12-22

## 2020-12-23 NOTE — DISCUSSION/SUMMARY
[FreeTextEntry1] : Mr. Crocker is a 49 yo [place] man with a PMHx of poorly controlled DM2, NSTEMI 11/16 s/p LHC w/ KHARI to 60% pCX and 100% OM2, s/p 2V CABG on 8/18 here for follow up.\par \par #CAD: S/p NSTEMI, now s/p 2V CABG (LIMA-LAD, SVG-LCx)\par -Continue asa 81 daily\par -Continue atorvastatin 80 daily\par -Continue Toprol XL 50 daily\par -D/c plavix (>1 year since last stent, vessels bypassed)\par -Pt to start cardiac rehab 1/6/21\par \par #HTN: Well controlled today\par -Continue Toprol XL 50 daily\par -Continue Lisinopril 5 mg daily\par \par #HLD: LDL at goal\par -Continue Lipitor 80\par \par RTC in 3 months or sooner PRN\par .

## 2020-12-23 NOTE — HISTORY OF PRESENT ILLNESS
[FreeTextEntry1] : Mr. Crocker is a 51 yo Pashto man with a PMHx of poorly controlled DM2 (A1c 10.8 11/2019), HTN, HLD, NSTEMI 11/16/19 s/p LHC w/ KHARI to 60% pCX and 100% OM2, now s/p 2V CABG w/ Dr. Bright on 8/18 here for follow up. He feels well today. Sternotomy pain much improved. Denies chest pain, TORO,  orthopnea, PND, LE edema. He has been trying to walk more since his surgery and has maintained a healthy diet. He is complaint with all of his medications.\par \par Pertinent labs:\par A1c: 10.8\par Cholesterol: 159, TG 88, HDL 51, LDL 90\par \par LHC 11/17/19:\par VENTRICLES: EF estimated was 55 %.\par CORONARY VESSELS: The coronary circulation is right dominant.\par LM:   --  LM: The vessel was medium sized. Angiography showed mild\par atherosclerosis with no flow limiting lesions.\par LAD:   --  Proximal LAD: The vessel was medium sized. Angiography showed\par mild atherosclerosis with no flow limiting lesions.\par CX:   --  Proximal circumflex: There was a 60 % stenosis.\par --  OM1: The vessel was small sized. Angiography showed mild\par atherosclerosis with no flow limiting lesions.\par --  OM2: There was a 100 % stenosis.\par RCA:   --  Proximal RCA: The vessel was large sized. Angiography showed\par mild atherosclerosis with no flow limiting lesions.\par --  RPDA: The vessel was medium sized. Angiography showed mild\par atherosclerosis with no flow limiting lesions.\par COMPLICATIONS: There were no complications.\par DIAGNOSTIC RECOMMENDATIONS: Coronary Angiography Summary:\par -single vessel coronary disease.\par -OM2 100% (culprit lesions).\par -proximal circumflex (60%).\par Coronary Interventional Summary:\par -successful KHARI to the OM2.\par -successful HKARI to the proximal circumflex\par \par TTE 11/17/19:\par Dimensions:    Normal Values:\par LA:     3.2    2.0 - 4.0 cm\par Ao:     3.1    2.0 - 3.8 cm\par SEPTUM: 1.1    0.6 - 1.2 cm\par PWT:    1.1    0.6 - 1.1 cm\par LVIDd:  4.6    3.0 - 5.6 cm\par LVIDs:  3.3    1.8 - 4.0 cm\par Derived variables:\par LVMI: 112 g/m2\par RWT: 0.47\par Fractional short: 28 %\par EF (Teicholtz): 55 %\par Doppler Peak Velocity (m/sec): MV=0.8 AoV=1.3\par ------------------------------------------------------------------------\par Observations:\par Mitral Valve: Normal mitral valve. Mild mitral\par regurgitation.  Peak mitral valve gradient equals 3 mm Hg,\par mean transmitral valve gradient equals 1 mm Hg.\par Aortic Valve/Aorta: Normal trileaflet aortic valve. Peak\par transaortic valve gradient equals 7 mm Hg. Peak left\par ventricular outflow tract gradient equals 4 mm Hg, mean\par gradient is equal to 2 mm Hg, LVOT velocity time integral\par equals 17 cm.\par Aortic Root: 3.1 cm.\par Left Atrium: LA volume index = 11 cc/m2.\par Left Ventricle: Mild inferior wall hypokinesis. Mild\par concentric left ventricular hypertrophy.\par Right Heart: Normal right atrium. Normal right ventricular\par size and function. Normal tricuspid valve. Mild tricuspid\par regurgitation. Normal pulmonic valve. Minimal pulmonic\par regurgitation.\par Pericardium/Pleura: Normal pericardium with no pericardial\par effusion.\par Hemodynamic: Estimated right atrial pressure is 8 mm Hg.\par Estimated right ventricular systolic pressure equals 35 mm\par Hg, assuming right atrial pressure equals 8 mm Hg,\par consistent with borderline pulmonary hypertension.\par ------------------------------------------------------------------------\par Conclusions:\par 1. Mild concentric left ventricular hypertrophy.\par 2. Mild inferior wall hypokinesis.\par \par \par

## 2020-12-23 NOTE — HISTORY OF PRESENT ILLNESS
[FreeTextEntry1] : Mr. Crocker is a 49 yo [place] man with a PMHx of poorly controlled DM2 (A1c 10.8 11/2019), HTN, HLD, NSTEMI 11/16/19 s/p LHC w/ KHARI to 60% pCX and 100% OM2, now s/p 2V CABG w/ Dr. Bright on 8/18 here for follow up. He feels well today. Sternotomy pain much improved. Denies chest pain, TORO,  orthopnea, PND, LE edema. He has been trying to walk more since his surgery and has maintained a healthy diet. He is complaint with all of his medications.\par \par Pertinent labs:\par A1c: 10.8\par Cholesterol: 159, TG 88, HDL 51, LDL 90\par \par LHC 11/17/19:\par VENTRICLES: EF estimated was 55 %.\par CORONARY VESSELS: The coronary circulation is right dominant.\par LM:   --  LM: The vessel was medium sized. Angiography showed mild\par atherosclerosis with no flow limiting lesions.\par LAD:   --  Proximal LAD: The vessel was medium sized. Angiography showed\par mild atherosclerosis with no flow limiting lesions.\par CX:   --  Proximal circumflex: There was a 60 % stenosis.\par --  OM1: The vessel was small sized. Angiography showed mild\par atherosclerosis with no flow limiting lesions.\par --  OM2: There was a 100 % stenosis.\par RCA:   --  Proximal RCA: The vessel was large sized. Angiography showed\par mild atherosclerosis with no flow limiting lesions.\par --  RPDA: The vessel was medium sized. Angiography showed mild\par atherosclerosis with no flow limiting lesions.\par COMPLICATIONS: There were no complications.\par DIAGNOSTIC RECOMMENDATIONS: Coronary Angiography Summary:\par -single vessel coronary disease.\par -OM2 100% (culprit lesions).\par -proximal circumflex (60%).\par Coronary Interventional Summary:\par -successful KHARI to the OM2.\par -successful KHARI to the proximal circumflex\par \par TTE 11/17/19:\par Dimensions:    Normal Values:\par LA:     3.2    2.0 - 4.0 cm\par Ao:     3.1    2.0 - 3.8 cm\par SEPTUM: 1.1    0.6 - 1.2 cm\par PWT:    1.1    0.6 - 1.1 cm\par LVIDd:  4.6    3.0 - 5.6 cm\par LVIDs:  3.3    1.8 - 4.0 cm\par Derived variables:\par LVMI: 112 g/m2\par RWT: 0.47\par Fractional short: 28 %\par EF (Teicholtz): 55 %\par Doppler Peak Velocity (m/sec): MV=0.8 AoV=1.3\par ------------------------------------------------------------------------\par Observations:\par Mitral Valve: Normal mitral valve. Mild mitral\par regurgitation.  Peak mitral valve gradient equals 3 mm Hg,\par mean transmitral valve gradient equals 1 mm Hg.\par Aortic Valve/Aorta: Normal trileaflet aortic valve. Peak\par transaortic valve gradient equals 7 mm Hg. Peak left\par ventricular outflow tract gradient equals 4 mm Hg, mean\par gradient is equal to 2 mm Hg, LVOT velocity time integral\par equals 17 cm.\par Aortic Root: 3.1 cm.\par Left Atrium: LA volume index = 11 cc/m2.\par Left Ventricle: Mild inferior wall hypokinesis. Mild\par concentric left ventricular hypertrophy.\par Right Heart: Normal right atrium. Normal right ventricular\par size and function. Normal tricuspid valve. Mild tricuspid\par regurgitation. Normal pulmonic valve. Minimal pulmonic\par regurgitation.\par Pericardium/Pleura: Normal pericardium with no pericardial\par effusion.\par Hemodynamic: Estimated right atrial pressure is 8 mm Hg.\par Estimated right ventricular systolic pressure equals 35 mm\par Hg, assuming right atrial pressure equals 8 mm Hg,\par consistent with borderline pulmonary hypertension.\par ------------------------------------------------------------------------\par Conclusions:\par 1. Mild concentric left ventricular hypertrophy.\par 2. Mild inferior wall hypokinesis.\par \par \par

## 2020-12-31 DIAGNOSIS — I10 ESSENTIAL (PRIMARY) HYPERTENSION: ICD-10-CM

## 2020-12-31 DIAGNOSIS — E78.5 HYPERLIPIDEMIA, UNSPECIFIED: ICD-10-CM

## 2021-01-15 ENCOUNTER — APPOINTMENT (OUTPATIENT)
Dept: INTERNAL MEDICINE | Facility: CLINIC | Age: 51
End: 2021-01-15

## 2021-01-15 ENCOUNTER — OUTPATIENT (OUTPATIENT)
Dept: OUTPATIENT SERVICES | Facility: HOSPITAL | Age: 51
LOS: 1 days | End: 2021-01-15
Payer: SELF-PAY

## 2021-01-15 DIAGNOSIS — R51.9 HEADACHE, UNSPECIFIED: ICD-10-CM

## 2021-01-15 DIAGNOSIS — Z83.3 FAMILY HISTORY OF DIABETES MELLITUS: ICD-10-CM

## 2021-01-15 DIAGNOSIS — Z95.5 PRESENCE OF CORONARY ANGIOPLASTY IMPLANT AND GRAFT: Chronic | ICD-10-CM

## 2021-01-15 DIAGNOSIS — Z82.49 FAMILY HISTORY OF ISCHEMIC HEART DISEASE AND OTHER DISEASES OF THE CIRCULATORY SYSTEM: ICD-10-CM

## 2021-01-15 DIAGNOSIS — E11.9 TYPE 2 DIABETES MELLITUS WITHOUT COMPLICATIONS: ICD-10-CM

## 2021-01-15 DIAGNOSIS — Z82.3 FAMILY HISTORY OF STROKE: ICD-10-CM

## 2021-01-15 DIAGNOSIS — I10 ESSENTIAL (PRIMARY) HYPERTENSION: ICD-10-CM

## 2021-01-15 PROCEDURE — G0463: CPT

## 2021-01-15 NOTE — ASSESSMENT
[FreeTextEntry1] : 50 y.o M w/ PMH of poorly controlled DM2 (A1c 10.8 11/2019), HTN, HLD, NSTEMI 11/16/19 s/p LHC w/ KHARI to 60% pCX and 100% OM2, now s/p 2V CABG  8/2020 has a telephone visit because he had a headache and fever that started on Monday and resolved the next day.\par \par #Headache\par Associated with fever, resolved in one day. Unclear etiology at this time given patient is asymptomatic. Unlikely meningitis given no current fever, neck pain, neck stiffness, photosensitivity, nausea, vomiting, diarrhea, SOB, abdominal pain, chest pain\par --patient is scheduled for a CPE on 1/28/21 at 3pm\par --monitor off abx\par --Instructed patient to go to the emergency room if he starts to develop fever, chills, neck pain, neck stiffness, photosensitivity, nausea, vomiting or confusion.

## 2021-01-15 NOTE — HISTORY OF PRESENT ILLNESS
[Home] : at home, [unfilled] , at the time of the visit. [Other Location: e.g. Home (Enter Location, City,State)___] : at [unfilled] [Verbal consent obtained from patient] : the patient, [unfilled] [FreeTextEntry1] : 50 y.o M w/ PMH of poorly controlled DM2 (A1c 10.8 11/2019), HTN, HLD, NSTEMI 11/16/19 s/p LHC w/ KHARI to 60% pCX and 100% OM2, now s/p 2V CABG  8/2020 has a telephone visit because he had a headache and fever that started on Monday and resolved the next day [de-identified] : 50 y.o M w/ PMH of poorly controlled DM2 (A1c 10.8 11/2019), HTN, HLD, NSTEMI 11/16/19 s/p LHC w/ KHARI to 60% pCX and 100% OM2, now s/p 2V CABG  8/2020 has a telephone visit because he had a headache and fever that started on Monday and resolved the next day.\par \par At that time, patient denies any chills, neck pain, neck stiffness, photosensitivity, nausea, vomiting, diarrhea, SOB, abdominal pain, chest pain. Now patient is asymptomatic, since Tuesday. Overall patient is asymptomatic now and states that he “feels good”. Patient is scheduled for a CPE on 1/28/21 at 3pm. Instructed patient to go to the emergency room if he starts to develop fever, chills, neck pain, neck stiffness, photosensitivity, nausea, vomiting or confusion.

## 2021-01-26 DIAGNOSIS — R51.9 HEADACHE, UNSPECIFIED: ICD-10-CM

## 2021-01-28 ENCOUNTER — OUTPATIENT (OUTPATIENT)
Dept: OUTPATIENT SERVICES | Facility: HOSPITAL | Age: 51
LOS: 1 days | End: 2021-01-28
Payer: SELF-PAY

## 2021-01-28 ENCOUNTER — LABORATORY RESULT (OUTPATIENT)
Age: 51
End: 2021-01-28

## 2021-01-28 ENCOUNTER — APPOINTMENT (OUTPATIENT)
Dept: INTERNAL MEDICINE | Facility: CLINIC | Age: 51
End: 2021-01-28

## 2021-01-28 VITALS
BODY MASS INDEX: 22.99 KG/M2 | DIASTOLIC BLOOD PRESSURE: 60 MMHG | SYSTOLIC BLOOD PRESSURE: 110 MMHG | WEIGHT: 138 LBS | HEIGHT: 65 IN

## 2021-01-28 DIAGNOSIS — Z83.3 FAMILY HISTORY OF DIABETES MELLITUS: ICD-10-CM

## 2021-01-28 DIAGNOSIS — Z82.49 FAMILY HISTORY OF ISCHEMIC HEART DISEASE AND OTHER DISEASES OF THE CIRCULATORY SYSTEM: ICD-10-CM

## 2021-01-28 DIAGNOSIS — Z95.5 PRESENCE OF CORONARY ANGIOPLASTY IMPLANT AND GRAFT: Chronic | ICD-10-CM

## 2021-01-28 DIAGNOSIS — I10 ESSENTIAL (PRIMARY) HYPERTENSION: ICD-10-CM

## 2021-01-28 DIAGNOSIS — Z82.3 FAMILY HISTORY OF STROKE: ICD-10-CM

## 2021-01-28 LAB
HCT VFR BLD CALC: 41.7 % — SIGNIFICANT CHANGE UP (ref 39–50)
HGB BLD-MCNC: 13.5 G/DL — SIGNIFICANT CHANGE UP (ref 13–17)
MCHC RBC-ENTMCNC: 30.7 PG — SIGNIFICANT CHANGE UP (ref 27–34)
MCHC RBC-ENTMCNC: 32.4 GM/DL — SIGNIFICANT CHANGE UP (ref 32–36)
MCV RBC AUTO: 94.8 FL — SIGNIFICANT CHANGE UP (ref 80–100)
PLATELET # BLD AUTO: 274 K/UL — SIGNIFICANT CHANGE UP (ref 150–400)
RBC # BLD: 4.4 M/UL — SIGNIFICANT CHANGE UP (ref 4.2–5.8)
RBC # FLD: 14 % — SIGNIFICANT CHANGE UP (ref 10.3–14.5)
WBC # BLD: 10.34 K/UL — SIGNIFICANT CHANGE UP (ref 3.8–10.5)
WBC # FLD AUTO: 10.34 K/UL — SIGNIFICANT CHANGE UP (ref 3.8–10.5)

## 2021-01-28 PROCEDURE — G0463: CPT

## 2021-01-28 PROCEDURE — 83036 HEMOGLOBIN GLYCOSYLATED A1C: CPT

## 2021-01-28 PROCEDURE — 82043 UR ALBUMIN QUANTITATIVE: CPT

## 2021-01-28 PROCEDURE — 80061 LIPID PANEL: CPT

## 2021-01-28 PROCEDURE — 80053 COMPREHEN METABOLIC PANEL: CPT

## 2021-01-28 PROCEDURE — 85027 COMPLETE CBC AUTOMATED: CPT

## 2021-01-29 LAB
A1C WITH ESTIMATED AVERAGE GLUCOSE RESULT: 8.1 % — HIGH (ref 4–5.6)
ALBUMIN SERPL ELPH-MCNC: 4.7 G/DL — SIGNIFICANT CHANGE UP (ref 3.3–5)
ALP SERPL-CCNC: 75 U/L — SIGNIFICANT CHANGE UP (ref 40–120)
ALT FLD-CCNC: 35 U/L — SIGNIFICANT CHANGE UP (ref 10–45)
ANION GAP SERPL CALC-SCNC: 11 MMOL/L — SIGNIFICANT CHANGE UP (ref 5–17)
AST SERPL-CCNC: 28 U/L — SIGNIFICANT CHANGE UP (ref 10–40)
BILIRUB SERPL-MCNC: 0.4 MG/DL — SIGNIFICANT CHANGE UP (ref 0.2–1.2)
BUN SERPL-MCNC: 15 MG/DL — SIGNIFICANT CHANGE UP (ref 7–23)
CALCIUM SERPL-MCNC: 10 MG/DL — SIGNIFICANT CHANGE UP (ref 8.4–10.5)
CHLORIDE SERPL-SCNC: 105 MMOL/L — SIGNIFICANT CHANGE UP (ref 96–108)
CHOLEST SERPL-MCNC: 86 MG/DL — SIGNIFICANT CHANGE UP
CO2 SERPL-SCNC: 24 MMOL/L — SIGNIFICANT CHANGE UP (ref 22–31)
CREAT ?TM UR-MCNC: 107 MG/DL — SIGNIFICANT CHANGE UP
CREAT SERPL-MCNC: 0.93 MG/DL — SIGNIFICANT CHANGE UP (ref 0.5–1.3)
ESTIMATED AVERAGE GLUCOSE: 186 MG/DL — HIGH (ref 68–114)
GLUCOSE SERPL-MCNC: 123 MG/DL — HIGH (ref 70–99)
HDLC SERPL-MCNC: 35 MG/DL — LOW
LIPID PNL WITH DIRECT LDL SERPL: 39 MG/DL — SIGNIFICANT CHANGE UP
MICROALBUMIN UR-MCNC: 2.6 MG/DL — SIGNIFICANT CHANGE UP
MICROALBUMIN/CREAT UR-RTO: 24 MG/G — SIGNIFICANT CHANGE UP (ref 0–30)
NON HDL CHOLESTEROL: 51 MG/DL — SIGNIFICANT CHANGE UP
POTASSIUM SERPL-MCNC: 4.7 MMOL/L — SIGNIFICANT CHANGE UP (ref 3.5–5.3)
POTASSIUM SERPL-SCNC: 4.7 MMOL/L — SIGNIFICANT CHANGE UP (ref 3.5–5.3)
PROT SERPL-MCNC: 7.5 G/DL — SIGNIFICANT CHANGE UP (ref 6–8.3)
SODIUM SERPL-SCNC: 140 MMOL/L — SIGNIFICANT CHANGE UP (ref 135–145)
TRIGL SERPL-MCNC: 59 MG/DL — SIGNIFICANT CHANGE UP

## 2021-01-30 RX ORDER — CLOPIDOGREL BISULFATE 75 MG/1
75 TABLET, FILM COATED ORAL DAILY
Qty: 90 | Refills: 1 | Status: COMPLETED | COMMUNITY
Start: 2019-12-23 | End: 2021-01-30

## 2021-02-01 DIAGNOSIS — Z83.3 FAMILY HISTORY OF DIABETES MELLITUS: ICD-10-CM

## 2021-02-01 DIAGNOSIS — Z82.3 FAMILY HISTORY OF STROKE: ICD-10-CM

## 2021-02-01 DIAGNOSIS — Z82.49 FAMILY HISTORY OF ISCHEMIC HEART DISEASE AND OTHER DISEASES OF THE CIRCULATORY SYSTEM: ICD-10-CM

## 2021-02-01 DIAGNOSIS — I25.751: ICD-10-CM

## 2021-02-01 DIAGNOSIS — E11.9 TYPE 2 DIABETES MELLITUS WITHOUT COMPLICATIONS: ICD-10-CM

## 2021-02-05 ENCOUNTER — APPOINTMENT (OUTPATIENT)
Dept: INTERNAL MEDICINE | Facility: CLINIC | Age: 51
End: 2021-02-05

## 2021-02-05 ENCOUNTER — OUTPATIENT (OUTPATIENT)
Dept: OUTPATIENT SERVICES | Facility: HOSPITAL | Age: 51
LOS: 1 days | End: 2021-02-05
Payer: SELF-PAY

## 2021-02-05 DIAGNOSIS — Z95.5 PRESENCE OF CORONARY ANGIOPLASTY IMPLANT AND GRAFT: Chronic | ICD-10-CM

## 2021-02-05 PROCEDURE — 97802 MEDICAL NUTRITION INDIV IN: CPT

## 2021-02-08 DIAGNOSIS — Z82.3 FAMILY HISTORY OF STROKE: ICD-10-CM

## 2021-02-08 DIAGNOSIS — E11.9 TYPE 2 DIABETES MELLITUS WITHOUT COMPLICATIONS: ICD-10-CM

## 2021-02-08 DIAGNOSIS — Z82.49 FAMILY HISTORY OF ISCHEMIC HEART DISEASE AND OTHER DISEASES OF THE CIRCULATORY SYSTEM: ICD-10-CM

## 2021-02-08 DIAGNOSIS — I10 ESSENTIAL (PRIMARY) HYPERTENSION: ICD-10-CM

## 2021-02-08 DIAGNOSIS — Z83.3 FAMILY HISTORY OF DIABETES MELLITUS: ICD-10-CM

## 2021-02-09 ENCOUNTER — APPOINTMENT (OUTPATIENT)
Dept: INTERNAL MEDICINE | Facility: CLINIC | Age: 51
End: 2021-02-09

## 2021-02-22 ENCOUNTER — NON-APPOINTMENT (OUTPATIENT)
Age: 51
End: 2021-02-22

## 2021-03-02 ENCOUNTER — NON-APPOINTMENT (OUTPATIENT)
Age: 51
End: 2021-03-02

## 2021-03-03 ENCOUNTER — OUTPATIENT (OUTPATIENT)
Dept: OUTPATIENT SERVICES | Facility: HOSPITAL | Age: 51
LOS: 1 days | End: 2021-03-03
Payer: SELF-PAY

## 2021-03-03 ENCOUNTER — APPOINTMENT (OUTPATIENT)
Dept: INTERNAL MEDICINE | Facility: CLINIC | Age: 51
End: 2021-03-03

## 2021-03-03 VITALS
HEART RATE: 66 BPM | WEIGHT: 138.04 LBS | SYSTOLIC BLOOD PRESSURE: 130 MMHG | OXYGEN SATURATION: 98 % | DIASTOLIC BLOOD PRESSURE: 68 MMHG | HEIGHT: 65 IN | BODY MASS INDEX: 23 KG/M2

## 2021-03-03 DIAGNOSIS — I10 ESSENTIAL (PRIMARY) HYPERTENSION: ICD-10-CM

## 2021-03-03 DIAGNOSIS — I25.10 ATHEROSCLEROTIC HEART DISEASE OF NATIVE CORONARY ARTERY WITHOUT ANGINA PECTORIS: ICD-10-CM

## 2021-03-03 DIAGNOSIS — E78.5 HYPERLIPIDEMIA, UNSPECIFIED: ICD-10-CM

## 2021-03-03 DIAGNOSIS — E11.9 TYPE 2 DIABETES MELLITUS WITHOUT COMPLICATIONS: ICD-10-CM

## 2021-03-03 DIAGNOSIS — Z95.5 PRESENCE OF CORONARY ANGIOPLASTY IMPLANT AND GRAFT: Chronic | ICD-10-CM

## 2021-03-03 DIAGNOSIS — Z00.00 ENCOUNTER FOR GENERAL ADULT MEDICAL EXAMINATION WITHOUT ABNORMAL FINDINGS: ICD-10-CM

## 2021-03-03 PROCEDURE — G0463: CPT

## 2021-03-03 NOTE — ASSESSMENT
[FreeTextEntry1] : 50 y.o M w/ PMH of poorly controlled DM2 (A1c 10.8 11/2019), HTN, HLD, NSTEMI 11/16/19 s/p LHC w/ KHARI to 60% pCX and 100% OM2, now s/p 2V CABG 8/2020 presents for CPE. \par \par #Chest Pain\par -Reproducible chest pain likely 2/2 to recent sternotomy from CABG\par -Unlikely active ACS\par -C/w Acetaminophen PRN\par -Cardiac rehab referral placed by [facility]\par \par #T2DM\par -A1C 8.1 today\par -F/u Urine Microalbumin/Cr\par -C/w Humulin 70/30, 9 U in AM and increased to 14 U in PM\par -C/w Metformin 850 mg TID\par -C/w Lisinopril\par -C/w High intensity Statin\par -Encouraged measuring FSG 3x day before meals and recording in booklet, told to bring to f/u appt \par -Encouraged to decreased carb intake\par -Dietician Referral placed\par -Optho referral for Diabetic Eye Exam\par -Diabetic Foot exam nl today, will f/u Podiatry referral at next visit\par \par #CAD\par -C/w ASA 81, no longer taking Plavix\par -C/w Atorvastatin 80 mg\par -C/w Lisinopril 5 mg\par -C/w Lopressor 50 mg TID\par -Closely followed by Cards, [facility]\par \par #HTN\par -At goal\par -Plan as above\par \par #HLD\par -Lipid profile drawn\par -Plan as above\par \par #HCM\par -Flu shot given\par -Gastro and FIT test ordered, patient wants time to think about which test to get, f/u at next appt\par \par RTC 5 weeks

## 2021-03-03 NOTE — HEALTH RISK ASSESSMENT
[Good] : ~his/her~ current health as good [Fair] :  ~his/her~ mood as fair [Intercurrent hospitalizations] : was admitted to the hospital  [No] : No [No falls in past year] : Patient reported no falls in the past year [0] : 1) Little interest or pleasure doing things: Not at all (0) [1] : 2) Feeling down, depressed, or hopeless for several days (1) [Alone] : lives alone [Employed] : employed [Single] : single [Sexually Active] : sexually active [Feels Safe at Home] : Feels safe at home [Fully functional (bathing, dressing, toileting, transferring, walking, feeding)] : Fully functional (bathing, dressing, toileting, transferring, walking, feeding) [Fully functional (using the telephone, shopping, preparing meals, housekeeping, doing laundry, using] : Fully functional and needs no help or supervision to perform IADLs (using the telephone, shopping, preparing meals, housekeeping, doing laundry, using transportation, managing medications and managing finances) [] : No [de-identified] : Cardiology, CT Surgery [Audit-CScore] : 0 [de-identified] : Walks 20-30 minutes, 3x/week [de-identified] : Carb heavy with toast for breakfast, rice at lunch and dinner [[id number]] : 1 [High Risk Behavior] : no high risk behavior [FreeTextEntry2] :

## 2021-03-03 NOTE — HISTORY OF PRESENT ILLNESS
[FreeTextEntry1] : CPE [de-identified] : 50 y.o M w/ PMH of poorly controlled DM2 (A1c 10.8 11/2019), HTN, HLD, NSTEMI 11/16/19 s/p LHC w/ KHARI to 60% pCX and 100% OM2, now s/p 2V CABG 8/2020 presents for CPE. \par \par #Chest pain\par -Endorses intermittent chest pain following CABG. \par -Described as sharp, achy, substernal and rib cage below left clavicle, without radiation to jaw or left arm\par -Pain occurs at rest, intermittent, lasting for few minutes, can self resolve\par -Associated with weakness and fatigue post CABG\par -Denies N/V, SOB, palpitations, not associated with food\par -Improves with 1-2 tablets Extra Strength Tylenol\par \par #Uncontrolled T2DM\par -Does not regularly check blood glucose, has only checked BG once last week\par -Diet heavy in carbs: toast for breakfast, cereal/oatmeal, oranges, rice and chicken for lunch/dinner\par -Denies hypoglycemia events\par -Due for optho appt\par -No recent foot exam\par \par #HCM\par -Due for flu shot\par -Due for colonoscopy/FIT test

## 2021-03-03 NOTE — REVIEW OF SYSTEMS
[Fatigue] : fatigue [Chest Pain] : chest pain [Negative] : Integumentary [Fever] : no fever [Chills] : no chills [Hot Flashes] : no hot flashes [Night Sweats] : no night sweats [Recent Change In Weight] : ~T no recent weight change [Palpitations] : no palpitations [Claudication] : no  leg claudication [Lower Ext Edema] : no lower extremity edema [Orthopena] : no orthopnea [Paroxysmal Nocturnal Dyspnea] : no paroxysmal nocturnal dyspnea [Shortness Of Breath] : no shortness of breath [Wheezing] : no wheezing [Cough] : no cough [Dyspnea on Exertion] : not dyspnea on exertion [Abdominal Pain] : no abdominal pain [Nausea] : no nausea [Constipation] : no constipation [Diarrhea] : no diarrhea [Vomiting] : no vomiting [Heartburn] : no heartburn [Melena] : no melena [Dysuria] : no dysuria [Nocturia] : no nocturia [Hematuria] : no hematuria [Joint Pain] : no joint pain [Joint Stiffness] : no joint stiffness [Back Pain] : no back pain [Suicidal] : not suicidal [Insomnia] : no insomnia [Anxiety] : no anxiety [Depression] : no depression [de-identified] : endorses feelings of loneliness

## 2021-03-03 NOTE — END OF VISIT
[] : Resident [Time Spent: ___ minutes] : I have spent [unfilled] minutes of time on the encounter. [FreeTextEntry3] : Patient appears comfortable and in NAD on today's visit. Has no current CP in the office. There are opportunities for medication simplification, although needs to be coordinated with cardiology. For example, would consider changing metoprolol dose to a toprol XL daily dose to decrease pill burden. Can consider modification of metformin dosing. He reports he is adherent to all medications, although A1c remains elevated. He is not checking his F/S regularly, which makes insulin dosing titration more difficult. Counseled extensively on need to check F/S at least TID, preferably QID, to modify humulin dosing. He will make commitment to this and f/u in 5 weeks. For now, will c/w current dosing. \par \par He has followed regularly w cards and CT Sx for his intermittent chest pain following CABG. No evidence of wound dehiscence, sx do not appear to be linked to underlying ischemia w/ persistent extensive w/u. Pain is easily controlled w tylenol, may be element of MSK pain. Will c/w monitoring.\par \par Addressed HCM gaps today. Will have close f/u w/ Dr. Tamez in 5 weeks.

## 2021-03-03 NOTE — PHYSICAL EXAM
[Coordination Grossly Intact] : coordination grossly intact [No Focal Deficits] : no focal deficits [Normal] : affect was normal and insight and judgment were intact [Comprehensive Foot Exam Normal] : Right and left foot were examined and both feet are normal. No ulcers in either foot. Toes are normal and with full ROM.  Normal tactile sensation with monofilament testing throughout both feet [de-identified] : sternotomy scar present, chest pain reproducible on palpation

## 2021-03-09 ENCOUNTER — APPOINTMENT (OUTPATIENT)
Dept: CARDIOLOGY | Facility: HOSPITAL | Age: 51
End: 2021-03-09

## 2021-03-09 ENCOUNTER — OUTPATIENT (OUTPATIENT)
Dept: OUTPATIENT SERVICES | Facility: HOSPITAL | Age: 51
LOS: 1 days | End: 2021-03-09
Payer: SELF-PAY

## 2021-03-09 ENCOUNTER — NON-APPOINTMENT (OUTPATIENT)
Age: 51
End: 2021-03-09

## 2021-03-09 VITALS — DIASTOLIC BLOOD PRESSURE: 69 MMHG | OXYGEN SATURATION: 96 % | SYSTOLIC BLOOD PRESSURE: 103 MMHG | HEART RATE: 57 BPM

## 2021-03-09 DIAGNOSIS — Z95.5 PRESENCE OF CORONARY ANGIOPLASTY IMPLANT AND GRAFT: Chronic | ICD-10-CM

## 2021-03-09 DIAGNOSIS — I25.10 ATHEROSCLEROTIC HEART DISEASE OF NATIVE CORONARY ARTERY WITHOUT ANGINA PECTORIS: ICD-10-CM

## 2021-03-09 NOTE — HISTORY OF PRESENT ILLNESS
[FreeTextEntry1] : Mr. Crocker is a 52 yo [place] man with a PMHx of DM2, HTN, HLD, NSTEMI 11/16/19 s/p LHC w/ KHARI to 60% pCX and 100% OM2, now s/p 2V CABG 8/18 here for follow up. He feels well today. Sternotomy pain much improved. He continues to have intermittent pain at his SVG harvest site which is improving as well. He denies any angina, TORO, orthopnea, PND, LE edema. He continues to walk ~ 10-30 minutes a day without limitations and he maintains a healthy diet. He is complaint with all of his medications. His A1c continues to downtrend (8.1 1/2021) and his cholesterol is well controlled (LDL 39). He was referred to cardiac rehab at last visit, however, he was unable to make an appointment. \par \par \par \par LHC 11/17/19:\par VENTRICLES: EF estimated was 55 %.\par CORONARY VESSELS: The coronary circulation is right dominant.\par LM:   --  LM: The vessel was medium sized. Angiography showed mild\par atherosclerosis with no flow limiting lesions.\par LAD:   --  Proximal LAD: The vessel was medium sized. Angiography showed\par mild atherosclerosis with no flow limiting lesions.\par CX:   --  Proximal circumflex: There was a 60 % stenosis.\par --  OM1: The vessel was small sized. Angiography showed mild\par atherosclerosis with no flow limiting lesions.\par --  OM2: There was a 100 % stenosis.\par RCA:   --  Proximal RCA: The vessel was large sized. Angiography showed\par mild atherosclerosis with no flow limiting lesions.\par --  RPDA: The vessel was medium sized. Angiography showed mild\par atherosclerosis with no flow limiting lesions.\par COMPLICATIONS: There were no complications.\par DIAGNOSTIC RECOMMENDATIONS: Coronary Angiography Summary:\par -single vessel coronary disease.\par -OM2 100% (culprit lesions).\par -proximal circumflex (60%).\par Coronary Interventional Summary:\par -successful KHARI to the OM2.\par -successful KHARI to the proximal circumflex\par \par TTE 11/17/19:\par Dimensions:    Normal Values:\par LA:     3.2    2.0 - 4.0 cm\par Ao:     3.1    2.0 - 3.8 cm\par SEPTUM: 1.1    0.6 - 1.2 cm\par PWT:    1.1    0.6 - 1.1 cm\par LVIDd:  4.6    3.0 - 5.6 cm\par LVIDs:  3.3    1.8 - 4.0 cm\par Derived variables:\par LVMI: 112 g/m2\par RWT: 0.47\par Fractional short: 28 %\par EF (Teicholtz): 55 %\par Doppler Peak Velocity (m/sec): MV=0.8 AoV=1.3\par ------------------------------------------------------------------------\par Observations:\par Mitral Valve: Normal mitral valve. Mild mitral\par regurgitation.  Peak mitral valve gradient equals 3 mm Hg,\par mean transmitral valve gradient equals 1 mm Hg.\par Aortic Valve/Aorta: Normal trileaflet aortic valve. Peak\par transaortic valve gradient equals 7 mm Hg. Peak left\par ventricular outflow tract gradient equals 4 mm Hg, mean\par gradient is equal to 2 mm Hg, LVOT velocity time integral\par equals 17 cm.\par Aortic Root: 3.1 cm.\par Left Atrium: LA volume index = 11 cc/m2.\par Left Ventricle: Mild inferior wall hypokinesis. Mild\par concentric left ventricular hypertrophy.\par Right Heart: Normal right atrium. Normal right ventricular\par size and function. Normal tricuspid valve. Mild tricuspid\par regurgitation. Normal pulmonic valve. Minimal pulmonic\par regurgitation.\par Pericardium/Pleura: Normal pericardium with no pericardial\par effusion.\par Hemodynamic: Estimated right atrial pressure is 8 mm Hg.\par Estimated right ventricular systolic pressure equals 35 mm\par Hg, assuming right atrial pressure equals 8 mm Hg,\par consistent with borderline pulmonary hypertension.\par ------------------------------------------------------------------------\par Conclusions:\par 1. Mild concentric left ventricular hypertrophy.\par 2. Mild inferior wall hypokinesis.\par \par \par

## 2021-03-09 NOTE — DISCUSSION/SUMMARY
[FreeTextEntry1] : Mr. Crocker is a 52 yo Ur[place] man with a PMHx of poorly controlled DM2, NSTEMI 11/16 s/p LHC w/ KHARI to 60% pCX and 100% OM2, s/p 2V CABG on 8/18 here for follow up.\par \par #CAD: S/p NSTEMI 11/2019, now s/p 2V CABG for U/A 8/2020\par -Continue asa 81 daily\par -Continue atorvastatin 80 daily\par -Continue Toprol XL 50 mg daily\par -Continue Lisinopril 5 mg daily\par -Sternal wound appears well healed. Tylenol PRN for pain.\par -Pt encouraged to make appointment for cardiac rehab\par \par #HTN: Well controlled today\par -Continue Toprol XL 50 daily\par -Continue Lisinopril 5 mg daily\par \par #HLD: LDL at goal\par -Continue Lipitor 80\par \par RTC in 3-6 months or sooner PRN\par

## 2021-03-12 DIAGNOSIS — I10 ESSENTIAL (PRIMARY) HYPERTENSION: ICD-10-CM

## 2021-03-12 DIAGNOSIS — E78.5 HYPERLIPIDEMIA, UNSPECIFIED: ICD-10-CM

## 2021-03-15 PROCEDURE — 82274 ASSAY TEST FOR BLOOD FECAL: CPT

## 2021-03-15 PROCEDURE — 93005 ELECTROCARDIOGRAM TRACING: CPT

## 2021-03-15 PROCEDURE — G0463: CPT

## 2021-03-19 LAB — OB PNL STL IA: NEGATIVE — SIGNIFICANT CHANGE UP

## 2021-03-25 NOTE — PHYSICAL EXAM
[Normal] : normal gait, coordination grossly intact, no focal deficits and deep tendon reflexes were 2+ and symmetric [de-identified] : sternotomy scar c/d/i [de-identified] : reproducible TTP of VY chest

## 2021-03-25 NOTE — ASSESSMENT
[FreeTextEntry1] : 50 y.o M w/ PMH of T2DM, HTN, HLD, NSTEMI 11/16/19 s/p LHC w/ KHARI to 60% pCX and 100% OM2, now s/p 2V CABG 8/2020 presents for follow-up after CPE.

## 2021-03-25 NOTE — PLAN
[FreeTextEntry1] : #Chest Pain\par -Reproducible chest pain likely 2/2 to recent sternotomy from CABG\par -Improving with Acetaminophen PRN and change in positioning with sleeping\par -Strongly encouraged cardiac rehab referral\par \par #CAD\par -C/w ASA 81\par -C/w Atorvastatin 80 mg\par -C/w Lisinopril 5 mg\par -C/w Lopressor 50 mg TID\par -Patient encouraged to go to Cardiac Rehab since deconditioned/decreased exercise tolerance since CABG. Referral given. \par -Has scheduled Cards follow-up\par \par #T2DM\par -C/w BG monitoring and documenting in booklet. Instructed to document what meals were eaten whenever +\par -C/w Lifestyle changes (diet, exercise)\par -Decrease Metformin to 1000 mg BID due GI issues (diarrhea, GI upset)\par -C/w Humalin 9U in AM and 14 U in PM\par -C/w Atorvastatin\par -C/w Lisinopril\par -If worsening A1C on next visit, consider Endo referral given cardiac comorbidities for improved glycemic control\par -Has upcoming Optho appt for Diabetic Eye Exam\par \par #HCM\par -After shared decision making, pt in agreement to do FIT test. Referral reprinted\par -Given letter for COVID vaccine with information on how to sign up online/telephone\par -Zoster vaccine deferred until 1 month after COVID vaccine administration\par \par RTC 5 weeks for A1C check

## 2021-03-25 NOTE — REVIEW OF SYSTEMS
[Negative] : Heme/Lymph [Chest Pain] : no chest pain [Palpitations] : no palpitations [Claudication] : no  leg claudication [Lower Ext Edema] : no lower extremity edema [Orthopena] : no orthopnea [Paroxysmal Nocturnal Dyspnea] : no paroxysmal nocturnal dyspnea [Joint Pain] : no joint pain [Joint Stiffness] : no joint stiffness [Muscle Weakness] : no muscle weakness [Back Pain] : no back pain [Joint Swelling] : no joint swelling [FreeTextEntry9] : -C/w Lisinopril\par reproducible chest pain with stiffness

## 2021-03-25 NOTE — HISTORY OF PRESENT ILLNESS
[FreeTextEntry1] : F/u [de-identified] : 50 y.o M w/ PMH of T2DM, HTN, HLD, NSTEMI 11/16/19 s/p LHC w/ KHARI to 60% pCX and 100% OM2, now s/p 2V CABG 8/2020 presents for follow-up after CPE. \par \par #T2DM\par -Notes improvement in recording BG 2x/day, records daily AM sugar before meals and 2nd sugar either before lunch or dinner\par -From pt's recordings, -150. Every 1-2 weeks, has a PM reading of 200 \par -Denies hypoglycemia events.\par -Trying diet modification. Decreasing carb intake. Endorses drinking fruit juices occasionally\par -Trying to increase exercise but states he has decreased motivation. Also feels deconditioned after CABG Sx. Also has occasional pain in R leg at area of graft that makes him less likely to exercise.\par -Endorses blurry vision, worsening in the last year. Denies numbness, paresthesias in extremities. \par \par #Chest pain\par -Still endorses but improving, unlike ACS pain pt experienced in past\par -Reproducible at area of sternotomy and left upper chest\par -Improving with OTC Tylenol and change in position\par -Denies SOB, palpitations, LOC, wound dehiscence/bleeding/discharge

## 2021-04-09 ENCOUNTER — NON-APPOINTMENT (OUTPATIENT)
Age: 51
End: 2021-04-09

## 2021-04-09 ENCOUNTER — APPOINTMENT (OUTPATIENT)
Dept: INTERNAL MEDICINE | Facility: CLINIC | Age: 51
End: 2021-04-09
Payer: MEDICAID

## 2021-04-09 ENCOUNTER — OUTPATIENT (OUTPATIENT)
Dept: OUTPATIENT SERVICES | Facility: HOSPITAL | Age: 51
LOS: 1 days | End: 2021-04-09
Payer: SELF-PAY

## 2021-04-09 VITALS
DIASTOLIC BLOOD PRESSURE: 70 MMHG | OXYGEN SATURATION: 99 % | WEIGHT: 139 LBS | BODY MASS INDEX: 23.16 KG/M2 | HEART RATE: 68 BPM | SYSTOLIC BLOOD PRESSURE: 112 MMHG | HEIGHT: 65 IN

## 2021-04-09 DIAGNOSIS — Z95.5 PRESENCE OF CORONARY ANGIOPLASTY IMPLANT AND GRAFT: Chronic | ICD-10-CM

## 2021-04-09 DIAGNOSIS — I10 ESSENTIAL (PRIMARY) HYPERTENSION: ICD-10-CM

## 2021-04-09 PROCEDURE — G0463: CPT

## 2021-04-09 PROCEDURE — 99214 OFFICE O/P EST MOD 30 MIN: CPT | Mod: GC

## 2021-04-10 ENCOUNTER — APPOINTMENT (OUTPATIENT)
Dept: DISASTER EMERGENCY | Facility: OTHER | Age: 51
End: 2021-04-10
Payer: MEDICAID

## 2021-04-10 PROCEDURE — 0012A: CPT

## 2021-04-11 NOTE — ASSESSMENT
[FreeTextEntry1] : 50 y.o M w/ PMH of T2DM, HTN, HLD, NSTEMI 11/16/19 s/p LHC w/ KHARI to 60% pCX and 100% OM2, now s/p 2V CABG 8/2020 presents for follow-up for A1C check.

## 2021-04-11 NOTE — HISTORY OF PRESENT ILLNESS
[FreeTextEntry1] : A1C check [de-identified] : #T2DM\par -Recording FSG 2x/day, records daily AM sugar before meals and 2nd sugar either before lunch or dinner.\par -Now documents what meals are and times\par -From pt's recordings, -150. Every 1-2 weeks, has a PM reading > 200, correlates with eating high carb snack that day or forgetting to take evening insulin dose\par -Denies hypoglycemia events.\par -Endorses difficulty with diet modification specifically croissants\par - Does not want to exercise. Feels less deconditioned. Has not gone to Cardiac rehab b/c "he feels lazy" \par -Endorses blurry vision, worsening in the last year.\par - Endorses pain and tingling pain in R big toe, new in last month

## 2021-04-11 NOTE — PLAN
[FreeTextEntry1] : #T2DM\par -A1C 8.1, unchanged from previous\par -New booklet given for BG monitoring and documenting in booklet. BG usually 110-150s, occasional mid 200s in evening if snacking on high carb meals or forgot to take evening insulin\par -C/w to reinforce diet, exercise, murali with 10 lb weight gain since Oct 2020\par -C/w Metformin to 1000 mg BID. Diarrhea, GI upset improved. \par -C/w Humalin 9U in AM and 14 U in PM. Reinforced timer to remember taking evening dose. At next visit, if unable to remember evening insulin, consider changing to Lantus. \par -C/w Atorvastatin\par -C/w Lisinopril 5 mg-BP well controlled\par -Has upcoming Optho appt for Diabetic Eye Exam\par -Podiatry referral for new intermittent numbness and pain of R toe\par \par #CAD\par -C/w ASA 81\par -C/w Atorvastatin 80 mg\par -C/w Lisinopril 5 mg\par -C/w Lopressor 50 mg TID\par -Reprinted Cardiac Rehab referral\par -Has scheduled Cards follow-up\par \par #HCM\par -FOBT 2021 neg. \par -Received 1st dose Moderna COVID vaccine, 2nd scheduled tomorrow\par -Zoster vaccine deferred until 1 month after COVID vaccine administration\par \par RTC 3 months for A1C check.

## 2021-04-11 NOTE — PHYSICAL EXAM
[Pedal Pulses Present] : the pedal pulses are present [No Edema] : there was no peripheral edema [Normal] : soft, non-tender, non-distended, no masses palpated, no HSM and normal bowel sounds [No Focal Deficits] : no focal deficits [de-identified] : sternotomy scar, mild TTP (improved from previous exams) [de-identified] : numbness and mild tingling pain in R toe

## 2021-04-12 DIAGNOSIS — E78.5 HYPERLIPIDEMIA, UNSPECIFIED: ICD-10-CM

## 2021-04-12 DIAGNOSIS — E11.9 TYPE 2 DIABETES MELLITUS WITHOUT COMPLICATIONS: ICD-10-CM

## 2021-04-12 DIAGNOSIS — I25.10 ATHEROSCLEROTIC HEART DISEASE OF NATIVE CORONARY ARTERY WITHOUT ANGINA PECTORIS: ICD-10-CM

## 2021-05-05 ENCOUNTER — OUTPATIENT (OUTPATIENT)
Dept: OUTPATIENT SERVICES | Facility: HOSPITAL | Age: 51
LOS: 1 days | End: 2021-05-05
Payer: SELF-PAY

## 2021-05-05 ENCOUNTER — APPOINTMENT (OUTPATIENT)
Dept: PODIATRY | Facility: HOSPITAL | Age: 51
End: 2021-05-05
Payer: COMMERCIAL

## 2021-05-05 VITALS
HEIGHT: 65 IN | BODY MASS INDEX: 22.82 KG/M2 | HEART RATE: 56 BPM | DIASTOLIC BLOOD PRESSURE: 69 MMHG | WEIGHT: 137 LBS | TEMPERATURE: 97.3 F | RESPIRATION RATE: 14 BRPM | SYSTOLIC BLOOD PRESSURE: 103 MMHG

## 2021-05-05 DIAGNOSIS — M79.609 PAIN IN UNSPECIFIED LIMB: ICD-10-CM

## 2021-05-05 DIAGNOSIS — Z95.5 PRESENCE OF CORONARY ANGIOPLASTY IMPLANT AND GRAFT: Chronic | ICD-10-CM

## 2021-05-05 DIAGNOSIS — E11.9 TYPE 2 DIABETES MELLITUS WITHOUT COMPLICATIONS: ICD-10-CM

## 2021-05-05 DIAGNOSIS — L60.0 INGROWING NAIL: ICD-10-CM

## 2021-05-05 PROCEDURE — 99203 OFFICE O/P NEW LOW 30 MIN: CPT | Mod: 25

## 2021-05-05 PROCEDURE — 11730 AVULSION NAIL PLATE SIMPLE 1: CPT

## 2021-05-05 PROCEDURE — G0463: CPT

## 2021-05-05 NOTE — ASSESSMENT
[FreeTextEntry1] : 51M with right hallux ingrown nail - non infected \par -pt signed consent for partial nail avulsion \par -toe block with 5mls 3:2 mix of 1% lido plain to 0.5% marcaine plain \par -performed right hallux partial nail avulsion with sterile instruments. Dressed with bacitracin, gauze and coban. Instructed patient to keep dressing on for 24 hours. Check dressing daily with bacitracin and bandaid. Soak after 24 hours with epsom salts. Demonstrated verbal understanding. Tolerated procedure well without complications. Instructed to control sugarsin order to facilitate healing \par -RTC 1 week

## 2021-05-05 NOTE — PHYSICAL EXAM
[2+] : left foot dorsalis pedis 2+ [No Joint Swelling] : no joint swelling [] : normal gait [Normal Foot/Ankle] : Both lower extremities were exposed and visualized. Standing exam demonstrates normal foot posture and alignment. Hindfoot exam shows no hindfoot valgus or varus [FreeTextEntry1] : ingrown nail - right hallux medial border. no acute signs of infection.

## 2021-05-05 NOTE — HISTORY OF PRESENT ILLNESS
[FreeTextEntry1] : 51M presents to clinic with CC of painful ingrown Right hallux nail. Pt states that he's had this ingrown nail for a while. Has not cut it himself. Also here for diabetic checkup. Denies any numbness burning or tingling in his feet. Has no other pedal complaints.

## 2021-05-12 ENCOUNTER — APPOINTMENT (OUTPATIENT)
Dept: PODIATRY | Facility: HOSPITAL | Age: 51
End: 2021-05-12

## 2021-06-15 ENCOUNTER — OUTPATIENT (OUTPATIENT)
Dept: OUTPATIENT SERVICES | Facility: HOSPITAL | Age: 51
LOS: 1 days | End: 2021-06-15
Payer: SELF-PAY

## 2021-06-15 ENCOUNTER — APPOINTMENT (OUTPATIENT)
Dept: CARDIOLOGY | Facility: HOSPITAL | Age: 51
End: 2021-06-15

## 2021-06-15 VITALS
HEIGHT: 65 IN | OXYGEN SATURATION: 97 % | SYSTOLIC BLOOD PRESSURE: 117 MMHG | DIASTOLIC BLOOD PRESSURE: 76 MMHG | HEART RATE: 76 BPM

## 2021-06-15 DIAGNOSIS — Z95.5 PRESENCE OF CORONARY ANGIOPLASTY IMPLANT AND GRAFT: Chronic | ICD-10-CM

## 2021-06-15 DIAGNOSIS — L91.0 HYPERTROPHIC SCAR: ICD-10-CM

## 2021-06-15 DIAGNOSIS — I25.10 ATHEROSCLEROTIC HEART DISEASE OF NATIVE CORONARY ARTERY WITHOUT ANGINA PECTORIS: ICD-10-CM

## 2021-06-15 PROCEDURE — G0463: CPT

## 2021-06-15 PROCEDURE — 93005 ELECTROCARDIOGRAM TRACING: CPT

## 2021-06-16 ENCOUNTER — NON-APPOINTMENT (OUTPATIENT)
Age: 51
End: 2021-06-16

## 2021-06-17 ENCOUNTER — LABORATORY RESULT (OUTPATIENT)
Age: 51
End: 2021-06-17

## 2021-06-17 NOTE — PHYSICAL EXAM
[Normal S1, S2] : normal S1, S2 [No Murmur] : no murmur [No Rub] : no rub [No Gallop] : no gallop [Normal] : alert and oriented, normal memory [de-identified] : Stenotomy scar w/ keloid at lower border, TTP

## 2021-06-17 NOTE — REVIEW OF SYSTEMS
[Negative] : Heme/Lymph [SOB] : no shortness of breath [Dyspnea on exertion] : not dyspnea during exertion [Chest Discomfort] : no chest discomfort [Lower Ext Edema] : no extremity edema [Leg Claudication] : no intermittent leg claudication [Orthopnea] : no orthopnea [Palpitations] : no palpitations [PND] : no PND [Syncope] : no syncope [FreeTextEntry5] : +Pain at sternotomy site

## 2021-06-17 NOTE — DISCUSSION/SUMMARY
[FreeTextEntry1] : Mr. Crocker is a 50 yo [place] man with a PMHx of poorly controlled DM2, NSTEMI 11/16 s/p LHC w/ KHARI to 60% pCX and 100% OM2, s/p 2V CABG on 8/18 here for follow up.\par \par #Sternotomy site pain:\par -Pt evaluated by CT surgery during this visit-  no concern for infection, dehiscence\par -Pain likely related to hypersensitivity/keloid reaction, prescribed steroid cream to use as needed\par \par #CAD: S/p NSTEMI 11/2019, now s/p 2V CABG for U/A 8/2020\par -Continue asa 81 daily\par -Continue atorvastatin 80 daily\par -Continue Toprol XL 50 mg daily\par -Continue Lisinopril 5 mg daily\par -Pt encouraged to make appointment for cardiac rehab\par \par #HTN: Well controlled today\par -Continue Toprol XL 50 daily\par -Continue Lisinopril 5 mg daily\par \par #HLD: LDL at goal\par -Continue Lipitor 80\par \par RTC in 3-6 months or sooner PRN

## 2021-06-17 NOTE — HISTORY OF PRESENT ILLNESS
[FreeTextEntry1] : Mr. Crocker is a 50 yo [facility] man with a PMHx of DM2, HTN, HLD, NSTEMI 11/16/19 s/p LHC w/ KHARI to 60% pCX and 100% OM2, now s/p 2V CABG 8/18/2020 here for follow up. Today, he complains of pain at his sternotomy site- worse since last visit. TTP at lower border. No overt redness, swelling, drainage, fever. He denies any angina, TORO, orthopnea, PND, LE edema. He continues to walk ~ 10-30 minutes a day without limitations and he maintains a healthy diet. He is complaint with all of his medications. His A1c continues to downtrend (8.1 1/2021) and his cholesterol is well controlled (LDL 39). He was referred to cardiac rehab at last visit, however, he was unable to make an appointment.

## 2021-06-18 ENCOUNTER — NON-APPOINTMENT (OUTPATIENT)
Age: 51
End: 2021-06-18

## 2021-06-18 ENCOUNTER — APPOINTMENT (OUTPATIENT)
Dept: INTERNAL MEDICINE | Facility: CLINIC | Age: 51
End: 2021-06-18
Payer: MEDICAID

## 2021-06-18 ENCOUNTER — OUTPATIENT (OUTPATIENT)
Dept: OUTPATIENT SERVICES | Facility: HOSPITAL | Age: 51
LOS: 1 days | End: 2021-06-18
Payer: SELF-PAY

## 2021-06-18 VITALS
OXYGEN SATURATION: 97 % | BODY MASS INDEX: 23.32 KG/M2 | HEIGHT: 65 IN | HEART RATE: 69 BPM | SYSTOLIC BLOOD PRESSURE: 115 MMHG | WEIGHT: 140 LBS | DIASTOLIC BLOOD PRESSURE: 70 MMHG

## 2021-06-18 DIAGNOSIS — Z95.5 PRESENCE OF CORONARY ANGIOPLASTY IMPLANT AND GRAFT: Chronic | ICD-10-CM

## 2021-06-18 DIAGNOSIS — I10 ESSENTIAL (PRIMARY) HYPERTENSION: ICD-10-CM

## 2021-06-18 DIAGNOSIS — R82.90 UNSPECIFIED ABNORMAL FINDINGS IN URINE: ICD-10-CM

## 2021-06-18 PROCEDURE — 99212 OFFICE O/P EST SF 10 MIN: CPT | Mod: GE

## 2021-06-19 ENCOUNTER — LABORATORY RESULT (OUTPATIENT)
Age: 51
End: 2021-06-19

## 2021-06-19 LAB
BACTERIA # UR AUTO: ABNORMAL
CREAT ?TM UR-MCNC: 72 MG/DL — SIGNIFICANT CHANGE UP
EPI CELLS # UR: 0 /HPF — SIGNIFICANT CHANGE UP (ref 0–5)
HYALINE CASTS # UR AUTO: 0 /LPF — SIGNIFICANT CHANGE UP (ref 0–7)
MICROALBUMIN UR-MCNC: 1.7 MG/DL — SIGNIFICANT CHANGE UP
MICROALBUMIN/CREAT UR-RTO: 23 MG/G — SIGNIFICANT CHANGE UP (ref 0–30)
RBC CASTS # UR COMP ASSIST: 3 /HPF — SIGNIFICANT CHANGE UP (ref 0–4)
WBC UR QL: 66 /HPF — HIGH (ref 0–5)

## 2021-06-19 PROCEDURE — 82043 UR ALBUMIN QUANTITATIVE: CPT

## 2021-06-19 PROCEDURE — 81001 URINALYSIS AUTO W/SCOPE: CPT

## 2021-06-19 PROCEDURE — G0463: CPT

## 2021-06-19 PROCEDURE — 87086 URINE CULTURE/COLONY COUNT: CPT

## 2021-06-20 ENCOUNTER — NON-APPOINTMENT (OUTPATIENT)
Age: 51
End: 2021-06-20

## 2021-06-20 PROBLEM — R82.90 FOUL SMELLING URINE: Status: ACTIVE | Noted: 2021-06-18

## 2021-06-21 DIAGNOSIS — I10 ESSENTIAL (PRIMARY) HYPERTENSION: ICD-10-CM

## 2021-06-21 DIAGNOSIS — E78.5 HYPERLIPIDEMIA, UNSPECIFIED: ICD-10-CM

## 2021-06-21 NOTE — PHYSICAL EXAM
[No Acute Distress] : no acute distress [Well Nourished] : well nourished [Well Developed] : well developed [Well-Appearing] : well-appearing [Normal Sclera/Conjunctiva] : normal sclera/conjunctiva [PERRL] : pupils equal round and reactive to light [EOMI] : extraocular movements intact [Normal Outer Ear/Nose] : the outer ears and nose were normal in appearance [Normal Oropharynx] : the oropharynx was normal [No JVD] : no jugular venous distention [No Lymphadenopathy] : no lymphadenopathy [Supple] : supple [Thyroid Normal, No Nodules] : the thyroid was normal and there were no nodules present [No Respiratory Distress] : no respiratory distress  [No Accessory Muscle Use] : no accessory muscle use [Clear to Auscultation] : lungs were clear to auscultation bilaterally [Normal Rate] : normal rate  [Regular Rhythm] : with a regular rhythm [Normal S1, S2] : normal S1 and S2 [No Murmur] : no murmur heard [No Carotid Bruits] : no carotid bruits [No Abdominal Bruit] : a ~M bruit was not heard ~T in the abdomen [No Varicosities] : no varicosities [Pedal Pulses Present] : the pedal pulses are present [No Edema] : there was no peripheral edema [No Palpable Aorta] : no palpable aorta [No Extremity Clubbing/Cyanosis] : no extremity clubbing/cyanosis [Soft] : abdomen soft [Non Tender] : non-tender [Non-distended] : non-distended [No Masses] : no abdominal mass palpated [No HSM] : no HSM [Normal Bowel Sounds] : normal bowel sounds [Normal Posterior Cervical Nodes] : no posterior cervical lymphadenopathy [Normal Anterior Cervical Nodes] : no anterior cervical lymphadenopathy [No CVA Tenderness] : no CVA  tenderness [No Spinal Tenderness] : no spinal tenderness [No Joint Swelling] : no joint swelling [Grossly Normal Strength/Tone] : grossly normal strength/tone [Coordination Grossly Intact] : coordination grossly intact [No Focal Deficits] : no focal deficits [Normal Gait] : normal gait [Deep Tendon Reflexes (DTR)] : deep tendon reflexes were 2+ and symmetric [Normal Affect] : the affect was normal [Normal Insight/Judgement] : insight and judgment were intact [de-identified] : Increased keloid formation along sternotomy, TTP along scar

## 2021-06-21 NOTE — PLAN
[FreeTextEntry1] : 52 yo man with a PMHx of DM2, HTN, HLD, NSTEMI 11/16/19 s/p LHC w/ KHARI to 60% pCX and 100% OM2, now s/p 2V CABG 8/18/2020 here for follow up. \par \par #Chest Discomfort\par -2/2 to keloid formation\par -C/w Triamcinolone\par -Start Aquafor and Vit E oil as well\par \par #Foul-Smelling Urine\par -Likely simple cystits, no CVA tenderness or constitutional symptoms\par -UA/UCx sent\par \par \par #T2DM\par -Urine MicroAlbumin sent\par -Encourage documentation BG monitoring. Did not bring booklet to visit. Patient states he will look for glucometer or pay for a new one. BG usually 110-150s, occasional mid 200s in evening if snacking on high carb meals or forgot to take evening insulin\par -C/w to reinforce diet, exercise, murali with weight gain \par -C/w Metformin to 1000 mg BID. Diarrhea, GI upset improved. \par -C/w Humalin 9U in AM and 14 U in PM. Reinforced timer to remember taking evening dose. At next visit, if unable to remember evening insulin and FSG elevated, consider changing to Lantus. \par -C/w Atorvastatin\par -C/w Lisinopril 5 mg-BP well controlled\par -Has upcoming Optho appt for Diabetic Eye Exam\par -Seen by podiatry\par \par #CAD\par -C/w ASA 81\par -C/w Atorvastatin 80 mg\par -C/w Lisinopril 5 mg\par -C/w Lopressor 50 mg TID\par -Has scheduled Cards follow-up\par \par #HCM\par -FOBT 2021 neg. \par -Received 2 doses of Moderna COVID vaccine\par -Zoster vaccine sent to pharmacy\par \par RTC 1 month for A1C check.

## 2021-06-21 NOTE — HISTORY OF PRESENT ILLNESS
[FreeTextEntry1] : Follow-up [de-identified] : 52 yo man with a PMHx of DM2, HTN, HLD, NSTEMI 11/16/19 s/p LHC w/ KHARI to 60% pCX and 100% OM2, now s/p 2V CABG 8/18/2020 here for follow up. \par \par #Chest Discomfort\par -Endorses continued discomfort along sternotomy scar\par -Increased Keloid formation\par -Prescribed low dose steroid cream by Cards\par -Denies pleuritic chest pain, no overt redness, swelling, drainage, bleeding, fever. \par \par #T2DM\par -Recording FSG 2x/day, records daily AM sugar before meals and 2nd sugar either before lunch or dinner\par -Has misplaced glucometer for 2 weeks so has not been checking FSG\par -From pt's recordings, -150. Every 1-2 weeks, has a PM reading > 200, correlates with eating high carb snack that day or forgetting to take evening insulin dose\par -Denies hypoglycemia events.\par -Endorses difficulty with diet modification but making more effort to avoid high carb foods\par -Endorses blurry vision, worsening\par -Endorses pain and tingling pain in R big toe. Denies polyuria, polyphagia. \par \par #Foul-smelling urine and dysuria\par -Endorses foul smelling urine and pain with urination for two days\par -Denies suprapubic tenderness, urinary frequency\par -No hx of UTIs in past\par -Not sexually active, no hx of STD infections

## 2021-06-22 LAB
CULTURE RESULTS: SIGNIFICANT CHANGE UP
SPECIMEN SOURCE: SIGNIFICANT CHANGE UP

## 2021-07-01 DIAGNOSIS — E11.9 TYPE 2 DIABETES MELLITUS WITHOUT COMPLICATIONS: ICD-10-CM

## 2021-07-01 DIAGNOSIS — R82.90 UNSPECIFIED ABNORMAL FINDINGS IN URINE: ICD-10-CM

## 2021-07-02 ENCOUNTER — APPOINTMENT (OUTPATIENT)
Dept: OPHTHALMOLOGY | Facility: CLINIC | Age: 51
End: 2021-07-02

## 2021-07-14 ENCOUNTER — OUTPATIENT (OUTPATIENT)
Dept: OUTPATIENT SERVICES | Facility: HOSPITAL | Age: 51
LOS: 1 days | End: 2021-07-14
Payer: SELF-PAY

## 2021-07-14 ENCOUNTER — APPOINTMENT (OUTPATIENT)
Dept: CARDIOLOGY | Facility: HOSPITAL | Age: 51
End: 2021-07-14

## 2021-07-14 ENCOUNTER — NON-APPOINTMENT (OUTPATIENT)
Age: 51
End: 2021-07-14

## 2021-07-14 VITALS
BODY MASS INDEX: 23.66 KG/M2 | OXYGEN SATURATION: 97 % | HEART RATE: 67 BPM | SYSTOLIC BLOOD PRESSURE: 107 MMHG | WEIGHT: 142 LBS | HEIGHT: 65 IN | DIASTOLIC BLOOD PRESSURE: 70 MMHG

## 2021-07-14 DIAGNOSIS — I25.10 ATHEROSCLEROTIC HEART DISEASE OF NATIVE CORONARY ARTERY WITHOUT ANGINA PECTORIS: ICD-10-CM

## 2021-07-14 DIAGNOSIS — Z95.5 PRESENCE OF CORONARY ANGIOPLASTY IMPLANT AND GRAFT: Chronic | ICD-10-CM

## 2021-07-14 PROCEDURE — G0463: CPT

## 2021-07-14 PROCEDURE — 93005 ELECTROCARDIOGRAM TRACING: CPT

## 2021-07-21 NOTE — PHYSICAL EXAM
[Well Developed] : well developed [Well Nourished] : well nourished [No Acute Distress] : no acute distress [Normal Conjunctiva] : normal conjunctiva [Normal Venous Pressure] : normal venous pressure [No Carotid Bruit] : no carotid bruit [Normal S1, S2] : normal S1, S2 [No Murmur] : no murmur [No Rub] : no rub [No Gallop] : no gallop [Clear Lung Fields] : clear lung fields [Good Air Entry] : good air entry [No Respiratory Distress] : no respiratory distress  [Soft] : abdomen soft [Non Tender] : non-tender [No Masses/organomegaly] : no masses/organomegaly [Normal Bowel Sounds] : normal bowel sounds [Normal Gait] : normal gait [No Edema] : no edema [No Cyanosis] : no cyanosis [No Clubbing] : no clubbing [No Varicosities] : no varicosities [No Rash] : no rash [No Skin Lesions] : no skin lesions [Moves all extremities] : moves all extremities [No Focal Deficits] : no focal deficits [Normal Speech] : normal speech [Alert and Oriented] : alert and oriented [Normal memory] : normal memory [de-identified] : midline scar, slgihtly tenderness on palpation, no fluctuiance or foul smell.

## 2021-07-21 NOTE — REASON FOR VISIT
[FreeTextEntry1] : Mr. Crocker is a 52 yo [place] man with a PMHx of DM2, HTN, HLD, NSTEMI 11/16/19 s/p LHC w/ KHARI to 60% pCX and 100% OM2, now s/p 2V CABG 8/18/2020 here for follow up. Today complains of occasional site at sternotomy site. Happens intermittently. No exercise pain or shortness of breath. No weight gain. Works at [employer] in [employer]. Occasionally lifts heavy things but does not worsen pain. Has been referred to cardiac rehab but didn't want to do. \par \par No changes in symptoms.

## 2021-07-21 NOTE — DISCUSSION/SUMMARY
[FreeTextEntry1] : Mr. Crocker is a 50 yo [place] man with a PMHx of poorly controlled DM2, NSTEMI 11/16 s/p LHC w/ KHARI to 60% pCX and 100% OM2, s/p 2V CABG on 8/18 here for follow up.\par \par #Sternotomy site pain:\par -Pt evaluated by CT surgery during this visit- no concern for infection, dehiscence\par -Pain likely related to hypersensitivity/keloid reaction, prescribed steroid cream to use as needed\par \par #CAD: S/p NSTEMI 11/2019, now s/p 2V CABG for U/A 8/2020\par -Continue asa 81 daily\par -Continue atorvastatin 80 daily\par -Continue Toprol XL 50 mg daily\par -Continue Lisinopril 5 mg daily\par - Will consider jardiance next visit but deferred given UTI\par -Pt encouraged to make appointment for cardiac rehab\par \par \par #HTN: Well controlled today\par -Continue Toprol XL 50 daily\par -Continue Lisinopril 5 mg daily\par \par #HLD: LDL at goal\par -Continue Lipitor 80\par \par RTC in 3-6 months or sooner PRN.

## 2021-07-22 ENCOUNTER — APPOINTMENT (OUTPATIENT)
Dept: INTERNAL MEDICINE | Facility: CLINIC | Age: 51
End: 2021-07-22

## 2021-07-26 DIAGNOSIS — I25.751: ICD-10-CM

## 2021-07-26 DIAGNOSIS — Z95.1 PRESENCE OF AORTOCORONARY BYPASS GRAFT: ICD-10-CM

## 2021-08-03 ENCOUNTER — NON-APPOINTMENT (OUTPATIENT)
Age: 51
End: 2021-08-03

## 2021-08-10 ENCOUNTER — NON-APPOINTMENT (OUTPATIENT)
Age: 51
End: 2021-08-10

## 2021-08-11 ENCOUNTER — OUTPATIENT (OUTPATIENT)
Dept: OUTPATIENT SERVICES | Facility: HOSPITAL | Age: 51
LOS: 1 days | End: 2021-08-11
Payer: SELF-PAY

## 2021-08-11 ENCOUNTER — APPOINTMENT (OUTPATIENT)
Dept: INTERNAL MEDICINE | Facility: CLINIC | Age: 51
End: 2021-08-11

## 2021-08-11 VITALS
HEART RATE: 71 BPM | HEIGHT: 65 IN | WEIGHT: 141 LBS | SYSTOLIC BLOOD PRESSURE: 120 MMHG | OXYGEN SATURATION: 97 % | DIASTOLIC BLOOD PRESSURE: 80 MMHG | BODY MASS INDEX: 23.49 KG/M2

## 2021-08-11 DIAGNOSIS — I10 ESSENTIAL (PRIMARY) HYPERTENSION: ICD-10-CM

## 2021-08-11 DIAGNOSIS — E11.9 TYPE 2 DIABETES MELLITUS WITHOUT COMPLICATIONS: ICD-10-CM

## 2021-08-11 DIAGNOSIS — Z95.5 PRESENCE OF CORONARY ANGIOPLASTY IMPLANT AND GRAFT: Chronic | ICD-10-CM

## 2021-08-11 DIAGNOSIS — Z95.1 PRESENCE OF AORTOCORONARY BYPASS GRAFT: ICD-10-CM

## 2021-08-11 DIAGNOSIS — I25.751: ICD-10-CM

## 2021-08-11 LAB — HBA1C MFR BLD HPLC: 10

## 2021-08-11 PROCEDURE — G0463: CPT | Mod: 25

## 2021-08-11 PROCEDURE — 83036 HEMOGLOBIN GLYCOSYLATED A1C: CPT

## 2021-08-11 RX ORDER — FAMOTIDINE 20 MG/1
20 TABLET, FILM COATED ORAL
Qty: 60 | Refills: 3 | Status: DISCONTINUED | COMMUNITY
Start: 2019-12-06 | End: 2021-08-11

## 2021-08-26 ENCOUNTER — NON-APPOINTMENT (OUTPATIENT)
Age: 51
End: 2021-08-26

## 2021-08-27 ENCOUNTER — OUTPATIENT (OUTPATIENT)
Dept: OUTPATIENT SERVICES | Facility: HOSPITAL | Age: 51
LOS: 1 days | End: 2021-08-27
Payer: SELF-PAY

## 2021-08-27 ENCOUNTER — APPOINTMENT (OUTPATIENT)
Dept: INTERNAL MEDICINE | Facility: CLINIC | Age: 51
End: 2021-08-27

## 2021-08-27 DIAGNOSIS — I10 ESSENTIAL (PRIMARY) HYPERTENSION: ICD-10-CM

## 2021-08-27 DIAGNOSIS — Z95.5 PRESENCE OF CORONARY ANGIOPLASTY IMPLANT AND GRAFT: Chronic | ICD-10-CM

## 2021-08-27 PROCEDURE — G0463: CPT

## 2021-08-30 NOTE — ASSESSMENT
[FreeTextEntry1] : \par #T2DM\par -Encourage documentation BG monitoring. Did not bring booklet to visit. Patient states he will look for glucometer or pay for a new one.\par -C/w to reinforce diet, exercise\par -C/w Metformin to 1000 mg BID \par -C/w Humalin 9U in AM and 14 U in PM. Reinforced timer to remember taking evening dose. At next visit, if unable to remember evening insulin and FSG elevated, consider changing to Lantus. \par -C/w Atorvastatin\par -C/w Lisinopril 5 mg-BP well controlled\par -Optho referral given appt for Diabetic Eye Exam\par -ideally would add SGLT2 but patient is on sliding scale \par -Seen by podiatry\par \par #CAD\par -C/w ASA 81\par -C/w Atorvastatin 80 mg\par -C/w Lisinopril 5 mg\par -C/w Lopressor 50 mg TID\par -Has scheduled Cards follow-up\par \par #HCM\par -FOBT 2021 neg. \par -Received 2 doses of Moderna COVID vaccine\par -Zoster vaccine next in clinic visit. Out of stock \par \par Follow up telehelath visit 2 weeks to go over BS logs\par

## 2021-08-30 NOTE — HISTORY OF PRESENT ILLNESS
[FreeTextEntry1] : follow up  [de-identified] : 52yo yo man with a PMHx of DM2, HTN, HLD, NSTEMI 11/16/19 s/p LHC w/ KHARI to 60% pCX and 100% OM2, now s/p 2V CABG 8/18/2020 here for follow up. Forgot to bring FS logs. Denies polyuria, polydipsia, abdominal pain, dizzineess. No numbess, tingling in extremities. Endorses blurry vision for years, had an appointment with optho but location closed down. Told he had cataracts in the past.  \par Urinary tract infection resolved after abx in June \par Today fasting sugars 202 but had chips over night\par -077\par Predinner 150-160\par On metformin and Humulin 9U AM and 14U PM \par A1c 8.1 on 4/9\par -POCT a1c\par -C/w Humalin 9U in AM and 14 U in PM. \par Moderna March \par \par Breakfast coffee, crossiant\par Lunch: rice\par Dinner: rice, chicken, fish, lamb \par Drinks energy drink every day. No snack \par Excerise: walk 20 minutes \par POCT 372 today

## 2021-09-03 DIAGNOSIS — E11.9 TYPE 2 DIABETES MELLITUS WITHOUT COMPLICATIONS: ICD-10-CM

## 2021-09-07 ENCOUNTER — OUTPATIENT (OUTPATIENT)
Dept: OUTPATIENT SERVICES | Facility: HOSPITAL | Age: 51
LOS: 1 days | End: 2021-09-07
Payer: SELF-PAY

## 2021-09-07 ENCOUNTER — APPOINTMENT (OUTPATIENT)
Dept: INTERNAL MEDICINE | Facility: CLINIC | Age: 51
End: 2021-09-07

## 2021-09-07 VITALS
WEIGHT: 145 LBS | DIASTOLIC BLOOD PRESSURE: 70 MMHG | BODY MASS INDEX: 24.16 KG/M2 | OXYGEN SATURATION: 98 % | SYSTOLIC BLOOD PRESSURE: 118 MMHG | HEIGHT: 65 IN | HEART RATE: 59 BPM

## 2021-09-07 DIAGNOSIS — Z95.5 PRESENCE OF CORONARY ANGIOPLASTY IMPLANT AND GRAFT: Chronic | ICD-10-CM

## 2021-09-07 DIAGNOSIS — I10 ESSENTIAL (PRIMARY) HYPERTENSION: ICD-10-CM

## 2021-09-07 PROCEDURE — G0463: CPT

## 2021-09-08 NOTE — ASSESSMENT
[FreeTextEntry1] :  PMHx of DM2, HTN, HLD, NSTEMI 11/16/19 s/p LHC w/ KHARI to 60% pCX and 100% OM2, now s/p 2V CABG 8/18/2020 here for follow up.\par \par #DMII\par -sugars better controlled than last visit. \par -counseled to continue dietary modifications and try to increase exercise\par -will check and record a few post-meal glucose measurements to see ranges\par -c/w metformin 1000 BID. increase morning Humalin to 16 U due to elevated pre-dinner readings. C/W 9U Humalin in evening \par -c/w atorvastatin, Lisinopril \par -ophtho referral given for diabetic eye exam \par -saw podiatry recently \par \par #CAD\par -C/w ASA 81\par -C/w Atorvastatin 80 mg\par -C/w Lisinopril 5 mg\par -C/w Lopressor 50 mg TID\par -Has scheduled Cards follow-up in October \par \par #HCM\par -FOBT 2021 neg. \par -Received 2 doses of Moderna COVID vaccine\par -Zoster vaccine next in clinic visit\par

## 2021-09-08 NOTE — HISTORY OF PRESENT ILLNESS
[FreeTextEntry1] : Followup  [de-identified] : Mr Larsen is a 51 year old male,  PMHx of DM2, HTN, HLD, NSTEMI 11/16/19 s/p LHC w/ KHARI to 60% pCX and 100% OM2, now s/p 2V CABG 8/18/2020 here for follow up of his diabetes. His last A1C was 10 in August, increased from 8 in April. He has been checking his blood sugars twice a day, and has his log with him today. His morning sugars range from 90 to 130s, and his pre-dinners range from 130 to 150s. He has been taking 14 units of Humalin in the AM and 9U pre-dinner. He has improved his diet, and eats minimal rice, bread and pastries. Has been focusing on eating vegetables, chicken and fish. 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 Recalls one episode of hypoglycemia in the past month, didn’t check his sugar at the time but felt dizzy. Drank OJ and felt better afterwards.

## 2021-09-09 DIAGNOSIS — E78.5 HYPERLIPIDEMIA, UNSPECIFIED: ICD-10-CM

## 2021-09-09 DIAGNOSIS — I25.10 ATHEROSCLEROTIC HEART DISEASE OF NATIVE CORONARY ARTERY WITHOUT ANGINA PECTORIS: ICD-10-CM

## 2021-09-09 DIAGNOSIS — E11.9 TYPE 2 DIABETES MELLITUS WITHOUT COMPLICATIONS: ICD-10-CM

## 2021-10-13 ENCOUNTER — OUTPATIENT (OUTPATIENT)
Dept: OUTPATIENT SERVICES | Facility: HOSPITAL | Age: 51
LOS: 1 days | End: 2021-10-13
Payer: SELF-PAY

## 2021-10-13 ENCOUNTER — APPOINTMENT (OUTPATIENT)
Dept: CARDIOLOGY | Facility: HOSPITAL | Age: 51
End: 2021-10-13

## 2021-10-13 ENCOUNTER — NON-APPOINTMENT (OUTPATIENT)
Age: 51
End: 2021-10-13

## 2021-10-13 VITALS
WEIGHT: 143 LBS | DIASTOLIC BLOOD PRESSURE: 72 MMHG | HEART RATE: 89 BPM | SYSTOLIC BLOOD PRESSURE: 130 MMHG | HEIGHT: 65 IN | OXYGEN SATURATION: 99 % | BODY MASS INDEX: 23.82 KG/M2

## 2021-10-13 DIAGNOSIS — Z95.5 PRESENCE OF CORONARY ANGIOPLASTY IMPLANT AND GRAFT: Chronic | ICD-10-CM

## 2021-10-13 DIAGNOSIS — I25.10 ATHEROSCLEROTIC HEART DISEASE OF NATIVE CORONARY ARTERY WITHOUT ANGINA PECTORIS: ICD-10-CM

## 2021-10-13 PROCEDURE — G0463: CPT

## 2021-10-13 PROCEDURE — 93005 ELECTROCARDIOGRAM TRACING: CPT

## 2021-10-13 NOTE — DISCUSSION/SUMMARY
[FreeTextEntry1] : Mr. 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 3-6 months or sooner PRN.

## 2021-10-13 NOTE — PHYSICAL EXAM
[Well Developed] : well developed [Well Nourished] : well nourished [No Acute Distress] : no acute distress [Normal Conjunctiva] : normal conjunctiva [Normal Venous Pressure] : normal venous pressure [No Carotid Bruit] : no carotid bruit [Normal S1, S2] : normal S1, S2 [No Murmur] : no murmur [No Rub] : no rub [No Gallop] : no gallop [Clear Lung Fields] : clear lung fields [Good Air Entry] : good air entry [No Respiratory Distress] : no respiratory distress  [Soft] : abdomen soft [Non Tender] : non-tender [No Masses/organomegaly] : no masses/organomegaly [Normal Bowel Sounds] : normal bowel sounds [Normal Gait] : normal gait [No Edema] : no edema [No Cyanosis] : no cyanosis [No Clubbing] : no clubbing [No Varicosities] : no varicosities [No Rash] : no rash [No Skin Lesions] : no skin lesions [Moves all extremities] : moves all extremities [No Focal Deficits] : no focal deficits [Normal Speech] : normal speech [Alert and Oriented] : alert and oriented [Normal memory] : normal memory [de-identified] : chest pain on palpitation over rib spaces and incision.

## 2021-10-13 NOTE — REASON FOR VISIT
[FreeTextEntry1] : Mr. Crocker is a 52 yo [place] man with a PMHx of DM2, HTN, HLD, NSTEMI 11/16/19 s/p LHC w/ KHARI to 60% pCX and 100% OM2, now s/p 2V CABG 8/18/2020 here for follow up. Today complains of occasional site at sternotomy site. Happens intermittently. No exercise pain or shortness of breath. No weight gain. Works at [employer] in [employer] [place]. Occasionally lifts heavy things but does not worsen pain. Has been referred to cardiac rehab but still doesn’t want. \par \par No changes in symptoms.

## 2021-10-15 DIAGNOSIS — E11.9 TYPE 2 DIABETES MELLITUS WITHOUT COMPLICATIONS: ICD-10-CM

## 2021-10-15 DIAGNOSIS — I25.751: ICD-10-CM

## 2021-10-19 ENCOUNTER — APPOINTMENT (OUTPATIENT)
Dept: INTERNAL MEDICINE | Facility: CLINIC | Age: 51
End: 2021-10-19

## 2021-11-03 ENCOUNTER — NON-APPOINTMENT (OUTPATIENT)
Age: 51
End: 2021-11-03

## 2021-11-03 ENCOUNTER — MED ADMIN CHARGE (OUTPATIENT)
Age: 51
End: 2021-11-03

## 2021-11-03 ENCOUNTER — APPOINTMENT (OUTPATIENT)
Dept: INTERNAL MEDICINE | Facility: CLINIC | Age: 51
End: 2021-11-03

## 2021-11-03 ENCOUNTER — OUTPATIENT (OUTPATIENT)
Dept: OUTPATIENT SERVICES | Facility: HOSPITAL | Age: 51
LOS: 1 days | End: 2021-11-03
Payer: SELF-PAY

## 2021-11-03 ENCOUNTER — RESULT CHARGE (OUTPATIENT)
Age: 51
End: 2021-11-03

## 2021-11-03 VITALS
SYSTOLIC BLOOD PRESSURE: 118 MMHG | OXYGEN SATURATION: 97 % | WEIGHT: 143 LBS | HEIGHT: 65 IN | DIASTOLIC BLOOD PRESSURE: 76 MMHG | HEART RATE: 66 BPM | BODY MASS INDEX: 23.82 KG/M2

## 2021-11-03 DIAGNOSIS — Z95.5 PRESENCE OF CORONARY ANGIOPLASTY IMPLANT AND GRAFT: Chronic | ICD-10-CM

## 2021-11-03 DIAGNOSIS — E11.9 TYPE 2 DIABETES MELLITUS W/OUT COMPLICATIONS: ICD-10-CM

## 2021-11-03 DIAGNOSIS — I10 ESSENTIAL (PRIMARY) HYPERTENSION: ICD-10-CM

## 2021-11-03 PROCEDURE — 83036 HEMOGLOBIN GLYCOSYLATED A1C: CPT

## 2021-11-03 PROCEDURE — G0463: CPT | Mod: 25

## 2021-11-04 NOTE — PHYSICAL EXAM
[No Varicosities] : no varicosities [Pedal Pulses Present] : the pedal pulses are present [No Edema] : there was no peripheral edema [Soft] : abdomen soft [Non Tender] : non-tender [Non-distended] : non-distended [No Masses] : no abdominal mass palpated [Normal Bowel Sounds] : normal bowel sounds [Normal Posterior Cervical Nodes] : no posterior cervical lymphadenopathy [Normal Anterior Cervical Nodes] : no anterior cervical lymphadenopathy [No CVA Tenderness] : no CVA  tenderness [No Spinal Tenderness] : no spinal tenderness [Normal] : affect was normal and insight and judgment were intact [Comprehensive Foot Exam Normal] : Right and left foot were examined and both feet are normal. No ulcers in either foot. Toes are normal and with full ROM.  Normal tactile sensation with monofilament testing throughout both feet [Kyphosis] : no kyphosis [Scoliosis] : no scoliosis

## 2021-11-04 NOTE — HISTORY OF PRESENT ILLNESS
[FreeTextEntry1] : A1C check [de-identified] : 51 M PMHx of DM2, HTN, HLD, NSTEMI 11/16/19 s/p LHC w/ KHARI to 60% pCX and 100% OM2, now s/p 2V CABG 8/18/2020 here for follow up. \par \par #T2DM\par -Patient states he has been checking his blood sugars less often in last few weeks, often once a day in the AM fasting due to not wanting to check sugars and feeling tired after work. Has brought log book and glucometer today. \par -Morning sugars range from 170s to 190s, with occasional 220s when he forgot to take his PM dose of insulin. He has been taking 16 units of Humalin in the AM and 9U pre-dinner. Endorses not complying with diet in last few weeks, eating meals heavy in rice and pastries due to eating meals at aunt's home and not cooking his own meals. and eats minimal rice, bread and pastries. Does not exercise.\par -Was prescribed Jardiance by cardiology last month but did not start. Med sent to [facility] and patient did not realize it was there. \par \par

## 2021-11-04 NOTE — PLAN
[FreeTextEntry1] : 51 M PMHx of DM2, HTN, HLD, NSTEMI 11/16/19 s/p LHC w/ KHARI to 60% pCX and 100% OM2, now s/p 2V CABG 8/18/2020 here for follow up.\par \par #CAD s/p stent and CABG\par -C/w ASA 81\par -C/w Atorvastatin 80 mg\par -C/w Lisinopril 5 mg\par -C/w Lopressor 50 mg TID\par -Will start Jardiance for cardiac and T2DM benefit\par \par #T2DM, uncontrolled\par -POCT A1C 9.8, 10 previously\par -From FSG, sugars 170-190s before meals and bedtime mostly, with spikes in 220s after lunch and AM fasting\par -Patient endorsed eating high carb meals from family and has not yet started Jardiance (did not realize it was sent to [facility])\par -C/w Humalin 70/30 16U AM and 9U PM\par -C/w Metformin 1000 BID\par -Told pt to  Jardiance and start\par -If FSGs/repeat A1C remain elevated after Jardiance, can consider starting Victoza via [facility] program\par -Optho referral sent but pt having difficulty making appt. Will task Dr. Nur and/or Sameera to assist\par -Saw podiatry, Diabetic foot exam performed today\par -Dietician referral\par \par #HCM\par -Signed up for [facility] program\par -Flu shot given\par -Moderna COVID vaccine x2, Instructed to take COVID vaccine booster now at 6 month venkata\par -Zoster sent to pharmacy but pharmacy does not carry, will send to [facility] instead\par \par \par RTC 5 weeks

## 2021-11-08 ENCOUNTER — OUTPATIENT (OUTPATIENT)
Dept: OUTPATIENT SERVICES | Facility: HOSPITAL | Age: 51
LOS: 1 days | End: 2021-11-08
Payer: SELF-PAY

## 2021-11-08 ENCOUNTER — APPOINTMENT (OUTPATIENT)
Dept: INTERNAL MEDICINE | Facility: CLINIC | Age: 51
End: 2021-11-08

## 2021-11-08 DIAGNOSIS — Z23 ENCOUNTER FOR IMMUNIZATION: ICD-10-CM

## 2021-11-08 DIAGNOSIS — I25.10 ATHEROSCLEROTIC HEART DISEASE OF NATIVE CORONARY ARTERY WITHOUT ANGINA PECTORIS: ICD-10-CM

## 2021-11-08 DIAGNOSIS — E11.9 TYPE 2 DIABETES MELLITUS WITHOUT COMPLICATIONS: ICD-10-CM

## 2021-11-08 DIAGNOSIS — I10 ESSENTIAL (PRIMARY) HYPERTENSION: ICD-10-CM

## 2021-11-08 DIAGNOSIS — Z95.5 PRESENCE OF CORONARY ANGIOPLASTY IMPLANT AND GRAFT: Chronic | ICD-10-CM

## 2021-11-08 PROCEDURE — 97802 MEDICAL NUTRITION INDIV IN: CPT

## 2021-11-23 ENCOUNTER — APPOINTMENT (OUTPATIENT)
Dept: OPHTHALMOLOGY | Facility: CLINIC | Age: 51
End: 2021-11-23
Payer: MEDICAID

## 2021-11-23 ENCOUNTER — NON-APPOINTMENT (OUTPATIENT)
Age: 51
End: 2021-11-23

## 2021-11-23 PROCEDURE — 92004 COMPRE OPH EXAM NEW PT 1/>: CPT

## 2021-11-23 PROCEDURE — 92250 FUNDUS PHOTOGRAPHY W/I&R: CPT

## 2021-11-23 PROCEDURE — 92020 GONIOSCOPY: CPT

## 2021-12-08 ENCOUNTER — APPOINTMENT (OUTPATIENT)
Dept: INTERNAL MEDICINE | Facility: CLINIC | Age: 51
End: 2021-12-08

## 2021-12-08 ENCOUNTER — OUTPATIENT (OUTPATIENT)
Dept: OUTPATIENT SERVICES | Facility: HOSPITAL | Age: 51
LOS: 1 days | End: 2021-12-08
Payer: SELF-PAY

## 2021-12-08 ENCOUNTER — APPOINTMENT (OUTPATIENT)
Dept: CARDIOLOGY | Facility: HOSPITAL | Age: 51
End: 2021-12-08

## 2021-12-08 ENCOUNTER — LABORATORY RESULT (OUTPATIENT)
Age: 51
End: 2021-12-08

## 2021-12-08 ENCOUNTER — NON-APPOINTMENT (OUTPATIENT)
Age: 51
End: 2021-12-08

## 2021-12-08 VITALS
HEIGHT: 65 IN | HEART RATE: 68 BPM | DIASTOLIC BLOOD PRESSURE: 72 MMHG | OXYGEN SATURATION: 99 % | BODY MASS INDEX: 23.32 KG/M2 | WEIGHT: 140 LBS | SYSTOLIC BLOOD PRESSURE: 114 MMHG

## 2021-12-08 VITALS — OXYGEN SATURATION: 96 % | DIASTOLIC BLOOD PRESSURE: 72 MMHG | HEART RATE: 70 BPM | SYSTOLIC BLOOD PRESSURE: 116 MMHG

## 2021-12-08 DIAGNOSIS — I10 ESSENTIAL (PRIMARY) HYPERTENSION: ICD-10-CM

## 2021-12-08 DIAGNOSIS — Z95.5 PRESENCE OF CORONARY ANGIOPLASTY IMPLANT AND GRAFT: Chronic | ICD-10-CM

## 2021-12-08 DIAGNOSIS — Z86.79 PERSONAL HISTORY OF OTHER DISEASES OF THE CIRCULATORY SYSTEM: ICD-10-CM

## 2021-12-08 DIAGNOSIS — I25.10 ATHEROSCLEROTIC HEART DISEASE OF NATIVE CORONARY ARTERY WITHOUT ANGINA PECTORIS: ICD-10-CM

## 2021-12-08 LAB
HCT VFR BLD CALC: 40 % — SIGNIFICANT CHANGE UP (ref 39–50)
HGB BLD-MCNC: 13.3 G/DL — SIGNIFICANT CHANGE UP (ref 13–17)
MCHC RBC-ENTMCNC: 31.3 PG — SIGNIFICANT CHANGE UP (ref 27–34)
MCHC RBC-ENTMCNC: 33.3 GM/DL — SIGNIFICANT CHANGE UP (ref 32–36)
MCV RBC AUTO: 94.1 FL — SIGNIFICANT CHANGE UP (ref 80–100)
PLATELET # BLD AUTO: 208 K/UL — SIGNIFICANT CHANGE UP (ref 150–400)
RBC # BLD: 4.25 M/UL — SIGNIFICANT CHANGE UP (ref 4.2–5.8)
RBC # FLD: 14.2 % — SIGNIFICANT CHANGE UP (ref 10.3–14.5)
WBC # BLD: 8.42 K/UL — SIGNIFICANT CHANGE UP (ref 3.8–10.5)
WBC # FLD AUTO: 8.42 K/UL — SIGNIFICANT CHANGE UP (ref 3.8–10.5)

## 2021-12-08 PROCEDURE — 80061 LIPID PANEL: CPT

## 2021-12-08 PROCEDURE — G0463: CPT

## 2021-12-08 PROCEDURE — 36415 COLL VENOUS BLD VENIPUNCTURE: CPT

## 2021-12-08 PROCEDURE — 93005 ELECTROCARDIOGRAM TRACING: CPT

## 2021-12-08 PROCEDURE — 85027 COMPLETE CBC AUTOMATED: CPT

## 2021-12-08 PROCEDURE — 80053 COMPREHEN METABOLIC PANEL: CPT

## 2021-12-08 RX ORDER — EMPAGLIFLOZIN 10 MG/1
10 TABLET, FILM COATED ORAL DAILY
Qty: 90 | Refills: 3 | Status: DISCONTINUED | COMMUNITY
Start: 2021-10-13 | End: 2021-12-08

## 2021-12-08 RX ORDER — NITROFURANTOIN (MONOHYDRATE/MACROCRYSTALS) 25; 75 MG/1; MG/1
100 CAPSULE ORAL
Qty: 10 | Refills: 0 | Status: COMPLETED | COMMUNITY
Start: 2021-06-20 | End: 2021-12-08

## 2021-12-09 ENCOUNTER — NON-APPOINTMENT (OUTPATIENT)
Age: 51
End: 2021-12-09

## 2021-12-09 LAB
ALBUMIN SERPL ELPH-MCNC: 4.5 G/DL — SIGNIFICANT CHANGE UP (ref 3.3–5)
ALP SERPL-CCNC: 75 U/L — SIGNIFICANT CHANGE UP (ref 40–120)
ALT FLD-CCNC: 29 U/L — SIGNIFICANT CHANGE UP (ref 10–45)
ANION GAP SERPL CALC-SCNC: 11 MMOL/L — SIGNIFICANT CHANGE UP (ref 5–17)
AST SERPL-CCNC: 24 U/L — SIGNIFICANT CHANGE UP (ref 10–40)
BILIRUB SERPL-MCNC: 0.8 MG/DL — SIGNIFICANT CHANGE UP (ref 0.2–1.2)
BUN SERPL-MCNC: 15 MG/DL — SIGNIFICANT CHANGE UP (ref 7–23)
CALCIUM SERPL-MCNC: 9.5 MG/DL — SIGNIFICANT CHANGE UP (ref 8.4–10.5)
CHLORIDE SERPL-SCNC: 103 MMOL/L — SIGNIFICANT CHANGE UP (ref 96–108)
CHOLEST SERPL-MCNC: 99 MG/DL — SIGNIFICANT CHANGE UP
CO2 SERPL-SCNC: 26 MMOL/L — SIGNIFICANT CHANGE UP (ref 22–31)
CREAT SERPL-MCNC: 0.87 MG/DL — SIGNIFICANT CHANGE UP (ref 0.5–1.3)
GLUCOSE SERPL-MCNC: 212 MG/DL — HIGH (ref 70–99)
HDLC SERPL-MCNC: 51 MG/DL — SIGNIFICANT CHANGE UP
LIPID PNL WITH DIRECT LDL SERPL: 29 MG/DL — SIGNIFICANT CHANGE UP
NON HDL CHOLESTEROL: 48 MG/DL — SIGNIFICANT CHANGE UP
POTASSIUM SERPL-MCNC: 4.5 MMOL/L — SIGNIFICANT CHANGE UP (ref 3.5–5.3)
POTASSIUM SERPL-SCNC: 4.5 MMOL/L — SIGNIFICANT CHANGE UP (ref 3.5–5.3)
PROT SERPL-MCNC: 7.4 G/DL — SIGNIFICANT CHANGE UP (ref 6–8.3)
SODIUM SERPL-SCNC: 140 MMOL/L — SIGNIFICANT CHANGE UP (ref 135–145)
TRIGL SERPL-MCNC: 98 MG/DL — SIGNIFICANT CHANGE UP

## 2021-12-10 ENCOUNTER — NON-APPOINTMENT (OUTPATIENT)
Age: 51
End: 2021-12-10

## 2021-12-10 RX ORDER — TRIAMCINOLONE ACETONIDE 5 MG/G
0.5 CREAM TOPICAL
Qty: 1 | Refills: 0 | Status: ACTIVE | COMMUNITY
Start: 2021-06-15 | End: 1900-01-01

## 2021-12-10 RX ORDER — MENTHOL 1.5 MG/G
POWDER TOPICAL DAILY
Qty: 1 | Refills: 0 | Status: ACTIVE | COMMUNITY
Start: 2021-06-19 | End: 1900-01-01

## 2021-12-10 RX ORDER — WHITE PETROLATUM 1.75 OZ
OINTMENT TOPICAL TWICE DAILY
Qty: 1 | Refills: 0 | Status: ACTIVE | COMMUNITY
Start: 2021-06-19 | End: 1900-01-01

## 2021-12-13 DIAGNOSIS — E11.9 TYPE 2 DIABETES MELLITUS WITHOUT COMPLICATIONS: ICD-10-CM

## 2021-12-13 DIAGNOSIS — Z86.79 PERSONAL HISTORY OF OTHER DISEASES OF THE CIRCULATORY SYSTEM: ICD-10-CM

## 2021-12-16 NOTE — PHYSICAL EXAM
HPI     Follow-up    Additional comments: Cataract questions.            Comments   Mr. Nair is here today for a follow-up visit. He had a cataract eval on   1/12/2018. He states he is here today for a follow-up just ask a few more   questions and discuss options and lenses. He denies any eye pain or   irritation. No problems or complaints at this time.        Last edited by Jaylin Ortiz, PCT on 3/14/2018  8:11 AM. (History)            Assessment /Plan     For exam results, see Encounter Report.    Nuclear sclerosis, bilateral      Visually Significant Cataract: Patient reports decreased vision consistent with the clinical amount of lenticular opacity, which reaches the level of visual significance and affects activities of daily living. Risks, benefits, and alternatives to cataract surgery were discussed and the consent reviewed. IOL options were discussed, including ATIOLs and the associated side effects and additional patient cost associated with them.   IOL Selections:   Right eye  IOL: pcboo 16.5 (near target)     Left eye  IOL: pcboo 16.0 (near target)    Pt wishes to have left eye done first.                        [No Edema] : there was no peripheral edema [Normal] : no rash [No Focal Deficits] : no focal deficits [Comprehensive Foot Exam Normal] : Right and left foot were examined and both feet are normal. No ulcers in either foot. Toes are normal and with full ROM.  Normal tactile sensation with monofilament testing throughout both feet [de-identified] : midline sternotomy scar c/d/i

## 2021-12-16 NOTE — REVIEW OF SYSTEMS
[Negative] : Respiratory [Discharge] : no discharge [Pain] : no pain [Redness] : no redness [Dryness] : no dryness [Itching] : no itching [Palpitations] : no palpitations [Claudication] : no  leg claudication [Lower Ext Edema] : no lower extremity edema [Orthopena] : no orthopnea [Paroxysmal Nocturnal Dyspnea] : no paroxysmal nocturnal dyspnea [Abdominal Pain] : no abdominal pain [Nausea] : no nausea [Constipation] : no constipation [Diarrhea] : no diarrhea [Vomiting] : no vomiting [Heartburn] : no heartburn [Melena] : no melena [Dysuria] : no dysuria [Frequency] : no frequency [FreeTextEntry3] : occasions blurry vision  [FreeTextEntry5] : reproducible chest pain along scar [FreeTextEntry7] : abdominal bloating

## 2021-12-16 NOTE — HISTORY OF PRESENT ILLNESS
[FreeTextEntry1] : CPE [de-identified] : 51 M PMHx of DM2, HTN, HLD, NSTEMI 11/16/19 s/p LHC w/ KHARI to 60% pCX and 100% OM2, now s/p 2V CABG 8/18/2020 here for CPE. \par Endorses occasional abdominal bloating after taking metformin. No N/V, diarrhea, constipation, abdominal pain. Occasional chest discomfort, often reproducible along surgical scar. No palpitations, SOB, LOC, currently chest pain free. No other complaints. \par \par #T2DM\par -Checks his blood sugars before breakfast everyday\par -Morning sugars range from 90s to 130s. No sugars below 60s, no sugars above 200s. \par -Compliant with current insulin regimen.  \par -Trying to cut down on carbs and sweets, Seen by Nutritionist. Does not exercise. Referred to cardiac rehab and encouraged to walk but not interested.\par -Was prescribed Jardiance by cardiology last month but did not start. Medication $1300/month.

## 2021-12-16 NOTE — ASSESSMENT
[FreeTextEntry1] : 51 M PMHx of DM2, HTN, HLD, NSTEMI 11/16/19 s/p LHC w/ KHARI to 60% pCX and 100% OM2, now s/p 2V CABG 8/18/2020 here for follow up.\par \par #CAD s/p stent and CABG\par -C/w ASA 81\par -C/w Atorvastatin 80 mg\par -C/w Lisinopril 5 mg\par -C/w Lopressor 50 mg TID\par -Jardiance is expensive for patient. Will look into patient assistance program. \par \par #T2DM, uncontrolled\par -POCT A1C 9.8\par -Sugars better controlled. Given elevated A1C with good sugars, told patient to start measuring post-prandial sugars to assess for reason for post-prandial spiking. May benefit from Jardiance (for improved glycemic control and cardiac protection) or Repaglinide with meals/other meds if Jardiance not covered.\par -C/w Humalin 70/30 16U AM and 9U PM\par -C/w Metformin 1000 BID\par -F/u PAP for Jardiance\par -Optho appt upcoming\par -Seen by Dietician\par \par #HLD\par -Low HDL on previous lipid panel\par -Repeat lipid panel\par -Will f/u lipidologist Dr. Hickey if statin needs to be decreased\par \par #HCM\par -CMP, CBC sent\par -Signed up for [facility] program, meds sent there\par -Flu shot UTD\par -Moderna COVID vaccine x2, going for booster\par -Pending Zoster\par -FOBT sent

## 2021-12-21 NOTE — REASON FOR VISIT
[FreeTextEntry1] : Mr. Crocker is a 52 yo Greek man with a PMHx of DM2, HTN, HLD, NSTEMI 11/16/19 s/p LHC w/ KHARI to 60% pCX and 100% OM2, now s/p 2V CABG 8/18/2020 here for follow up. Complains of pain to sternotomy site. No exercise pain or shortness of breath. No weight gain. Works at [employer] in [place]. Occasionally lifts heavy things but does not worsen pain. Has been referred to cardiac rehab but still doesn’t want. \par \par Last visit started gabapentin for pain unsure if helps. Requesting refill as not having side effects. Did not want to increase dose.

## 2021-12-21 NOTE — ASSESSMENT
[FreeTextEntry1] : Mr. 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 \par -Pt encouraged to make appointment for cardiac rehab\par \par \par #HTN: Well controlled today\par -Continue Toprol XL 50 daily\par -Continue Lisinopril 5 mg daily\par \par #HLD: LDL at goal\par -Continue Lipitor 40\par \par RTC 3-6 months or sooner PRN. \par \par

## 2021-12-22 NOTE — PRE-OP CHECKLIST - TEMPERATURE IN FAHRENHEIT (DEGREES F)
INDICATION: Reason: right abd pain CM ORDERED / Spl. Instructions:  / History: 



COMPARISON: None.



TECHNIQUE: 



Axial CT images were obtained through the abdomen and pelvis with intravenous contrast.  



One or more of the following individualized dose reduction techniques were utilized for this examinat
ion:  1. Automated exposure control;  2. Adjustment of the mA and/or kV according to patient size;  3
. Use of iterative reconstruction technique.



FINDINGS:



Linear opacity at the left lower lung could be from scarring or atelectasis.

Vascular: Scattered calcific atherosclerosis.

Hepatobiliary: There is a couple low-density lesions within the liver with one of them in the right l
obe likely cystic in nature and one of them less than a centimeter in size and too small to character
ize but a common finding.

Pancreas: No peripancreatic edema.

Spleen: Spleen unremarkable.

Renal/Bladder: Urinary bladder is partially distended with some prominence of the wall. No hydronephr
osis.

Gastrointestinal: Small amount of free fluid in the pelvis. 37 mm right adnexal cystic lesion. Inflam
matory process at the right lower quadrant centered around the base of the appendix with edema to the
 fat as well as an area of focal fluid measuring approximately 23 mm. The appendix is seen at this lo
cation and is dilated measuring approximately 12 mm.

No dilated loops of bowel to suggest obstruction.

Degenerative changes the spine with multilevel central canal and neural foraminal stenosis.

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

Circular fat-containing structure within the fat lateral to the levator ani which could be from cause
s such as fat necrosis or oil cyst. Another possible cause would include a small lipoma.



IMPRESSION:



*   Inflammatory changes at the right lower quadrant of the abdomen centered around the base of the a
ppendix with adjacent enlargement of the appendix as well as wall thickening of the cecum with some a
djacent fluid. This could be secondary to appendicitis with adjacent early abscess/phlegmon formation
. There is adjacent lymphadenopathy.



*  Right adnexal cystic lesion.



Electronically signed by: Bismark Johns MD (12/22/2021 1:54 PM) [id number] 98.2

## 2021-12-23 DIAGNOSIS — E11.9 TYPE 2 DIABETES MELLITUS WITHOUT COMPLICATIONS: ICD-10-CM

## 2021-12-23 DIAGNOSIS — I25.10 ATHEROSCLEROTIC HEART DISEASE OF NATIVE CORONARY ARTERY WITHOUT ANGINA PECTORIS: ICD-10-CM

## 2021-12-23 DIAGNOSIS — E78.5 HYPERLIPIDEMIA, UNSPECIFIED: ICD-10-CM

## 2022-01-18 ENCOUNTER — NON-APPOINTMENT (OUTPATIENT)
Age: 52
End: 2022-01-18

## 2022-02-16 ENCOUNTER — OUTPATIENT (OUTPATIENT)
Dept: OUTPATIENT SERVICES | Facility: HOSPITAL | Age: 52
LOS: 1 days | End: 2022-02-16
Payer: SELF-PAY

## 2022-02-16 ENCOUNTER — RESULT CHARGE (OUTPATIENT)
Age: 52
End: 2022-02-16

## 2022-02-16 ENCOUNTER — APPOINTMENT (OUTPATIENT)
Dept: INTERNAL MEDICINE | Facility: CLINIC | Age: 52
End: 2022-02-16
Payer: COMMERCIAL

## 2022-02-16 ENCOUNTER — NON-APPOINTMENT (OUTPATIENT)
Age: 52
End: 2022-02-16

## 2022-02-16 VITALS
DIASTOLIC BLOOD PRESSURE: 50 MMHG | HEIGHT: 65 IN | SYSTOLIC BLOOD PRESSURE: 90 MMHG | HEART RATE: 69 BPM | OXYGEN SATURATION: 99 % | WEIGHT: 140 LBS | BODY MASS INDEX: 23.32 KG/M2

## 2022-02-16 DIAGNOSIS — I10 ESSENTIAL (PRIMARY) HYPERTENSION: ICD-10-CM

## 2022-02-16 DIAGNOSIS — Z95.5 PRESENCE OF CORONARY ANGIOPLASTY IMPLANT AND GRAFT: Chronic | ICD-10-CM

## 2022-02-16 DIAGNOSIS — Z86.79 PERSONAL HISTORY OF OTHER DISEASES OF THE CIRCULATORY SYSTEM: ICD-10-CM

## 2022-02-16 LAB — HBA1C MFR BLD HPLC: 10.4

## 2022-02-16 PROCEDURE — 83036 HEMOGLOBIN GLYCOSYLATED A1C: CPT

## 2022-02-16 PROCEDURE — ZZZZZ: CPT

## 2022-02-16 PROCEDURE — G0463: CPT | Mod: 25

## 2022-02-16 PROCEDURE — 93005 ELECTROCARDIOGRAM TRACING: CPT

## 2022-02-22 ENCOUNTER — APPOINTMENT (OUTPATIENT)
Dept: INTERNAL MEDICINE | Facility: CLINIC | Age: 52
End: 2022-02-22
Payer: COMMERCIAL

## 2022-02-22 ENCOUNTER — OUTPATIENT (OUTPATIENT)
Dept: OUTPATIENT SERVICES | Facility: HOSPITAL | Age: 52
LOS: 1 days | End: 2022-02-22
Payer: SELF-PAY

## 2022-02-22 DIAGNOSIS — Z95.5 PRESENCE OF CORONARY ANGIOPLASTY IMPLANT AND GRAFT: Chronic | ICD-10-CM

## 2022-02-22 PROCEDURE — 97802 MEDICAL NUTRITION INDIV IN: CPT

## 2022-02-22 PROCEDURE — ZZZZZ: CPT

## 2022-02-22 NOTE — PHYSICAL EXAM
[Pedal Pulses Present] : the pedal pulses are present [No Edema] : there was no peripheral edema [Normal] : normal gait, coordination grossly intact, no focal deficits and deep tendon reflexes were 2+ and symmetric [Comprehensive Foot Exam Normal] : Right and left foot were examined and both feet are normal. No ulcers in either foot. Toes are normal and with full ROM.  Normal tactile sensation with monofilament testing throughout both feet [de-identified] : pain reproducible along surgical scar

## 2022-02-22 NOTE — PLAN
[FreeTextEntry1] : #T2DM, uncontrolled\par -POCT A1C 10.\par -Although patient notes sugars are well controlled, given rise in A1C not properly documenting sugars and diet not compliant with diabetic diet. \par -Free style Fransisco sent for better sugar monitoring\par -Increase Humalin 70/30 20U AM and 12U PM\par -C/w Metformin 1000 BID\par -F/u PAP for SGLT2\par -Seen by optho\par -Dietician referral\par \par #SOB\par -EKG NSR without ischemic changes, similar to previous\par -Euvolemic on exam\par -SOB likely 2/2 deconditioning and CP likely MSK or 2/2 cardiax Sx\par -Continue to montor\par \par RTC 1 week for further diabetes management.

## 2022-02-22 NOTE — HISTORY OF PRESENT ILLNESS
[FreeTextEntry1] : A1C check [de-identified] : 51 M PMHx of DM2, HTN, HLD, NSTEMI 11/16/19 s/p LHC w/ KHARI to 60% pCX and 100% OM2, now s/p 2V CABG 8/18/2020 here for follow-up. \par \par #SOB\par Endorsed 1 month of intermittent SOB with exertion (walking more than 10 minutes) or lifting boxes at work.\par Has Occasional chest discomfort, often reproducible along surgical scar with exertion as well. \par No palpitations, SOB, LOC, currently chest pain free. No other complaints. \par \par #T2DM\par -Checks his blood sugars before breakfast everyday\par -Morning sugars range from 90s to 130s. No sugars below 60s, no sugars above 200s. \par -Compliant with current insulin regimen. \par -States in last week had carbs and sweets for his birthday, also does not cook for himself, often eats heavy rice meals made by family.  Does not exercise. Referred to cardiac rehab and encouraged to walk but not interested.\par -Was prescribed Jardiance by cardiology last month but did not start. Medication $1300/month.

## 2022-02-22 NOTE — ASSESSMENT
[FreeTextEntry1] : 51 M PMHx of DM2, HTN, HLD, NSTEMI 11/16/19 s/p LHC w/ KHARI to 60% pCX and 100% OM2, now s/p 2V CABG 8/18/2020 for A1C check.

## 2022-02-24 DIAGNOSIS — I10 ESSENTIAL (PRIMARY) HYPERTENSION: ICD-10-CM

## 2022-02-24 DIAGNOSIS — E11.9 TYPE 2 DIABETES MELLITUS WITHOUT COMPLICATIONS: ICD-10-CM

## 2022-02-24 DIAGNOSIS — Z86.79 PERSONAL HISTORY OF OTHER DISEASES OF THE CIRCULATORY SYSTEM: ICD-10-CM

## 2022-02-24 DIAGNOSIS — I25.10 ATHEROSCLEROTIC HEART DISEASE OF NATIVE CORONARY ARTERY WITHOUT ANGINA PECTORIS: ICD-10-CM

## 2022-03-01 DIAGNOSIS — E11.65 TYPE 2 DIABETES MELLITUS WITH HYPERGLYCEMIA: ICD-10-CM

## 2022-03-02 ENCOUNTER — APPOINTMENT (OUTPATIENT)
Dept: INTERNAL MEDICINE | Facility: CLINIC | Age: 52
End: 2022-03-02
Payer: COMMERCIAL

## 2022-03-02 ENCOUNTER — OUTPATIENT (OUTPATIENT)
Dept: OUTPATIENT SERVICES | Facility: HOSPITAL | Age: 52
LOS: 1 days | End: 2022-03-02
Payer: SELF-PAY

## 2022-03-02 ENCOUNTER — NON-APPOINTMENT (OUTPATIENT)
Age: 52
End: 2022-03-02

## 2022-03-02 ENCOUNTER — MED ADMIN CHARGE (OUTPATIENT)
Age: 52
End: 2022-03-02

## 2022-03-02 VITALS
HEIGHT: 65 IN | OXYGEN SATURATION: 98 % | WEIGHT: 140 LBS | DIASTOLIC BLOOD PRESSURE: 58 MMHG | HEART RATE: 76 BPM | BODY MASS INDEX: 23.32 KG/M2 | SYSTOLIC BLOOD PRESSURE: 98 MMHG

## 2022-03-02 DIAGNOSIS — Z95.5 PRESENCE OF CORONARY ANGIOPLASTY IMPLANT AND GRAFT: Chronic | ICD-10-CM

## 2022-03-02 DIAGNOSIS — I10 ESSENTIAL (PRIMARY) HYPERTENSION: ICD-10-CM

## 2022-03-02 PROCEDURE — G0463: CPT

## 2022-03-02 PROCEDURE — ZZZZZ: CPT

## 2022-03-02 NOTE — PHYSICAL EXAM
[No Acute Distress] : no acute distress [Well Nourished] : well nourished [Well Developed] : well developed [Normal Sclera/Conjunctiva] : normal sclera/conjunctiva [PERRL] : pupils equal round and reactive to light [EOMI] : extraocular movements intact [Normal Outer Ear/Nose] : the outer ears and nose were normal in appearance [Normal Oropharynx] : the oropharynx was normal [No JVD] : no jugular venous distention [Supple] : supple [No Respiratory Distress] : no respiratory distress  [No Accessory Muscle Use] : no accessory muscle use [Clear to Auscultation] : lungs were clear to auscultation bilaterally [Normal Rate] : normal rate  [Regular Rhythm] : with a regular rhythm [Normal S1, S2] : normal S1 and S2 [No Murmur] : no murmur heard [No Edema] : there was no peripheral edema [Soft] : abdomen soft [Non Tender] : non-tender [Non-distended] : non-distended [No HSM] : no HSM [Normal Bowel Sounds] : normal bowel sounds [No CVA Tenderness] : no CVA  tenderness [No Spinal Tenderness] : no spinal tenderness [No Joint Swelling] : no joint swelling

## 2022-03-09 ENCOUNTER — OUTPATIENT (OUTPATIENT)
Dept: OUTPATIENT SERVICES | Facility: HOSPITAL | Age: 52
LOS: 1 days | End: 2022-03-09
Payer: SELF-PAY

## 2022-03-09 ENCOUNTER — NON-APPOINTMENT (OUTPATIENT)
Age: 52
End: 2022-03-09

## 2022-03-09 ENCOUNTER — APPOINTMENT (OUTPATIENT)
Dept: CARDIOLOGY | Facility: HOSPITAL | Age: 52
End: 2022-03-09

## 2022-03-09 VITALS
DIASTOLIC BLOOD PRESSURE: 67 MMHG | SYSTOLIC BLOOD PRESSURE: 102 MMHG | HEIGHT: 65 IN | WEIGHT: 140 LBS | HEART RATE: 63 BPM | OXYGEN SATURATION: 99 % | BODY MASS INDEX: 23.32 KG/M2

## 2022-03-09 DIAGNOSIS — I25.10 ATHEROSCLEROTIC HEART DISEASE OF NATIVE CORONARY ARTERY WITHOUT ANGINA PECTORIS: ICD-10-CM

## 2022-03-09 DIAGNOSIS — Z95.5 PRESENCE OF CORONARY ANGIOPLASTY IMPLANT AND GRAFT: Chronic | ICD-10-CM

## 2022-03-09 DIAGNOSIS — I25.751 ATHEROSCLEROSIS OF NATIVE CORONARY ARTERY OF TRANSPLANTED HEART WITH ANGINA PECTORIS WITH DOCUMENTED SPASM: ICD-10-CM

## 2022-03-09 PROCEDURE — 93005 ELECTROCARDIOGRAM TRACING: CPT

## 2022-03-09 PROCEDURE — G0463: CPT

## 2022-03-09 NOTE — HISTORY OF PRESENT ILLNESS
[FreeTextEntry1] : DM management  [de-identified] : 52 M PMHx of DM2, HTN, HLD, NSTEMI 11/16/19 s/p LHC w/ KHARI to 60% pCX and 100% OM2, now s/p 2V CABG 8/18/2020 here for follow-up. \par \par Recently patient has been feeling fatigued thoruhgout the day. he increased his humalin dose to 20u in the am and 15u in the PM (although we prescribed by 12u). There are few morning in which his sugar was 92, and subsequently he felt weak, fatigued, and then extreme hunger. He also reports eating breakfast late (>30 min after taking his lantus). He denies any chest pain, cough, shortness of breath, nausea, vomiting.

## 2022-03-09 NOTE — REVIEW OF SYSTEMS
[Negative] : Heme/Lymph [Fatigue] : fatigue [Joint Pain] : joint pain [Muscle Pain] : muscle pain [Muscle Weakness] : muscle weakness [Fever] : no fever [Chills] : no chills [Hot Flashes] : no hot flashes [Night Sweats] : no night sweats [Recent Change In Weight] : ~T no recent weight change [Joint Stiffness] : no joint stiffness [Back Pain] : no back pain [Joint Swelling] : no joint swelling

## 2022-03-09 NOTE — REASON FOR VISIT
[FreeTextEntry1] : Mr. Crocker is a 53 yo Arabic man with a PMHx of DM2, HTN, HLD, NSTEMI 11/16/19 s/p LHC w/ KHARI to 60% pCX and 100% OM2, now s/p 2V CABG 8/18/2020 here for follow up. Complains of pain to sternotomy site. No exercise pain or shortness of breath. No weight gain. Works at [employer] in [place]. Occasionally lifts heavy things but does not worsen pain. Has been referred to cardiac rehab but but has been unable to make appointment. Recently started on gabapentin with some improvement in pain.\par \par Most recently had seen PMD for weakness and lethargy. decreased metoprolol to 25mg daily. Since lowering dose has not had improvement in fatigue but has notticed that he is having increasing anginal symptoms. Now having dyspnea, chest pain and left arm radiation when walks. resolves with rest.

## 2022-03-09 NOTE — ASSESSMENT
[FreeTextEntry1] : Mr. Crocker is a 52 yo [place]az[place] man with a PMHx of poorly controlled DM2, NSTEMI 11/16 s/p LHC w/ KHARI to 60% pCX and 100% OM2, s/p 2V CABG on 8/18 here for follow up.\par \par #Fatigue\par unclear etiology\par Did not improve with lower dose beta blocker\par Increased back to 50mg Daily because is now having increasing angina\par Will perform exercise nuc to evaluate ischemic component\par TTE for structural eval. 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 \par -Pt encouraged to make appointment for cardiac rehab\par \par \par #HTN: Well controlled today\par -Continue Toprol XL 50 daily\par -Continue Lisinopril 5 mg daily\par - if needed will discontinue lisinopril at next visit. \par \par #HLD: LDL at goal\par -Continue Lipitor 40\par \par RTC 3-6 months or sooner PRN. \par \par

## 2022-03-10 DIAGNOSIS — I25.751: ICD-10-CM

## 2022-03-11 DIAGNOSIS — E11.9 TYPE 2 DIABETES MELLITUS WITHOUT COMPLICATIONS: ICD-10-CM

## 2022-03-16 ENCOUNTER — APPOINTMENT (OUTPATIENT)
Dept: INTERNAL MEDICINE | Facility: CLINIC | Age: 52
End: 2022-03-16

## 2022-03-16 DIAGNOSIS — M25.561 PAIN IN RIGHT KNEE: ICD-10-CM

## 2022-03-18 ENCOUNTER — NON-APPOINTMENT (OUTPATIENT)
Age: 52
End: 2022-03-18

## 2022-03-23 ENCOUNTER — APPOINTMENT (OUTPATIENT)
Dept: INTERNAL MEDICINE | Facility: CLINIC | Age: 52
End: 2022-03-23
Payer: COMMERCIAL

## 2022-03-23 ENCOUNTER — OUTPATIENT (OUTPATIENT)
Dept: OUTPATIENT SERVICES | Facility: HOSPITAL | Age: 52
LOS: 1 days | End: 2022-03-23
Payer: SELF-PAY

## 2022-03-23 VITALS
DIASTOLIC BLOOD PRESSURE: 68 MMHG | OXYGEN SATURATION: 98 % | WEIGHT: 140 LBS | HEART RATE: 71 BPM | SYSTOLIC BLOOD PRESSURE: 108 MMHG | BODY MASS INDEX: 23.32 KG/M2 | HEIGHT: 65 IN

## 2022-03-23 DIAGNOSIS — Z95.5 PRESENCE OF CORONARY ANGIOPLASTY IMPLANT AND GRAFT: Chronic | ICD-10-CM

## 2022-03-23 DIAGNOSIS — Z01.818 ENCOUNTER FOR OTHER PREPROCEDURAL EXAMINATION: ICD-10-CM

## 2022-03-23 DIAGNOSIS — Z09 ENCOUNTER FOR FOLLOW-UP EXAMINATION AFTER COMPLETED TREATMENT FOR CONDITIONS OTHER THAN MALIGNANT NEOPLASM: ICD-10-CM

## 2022-03-23 DIAGNOSIS — I10 ESSENTIAL (PRIMARY) HYPERTENSION: ICD-10-CM

## 2022-03-23 PROCEDURE — G0463: CPT

## 2022-03-23 PROCEDURE — ZZZZZ: CPT | Mod: GE

## 2022-03-23 RX ORDER — BLOOD SUGAR DIAGNOSTIC
STRIP MISCELLANEOUS 3 TIMES DAILY
Qty: 90 | Refills: 1 | Status: COMPLETED | COMMUNITY
Start: 2022-02-19 | End: 2022-03-23

## 2022-03-23 RX ORDER — FLASH GLUCOSE SENSOR
KIT MISCELLANEOUS
Qty: 1 | Refills: 0 | Status: COMPLETED | COMMUNITY
Start: 2022-02-19 | End: 2022-03-23

## 2022-03-23 RX ORDER — FLASH GLUCOSE SENSOR
KIT MISCELLANEOUS
Qty: 2 | Refills: 3 | Status: COMPLETED | COMMUNITY
Start: 2022-02-19 | End: 2022-03-23

## 2022-03-25 PROBLEM — Z09 POSTOPERATIVE FOLLOW-UP: Status: RESOLVED | Noted: 2020-09-24 | Resolved: 2022-03-25

## 2022-03-25 PROBLEM — Z01.818 PREOP TESTING: Status: RESOLVED | Noted: 2020-08-10 | Resolved: 2022-03-25

## 2022-03-25 PROBLEM — Z01.818 PREOP TESTING: Status: RESOLVED | Noted: 2020-08-16 | Resolved: 2022-03-25

## 2022-03-25 NOTE — HISTORY OF PRESENT ILLNESS
[FreeTextEntry1] : diabetes follow-up [de-identified] : 52 M PMHx of DM2, HTN, HLD, NSTEMI 11/16/19 s/p LHC w/ KHARI to 60% pCX and 100% OM2, now s/p 2V CABG 8/18/2020 here for diabetes follow-up.\par \par #Uncontrolled T2DM\par -Patient has begun using Trulicity with 1st dose last week. Patient received teaching on how to administer medication today with pharmacy team. Patient to continue med. \par -Patient tracking FSG once in the AM fasting. Numbers typically . Had two readings in 60-70s. Patient states he did not experience symptoms and immediately drank fruit juice once seeing those numbers. Wasn't sure if he skipped dinner those days. \par \par #Fatigue, Hypotension\par -States generalized fatigue improved\par -Still endorses SOB with walking more than 10 blocks. Denies CP, palpitations, syncope\par -At last clinic visit, Toprol decreased to 25 mg since patient hypotensive and endorsing fatigue. Increased back to 50 mg at cards appt after patient had increased symptoms of angina

## 2022-03-25 NOTE — PHYSICAL EXAM
[Normal Oropharynx] : the oropharynx was normal [Pedal Pulses Present] : the pedal pulses are present [No Edema] : there was no peripheral edema [Normal] : affect was normal and insight and judgment were intact [de-identified] : midline sternotomy scar with keloiding

## 2022-03-25 NOTE — PLAN
[FreeTextEntry1] : 52 M PMHx of DM2, HTN, HLD, NSTEMI 11/16/19 s/p LHC w/ KHARI to 60% pCX and 100% OM2, now s/p 2V CABG 8/18/2020 here for diabetes follow-up.\par \par #Uncontrolled T2DM\par -Patient has begun using Trulicity with 1st dose last week. Patient received teaching on how to administer medication today with pharmacy team. Patient to continue med. \par -C/w Metformin 1000 mg BID\par -C/w Humalin 70/30 20 Units in AM, 12 Units at bedtime\par -Patient tracking FSG once in the AM fasting. Numbers typically . Had two readings in 60-70s. Patient states he did not experience symptoms and immediately drank fruit juice once seeing those numbers. Wasn't sure if he skipped dinner those days. \par -Explained to patient that it is ideal if he checked FSGs more often. Was able to compromise to 1-2 glucose checks as schedule tolerates, varying between fasting AM, 2 hours after different meals and bedtime to get a better sense of overall glycemic control. Also instructed to record meal eaten so he can determine what meals increase or decrease his sugars\par \par #CAD s/p stent and CABG\par -C/w ASA 81\par -C/w Atorvastatin 80 mg\par -C/w Lisinopril 5 mg\par -C/w Toprol 50 mg QD. Decreased to 25 mg at previous clinic given patient hypotensive and endorsing fatigue. Increased back to 50 mg after patient had increased angina at cards appt. \par -BP improved today. If patient remains hypotensive, can consider decreasing Lisinopril\par -BP cuff sent to pharmacy\par -Has scheduled TTE and nuclear stress test to evaluate for worsening dyspnea\par -Cardiac rehab ordered for pt several times but has refused. Now going to general PT. \par \par RTC 10 weeks for A1C check

## 2022-03-29 DIAGNOSIS — I25.10 ATHEROSCLEROTIC HEART DISEASE OF NATIVE CORONARY ARTERY WITHOUT ANGINA PECTORIS: ICD-10-CM

## 2022-03-29 DIAGNOSIS — E11.65 TYPE 2 DIABETES MELLITUS WITH HYPERGLYCEMIA: ICD-10-CM

## 2022-03-29 DIAGNOSIS — I20.0 UNSTABLE ANGINA: ICD-10-CM

## 2022-03-29 DIAGNOSIS — E11.9 TYPE 2 DIABETES MELLITUS WITHOUT COMPLICATIONS: ICD-10-CM

## 2022-04-12 ENCOUNTER — OUTPATIENT (OUTPATIENT)
Dept: OUTPATIENT SERVICES | Facility: HOSPITAL | Age: 52
LOS: 1 days | End: 2022-04-12
Payer: SELF-PAY

## 2022-04-12 ENCOUNTER — APPOINTMENT (OUTPATIENT)
Dept: CV DIAGNOSITCS | Facility: HOSPITAL | Age: 52
End: 2022-04-12

## 2022-04-12 ENCOUNTER — APPOINTMENT (OUTPATIENT)
Dept: CV DIAGNOSTICS | Facility: HOSPITAL | Age: 52
End: 2022-04-12

## 2022-04-12 DIAGNOSIS — I25.751: ICD-10-CM

## 2022-04-12 DIAGNOSIS — Z95.5 PRESENCE OF CORONARY ANGIOPLASTY IMPLANT AND GRAFT: Chronic | ICD-10-CM

## 2022-04-12 PROCEDURE — 78452 HT MUSCLE IMAGE SPECT MULT: CPT | Mod: MH

## 2022-04-12 PROCEDURE — 93306 TTE W/DOPPLER COMPLETE: CPT

## 2022-04-12 PROCEDURE — 93018 CV STRESS TEST I&R ONLY: CPT

## 2022-04-12 PROCEDURE — 78452 HT MUSCLE IMAGE SPECT MULT: CPT | Mod: 26,MH

## 2022-04-12 PROCEDURE — 93306 TTE W/DOPPLER COMPLETE: CPT | Mod: 26

## 2022-04-12 PROCEDURE — 93016 CV STRESS TEST SUPVJ ONLY: CPT

## 2022-04-12 PROCEDURE — 93017 CV STRESS TEST TRACING ONLY: CPT

## 2022-04-12 PROCEDURE — A9500: CPT

## 2022-04-29 ENCOUNTER — NON-APPOINTMENT (OUTPATIENT)
Age: 52
End: 2022-04-29

## 2022-05-02 ENCOUNTER — OUTPATIENT (OUTPATIENT)
Dept: OUTPATIENT SERVICES | Facility: HOSPITAL | Age: 52
LOS: 1 days | End: 2022-05-02
Payer: SELF-PAY

## 2022-05-02 DIAGNOSIS — Z11.52 ENCOUNTER FOR SCREENING FOR COVID-19: ICD-10-CM

## 2022-05-02 DIAGNOSIS — Z95.5 PRESENCE OF CORONARY ANGIOPLASTY IMPLANT AND GRAFT: Chronic | ICD-10-CM

## 2022-05-02 LAB — SARS-COV-2 RNA SPEC QL NAA+PROBE: SIGNIFICANT CHANGE UP

## 2022-05-02 PROCEDURE — U0003: CPT

## 2022-05-02 PROCEDURE — U0005: CPT

## 2022-05-02 PROCEDURE — C9803: CPT

## 2022-05-04 ENCOUNTER — TRANSCRIPTION ENCOUNTER (OUTPATIENT)
Age: 52
End: 2022-05-04

## 2022-05-04 ENCOUNTER — INPATIENT (INPATIENT)
Facility: HOSPITAL | Age: 52
LOS: 0 days | Discharge: HOME | DRG: 247 | End: 2022-05-04
Attending: INTERNAL MEDICINE | Admitting: INTERNAL MEDICINE
Payer: MEDICAID

## 2022-05-04 VITALS
OXYGEN SATURATION: 98 % | RESPIRATION RATE: 16 BRPM | SYSTOLIC BLOOD PRESSURE: 114 MMHG | DIASTOLIC BLOOD PRESSURE: 74 MMHG | HEART RATE: 65 BPM

## 2022-05-04 VITALS
DIASTOLIC BLOOD PRESSURE: 74 MMHG | TEMPERATURE: 98 F | RESPIRATION RATE: 18 BRPM | OXYGEN SATURATION: 98 % | HEIGHT: 65 IN | HEART RATE: 72 BPM | WEIGHT: 139.99 LBS | SYSTOLIC BLOOD PRESSURE: 135 MMHG

## 2022-05-04 DIAGNOSIS — I25.10 ATHEROSCLEROTIC HEART DISEASE OF NATIVE CORONARY ARTERY WITHOUT ANGINA PECTORIS: ICD-10-CM

## 2022-05-04 DIAGNOSIS — Z95.5 PRESENCE OF CORONARY ANGIOPLASTY IMPLANT AND GRAFT: Chronic | ICD-10-CM

## 2022-05-04 LAB
ALBUMIN SERPL ELPH-MCNC: 4.8 G/DL — SIGNIFICANT CHANGE UP (ref 3.3–5)
ALP SERPL-CCNC: 82 U/L — SIGNIFICANT CHANGE UP (ref 40–120)
ALT FLD-CCNC: 25 U/L — SIGNIFICANT CHANGE UP (ref 10–45)
ANION GAP SERPL CALC-SCNC: 12 MMOL/L — SIGNIFICANT CHANGE UP (ref 5–17)
AST SERPL-CCNC: 31 U/L — SIGNIFICANT CHANGE UP (ref 10–40)
BILIRUB SERPL-MCNC: 0.5 MG/DL — SIGNIFICANT CHANGE UP (ref 0.2–1.2)
BUN SERPL-MCNC: 16 MG/DL — SIGNIFICANT CHANGE UP (ref 7–23)
CALCIUM SERPL-MCNC: 9.3 MG/DL — SIGNIFICANT CHANGE UP (ref 8.4–10.5)
CHLORIDE SERPL-SCNC: 102 MMOL/L — SIGNIFICANT CHANGE UP (ref 96–108)
CO2 SERPL-SCNC: 25 MMOL/L — SIGNIFICANT CHANGE UP (ref 22–31)
CREAT SERPL-MCNC: 0.86 MG/DL — SIGNIFICANT CHANGE UP (ref 0.5–1.3)
EGFR: 103 ML/MIN/1.73M2 — SIGNIFICANT CHANGE UP
GLUCOSE BLDC GLUCOMTR-MCNC: 156 MG/DL — HIGH (ref 70–99)
GLUCOSE SERPL-MCNC: 170 MG/DL — HIGH (ref 70–99)
HCT VFR BLD CALC: 40.9 % — SIGNIFICANT CHANGE UP (ref 39–50)
HGB BLD-MCNC: 13.7 G/DL — SIGNIFICANT CHANGE UP (ref 13–17)
MCHC RBC-ENTMCNC: 31 PG — SIGNIFICANT CHANGE UP (ref 27–34)
MCHC RBC-ENTMCNC: 33.5 GM/DL — SIGNIFICANT CHANGE UP (ref 32–36)
MCV RBC AUTO: 92.5 FL — SIGNIFICANT CHANGE UP (ref 80–100)
NRBC # BLD: 0 /100 WBCS — SIGNIFICANT CHANGE UP (ref 0–0)
PLATELET # BLD AUTO: 216 K/UL — SIGNIFICANT CHANGE UP (ref 150–400)
POTASSIUM SERPL-MCNC: 4.3 MMOL/L — SIGNIFICANT CHANGE UP (ref 3.5–5.3)
POTASSIUM SERPL-SCNC: 4.3 MMOL/L — SIGNIFICANT CHANGE UP (ref 3.5–5.3)
PROT SERPL-MCNC: 8 G/DL — SIGNIFICANT CHANGE UP (ref 6–8.3)
RBC # BLD: 4.42 M/UL — SIGNIFICANT CHANGE UP (ref 4.2–5.8)
RBC # FLD: 13.8 % — SIGNIFICANT CHANGE UP (ref 10.3–14.5)
SODIUM SERPL-SCNC: 139 MMOL/L — SIGNIFICANT CHANGE UP (ref 135–145)
WBC # BLD: 11.6 K/UL — HIGH (ref 3.8–10.5)
WBC # FLD AUTO: 11.6 K/UL — HIGH (ref 3.8–10.5)

## 2022-05-04 PROCEDURE — 93010 ELECTROCARDIOGRAM REPORT: CPT

## 2022-05-04 PROCEDURE — 99152 MOD SED SAME PHYS/QHP 5/>YRS: CPT

## 2022-05-04 PROCEDURE — 92928 PRQ TCAT PLMT NTRAC ST 1 LES: CPT | Mod: LM

## 2022-05-04 PROCEDURE — 92978 ENDOLUMINL IVUS OCT C 1ST: CPT | Mod: 26,LM

## 2022-05-04 PROCEDURE — 93455 CORONARY ART/GRFT ANGIO S&I: CPT | Mod: 26,59

## 2022-05-04 RX ORDER — SODIUM CHLORIDE 9 MG/ML
1000 INJECTION, SOLUTION INTRAVENOUS
Refills: 0 | Status: DISCONTINUED | OUTPATIENT
Start: 2022-05-04 | End: 2022-05-04

## 2022-05-04 RX ORDER — GLUCAGON INJECTION, SOLUTION 0.5 MG/.1ML
1 INJECTION, SOLUTION SUBCUTANEOUS ONCE
Refills: 0 | Status: DISCONTINUED | OUTPATIENT
Start: 2022-05-04 | End: 2022-05-04

## 2022-05-04 RX ORDER — INSULIN LISPRO 100/ML
VIAL (ML) SUBCUTANEOUS AT BEDTIME
Refills: 0 | Status: DISCONTINUED | OUTPATIENT
Start: 2022-05-04 | End: 2022-05-04

## 2022-05-04 RX ORDER — INSULIN LISPRO 100/ML
VIAL (ML) SUBCUTANEOUS
Refills: 0 | Status: DISCONTINUED | OUTPATIENT
Start: 2022-05-04 | End: 2022-05-04

## 2022-05-04 RX ORDER — METFORMIN HYDROCHLORIDE 850 MG/1
1 TABLET ORAL
Qty: 0 | Refills: 0 | DISCHARGE

## 2022-05-04 RX ORDER — CLOPIDOGREL BISULFATE 75 MG/1
1 TABLET, FILM COATED ORAL
Qty: 90 | Refills: 3
Start: 2022-05-04 | End: 2023-04-28

## 2022-05-04 RX ORDER — DEXTROSE 50 % IN WATER 50 %
25 SYRINGE (ML) INTRAVENOUS ONCE
Refills: 0 | Status: DISCONTINUED | OUTPATIENT
Start: 2022-05-04 | End: 2022-05-04

## 2022-05-04 RX ORDER — NITROGLYCERIN 6.5 MG
0.5 CAPSULE, EXTENDED RELEASE ORAL ONCE
Refills: 0 | Status: COMPLETED | OUTPATIENT
Start: 2022-05-04 | End: 2022-05-04

## 2022-05-04 RX ORDER — SODIUM CHLORIDE 9 MG/ML
1000 INJECTION INTRAMUSCULAR; INTRAVENOUS; SUBCUTANEOUS
Refills: 0 | Status: DISCONTINUED | OUTPATIENT
Start: 2022-05-04 | End: 2022-05-04

## 2022-05-04 RX ORDER — INSULIN LISPRO 100/ML
4 VIAL (ML) SUBCUTANEOUS
Refills: 0 | Status: DISCONTINUED | OUTPATIENT
Start: 2022-05-04 | End: 2022-05-04

## 2022-05-04 RX ORDER — METOPROLOL TARTRATE 50 MG
50 TABLET ORAL DAILY
Refills: 0 | Status: DISCONTINUED | OUTPATIENT
Start: 2022-05-04 | End: 2022-05-04

## 2022-05-04 RX ORDER — SODIUM CHLORIDE 9 MG/ML
250 INJECTION INTRAMUSCULAR; INTRAVENOUS; SUBCUTANEOUS ONCE
Refills: 0 | Status: COMPLETED | OUTPATIENT
Start: 2022-05-04 | End: 2022-05-04

## 2022-05-04 RX ORDER — CLOPIDOGREL BISULFATE 75 MG/1
75 TABLET, FILM COATED ORAL DAILY
Refills: 0 | Status: DISCONTINUED | OUTPATIENT
Start: 2022-05-04 | End: 2022-05-04

## 2022-05-04 RX ORDER — LISINOPRIL 2.5 MG/1
5 TABLET ORAL DAILY
Refills: 0 | Status: DISCONTINUED | OUTPATIENT
Start: 2022-05-04 | End: 2022-05-04

## 2022-05-04 RX ORDER — FAMOTIDINE 10 MG/ML
20 INJECTION INTRAVENOUS
Refills: 0 | Status: DISCONTINUED | OUTPATIENT
Start: 2022-05-04 | End: 2022-05-04

## 2022-05-04 RX ORDER — INSULIN GLARGINE 100 [IU]/ML
12 INJECTION, SOLUTION SUBCUTANEOUS AT BEDTIME
Refills: 0 | Status: DISCONTINUED | OUTPATIENT
Start: 2022-05-04 | End: 2022-05-04

## 2022-05-04 RX ORDER — ATORVASTATIN CALCIUM 80 MG/1
80 TABLET, FILM COATED ORAL AT BEDTIME
Refills: 0 | Status: DISCONTINUED | OUTPATIENT
Start: 2022-05-04 | End: 2022-05-04

## 2022-05-04 RX ORDER — SODIUM CHLORIDE 9 MG/ML
500 INJECTION INTRAMUSCULAR; INTRAVENOUS; SUBCUTANEOUS
Refills: 0 | Status: DISCONTINUED | OUTPATIENT
Start: 2022-05-04 | End: 2022-05-04

## 2022-05-04 RX ORDER — DEXTROSE 50 % IN WATER 50 %
15 SYRINGE (ML) INTRAVENOUS ONCE
Refills: 0 | Status: DISCONTINUED | OUTPATIENT
Start: 2022-05-04 | End: 2022-05-04

## 2022-05-04 RX ORDER — DEXTROSE 50 % IN WATER 50 %
12.5 SYRINGE (ML) INTRAVENOUS ONCE
Refills: 0 | Status: DISCONTINUED | OUTPATIENT
Start: 2022-05-04 | End: 2022-05-04

## 2022-05-04 RX ORDER — ASPIRIN/CALCIUM CARB/MAGNESIUM 324 MG
81 TABLET ORAL DAILY
Refills: 0 | Status: DISCONTINUED | OUTPATIENT
Start: 2022-05-04 | End: 2022-05-04

## 2022-05-04 RX ADMIN — Medication 0.5 INCH(S): at 12:03

## 2022-05-04 RX ADMIN — SODIUM CHLORIDE 125 MILLILITER(S): 9 INJECTION INTRAMUSCULAR; INTRAVENOUS; SUBCUTANEOUS at 12:55

## 2022-05-04 RX ADMIN — Medication 2: at 16:27

## 2022-05-04 RX ADMIN — SODIUM CHLORIDE 750 MILLILITER(S): 9 INJECTION INTRAMUSCULAR; INTRAVENOUS; SUBCUTANEOUS at 09:38

## 2022-05-04 RX ADMIN — SODIUM CHLORIDE 75 MILLILITER(S): 9 INJECTION INTRAMUSCULAR; INTRAVENOUS; SUBCUTANEOUS at 12:02

## 2022-05-04 NOTE — ASU PATIENT PROFILE, ADULT - FALL HARM RISK - UNIVERSAL INTERVENTIONS
Bed in lowest position, wheels locked, appropriate side rails in place/Call bell, personal items and telephone in reach/Instruct patient to call for assistance before getting out of bed or chair/Non-slip footwear when patient is out of bed/[place] to call system/Physically safe environment - no spills, clutter or unnecessary equipment/Purposeful Proactive Rounding/Room/bathroom lighting operational, light cord in reach

## 2022-05-04 NOTE — ASU PATIENT PROFILE, ADULT - ALCOHOL USE HISTORY SINGLE SELECT
Problem: Nausea/Vomiting  Goal: Maintains oral intake with decreased/no reports of nausea/vomiting  Outcome: Outcome Not Met, Continue to Monitor  Goal: Current weight maintained or increased  Outcome: Outcome Not Met, Continue to Monitor  Goal: Verbalizes understanding of s/s and strategies to control nausea/vomiting  Description: Document education using the patient education activity.   Outcome: Outcome Not Met, Continue to Monitor     Problem: Urinary Tract Infection  Goal: Urinary Tract Infection (UTI) s/s resolved  Outcome: Outcome Not Met, Continue to Monitor  Goal: Verbalizes s/s of UTI and treatment plan  Description: Document on Patient Education Activity   Outcome: Outcome Not Met, Continue to Monitor     Problem: Breathing Pattern Ineffective  Goal: Air exchange is effective, demonstrated by Sp02 sat of greater then or = 92% (or as ordered)  Outcome: Outcome Not Met, Continue to Monitor  Goal: Respiratory pattern is quiet and regular without report of SOB  Outcome: Outcome Not Met, Continue to Monitor  Goal: Verbalizes/demonstrates effective breathing management strategies  Description: Document education using the patient education activity.   Outcome: Outcome Not Met, Continue to Monitor      never

## 2022-05-04 NOTE — H&P CARDIOLOGY - NSICDXPASTMEDICALHX_GEN_ALL_CORE_FT
PAST MEDICAL HISTORY:  CAD (coronary artery disease)     Diabetes     HTN (hypertension)     MI (myocardial infarction)

## 2022-05-04 NOTE — H&P CARDIOLOGY - HISTORY OF PRESENT ILLNESS
This is a 51y/o M bilingual Greek/English speaking,with no known implantable devices noted COVID 19 negative Vaccinated with PMHx HTN, HLD, NSTEMI 11/16/19, CAD-KHARI to 60% pCX and 100% OM2,DM2 ([facility], Hgba1c unknown, controlled ),  presents with chest pain. Pt reports intermittent chest pain for the past 12 days. Initially came on while he was at rest. Described as sharp, 4/10, left sided w/o radiation, lasted for several hours w/o associated SOB. Since then he has noticed the pain more with exertion- says he is unable to walk more than 10 feet without stopping due to the pain. Pt was referred for Cardiac Cath today with Dr. Bonilla. Currently CP free no sob no lightheadedness noted.   < from: Nuclear Stress Test-Exercise (Nuclear Stress Test-Exercise .) (04.12.22 @ 10:57) >  NUCLEAR FINDINGS:  The left ventricle was normal in size.  There are large, mild defects in the mid to distal  anteroseptal, distal anterior, distal anterolateral, and  apical walls that are reversible suggestiveof ischemia.  There are large, mild to moderate defects in the inferior,  basal to mid inferoseptal, and basal to mid inferolateral  walls that are mostly fixed suggestive of infarct with  mild coleen-infarct ischemia.  Increased right ventricular tracer uptake is noted on  stress imaging.  ------------------------------------------------------------------------  GATED ANALYSIS:  Post-stress gated wall motion analysis was performed (LVEF  = 60 %;LVEDV = 79 ml.) revealing mild hypokinesis of the  inferior, basal inferolateral, and basal inferoseptal  walls and paradoxical septal motion consistent with a  post-operative state.  ------------------------------------------------------------------------  IMPRESSIONS:Abnormal Study  * Exercise capacity: 8 METS, Poor for age and gender.  * Chest Pain: Patient developed chest pain starting  towards the end of stage 2 of exercise and persisted  around 5 min in recovery.  * Symptom: Chest pain.  * HR Response: Appropriate.  * BP Response: Appropriate.  * Heart Rhythm: Sinus Rhythm - 69 BPM.  * Q Waves: Inferior wall MI.  * Baseline ECG: T wave abnormality in leads III and aVF,  inverted at baseline.  * ECG Changes: ST Depression: Approximately 2 mm  horizontal in leads V4-V6 started at 5:12 min ofexercise  at HR of 141 bpm and persisted 07:00 min into recovery.  There was also approximately 0.5 mm ST segment elevation  in lead aVR starting at 5:12 min of exercise and  persisting 7:00 min in recovery.  * Arrhythmia: None.  * The left ventriclewas normal in size.  * There are large, mild defects in the mid to distal  anteroseptal, distal anterior, distal anterolateral, and  apical walls that are reversible suggestive of ischemia.  * There are large, mild to moderate defects in the  inferior, basal to mid inferoseptal, and basal to mid  inferolateral walls that are mostly fixed suggestive of  infarct with mild coleen-infarct ischemia.  * Increased right ventricular tracer uptake is noted on  stress imaging.  * Post-stress gated wall motion analysis was performed  (LVEF = 60 %;LVEDV = 79 ml.) revealing mild hypokinesis of  the inferior, basal inferolateral, and basal inferoseptal  walls and paradoxical septal motion consistent with a  post-operative state.  ------------------------------------------------------------------------  Confirmed on  4/12/2022 - 14:04:08 by Andrea Choudhury M.D.  ------------------------------------------------------------------------    < end of copied text >  < from: Transthoracic Echocardiogram (04.12.22 @ 07:49) >  Conclusions:  1. Normal mitral valve. Mild mitral regurgitation.  2. Normal left ventricular systolic function. No segmental  wall motion abnormalities.  3. Normal right ventricular size and function.  4. Estimated pulmonary artery systolic pressure equals 28  mm Hg, assuming right atrial pressure equals 8  mm Hg,  consistent with normal pulmonary pressures.  ------------------------------------------------------------------------  Confirmed on  4/12/2022 - 16:46:14 by BACILIO Livingston  ------------------------------------------------------------------------    < end of copied text >  < from: Cardiac Cath Lab - Adult (08.17.20 @ 11:00) >  CORONARY VESSELS: The coronary circulation is right dominant.  LM:   --  Proximal left main: Angiography showed severe atherosclerosis.  LAD:   --  LAD: Angiography showed mild moderate diffuse atherosclerosis.  CX:   --  Proximal circumflex: There was a 20 % stenosis at the site of a  prior stent.  --  OM1: There was a 10 % stenosis at the site of a prior stent.  RCA:   --  RCA: Angiography showed minor luminal irregularities with no  flow limiting lesions.  COMPLICATIONS: There were no complications.  DIAGNOSTIC RECOMMENDATIONS: Significant LM disease.  Recommend CTS evaluation.  INTERVENTIONAL RECOMMENDATIONS: Significant LM disease.  Recommend CTS evaluation.  Prepared and signed by  Sandy De Leon D.O.  Signed 08/17/2020 12:49:10    < end of copied text >        This is a 51y/o M bilingual German/English speaking,with no known implantable devices noted COVID 19 negative Vaccinated with Moderna last dose Jan 2022 with  PMHx HTN, HLD, NSTEMI 11/16/19, CAD-KHARI to 60% pCX and 100% OM2,DM2 ([facility]ocrine [facility], Hgba1c unknown, controlled ),  presents with chest pain. Pt reports intermittent chest pain for the past 12 days. Initially came on while he was at rest. Described as sharp, 4/10, left sided w/o radiation, lasted for several hours w/o associated SOB. Since then he has noticed the pain more with exertion- says he is unable to walk more than 10 feet without stopping due to the pain. Pt was referred for Cardiac Cath today with Dr. Bonilla. Currently CP free no sob no lightheadedness noted.   < from: Nuclear Stress Test-Exercise (Nuclear Stress Test-Exercise .) (04.12.22 @ 10:57) >  NUCLEAR FINDINGS:  The left ventricle was normal in size.  There are large, mild defects in the mid to distal  anteroseptal, distal anterior, distal anterolateral, and  apical walls that are reversible suggestiveof ischemia.  There are large, mild to moderate defects in the inferior,  basal to mid inferoseptal, and basal to mid inferolateral  walls that are mostly fixed suggestive of infarct with  mild coleen-infarct ischemia.  Increased right ventricular tracer uptake is noted on  stress imaging.  ------------------------------------------------------------------------  GATED ANALYSIS:  Post-stress gated wall motion analysis was performed (LVEF  = 60 %;LVEDV = 79 ml.) revealing mild hypokinesis of the  inferior, basal inferolateral, and basal inferoseptal  walls and paradoxical septal motion consistent with a  post-operative state.  ------------------------------------------------------------------------  IMPRESSIONS:Abnormal Study  * Exercise capacity: 8 METS, Poor for age and gender.  * Chest Pain: Patient developed chest pain starting  towards the end of stage 2 of exercise and persisted  around 5 min in recovery.  * Symptom: Chest pain.  * HR Response: Appropriate.  * BP Response: Appropriate.  * Heart Rhythm: Sinus Rhythm - 69 BPM.  * Q Waves: Inferior wall MI.  * Baseline ECG: T wave abnormality in leads III and aVF,  inverted at baseline.  * ECG Changes: ST Depression: Approximately 2 mm  horizontal in leads V4-V6 started at 5:12 min ofexercise  at HR of 141 bpm and persisted 07:00 min into recovery.  There was also approximately 0.5 mm ST segment elevation  in lead aVR starting at 5:12 min of exercise and  persisting 7:00 min in recovery.  * Arrhythmia: None.  * The left ventriclewas normal in size.  * There are large, mild defects in the mid to distal  anteroseptal, distal anterior, distal anterolateral, and  apical walls that are reversible suggestive of ischemia.  * There are large, mild to moderate defects in the  inferior, basal to mid inferoseptal, and basal to mid  inferolateral walls that are mostly fixed suggestive of  infarct with mild coleen-infarct ischemia.  * Increased right ventricular tracer uptake is noted on  stress imaging.  * Post-stress gated wall motion analysis was performed  (LVEF = 60 %;LVEDV = 79 ml.) revealing mild hypokinesis of  the inferior, basal inferolateral, and basal inferoseptal  walls and paradoxical septal motion consistent with a  post-operative state.  ------------------------------------------------------------------------  Confirmed on  4/12/2022 - 14:04:08 by Andrea Choudhury M.D.  ------------------------------------------------------------------------    < end of copied text >  < from: Transthoracic Echocardiogram (04.12.22 @ 07:49) >  Conclusions:  1. Normal mitral valve. Mild mitral regurgitation.  2. Normal left ventricular systolic function. No segmental  wall motion abnormalities.  3. Normal right ventricular size and function.  4. Estimated pulmonary artery systolic pressure equals 28  mm Hg, assuming right atrial pressure equals 8  mm Hg,  consistent with normal pulmonary pressures.  ------------------------------------------------------------------------  Confirmed on  4/12/2022 - 16:46:14 by BACILIO Livingston  ------------------------------------------------------------------------    < end of copied text >  < from: Cardiac Cath Lab - Adult (08.17.20 @ 11:00) >  CORONARY VESSELS: The coronary circulation is right dominant.  LM:   --  Proximal left main: Angiography showed severe atherosclerosis.  LAD:   --  LAD: Angiography showed mild moderate diffuse atherosclerosis.  CX:   --  Proximal circumflex: There was a 20 % stenosis at the site of a  prior stent.  --  OM1: There was a 10 % stenosis at the site of a prior stent.  RCA:   --  RCA: Angiography showed minor luminal irregularities with no  flow limiting lesions.  COMPLICATIONS: There were no complications.  DIAGNOSTIC RECOMMENDATIONS: Significant LM disease.  Recommend CTS evaluation.  INTERVENTIONAL RECOMMENDATIONS: Significant LM disease.  Recommend CTS evaluation.  Prepared and signed by  Sandy De Leon D.O.  Signed 08/17/2020 12:49:10    < end of copied text >

## 2022-05-04 NOTE — ASU DISCHARGE PLAN (ADULT/PEDIATRIC) - ASU DC SPECIAL INSTRUCTIONSFT

## 2022-05-05 ENCOUNTER — NON-APPOINTMENT (OUTPATIENT)
Age: 52
End: 2022-05-05

## 2022-05-05 LAB — GLUCOSE BLDC GLUCOMTR-MCNC: 184 MG/DL — HIGH (ref 70–99)

## 2022-05-11 ENCOUNTER — APPOINTMENT (OUTPATIENT)
Dept: CARDIOLOGY | Facility: HOSPITAL | Age: 52
End: 2022-05-11

## 2022-05-11 ENCOUNTER — OUTPATIENT (OUTPATIENT)
Dept: OUTPATIENT SERVICES | Facility: HOSPITAL | Age: 52
LOS: 1 days | End: 2022-05-11
Payer: SELF-PAY

## 2022-05-11 ENCOUNTER — NON-APPOINTMENT (OUTPATIENT)
Age: 52
End: 2022-05-11

## 2022-05-11 VITALS — SYSTOLIC BLOOD PRESSURE: 99 MMHG | OXYGEN SATURATION: 99 % | DIASTOLIC BLOOD PRESSURE: 66 MMHG | HEART RATE: 73 BPM

## 2022-05-11 DIAGNOSIS — Z95.5 PRESENCE OF CORONARY ANGIOPLASTY IMPLANT AND GRAFT: Chronic | ICD-10-CM

## 2022-05-11 DIAGNOSIS — I25.10 ATHEROSCLEROTIC HEART DISEASE OF NATIVE CORONARY ARTERY WITHOUT ANGINA PECTORIS: ICD-10-CM

## 2022-05-11 PROCEDURE — G0463: CPT

## 2022-05-11 PROCEDURE — 93005 ELECTROCARDIOGRAM TRACING: CPT

## 2022-05-11 NOTE — REASON FOR VISIT
[FreeTextEntry1] : Mr. Crocker is a 51 yo Frisian man with a PMHx of DM2, HTN, HLD, NSTEMI 11/16/19 s/p LHC w/ KHARI to 60% pCX and 100% OM2, now s/p 2V CABG 8/18/2020, Left Main KHARI 5/4/22 here for follow up. Pain has resolved. Continues to have pinching pain in the chest and feels some pain the groin at site of procedural access. No numbness or weakness in legs. Continues to work at the [employer] in [place]. No fatigue or shortness of breath. No feelings or lightheadedness, dizziness or fatigue. Able to sleep at night laying flat. \par

## 2022-05-11 NOTE — PHYSICAL EXAM
[Well Developed] : well developed [Well Nourished] : well nourished [No Acute Distress] : no acute distress [Normal Conjunctiva] : normal conjunctiva [Normal Venous Pressure] : normal venous pressure [No Carotid Bruit] : no carotid bruit [Normal S1, S2] : normal S1, S2 [No Murmur] : no murmur [No Rub] : no rub [No Gallop] : no gallop [Clear Lung Fields] : clear lung fields [Good Air Entry] : good air entry [No Respiratory Distress] : no respiratory distress  [Soft] : abdomen soft [Non Tender] : non-tender [No Masses/organomegaly] : no masses/organomegaly [Normal Bowel Sounds] : normal bowel sounds [Normal Gait] : normal gait [No Edema] : no edema [No Cyanosis] : no cyanosis [No Clubbing] : no clubbing [No Varicosities] : no varicosities [Normal DP B/L] : normal DP B/L [No Rash] : no rash [No Skin Lesions] : no skin lesions [Moves all extremities] : moves all extremities [No Focal Deficits] : no focal deficits [Normal Speech] : normal speech [Alert and Oriented] : alert and oriented [Normal memory] : normal memory [de-identified] : Right groin site small hematoma, no echymosis. No bruit.

## 2022-05-11 NOTE — ASSESSMENT
[FreeTextEntry1] : Mr. Crocker is a 50 yo [place] man with a PMHx of poorly controlled DM2, NSTEMI 11/16 s/p LHC w/ KHARI to 60% pCX and 100% OM2, s/p 2V CABG on 8/18 recently a KHARI to left main into Lcx, OM graft closed on 5/4/22 here for follow up.\par \par #Fatigue\par improved. 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 \par -Pt encouraged to make appointment for cardiac rehab\par \par \par #HTN: Well controlled today\par -Continue Toprol XL 50 daily\par -Continue Lisinopril 5 mg daily\par - if needed will discontinue lisinopril at next visit. \par \par #HLD: LDL at goal\par -Continue Lipitor 80\par - LDL 20\par - Will defer ezetemibe and cannot afford PSK-9 inhibitor. \par \par DM\par A1c >10 in 2/22\par Discussed need for control and possibly contributing to blockage\par Lifestyle and diet discussed\par Encouraged f/u with PMD, nutritionist and endocrine if necessary.\par Will recheck at next visit. \par \par RTC 3-6 months or sooner PRN. \par

## 2022-05-30 PROCEDURE — 82962 GLUCOSE BLOOD TEST: CPT

## 2022-05-30 PROCEDURE — C1769: CPT

## 2022-05-30 PROCEDURE — 92978 ENDOLUMINL IVUS OCT C 1ST: CPT | Mod: LM

## 2022-05-30 PROCEDURE — 99153 MOD SED SAME PHYS/QHP EA: CPT

## 2022-05-30 PROCEDURE — C1753: CPT

## 2022-05-30 PROCEDURE — C1874: CPT

## 2022-05-30 PROCEDURE — C1760: CPT

## 2022-05-30 PROCEDURE — 85027 COMPLETE CBC AUTOMATED: CPT

## 2022-05-30 PROCEDURE — 93005 ELECTROCARDIOGRAM TRACING: CPT

## 2022-05-30 PROCEDURE — 93455 CORONARY ART/GRFT ANGIO S&I: CPT | Mod: 59

## 2022-05-30 PROCEDURE — 36415 COLL VENOUS BLD VENIPUNCTURE: CPT

## 2022-05-30 PROCEDURE — C1725: CPT

## 2022-05-30 PROCEDURE — 80053 COMPREHEN METABOLIC PANEL: CPT

## 2022-05-30 PROCEDURE — 99152 MOD SED SAME PHYS/QHP 5/>YRS: CPT

## 2022-05-30 PROCEDURE — C9600: CPT | Mod: LM

## 2022-05-30 PROCEDURE — C1894: CPT

## 2022-05-30 PROCEDURE — C1887: CPT

## 2022-06-07 ENCOUNTER — APPOINTMENT (OUTPATIENT)
Dept: OPHTHALMOLOGY | Facility: CLINIC | Age: 52
End: 2022-06-07
Payer: COMMERCIAL

## 2022-06-07 ENCOUNTER — NON-APPOINTMENT (OUTPATIENT)
Age: 52
End: 2022-06-07

## 2022-06-07 PROCEDURE — 92286 ANT SGM IMG I&R SPECLR MIC: CPT

## 2022-06-07 PROCEDURE — 92014 COMPRE OPH EXAM EST PT 1/>: CPT

## 2022-06-07 PROCEDURE — 92136 OPHTHALMIC BIOMETRY: CPT

## 2022-06-07 PROCEDURE — 92025 CPTRIZED CORNEAL TOPOGRAPHY: CPT

## 2022-06-10 ENCOUNTER — APPOINTMENT (OUTPATIENT)
Dept: INTERNAL MEDICINE | Facility: CLINIC | Age: 52
End: 2022-06-10
Payer: COMMERCIAL

## 2022-06-10 ENCOUNTER — OUTPATIENT (OUTPATIENT)
Dept: OUTPATIENT SERVICES | Facility: HOSPITAL | Age: 52
LOS: 1 days | End: 2022-06-10
Payer: SELF-PAY

## 2022-06-10 ENCOUNTER — RESULT CHARGE (OUTPATIENT)
Age: 52
End: 2022-06-10

## 2022-06-10 VITALS
WEIGHT: 140 LBS | BODY MASS INDEX: 23.32 KG/M2 | HEIGHT: 65 IN | HEART RATE: 76 BPM | OXYGEN SATURATION: 98 % | DIASTOLIC BLOOD PRESSURE: 60 MMHG | SYSTOLIC BLOOD PRESSURE: 120 MMHG

## 2022-06-10 DIAGNOSIS — Z01.818 ENCOUNTER FOR OTHER PREPROCEDURAL EXAMINATION: ICD-10-CM

## 2022-06-10 DIAGNOSIS — Z95.5 PRESENCE OF CORONARY ANGIOPLASTY IMPLANT AND GRAFT: Chronic | ICD-10-CM

## 2022-06-10 DIAGNOSIS — E78.5 HYPERLIPIDEMIA, UNSPECIFIED: ICD-10-CM

## 2022-06-10 PROCEDURE — 83036 HEMOGLOBIN GLYCOSYLATED A1C: CPT

## 2022-06-10 PROCEDURE — 81003 URINALYSIS AUTO W/O SCOPE: CPT

## 2022-06-10 PROCEDURE — G0463: CPT | Mod: 25

## 2022-06-10 PROCEDURE — ZZZZZ: CPT

## 2022-06-15 PROBLEM — Z01.818 PREOPERATIVE EXAMINATION: Status: ACTIVE | Noted: 2022-06-15

## 2022-06-15 PROBLEM — E78.5 HYPERLIPIDEMIA, UNSPECIFIED HYPERLIPIDEMIA TYPE: Status: ACTIVE | Noted: 2020-07-22

## 2022-06-15 NOTE — REVIEW OF SYSTEMS
[Negative] : Genitourinary [Chest Pain] : no chest pain [Claudication] : no  leg claudication [Lower Ext Edema] : no lower extremity edema [Orthopena] : no orthopnea [Paroxysmal Nocturnal Dyspnea] : no paroxysmal nocturnal dyspnea [Shortness Of Breath] : no shortness of breath [Wheezing] : no wheezing [Cough] : no cough [Dyspnea on Exertion] : not dyspnea on exertion

## 2022-06-15 NOTE — ASSESSMENT
[FreeTextEntry1] : 1) had flu vacc this year; had covid vacc x 3, doesn't have dates, will enter.  2) DM - on 70/30 BID, trulicity with that at .75, A1C much better at 7.9.  Will have him push further on carbohydrate, walking.  Can push trulicity or insulin next visit if A1C still > 7.  Microalb also done, negative.  3) HTN - in good control.  4) on appropriate secondary prevention regimen w hi potency atorva, asa/clopidogrel, metoprolol and tolerating.  Has no anginal syx.  5) possesses adequately low c/p risk for low risk procedure - may proceed w cataract.  should take half usual PM dose of 70/30 night before, no insulin AM of, also to hold anti platelet agents as per ophtho preference.  Will fill out form given by [facility].

## 2022-06-15 NOTE — HISTORY OF PRESENT ILLNESS
[Patient Declined  Services] : - None: Patient declined  services [FreeTextEntry1] : Here for f/u of DM, HTN, lipids, CAD s/p CABG. [de-identified] : Doing all right.   Has had no lugo, sob, c/p; activity tolerance better since CAD intervention.  Has no polyuria, polydipsia.  Trying very hard to control. his DM better - having much less carbohydrate, walking.  Did not like his last A1C.  Today is much improved at 7.9.  Tolerating his atorva/clopidogrel/low dose lisinopril/70-30 humulin regimen/metformin/metoprolol.  \par \par Is having a cataract done L eye 6/27 - brings clearance form as well.

## 2022-06-15 NOTE — ASSESSMENT
[FreeTextEntry1] : 1) had flu vacc this year; had covid vacc x 3, doesn't have dates, will enter.  2) DM - on 70/30 BID, trulicity with that at .75, A1C much better at 7.9.  Will have him push further on carbohydrate, walking.  Can push trulicity or insulin next visit if A1C still > 7.  Microalb also done, negative.  3) HTN - in good control.  4) on appropriate secondary prevention regimen w hi potency atorva, asa/clopidogrel, metoprolol and tolerating.  Has no anginal syx.  5) possesses adequately low c/p risk for low risk procedure - may proceed w cataract.  should take half usual PM dose of 70/30 night before, no insulin AM of, also to hold anti platelet agents as per ophtho preference.  Will fill out form given by [facility].

## 2022-06-27 ENCOUNTER — APPOINTMENT (OUTPATIENT)
Dept: OPHTHALMOLOGY | Facility: AMBULATORY MEDICAL SERVICES | Age: 52
End: 2022-06-27

## 2022-06-27 DIAGNOSIS — I10 ESSENTIAL (PRIMARY) HYPERTENSION: ICD-10-CM

## 2022-06-27 DIAGNOSIS — E78.5 HYPERLIPIDEMIA, UNSPECIFIED: ICD-10-CM

## 2022-06-27 DIAGNOSIS — E11.9 TYPE 2 DIABETES MELLITUS WITHOUT COMPLICATIONS: ICD-10-CM

## 2022-06-28 ENCOUNTER — APPOINTMENT (OUTPATIENT)
Dept: OPHTHALMOLOGY | Facility: CLINIC | Age: 52
End: 2022-06-28

## 2022-07-05 ENCOUNTER — APPOINTMENT (OUTPATIENT)
Dept: OPHTHALMOLOGY | Facility: CLINIC | Age: 52
End: 2022-07-05

## 2022-07-07 ENCOUNTER — APPOINTMENT (OUTPATIENT)
Dept: OPHTHALMOLOGY | Facility: CLINIC | Age: 52
End: 2022-07-07

## 2022-07-26 ENCOUNTER — APPOINTMENT (OUTPATIENT)
Dept: OPHTHALMOLOGY | Facility: CLINIC | Age: 52
End: 2022-07-26

## 2022-08-08 NOTE — ED ADULT TRIAGE NOTE - CCCP TRG CHIEF CMPLNT
chest discomfort Abbe Flap (Upper To Lower Lip) Text: The defect of the lower lip was assessed and measured.  Given the location and size of the defect, an Abbe flap was deemed most appropriate.  Using a sterile surgical marker, an appropriate Abbe flap was measured and drawn on the upper lip. Local anesthesia was then infiltrated.  A scalpel was then used to incise the upper lip through and through the skin, vermilion, muscle and mucosa, leaving the flap pedicled on the opposite side.  The flap was then rotated and transferred to the lower lip defect.  The flap was then sutured into place with a three layer technique, closing the orbicularis oris muscle layer with subcutaneous buried sutures, followed by a mucosal layer and an epidermal layer.

## 2022-08-17 ENCOUNTER — RX RENEWAL (OUTPATIENT)
Age: 52
End: 2022-08-17

## 2022-08-26 ENCOUNTER — OUTPATIENT (OUTPATIENT)
Dept: OUTPATIENT SERVICES | Facility: HOSPITAL | Age: 52
LOS: 1 days | End: 2022-08-26
Payer: SELF-PAY

## 2022-08-26 ENCOUNTER — APPOINTMENT (OUTPATIENT)
Dept: INTERNAL MEDICINE | Facility: CLINIC | Age: 52
End: 2022-08-26

## 2022-08-26 VITALS
WEIGHT: 139 LBS | SYSTOLIC BLOOD PRESSURE: 112 MMHG | OXYGEN SATURATION: 97 % | HEIGHT: 65 IN | DIASTOLIC BLOOD PRESSURE: 60 MMHG | HEART RATE: 51 BPM | BODY MASS INDEX: 23.16 KG/M2

## 2022-08-26 DIAGNOSIS — I10 ESSENTIAL (PRIMARY) HYPERTENSION: ICD-10-CM

## 2022-08-26 DIAGNOSIS — Z95.5 PRESENCE OF CORONARY ANGIOPLASTY IMPLANT AND GRAFT: Chronic | ICD-10-CM

## 2022-08-26 DIAGNOSIS — E11.9 TYPE 2 DIABETES MELLITUS WITHOUT COMPLICATIONS: ICD-10-CM

## 2022-08-26 LAB — HBA1C MFR BLD HPLC: 7.2

## 2022-08-26 PROCEDURE — G0463: CPT | Mod: 25

## 2022-08-26 PROCEDURE — ZZZZZ: CPT

## 2022-08-26 PROCEDURE — 83036 HEMOGLOBIN GLYCOSYLATED A1C: CPT

## 2022-08-26 RX ORDER — BLOOD SUGAR DIAGNOSTIC
STRIP MISCELLANEOUS
Qty: 100 | Refills: 3 | Status: ACTIVE | COMMUNITY
Start: 2021-12-10 | End: 1900-01-01

## 2022-08-26 NOTE — PHYSICAL EXAM
[No Acute Distress] : no acute distress [Well Nourished] : well nourished [Well Developed] : well developed [Well-Appearing] : well-appearing [Normal Sclera/Conjunctiva] : normal sclera/conjunctiva [PERRL] : pupils equal round and reactive to light [EOMI] : extraocular movements intact [Normal Outer Ear/Nose] : the outer ears and nose were normal in appearance [Normal Oropharynx] : the oropharynx was normal [No JVD] : no jugular venous distention [No Lymphadenopathy] : no lymphadenopathy [Supple] : supple [Thyroid Normal, No Nodules] : the thyroid was normal and there were no nodules present [No Respiratory Distress] : no respiratory distress  [No Accessory Muscle Use] : no accessory muscle use [Clear to Auscultation] : lungs were clear to auscultation bilaterally [Normal Rate] : normal rate  [Regular Rhythm] : with a regular rhythm [Normal S1, S2] : normal S1 and S2 [No Murmur] : no murmur heard [No Abdominal Bruit] : a ~M bruit was not heard ~T in the abdomen [No Varicosities] : no varicosities [Pedal Pulses Present] : the pedal pulses are present [No Edema] : there was no peripheral edema [No Extremity Clubbing/Cyanosis] : no extremity clubbing/cyanosis [Soft] : abdomen soft [Non Tender] : non-tender [Non-distended] : non-distended [No Masses] : no abdominal mass palpated [No HSM] : no HSM [Normal Bowel Sounds] : normal bowel sounds [Normal Posterior Cervical Nodes] : no posterior cervical lymphadenopathy [Normal Anterior Cervical Nodes] : no anterior cervical lymphadenopathy [No CVA Tenderness] : no CVA  tenderness [No Spinal Tenderness] : no spinal tenderness [No Joint Swelling] : no joint swelling [Grossly Normal Strength/Tone] : grossly normal strength/tone [No Rash] : no rash [Coordination Grossly Intact] : coordination grossly intact [No Focal Deficits] : no focal deficits [Normal Gait] : normal gait [Deep Tendon Reflexes (DTR)] : deep tendon reflexes were 2+ and symmetric [Normal Affect] : the affect was normal [Normal Insight/Judgement] : insight and judgment were intact [Comprehensive Foot Exam Normal] : Right and left foot were examined and both feet are normal. No ulcers in either foot. Toes are normal and with full ROM.  Normal tactile sensation with monofilament testing throughout both feet

## 2022-08-29 NOTE — COUNSELING
CNN visit with Warren in infusion room today during D1C1 R-CHOP every 21 days x 6 cycles. Pierce Menendez is accompanied by his wife and daughter in infusion room. Reviewed resources available (integrative medicine, RD, SW, [facility] and Cancer rehab). Denies any additional needs at this time. Support and encouragement provided. [Encouraged to increase physical activity] : Encouraged to increase physical activity [Patient Non-adherent to care plan] : Patient non-adherent to care plan [Financial issues] : Financial issues [Needs reinforcement, provided] : Patient needs reinforcement on understanding of lifestyle changes and steps needed to achieve self management goal; reinforcement was provided [de-identified] : Encouraged patient to reduce carbohydrate intake. Encouraged patient to increase vegetable intake. Encouraged patient to increase exercise activity after meals.

## 2022-08-29 NOTE — HISTORY OF PRESENT ILLNESS
[FreeTextEntry1] : F/u DM and BP  [de-identified] : Patient is a 53 yo M with PMHx of htn, hyperlipidemia, DM, NSTEMI s/p 2 vessel CABG, C with PCI and KHARI, here for DM and BP follow-up. Patient reports he has been taking his medications as prescribed and he has eaten less. Patient reports his diet consists mainly of bread, coffee, rice and meat, with occasional vegetables. Patient reports he is unable to change his diet due to lack of control over what he eats. He has not been measuring his blood sugars daily. Last measured blood sugar was over a month ago. Patient reports blood sugar range between . Patient reports limited exercise. \par Patient is also here for BP check. Patient's initial BP was 112/60, repeat BP at 92/56. \par Patient reports he is unable to see an ophthalmologist for his cataract surgery because of insurance coverage concerns.  detailed exam

## 2022-09-28 ENCOUNTER — APPOINTMENT (OUTPATIENT)
Dept: CARDIOLOGY | Facility: HOSPITAL | Age: 52
End: 2022-09-28

## 2022-09-28 VITALS
HEIGHT: 65 IN | SYSTOLIC BLOOD PRESSURE: 128 MMHG | HEART RATE: 71 BPM | DIASTOLIC BLOOD PRESSURE: 81 MMHG | OXYGEN SATURATION: 97 % | WEIGHT: 139 LBS | BODY MASS INDEX: 23.16 KG/M2

## 2022-10-11 ENCOUNTER — APPOINTMENT (OUTPATIENT)
Dept: CARDIOLOGY | Facility: HOSPITAL | Age: 52
End: 2022-10-11

## 2022-10-11 ENCOUNTER — OUTPATIENT (OUTPATIENT)
Dept: OUTPATIENT SERVICES | Facility: HOSPITAL | Age: 52
LOS: 1 days | End: 2022-10-11
Payer: SELF-PAY

## 2022-10-11 VITALS
WEIGHT: 139 LBS | BODY MASS INDEX: 23.16 KG/M2 | SYSTOLIC BLOOD PRESSURE: 112 MMHG | HEIGHT: 65 IN | HEART RATE: 82 BPM | OXYGEN SATURATION: 99 % | DIASTOLIC BLOOD PRESSURE: 75 MMHG

## 2022-10-11 DIAGNOSIS — I25.10 ATHEROSCLEROTIC HEART DISEASE OF NATIVE CORONARY ARTERY WITHOUT ANGINA PECTORIS: ICD-10-CM

## 2022-10-11 DIAGNOSIS — Z95.5 PRESENCE OF CORONARY ANGIOPLASTY IMPLANT AND GRAFT: Chronic | ICD-10-CM

## 2022-10-11 PROCEDURE — G0463: CPT

## 2022-10-11 PROCEDURE — 93005 ELECTROCARDIOGRAM TRACING: CPT

## 2022-10-11 RX ORDER — LANCETS 33 GAUGE
EACH MISCELLANEOUS
Qty: 90 | Refills: 3 | Status: ACTIVE | COMMUNITY
Start: 2017-07-17 | End: 1900-01-01

## 2022-10-11 RX ORDER — CALCIUM CARB/VITAMIN D3/VIT K1 500-100-40
31G X 5/16" TABLET,CHEWABLE ORAL
Qty: 1 | Refills: 5 | Status: ACTIVE | COMMUNITY
Start: 2021-09-03 | End: 1900-01-01

## 2022-10-11 RX ORDER — BENZOCAINE .06; .7 ML/1; ML/1
6-70 SWAB TOPICAL
Qty: 1 | Refills: 2 | Status: ACTIVE | COMMUNITY
Start: 2021-12-10 | End: 1900-01-01

## 2022-10-11 NOTE — HISTORY OF PRESENT ILLNESS
[FreeTextEntry1] : Mr. Crocker is a 51 yo Azer[place] man with a PMHx of DM2, HTN, HLD, NSTEMI 11/16/19 s/p LHC w/ KHARI to 60% pCX and 100% OM2, now s/p 2V CABG 8/18/2020, Left Main KHARI 5/4/22 here for follow up. Pain has resolved. States he has resumed his walking exercises and denies any chest pain or SOB. Overall feels significantly improved since last visit\par

## 2022-10-11 NOTE — ASSESSMENT
[FreeTextEntry1] : Mr. Crocker is a 52 yo German man with a PMHx of poorly controlled DM2, NSTEMI 11/16 s/p LHC w/ KHARI to 60% pCX and 100% OM2, s/p 2V CABG on 8/18 recently a KHARI to left main into Lcx, OM graft closed on 5/4/22 here for follow up.\par \par #CAD: S/p NSTEMI 11/2019, now s/p 2V CABG for U/A 8/2020\par -Continue asa 81 daily\par c/w plavix 75mg daily\par -Continue atorvastatin 40 daily\par - c/w Toprol XL 50 mg daily\par -Continue Lisinopril 5 mg daily\par \par \par #HTN: Well controlled today\par -Continue Toprol XL 50 daily\par -Continue Lisinopril 5 mg daily\par \par #HLD: LDL at goal\par -Continue Lipitor 80\par - LDL 20\par \par DM\par A1c >10 in 2/22 now 7.2\par Discussed need for control and possibly contributing to blockage\par Lifestyle and diet discussed. Now on insulin\par Encouraged f/u with PMD, nutritionist and endocrine if necessary.\par \par \par RTC 3- 6 months or sooner PRN. \par

## 2022-12-09 ENCOUNTER — APPOINTMENT (OUTPATIENT)
Dept: INTERNAL MEDICINE | Facility: CLINIC | Age: 52
End: 2022-12-09

## 2022-12-09 ENCOUNTER — NON-APPOINTMENT (OUTPATIENT)
Age: 52
End: 2022-12-09

## 2022-12-09 ENCOUNTER — OUTPATIENT (OUTPATIENT)
Dept: OUTPATIENT SERVICES | Facility: HOSPITAL | Age: 52
LOS: 1 days | End: 2022-12-09
Payer: SELF-PAY

## 2022-12-09 VITALS
BODY MASS INDEX: 23.66 KG/M2 | WEIGHT: 142 LBS | HEART RATE: 66 BPM | DIASTOLIC BLOOD PRESSURE: 80 MMHG | OXYGEN SATURATION: 98 % | HEIGHT: 65 IN | SYSTOLIC BLOOD PRESSURE: 114 MMHG

## 2022-12-09 DIAGNOSIS — Z95.5 PRESENCE OF CORONARY ANGIOPLASTY IMPLANT AND GRAFT: Chronic | ICD-10-CM

## 2022-12-09 DIAGNOSIS — I10 ESSENTIAL (PRIMARY) HYPERTENSION: ICD-10-CM

## 2022-12-09 DIAGNOSIS — Z23 ENCOUNTER FOR IMMUNIZATION: ICD-10-CM

## 2022-12-09 DIAGNOSIS — Z80.8 FAMILY HISTORY OF MALIGNANT NEOPLASM OF OTHER ORGANS OR SYSTEMS: ICD-10-CM

## 2022-12-09 DIAGNOSIS — Z78.9 OTHER SPECIFIED HEALTH STATUS: ICD-10-CM

## 2022-12-09 PROCEDURE — 82746 ASSAY OF FOLIC ACID SERUM: CPT

## 2022-12-09 PROCEDURE — 80061 LIPID PANEL: CPT

## 2022-12-09 PROCEDURE — 85025 COMPLETE CBC W/AUTO DIFF WBC: CPT

## 2022-12-09 PROCEDURE — ZZZZZ: CPT

## 2022-12-09 PROCEDURE — 80053 COMPREHEN METABOLIC PANEL: CPT

## 2022-12-09 PROCEDURE — G0008: CPT

## 2022-12-09 PROCEDURE — 90688 IIV4 VACCINE SPLT 0.5 ML IM: CPT

## 2022-12-09 PROCEDURE — 82607 VITAMIN B-12: CPT

## 2022-12-09 PROCEDURE — G0103: CPT

## 2022-12-09 PROCEDURE — 36415 COLL VENOUS BLD VENIPUNCTURE: CPT

## 2022-12-09 PROCEDURE — 82043 UR ALBUMIN QUANTITATIVE: CPT

## 2022-12-09 PROCEDURE — 83036 HEMOGLOBIN GLYCOSYLATED A1C: CPT

## 2022-12-09 RX ORDER — ATORVASTATIN CALCIUM 80 MG/1
80 TABLET, FILM COATED ORAL
Qty: 90 | Refills: 3 | Status: DISCONTINUED | COMMUNITY
Start: 2019-11-26 | End: 2022-12-09

## 2022-12-09 RX ORDER — LISINOPRIL 5 MG/1
5 TABLET ORAL DAILY
Qty: 90 | Refills: 3 | Status: DISCONTINUED | COMMUNITY
Start: 2019-11-26 | End: 2022-12-09

## 2022-12-10 ENCOUNTER — NON-APPOINTMENT (OUTPATIENT)
Age: 52
End: 2022-12-10

## 2022-12-11 NOTE — HISTORY OF PRESENT ILLNESS
[FreeTextEntry1] : CPE \par f/u DM and htn  [de-identified] : 51 yo M with PMHx of hypertension, hyperlipidemia, T2DM, and NSTEMI s/p 3 stents and CABG x 2 presents for CPE. Patient reports only concern is chest pain. \par \par Chest Pain \par - A/w lifting and pulling heavy objects while moving\par - Started 1 week ago\par - Intermittent and improved over the span of 1-2 days\par - Now barely painful \par - Not a/w dizziness, shortness of breath, sweating, or palpitations\par - Pain is reproducible when placing right arm across chest\par - Localized only to 1 cm to the right of the sternum and does not radiate \par \par DM \par - POC a1c now 7.6, increased from 7.2 \par - Patient reports increasing soda and sugar intake \par - Patient reports not taking insulin and medications as prescribed \par - Patient reports poor diet and exercise \par - Patient scheduled for follow-up with ophthalmology, but appt was cancelled \par \par Htn \par - Patient reports taking medication as prescribed \par - BP well controlled at this visit

## 2022-12-11 NOTE — PHYSICAL EXAM
[No Acute Distress] : no acute distress [Well Nourished] : well nourished [Well Developed] : well developed [Well-Appearing] : well-appearing [Normal Sclera/Conjunctiva] : normal sclera/conjunctiva [PERRL] : pupils equal round and reactive to light [EOMI] : extraocular movements intact [Normal Outer Ear/Nose] : the outer ears and nose were normal in appearance [Normal Oropharynx] : the oropharynx was normal [No JVD] : no jugular venous distention [No Lymphadenopathy] : no lymphadenopathy [Supple] : supple [Thyroid Normal, No Nodules] : the thyroid was normal and there were no nodules present [No Respiratory Distress] : no respiratory distress  [No Accessory Muscle Use] : no accessory muscle use [Clear to Auscultation] : lungs were clear to auscultation bilaterally [Normal Rate] : normal rate  [Regular Rhythm] : with a regular rhythm [Normal S1, S2] : normal S1 and S2 [No Murmur] : no murmur heard [No Varicosities] : no varicosities [Pedal Pulses Present] : the pedal pulses are present [No Edema] : there was no peripheral edema [No Palpable Aorta] : no palpable aorta [No Extremity Clubbing/Cyanosis] : no extremity clubbing/cyanosis [Soft] : abdomen soft [Non Tender] : non-tender [Non-distended] : non-distended [No Masses] : no abdominal mass palpated [No HSM] : no HSM [Normal Bowel Sounds] : normal bowel sounds [Normal Posterior Cervical Nodes] : no posterior cervical lymphadenopathy [Normal Anterior Cervical Nodes] : no anterior cervical lymphadenopathy [No CVA Tenderness] : no CVA  tenderness [No Spinal Tenderness] : no spinal tenderness [No Joint Swelling] : no joint swelling [Grossly Normal Strength/Tone] : grossly normal strength/tone [No Rash] : no rash [Coordination Grossly Intact] : coordination grossly intact [No Focal Deficits] : no focal deficits [Normal Gait] : normal gait [Deep Tendon Reflexes (DTR)] : deep tendon reflexes were 2+ and symmetric [Normal Affect] : the affect was normal [Normal Insight/Judgement] : insight and judgment were intact [Comprehensive Foot Exam Normal] : Right and left foot were examined and both feet are normal. No ulcers in either foot. Toes are normal and with full ROM.  Normal tactile sensation with monofilament testing throughout both feet [de-identified] : Chest pain reproducible on internal rotation of right arm across chest

## 2022-12-11 NOTE — END OF VISIT
[] : Resident [FreeTextEntry3] : To have clinic director get involved i f needed to have pt seen in our [facility] clinic for cataract surgery

## 2022-12-11 NOTE — COUNSELING
[Fall prevention counseling provided] : Fall prevention counseling provided [Behavioral health counseling provided] : Behavioral health counseling provided [Engage in a relaxing activity] : Engage in a relaxing activity [Potential consequences of obesity discussed] : Potential consequences of obesity discussed [Encouraged to increase physical activity] : Encouraged to increase physical activity [Inadequate social support] : Inadequate social support [Financial issues] : Financial issues [Needs reinforcement, provided] : Patient needs reinforcement on understanding of lifestyle changes and steps needed to achieve self management goal; reinforcement was provided [FreeTextEntry4] : 15

## 2022-12-11 NOTE — HEALTH RISK ASSESSMENT
[Good] : ~his/her~  mood as  good [Never] : Never [Yes] : Yes [1 or 2 (0 pts)] : 1 or 2 (0 points) [Never (0 pts)] : Never (0 points) [One fall no injury in past year] : Patient reported one fall in the past year without injury [0] : 1) Little interest or pleasure doing things: Not at all (0) [1] : 2) Feeling down, depressed, or hopeless for several days (1) [PHQ-2 Negative - No further assessment needed] : PHQ-2 Negative - No further assessment needed [Alone] : lives alone [Employed] : employed [College] : College [] :  [# Of Children ___] : has [unfilled] children [Feels Safe at Home] : Feels safe at home [Fully functional (bathing, dressing, toileting, transferring, walking, feeding)] : Fully functional (bathing, dressing, toileting, transferring, walking, feeding) [Fully functional (using the telephone, shopping, preparing meals, housekeeping, doing laundry, using] : Fully functional and needs no help or supervision to perform IADLs (using the telephone, shopping, preparing meals, housekeeping, doing laundry, using transportation, managing medications and managing finances) [de-identified] : cardiology  [de-identified] : Walking  [de-identified] : Rice, chicken, beef, lamb, guerrero, vegetables (eggplants, benas, potatoes, cauliflower)  [Sexually Active] : not sexually active [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

## 2022-12-11 NOTE — ASSESSMENT
[FreeTextEntry1] : #HCM \par - CBC, CMP, PSA, Lipid panel, B12/Folate, urine protein/creatinine sent today \par - Flu vaccine today \par - Schedule patient for Shingrix vaccine \par - GI referral sent for colonoscopy, patient states he is afraid of colonoscopy but will consider it. FIT test provided. \par - RTC in 3-4 months for DM follow-up \par - Enrolled patient in [employer] today

## 2022-12-13 DIAGNOSIS — Z80.8 FAMILY HISTORY OF MALIGNANT NEOPLASM OF OTHER ORGANS OR SYSTEMS: ICD-10-CM

## 2022-12-13 DIAGNOSIS — R07.9 CHEST PAIN, UNSPECIFIED: ICD-10-CM

## 2022-12-13 DIAGNOSIS — Z00.00 ENCOUNTER FOR GENERAL ADULT MEDICAL EXAMINATION WITHOUT ABNORMAL FINDINGS: ICD-10-CM

## 2022-12-13 DIAGNOSIS — Z23 ENCOUNTER FOR IMMUNIZATION: ICD-10-CM

## 2022-12-13 LAB
ALBUMIN SERPL ELPH-MCNC: 4.8 G/DL
ALP BLD-CCNC: 84 U/L
ALT SERPL-CCNC: 35 U/L
ANION GAP SERPL CALC-SCNC: 10 MMOL/L
AST SERPL-CCNC: 59 U/L
BASOPHILS # BLD AUTO: 0.04 K/UL
BASOPHILS NFR BLD AUTO: 0.4 %
BILIRUB SERPL-MCNC: 0.6 MG/DL
BUN SERPL-MCNC: 14 MG/DL
CALCIUM SERPL-MCNC: 10.3 MG/DL
CHLORIDE SERPL-SCNC: 102 MMOL/L
CHOLEST SERPL-MCNC: 118 MG/DL
CO2 SERPL-SCNC: 29 MMOL/L
CREAT SERPL-MCNC: 0.93 MG/DL
CREAT SPEC-SCNC: 31 MG/DL
EGFR: 98 ML/MIN/1.73M2
EOSINOPHIL # BLD AUTO: 0.19 K/UL
EOSINOPHIL NFR BLD AUTO: 1.8 %
FOLATE SERPL-MCNC: >20 NG/ML
GLUCOSE SERPL-MCNC: 99 MG/DL
HCT VFR BLD CALC: 40.7 %
HDLC SERPL-MCNC: 58 MG/DL
HGB BLD-MCNC: 13.7 G/DL
IMM GRANULOCYTES NFR BLD AUTO: 0.3 %
LDLC SERPL CALC-MCNC: 42 MG/DL
LYMPHOCYTES # BLD AUTO: 4.13 K/UL
LYMPHOCYTES NFR BLD AUTO: 38.1 %
MAN DIFF?: NORMAL
MCHC RBC-ENTMCNC: 31.6 PG
MCHC RBC-ENTMCNC: 33.7 GM/DL
MCV RBC AUTO: 94 FL
MICROALBUMIN 24H UR DL<=1MG/L-MCNC: <1.2 MG/DL
MICROALBUMIN/CREAT 24H UR-RTO: NORMAL MG/G
MONOCYTES # BLD AUTO: 0.69 K/UL
MONOCYTES NFR BLD AUTO: 6.4 %
NEUTROPHILS # BLD AUTO: 5.75 K/UL
NEUTROPHILS NFR BLD AUTO: 53 %
NONHDLC SERPL-MCNC: 60 MG/DL
PLATELET # BLD AUTO: 255 K/UL
POTASSIUM SERPL-SCNC: 5 MMOL/L
PROT SERPL-MCNC: 7.4 G/DL
PSA SERPL-MCNC: 0.59 NG/ML
RBC # BLD: 4.33 M/UL
RBC # FLD: 14.9 %
SODIUM SERPL-SCNC: 140 MMOL/L
TRIGL SERPL-MCNC: 87 MG/DL
VIT B12 SERPL-MCNC: 817 PG/ML
WBC # FLD AUTO: 10.83 K/UL

## 2023-01-29 LAB — HBA1C MFR BLD HPLC: 7.6

## 2023-03-24 ENCOUNTER — APPOINTMENT (OUTPATIENT)
Dept: INTERNAL MEDICINE | Facility: CLINIC | Age: 53
End: 2023-03-24

## 2023-04-04 ENCOUNTER — APPOINTMENT (OUTPATIENT)
Dept: OPHTHALMOLOGY | Facility: CLINIC | Age: 53
End: 2023-04-04

## 2023-04-04 ENCOUNTER — NON-APPOINTMENT (OUTPATIENT)
Age: 53
End: 2023-04-04

## 2023-04-04 ENCOUNTER — APPOINTMENT (OUTPATIENT)
Dept: OPHTHALMOLOGY | Facility: CLINIC | Age: 53
End: 2023-04-04
Payer: COMMERCIAL

## 2023-04-04 PROCEDURE — 92136 OPHTHALMIC BIOMETRY: CPT

## 2023-04-04 PROCEDURE — 92134 CPTRZ OPH DX IMG PST SGM RTA: CPT

## 2023-04-04 PROCEDURE — 92012 INTRM OPH EXAM EST PATIENT: CPT

## 2023-04-14 ENCOUNTER — RX RENEWAL (OUTPATIENT)
Age: 53
End: 2023-04-14

## 2023-05-10 ENCOUNTER — APPOINTMENT (OUTPATIENT)
Dept: INTERNAL MEDICINE | Facility: CLINIC | Age: 53
End: 2023-05-10
Payer: COMMERCIAL

## 2023-05-10 ENCOUNTER — OUTPATIENT (OUTPATIENT)
Dept: OUTPATIENT SERVICES | Facility: HOSPITAL | Age: 53
LOS: 1 days | End: 2023-05-10
Payer: SELF-PAY

## 2023-05-10 ENCOUNTER — NON-APPOINTMENT (OUTPATIENT)
Age: 53
End: 2023-05-10

## 2023-05-10 VITALS
HEIGHT: 65 IN | SYSTOLIC BLOOD PRESSURE: 116 MMHG | HEART RATE: 74 BPM | DIASTOLIC BLOOD PRESSURE: 80 MMHG | WEIGHT: 143 LBS | BODY MASS INDEX: 23.82 KG/M2 | OXYGEN SATURATION: 97 %

## 2023-05-10 DIAGNOSIS — I10 ESSENTIAL (PRIMARY) HYPERTENSION: ICD-10-CM

## 2023-05-10 DIAGNOSIS — G89.29 PAIN IN LEFT SHOULDER: ICD-10-CM

## 2023-05-10 DIAGNOSIS — M25.512 PAIN IN LEFT SHOULDER: ICD-10-CM

## 2023-05-10 DIAGNOSIS — Z00.00 ENCOUNTER FOR GENERAL ADULT MEDICAL EXAMINATION WITHOUT ABNORMAL FINDINGS: ICD-10-CM

## 2023-05-10 DIAGNOSIS — R07.9 CHEST PAIN, UNSPECIFIED: ICD-10-CM

## 2023-05-10 DIAGNOSIS — Z95.5 PRESENCE OF CORONARY ANGIOPLASTY IMPLANT AND GRAFT: Chronic | ICD-10-CM

## 2023-05-10 DIAGNOSIS — E11.9 TYPE 2 DIABETES MELLITUS WITHOUT COMPLICATIONS: ICD-10-CM

## 2023-05-10 PROCEDURE — 84100 ASSAY OF PHOSPHORUS: CPT

## 2023-05-10 PROCEDURE — 83735 ASSAY OF MAGNESIUM: CPT

## 2023-05-10 PROCEDURE — 80061 LIPID PANEL: CPT

## 2023-05-10 PROCEDURE — G0463: CPT

## 2023-05-10 PROCEDURE — ZZZZZ: CPT | Mod: GE

## 2023-05-10 PROCEDURE — 83036 HEMOGLOBIN GLYCOSYLATED A1C: CPT

## 2023-05-10 PROCEDURE — 80053 COMPREHEN METABOLIC PANEL: CPT

## 2023-05-10 PROCEDURE — 84484 ASSAY OF TROPONIN QUANT: CPT

## 2023-05-10 PROCEDURE — 85025 COMPLETE CBC W/AUTO DIFF WBC: CPT

## 2023-05-10 PROCEDURE — 82553 CREATINE MB FRACTION: CPT

## 2023-05-10 RX ORDER — CLOPIDOGREL BISULFATE 75 MG/1
75 TABLET, FILM COATED ORAL DAILY
Qty: 90 | Refills: 3 | Status: DISCONTINUED | COMMUNITY
Start: 2022-05-11 | End: 2023-05-10

## 2023-05-10 RX ORDER — GABAPENTIN 100 MG/1
100 CAPSULE ORAL
Qty: 30 | Refills: 2 | Status: COMPLETED | COMMUNITY
Start: 2021-10-13 | End: 2023-05-10

## 2023-05-11 LAB
ALBUMIN SERPL ELPH-MCNC: 4.4 G/DL
ALP BLD-CCNC: 84 U/L
ALT SERPL-CCNC: 22 U/L
ANION GAP SERPL CALC-SCNC: 13 MMOL/L
AST SERPL-CCNC: 25 U/L
BILIRUB SERPL-MCNC: 0.5 MG/DL
BUN SERPL-MCNC: 14 MG/DL
CALCIUM SERPL-MCNC: 9.8 MG/DL
CHLORIDE SERPL-SCNC: 105 MMOL/L
CHOLEST SERPL-MCNC: 107 MG/DL
CK MB BLD-MCNC: 7.7 NG/ML
CO2 SERPL-SCNC: 24 MMOL/L
CREAT SERPL-MCNC: 0.87 MG/DL
EGFR: 102 ML/MIN/1.73M2
ESTIMATED AVERAGE GLUCOSE: 189 MG/DL
GLUCOSE SERPL-MCNC: 134 MG/DL
HBA1C MFR BLD HPLC: 8.2 %
HDLC SERPL-MCNC: 53 MG/DL
LDLC SERPL CALC-MCNC: 34 MG/DL
MAGNESIUM SERPL-MCNC: 1.7 MG/DL
NONHDLC SERPL-MCNC: 54 MG/DL
PHOSPHATE SERPL-MCNC: 2.8 MG/DL
POTASSIUM SERPL-SCNC: 4.8 MMOL/L
PROT SERPL-MCNC: 7.4 G/DL
SODIUM SERPL-SCNC: 142 MMOL/L
TRIGL SERPL-MCNC: 101 MG/DL
TROPONIN-T, HIGH SENSITIVITY: 22 NG/L

## 2023-05-13 NOTE — ED PROVIDER NOTE - CHPI ED SYMPTOMS POS
Attempted to call report to [facility] was informed the nurse is passing meds and to call back at 1330, pt is getting picked up at 2 pm. 1325 second attempt to give report to [facility], nurse was unable to take report.    PAIN

## 2023-07-14 NOTE — ASSESSMENT
[FreeTextEntry1] : 53M hx HTN, HLD, DM2, NSTEMI (s/p stent x3 and CABG x2) presenting for follow up.\par \par #HCM\par - RTC in *****\par - Cancer screening: colonoscopy [], mammogram [], pap []\par - Other screening: HIV [], HCV [], osteoporosis [], PHQ2/9 []\par - Vax: Flu [], Tdap [], Zoster [], Covid []\par - Routine labs ordered: CBC [], CMP  [], Lipid panel [], A1c []\par \par VI BUI MD, PhD - PGY-1\par 05/10/2023

## 2023-07-14 NOTE — HISTORY OF PRESENT ILLNESS
[FreeTextEntry1] : 53M hx HTN, HLD, DM2, NSTEMI (s/p stent x3 and CABG x2) presenting for follow up. [de-identified] : #Atypical chest pain\par #HLD/NSTEMI\par #HTN\par 12/2022 patient in clinic for CP evaluation, deemed likely MSK after EKG unremarkable, no signs of ischemic changes, reproducible pain on MSK exam. F/u with cardiology as previously scheduled (has not seen them yet). Again today reports itchiness at scar where prior bypass surgeries were done. Has L sided pressure 1x/week, lasting 5-10 mins to hours, no palpitations or diaphoresis or radiation. Occurs with sitting or activities. Similar pain to prior NSTEMI but not nearly as bad. Has pain down arm when walking ocassionally. Patient last stent 5/4/2022 to left main. Currently taking ASA, not taking Plavix. Taking Lipitor 80mg qHS, Metoprolol. BP controlled, typically SBP <120. On Metoprolol 50mg and supposedly still on Lisinopril (though was supposed to be on Valsartan 40mg qD due to [employer] formulary). No smoking or EtOH intake. Follows with cardiology, last seen 10/11/2022. Takes supplements: B complex, multivitamin, magnesium.\par - Labs and cardiology f/u, Dr. Velez contacted on teams, will try to expedite cardiology f/u for possible need of diagnostic LHC to assess for stent/graft patency\par \par #L shoulder decreased ROM in all directions\par No injury to the area. Sleeps on that side. No pain a/w movement. No hx or fam hx of rheumatologic conditions.\par - PT referral\par \par #DM2\par A1c increasing 7.2%->7.6%. Previously instructed on decreased soda/sugar intake. Reported not taking insulin and medications as prescribed. Poor diet and exercise. Taking Metformin 1g BID, Trulicity 0.75mg, Humulin 70/20 BID. Not monitoring BG daily, when he does check it is in the morning with values . Denies shaking, lightheadedness, sweating, or other hypoglycemic symptoms. Is still trying to diet more to reduce carbohydrates. Too busy to exercise currently working 12 hours shifts.\par \par #Cataracts\par F/u with optho, with plan for cataract surgery penind sliding scale renewel (done today). Had diabetic eye exam during cataract evaluation.

## 2023-07-14 NOTE — HEALTH RISK ASSESSMENT
[1 or 2 (0 pts)] : 1 or 2 (0 points) [Never (0 pts)] : Never (0 points) [No] : In the past 12 months have you used drugs other than those required for medical reasons? No [0] : 2) Feeling down, depressed, or hopeless: Not at all (0) [PHQ-2 Negative - No further assessment needed] : PHQ-2 Negative - No further assessment needed [Never] : Never [0-4] : 0-4 [Audit-CScore] : 0 [UIH2Ftyya] : 0

## 2023-07-14 NOTE — REVIEW OF SYSTEMS
[Chest Pain] : chest pain [Fever] : no fever [Chills] : no chills [Palpitations] : no palpitations [Lower Ext Edema] : no lower extremity edema [Orthopena] : no orthopnea [Shortness Of Breath] : no shortness of breath [Abdominal Pain] : no abdominal pain [Nausea] : no nausea [Incontinence] : no incontinence [Hesitancy] : no hesitancy [Joint Pain] : no joint pain [Back Pain] : no back pain [Headache] : no headache [Dizziness] : no dizziness [Anxiety] : no anxiety [Depression] : no depression

## 2023-07-14 NOTE — PHYSICAL EXAM
[No Acute Distress] : no acute distress [Well Nourished] : well nourished [Well Developed] : well developed [Well-Appearing] : well-appearing [Normal Sclera/Conjunctiva] : normal sclera/conjunctiva [EOMI] : extraocular movements intact [No JVD] : no jugular venous distention [No Lymphadenopathy] : no lymphadenopathy [No Respiratory Distress] : no respiratory distress  [No Accessory Muscle Use] : no accessory muscle use [Clear to Auscultation] : lungs were clear to auscultation bilaterally [Normal Rate] : normal rate  [Regular Rhythm] : with a regular rhythm [Normal S1, S2] : normal S1 and S2 [No Murmur] : no murmur heard [No Varicosities] : no varicosities [Pedal Pulses Present] : the pedal pulses are present [No Edema] : there was no peripheral edema [Soft] : abdomen soft [Non Tender] : non-tender [Non-distended] : non-distended [No HSM] : no HSM [No CVA Tenderness] : no CVA  tenderness [No Spinal Tenderness] : no spinal tenderness [No Joint Swelling] : no joint swelling [Grossly Normal Strength/Tone] : grossly normal strength/tone [No Rash] : no rash [Coordination Grossly Intact] : coordination grossly intact [No Focal Deficits] : no focal deficits [Normal Affect] : the affect was normal [Normal Insight/Judgement] : insight and judgment were intact [de-identified] : chest pressure not reproducible on exam [de-identified] : L shoulder decreased ROM in all direction

## 2023-07-25 ENCOUNTER — APPOINTMENT (OUTPATIENT)
Dept: GASTROENTEROLOGY | Facility: HOSPITAL | Age: 53
End: 2023-07-25

## 2023-08-07 ENCOUNTER — RX RENEWAL (OUTPATIENT)
Age: 53
End: 2023-08-07

## 2023-09-28 ENCOUNTER — RX RENEWAL (OUTPATIENT)
Age: 53
End: 2023-09-28

## 2023-10-27 ENCOUNTER — OUTPATIENT (OUTPATIENT)
Dept: OUTPATIENT SERVICES | Facility: HOSPITAL | Age: 53
LOS: 1 days | End: 2023-10-27
Payer: SELF-PAY

## 2023-10-27 ENCOUNTER — APPOINTMENT (OUTPATIENT)
Dept: INTERNAL MEDICINE | Facility: CLINIC | Age: 53
End: 2023-10-27
Payer: COMMERCIAL

## 2023-10-27 VITALS
OXYGEN SATURATION: 98 % | HEART RATE: 75 BPM | HEIGHT: 65 IN | BODY MASS INDEX: 23.49 KG/M2 | SYSTOLIC BLOOD PRESSURE: 128 MMHG | WEIGHT: 141 LBS | DIASTOLIC BLOOD PRESSURE: 70 MMHG

## 2023-10-27 DIAGNOSIS — Z95.5 PRESENCE OF CORONARY ANGIOPLASTY IMPLANT AND GRAFT: Chronic | ICD-10-CM

## 2023-10-27 DIAGNOSIS — I10 ESSENTIAL (PRIMARY) HYPERTENSION: ICD-10-CM

## 2023-10-27 LAB — HBA1C MFR BLD HPLC: 9.1

## 2023-10-27 PROCEDURE — 83036 HEMOGLOBIN GLYCOSYLATED A1C: CPT

## 2023-10-27 PROCEDURE — G0463: CPT | Mod: 25

## 2023-10-27 PROCEDURE — 90750 HZV VACC RECOMBINANT IM: CPT

## 2023-10-27 PROCEDURE — 99214 OFFICE O/P EST MOD 30 MIN: CPT | Mod: GC,25

## 2023-10-27 PROCEDURE — G0008: CPT

## 2023-10-27 RX ORDER — DULAGLUTIDE 0.75 MG/.5ML
0.75 INJECTION, SOLUTION SUBCUTANEOUS
Qty: 12 | Refills: 3 | Status: DISCONTINUED | COMMUNITY
Start: 2022-04-26 | End: 2023-10-27

## 2023-10-27 RX ORDER — DULAGLUTIDE 1.5 MG/.5ML
1.5 INJECTION, SOLUTION SUBCUTANEOUS
Qty: 12 | Refills: 3 | Status: ACTIVE | COMMUNITY
Start: 2023-10-27 | End: 1900-01-01

## 2023-11-06 ENCOUNTER — APPOINTMENT (OUTPATIENT)
Dept: ORTHOPEDIC SURGERY | Facility: CLINIC | Age: 53
End: 2023-11-06
Payer: SELF-PAY

## 2023-11-06 VITALS
WEIGHT: 139 LBS | SYSTOLIC BLOOD PRESSURE: 147 MMHG | DIASTOLIC BLOOD PRESSURE: 81 MMHG | OXYGEN SATURATION: 98 % | TEMPERATURE: 97.2 F | BODY MASS INDEX: 23.16 KG/M2 | HEIGHT: 65 IN | HEART RATE: 75 BPM

## 2023-11-06 DIAGNOSIS — M75.02 ADHESIVE CAPSULITIS OF LEFT SHOULDER: ICD-10-CM

## 2023-11-06 DIAGNOSIS — M77.8 OTHER ENTHESOPATHIES, NOT ELSEWHERE CLASSIFIED: ICD-10-CM

## 2023-11-06 PROCEDURE — 20610 DRAIN/INJ JOINT/BURSA W/O US: CPT | Mod: LT

## 2023-11-06 PROCEDURE — 73030 X-RAY EXAM OF SHOULDER: CPT | Mod: LT

## 2023-11-06 PROCEDURE — 99204 OFFICE O/P NEW MOD 45 MIN: CPT | Mod: 25

## 2023-11-07 ENCOUNTER — APPOINTMENT (OUTPATIENT)
Dept: CARDIOLOGY | Facility: HOSPITAL | Age: 53
End: 2023-11-07

## 2023-11-07 ENCOUNTER — OUTPATIENT (OUTPATIENT)
Dept: OUTPATIENT SERVICES | Facility: HOSPITAL | Age: 53
LOS: 1 days | End: 2023-11-07
Payer: SELF-PAY

## 2023-11-07 VITALS — HEART RATE: 63 BPM | SYSTOLIC BLOOD PRESSURE: 110 MMHG | DIASTOLIC BLOOD PRESSURE: 69 MMHG

## 2023-11-07 DIAGNOSIS — I25.10 ATHEROSCLEROTIC HEART DISEASE OF NATIVE CORONARY ARTERY WITHOUT ANGINA PECTORIS: ICD-10-CM

## 2023-11-07 DIAGNOSIS — E11.9 TYPE 2 DIABETES MELLITUS WITHOUT COMPLICATIONS: ICD-10-CM

## 2023-11-07 DIAGNOSIS — Z95.5 PRESENCE OF CORONARY ANGIOPLASTY IMPLANT AND GRAFT: Chronic | ICD-10-CM

## 2023-11-07 DIAGNOSIS — R07.9 CHEST PAIN, UNSPECIFIED: ICD-10-CM

## 2023-11-07 PROCEDURE — G0463: CPT

## 2023-11-07 PROCEDURE — 93005 ELECTROCARDIOGRAM TRACING: CPT

## 2023-11-08 DIAGNOSIS — E11.9 TYPE 2 DIABETES MELLITUS WITHOUT COMPLICATIONS: ICD-10-CM

## 2023-11-08 DIAGNOSIS — Z23 ENCOUNTER FOR IMMUNIZATION: ICD-10-CM

## 2023-11-14 NOTE — DISCHARGE NOTE NURSING/CASE MANAGEMENT/SOCIAL WORK - NSDCPEPTCAREGIVEDUMATLIST _GEN_ALL_CORE
A MRI Lumbar Spine with anesthesia was requested by Haroon Johnson DO.    Patient: Heraclio Tyson  MRN #: [id number]  Age: 74 year old  : 1949  Date: 2023    Past Medical History:   Diagnosis Date   • Angioneurotic edema not elsewhere classified    • Bladder cancer (CMD)    • BPH (benign prostatic hypertrophy)    • Claustrophobia    • Degeneration of lumbar or lumbosacral intervertebral disc    • Diabetes mellitus (CMD)     checks blood sugars twice a day, fasting range close to 200   • Esophageal reflux    • Essential hypertension, benign    • Hyperlipidemia    • Hypertension    • Irritable bowel syndrome    • Low back pain    • Malignant neoplasm (CMD)     bladder   • Obstructive sleep apnea (adult) (pediatric) 2014   • SHAUNA (obstructive sleep apnea)    • Other and unspecified hyperlipidemia    • PMH of     Rheumatic Fever 2nd grade   • Prostatitis 10-15-14   • Sepsis 10-15-14   • Spinal stenosis    • Testicular pain    • Unspecified sleep apnea     cpap       Past Surgical History:   Procedure Laterality Date   • ------------other-------------      inflammation of lower vertebrae   • Bladder surgery      TURBT   • Colonoscopy  16   • Colonoscopy diagnostic  2006    Colonoscopy   • Colonoscopy w/ polypectomy  2005   • Colonoscopy w/ polypectomy  2019    Repeat in 5 years - Dr. Cai   • Esophagogastroduodenoscopy transoral flex w/bx single or mult  2005    EGD with Bx   • Knee scope,diagnostic      Knee Arthroscopy--multiple   • Left heart cath,percutaneous  2003    Cardiac Cath   • Transurethral resection of bladder tumor  12-28-15       ALLERGIES:   Allergen Reactions   • Hydromorph DIZZINESS, ANXIETY and NAUSEA   • Zithromax [Azithromycin Dihydrate] HIVES and RASH   • Benazepril SWELLING     Tongue Swelling- Seen in ER on 22   • Gabapentin Other (See Comments)     Excessive lower extremity and scrotal swelling   • Isidoro Cory [Methylprednisolone]       Swelling & hives.  Hallucinations    • Metformin Hcl DIARRHEA   • Penicillins RASH     Tolerated cefazolin 4/8/21       Abnormal Labs:     Special Instructions: (if any)    H&P within 30 days: 11/6/23 estuardo/ Joan Lara APNP     Procedure date: Monday, 12/4/23  Arrival time: 0630  Procedure time: 0800  NPO solids: MN  NPO liquids: MN         Diabetes

## 2023-11-16 ENCOUNTER — NON-APPOINTMENT (OUTPATIENT)
Age: 53
End: 2023-11-16

## 2023-11-20 ENCOUNTER — OUTPATIENT (OUTPATIENT)
Dept: OUTPATIENT SERVICES | Facility: HOSPITAL | Age: 53
LOS: 1 days | End: 2023-11-20
Payer: SELF-PAY

## 2023-11-20 ENCOUNTER — TRANSCRIPTION ENCOUNTER (OUTPATIENT)
Age: 53
End: 2023-11-20

## 2023-11-20 VITALS
WEIGHT: 138.89 LBS | DIASTOLIC BLOOD PRESSURE: 72 MMHG | RESPIRATION RATE: 15 BRPM | HEART RATE: 56 BPM | SYSTOLIC BLOOD PRESSURE: 145 MMHG | HEIGHT: 66 IN | OXYGEN SATURATION: 100 %

## 2023-11-20 VITALS
RESPIRATION RATE: 16 BRPM | DIASTOLIC BLOOD PRESSURE: 66 MMHG | HEART RATE: 65 BPM | SYSTOLIC BLOOD PRESSURE: 122 MMHG | OXYGEN SATURATION: 98 %

## 2023-11-20 DIAGNOSIS — Z95.5 PRESENCE OF CORONARY ANGIOPLASTY IMPLANT AND GRAFT: Chronic | ICD-10-CM

## 2023-11-20 DIAGNOSIS — Z95.1 PRESENCE OF AORTOCORONARY BYPASS GRAFT: Chronic | ICD-10-CM

## 2023-11-20 DIAGNOSIS — I25.10 ATHEROSCLEROTIC HEART DISEASE OF NATIVE CORONARY ARTERY WITHOUT ANGINA PECTORIS: ICD-10-CM

## 2023-11-20 LAB
ANION GAP SERPL CALC-SCNC: 10 MMOL/L — SIGNIFICANT CHANGE UP (ref 5–17)
ANION GAP SERPL CALC-SCNC: 10 MMOL/L — SIGNIFICANT CHANGE UP (ref 5–17)
BUN SERPL-MCNC: 18 MG/DL — SIGNIFICANT CHANGE UP (ref 7–23)
BUN SERPL-MCNC: 18 MG/DL — SIGNIFICANT CHANGE UP (ref 7–23)
CALCIUM SERPL-MCNC: 9.3 MG/DL — SIGNIFICANT CHANGE UP (ref 8.4–10.5)
CALCIUM SERPL-MCNC: 9.3 MG/DL — SIGNIFICANT CHANGE UP (ref 8.4–10.5)
CHLORIDE SERPL-SCNC: 104 MMOL/L — SIGNIFICANT CHANGE UP (ref 96–108)
CHLORIDE SERPL-SCNC: 104 MMOL/L — SIGNIFICANT CHANGE UP (ref 96–108)
CO2 SERPL-SCNC: 28 MMOL/L — SIGNIFICANT CHANGE UP (ref 22–31)
CO2 SERPL-SCNC: 28 MMOL/L — SIGNIFICANT CHANGE UP (ref 22–31)
CREAT SERPL-MCNC: 0.97 MG/DL — SIGNIFICANT CHANGE UP (ref 0.5–1.3)
CREAT SERPL-MCNC: 0.97 MG/DL — SIGNIFICANT CHANGE UP (ref 0.5–1.3)
EGFR: 92 ML/MIN/1.73M2 — SIGNIFICANT CHANGE UP
EGFR: 92 ML/MIN/1.73M2 — SIGNIFICANT CHANGE UP
GLUCOSE SERPL-MCNC: 93 MG/DL — SIGNIFICANT CHANGE UP (ref 70–99)
GLUCOSE SERPL-MCNC: 93 MG/DL — SIGNIFICANT CHANGE UP (ref 70–99)
HCT VFR BLD CALC: 38.2 % — LOW (ref 39–50)
HCT VFR BLD CALC: 38.2 % — LOW (ref 39–50)
HGB BLD-MCNC: 13.2 G/DL — SIGNIFICANT CHANGE UP (ref 13–17)
HGB BLD-MCNC: 13.2 G/DL — SIGNIFICANT CHANGE UP (ref 13–17)
MCHC RBC-ENTMCNC: 31.6 PG — SIGNIFICANT CHANGE UP (ref 27–34)
MCHC RBC-ENTMCNC: 31.6 PG — SIGNIFICANT CHANGE UP (ref 27–34)
MCHC RBC-ENTMCNC: 34.6 GM/DL — SIGNIFICANT CHANGE UP (ref 32–36)
MCHC RBC-ENTMCNC: 34.6 GM/DL — SIGNIFICANT CHANGE UP (ref 32–36)
MCV RBC AUTO: 91.4 FL — SIGNIFICANT CHANGE UP (ref 80–100)
MCV RBC AUTO: 91.4 FL — SIGNIFICANT CHANGE UP (ref 80–100)
NRBC # BLD: 0 /100 WBCS — SIGNIFICANT CHANGE UP (ref 0–0)
NRBC # BLD: 0 /100 WBCS — SIGNIFICANT CHANGE UP (ref 0–0)
PLATELET # BLD AUTO: 178 K/UL — SIGNIFICANT CHANGE UP (ref 150–400)
PLATELET # BLD AUTO: 178 K/UL — SIGNIFICANT CHANGE UP (ref 150–400)
POTASSIUM SERPL-MCNC: 3.4 MMOL/L — LOW (ref 3.5–5.3)
POTASSIUM SERPL-MCNC: 3.4 MMOL/L — LOW (ref 3.5–5.3)
POTASSIUM SERPL-SCNC: 3.4 MMOL/L — LOW (ref 3.5–5.3)
POTASSIUM SERPL-SCNC: 3.4 MMOL/L — LOW (ref 3.5–5.3)
RBC # BLD: 4.18 M/UL — LOW (ref 4.2–5.8)
RBC # BLD: 4.18 M/UL — LOW (ref 4.2–5.8)
RBC # FLD: 14.3 % — SIGNIFICANT CHANGE UP (ref 10.3–14.5)
RBC # FLD: 14.3 % — SIGNIFICANT CHANGE UP (ref 10.3–14.5)
SODIUM SERPL-SCNC: 142 MMOL/L — SIGNIFICANT CHANGE UP (ref 135–145)
SODIUM SERPL-SCNC: 142 MMOL/L — SIGNIFICANT CHANGE UP (ref 135–145)
WBC # BLD: 12.86 K/UL — HIGH (ref 3.8–10.5)
WBC # BLD: 12.86 K/UL — HIGH (ref 3.8–10.5)
WBC # FLD AUTO: 12.86 K/UL — HIGH (ref 3.8–10.5)
WBC # FLD AUTO: 12.86 K/UL — HIGH (ref 3.8–10.5)

## 2023-11-20 PROCEDURE — 93010 ELECTROCARDIOGRAM REPORT: CPT

## 2023-11-20 PROCEDURE — 80048 BASIC METABOLIC PNL TOTAL CA: CPT

## 2023-11-20 PROCEDURE — 93455 CORONARY ART/GRFT ANGIO S&I: CPT

## 2023-11-20 PROCEDURE — 93455 CORONARY ART/GRFT ANGIO S&I: CPT | Mod: 26

## 2023-11-20 PROCEDURE — C1769: CPT

## 2023-11-20 PROCEDURE — 99152 MOD SED SAME PHYS/QHP 5/>YRS: CPT

## 2023-11-20 PROCEDURE — 93005 ELECTROCARDIOGRAM TRACING: CPT

## 2023-11-20 PROCEDURE — 85027 COMPLETE CBC AUTOMATED: CPT

## 2023-11-20 PROCEDURE — C1887: CPT

## 2023-11-20 PROCEDURE — C1894: CPT

## 2023-11-20 PROCEDURE — 36415 COLL VENOUS BLD VENIPUNCTURE: CPT

## 2023-11-20 PROCEDURE — 82962 GLUCOSE BLOOD TEST: CPT

## 2023-11-20 RX ORDER — SODIUM CHLORIDE 9 MG/ML
250 INJECTION INTRAMUSCULAR; INTRAVENOUS; SUBCUTANEOUS ONCE
Refills: 0 | Status: COMPLETED | OUTPATIENT
Start: 2023-11-20 | End: 2023-11-20

## 2023-11-20 RX ORDER — INSULIN NPH HUM/REG INSULIN HM 70-30/ML
12 VIAL (ML) SUBCUTANEOUS
Qty: 0 | Refills: 0 | DISCHARGE

## 2023-11-20 RX ORDER — METFORMIN HYDROCHLORIDE 850 MG/1
1 TABLET ORAL
Qty: 0 | Refills: 0 | DISCHARGE

## 2023-11-20 RX ORDER — VALSARTAN 80 MG/1
1 TABLET ORAL
Refills: 0 | DISCHARGE

## 2023-11-20 RX ORDER — POTASSIUM BICARBONATE 978 MG/1
25 TABLET, EFFERVESCENT ORAL ONCE
Refills: 0 | Status: COMPLETED | OUTPATIENT
Start: 2023-11-20 | End: 2023-11-20

## 2023-11-20 RX ORDER — METOPROLOL TARTRATE 50 MG
1 TABLET ORAL
Qty: 0 | Refills: 0 | DISCHARGE

## 2023-11-20 RX ORDER — PRASUGREL 5 MG/1
1 TABLET, FILM COATED ORAL
Refills: 0 | DISCHARGE

## 2023-11-20 RX ORDER — SODIUM CHLORIDE 9 MG/ML
1000 INJECTION INTRAMUSCULAR; INTRAVENOUS; SUBCUTANEOUS
Refills: 0 | Status: DISCONTINUED | OUTPATIENT
Start: 2023-11-20 | End: 2023-12-04

## 2023-11-20 RX ADMIN — POTASSIUM BICARBONATE 25 MILLIEQUIVALENT(S): 978 TABLET, EFFERVESCENT ORAL at 08:51

## 2023-11-20 RX ADMIN — SODIUM CHLORIDE 500 MILLILITER(S): 9 INJECTION INTRAMUSCULAR; INTRAVENOUS; SUBCUTANEOUS at 08:12

## 2023-11-20 RX ADMIN — SODIUM CHLORIDE 75 MILLILITER(S): 9 INJECTION INTRAMUSCULAR; INTRAVENOUS; SUBCUTANEOUS at 08:12

## 2023-11-20 NOTE — ASU DISCHARGE PLAN (ADULT/PEDIATRIC) - CARE PROVIDER_API CALL
Sandy De Leon  Interventional Cardiology  [address] [place] [address], [address] [place] [id number]  Phone: [phone]  Fax: [phone]  Follow Up Time: Routine

## 2023-11-20 NOTE — H&P CARDIOLOGY - NSICDXPASTSURGICALHX_GEN_ALL_CORE_FT
PAST SURGICAL HISTORY:  History of coronary artery stent placement      PAST SURGICAL HISTORY:  History of coronary artery stent placement     S/P CABG x 2

## 2023-11-20 NOTE — ASU DISCHARGE PLAN (ADULT/PEDIATRIC) - ASU DC SPECIAL INSTRUCTIONSFT
Wound Care:   the day AFTER your procedure remove bandage GENTTLY, and clean using  mild soap and gentle warm, water stream, pat dry. leave OPEN to air. YOU MAY SHOWER   DO NOT apply lotions, creams, ointments, powder, parfumes to your incision site  DO NOT SOAK your site for 1 week ( no baths, no pools, no tubs, etc...)  Check  your groin and /or wirst daily.A small amount of bruising, and soarness are normal    ACTIVITY: for 24 hours   - DO NOT DRIVE  - DO NOT make any important decisions or sign legal documents   - DO NOT operate heavy machinaries   - you may resume sexual activity in 48 hours, unless otherwise instructed by your cardiologist     If your procedure was done through the WRIST: for the NEXT 3DAYS:  - avoid pushing, pulling, with that affected wrist   - avoid repeated movement of that hand and wrist ( eg: typing, hammering)  - DO NOT LIFT anything more than 5 lbs     If your procedure was done through the GROIN: for the NEXT 5 DAYS  - Limit climbing stairs, DO NOT soak in bathtub or pool  - no strenous activities, pushing, pulling, straining  - Do not lift anything 10lbs or heavier     MEDICATION:   take your medications as explained ( see discharge paperwork)   If you received a STENT, you will be taking antiplatelet medications to KEEP YOUR STENT OPEN ( eg: Aspirin, Plavix, Brilinta, Effient, etc).  Take as prescribed DO NOT STOP taking them without consulting with your cardiologist first.     Follow heart healthy diet reccomended by your doctor, , if you smoke STOP SMOKING ( may call [phone] for center of tobacco control if you need assistance)     CALL your doctor to make appointment in 2 WEEKS     ***CALL YOUR DOCTOR***  if you experience: fever, chills, body aches, or severe pain, swelling, redness, heat or yellow discharge at incision site  If you experience Bleeding or excruciating pain at the procedural site, sweliing ( golf ball size) at your procedural site  If you experience CHEST PAIN  If you experience extremity numbness, tingling, temperature change ( of your procedural site)   If you are unable to reach your doctor, you may contact:   -Cardiology Office at [facility] at [phone] or   - [facility] [phone] - [facility] [phone]

## 2023-11-20 NOTE — H&P CARDIOLOGY - HISTORY OF PRESENT ILLNESS
This is a 52 y/o bilingual Korean/English speaking male, with no known implantable devices, and PMH HTN, HLD, NSTEMI 11/16/19, CAD s/p  2V CABG (LIMA to LAD and reverse saphenous vein to LCx, 8/18/2020) and Left Main KHARI 5/4/22, DM2 (most recent A1C 9.1%),  presents with c/o intermittent left sided chest pain over the past 2-3 mos. He states that this happens once or twice a day, mostly at rest but occasionally with activity. Similar to chest pain he experienced prior to stent. Evaluated by Dr. De Leon and referred for  further evaluation.   < from: Cardiac Catheterization (05.04.22 @ 10:41) >  Conclusions:   Diagnostic cath showed severe native disease in the LM that was likely  the culprit for the patients chest pain. The graft to the    LCx system was occluded. A PCI was performed from the LM into the LCx.  The patient should continue DAPT for 6 months.  Recommendations:   Patient management should include aggressive medical therapy and an  exercise program    < end of copied text >

## 2023-11-20 NOTE — H&P CARDIOLOGY - NSICDXPASTMEDICALHX_GEN_ALL_CORE_FT
PAST MEDICAL HISTORY:  CAD (coronary artery disease)     Diabetes     HTN (hypertension)     MI (myocardial infarction)      PAST MEDICAL HISTORY:  CAD (coronary artery disease)     Diabetes     HLD (hyperlipidemia)     HTN (hypertension)     MI (myocardial infarction)

## 2023-11-20 NOTE — ASU DISCHARGE PLAN (ADULT/PEDIATRIC) - NS MD DC FALL RISK RISK
For information on Fall & Injury Prevention, visit: [email or web address] OR  [email or web address] OR  https://www.cdc.gov/steadi/patient.html

## 2023-11-22 ENCOUNTER — TRANSCRIPTION ENCOUNTER (OUTPATIENT)
Age: 53
End: 2023-11-22

## 2023-12-05 NOTE — HISTORY OF PRESENT ILLNESS
Detail Level: Zone [Patient Declined  Services] : - None: Patient declined  services [FreeTextEntry1] : Here for f/u of DM, HTN, lipids, CAD s/p CABG. [de-identified] : Doing all right.   Has had no lugo, sob, c/p; activity tolerance better since CAD intervention.  Has no polyuria, polydipsia.  Trying very hard to control. his DM better - having much less carbohydrate, walking.  Did not like his last A1C.  Today is much improved at 7.9.  Tolerating his atorva/clopidogrel/low dose lisinopril/70-30 humulin regimen/metformin/metoprolol.  \par \par Is having a cataract done L eye 6/27 - brings clearance form as well.

## 2023-12-12 ENCOUNTER — NON-APPOINTMENT (OUTPATIENT)
Age: 53
End: 2023-12-12

## 2023-12-12 ENCOUNTER — OUTPATIENT (OUTPATIENT)
Dept: OUTPATIENT SERVICES | Facility: HOSPITAL | Age: 53
LOS: 1 days | End: 2023-12-12
Payer: SELF-PAY

## 2023-12-12 ENCOUNTER — APPOINTMENT (OUTPATIENT)
Dept: CARDIOLOGY | Facility: HOSPITAL | Age: 53
End: 2023-12-12

## 2023-12-12 VITALS — DIASTOLIC BLOOD PRESSURE: 74 MMHG | SYSTOLIC BLOOD PRESSURE: 114 MMHG | HEART RATE: 72 BPM | OXYGEN SATURATION: 97 %

## 2023-12-12 DIAGNOSIS — Z95.1 PRESENCE OF AORTOCORONARY BYPASS GRAFT: ICD-10-CM

## 2023-12-12 DIAGNOSIS — I25.10 ATHEROSCLEROTIC HEART DISEASE OF NATIVE CORONARY ARTERY WITHOUT ANGINA PECTORIS: ICD-10-CM

## 2023-12-12 DIAGNOSIS — R07.9 CHEST PAIN, UNSPECIFIED: ICD-10-CM

## 2023-12-12 DIAGNOSIS — E11.9 TYPE 2 DIABETES MELLITUS WITHOUT COMPLICATIONS: ICD-10-CM

## 2023-12-12 DIAGNOSIS — Z86.79 PERSONAL HISTORY OF OTHER DISEASES OF THE CIRCULATORY SYSTEM: ICD-10-CM

## 2023-12-12 DIAGNOSIS — Z95.5 PRESENCE OF CORONARY ANGIOPLASTY IMPLANT AND GRAFT: Chronic | ICD-10-CM

## 2023-12-12 DIAGNOSIS — I25.10 ATHEROSCLEROTIC HEART DISEASE OF NATIVE CORONARY ARTERY W/OUT ANGINA PECTORIS: ICD-10-CM

## 2023-12-12 DIAGNOSIS — Z95.1 PRESENCE OF AORTOCORONARY BYPASS GRAFT: Chronic | ICD-10-CM

## 2023-12-12 PROBLEM — E78.5 HYPERLIPIDEMIA, UNSPECIFIED: Chronic | Status: ACTIVE | Noted: 2023-11-20

## 2023-12-12 PROCEDURE — 93005 ELECTROCARDIOGRAM TRACING: CPT

## 2023-12-12 PROCEDURE — G0463: CPT

## 2023-12-13 DIAGNOSIS — Z95.1 PRESENCE OF AORTOCORONARY BYPASS GRAFT: ICD-10-CM

## 2023-12-18 ENCOUNTER — APPOINTMENT (OUTPATIENT)
Dept: ORTHOPEDIC SURGERY | Facility: CLINIC | Age: 53
End: 2023-12-18

## 2023-12-18 ENCOUNTER — APPOINTMENT (OUTPATIENT)
Dept: INTERNAL MEDICINE | Facility: CLINIC | Age: 53
End: 2023-12-18

## 2024-01-09 ENCOUNTER — APPOINTMENT (OUTPATIENT)
Dept: OPHTHALMOLOGY | Facility: CLINIC | Age: 54
End: 2024-01-09
Payer: COMMERCIAL

## 2024-01-09 ENCOUNTER — NON-APPOINTMENT (OUTPATIENT)
Age: 54
End: 2024-01-09

## 2024-01-09 PROCEDURE — 92012 INTRM OPH EXAM EST PATIENT: CPT

## 2024-01-19 NOTE — ASU PREOP CHECKLIST - ISOLATION PRECAUTIONS
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

## 2024-01-29 ENCOUNTER — RX RENEWAL (OUTPATIENT)
Age: 54
End: 2024-01-29

## 2024-01-29 NOTE — REVIEW OF SYSTEMS
Male [Negative] : Heme/Lymph [Itching] : no itching [Mole Changes] : no mole changes [Nail Changes] : no nail changes [Hair Changes] : no hair changes [Skin Rash] : no skin rash [de-identified] : pain along sternotomy scar

## 2024-02-05 ENCOUNTER — OUTPATIENT (OUTPATIENT)
Dept: OUTPATIENT SERVICES | Facility: HOSPITAL | Age: 54
LOS: 1 days | End: 2024-02-05
Payer: SELF-PAY

## 2024-02-05 ENCOUNTER — APPOINTMENT (OUTPATIENT)
Dept: INTERNAL MEDICINE | Facility: CLINIC | Age: 54
End: 2024-02-05
Payer: COMMERCIAL

## 2024-02-05 VITALS
WEIGHT: 138 LBS | SYSTOLIC BLOOD PRESSURE: 130 MMHG | DIASTOLIC BLOOD PRESSURE: 80 MMHG | HEIGHT: 65 IN | BODY MASS INDEX: 22.99 KG/M2 | OXYGEN SATURATION: 98 % | HEART RATE: 75 BPM

## 2024-02-05 DIAGNOSIS — I10 ESSENTIAL (PRIMARY) HYPERTENSION: ICD-10-CM

## 2024-02-05 DIAGNOSIS — Z95.1 PRESENCE OF AORTOCORONARY BYPASS GRAFT: Chronic | ICD-10-CM

## 2024-02-05 DIAGNOSIS — Z95.1 PRESENCE OF AORTOCORONARY BYPASS GRAFT: ICD-10-CM

## 2024-02-05 DIAGNOSIS — R07.9 CHEST PAIN, UNSPECIFIED: ICD-10-CM

## 2024-02-05 DIAGNOSIS — H26.9 UNSPECIFIED CATARACT: ICD-10-CM

## 2024-02-05 DIAGNOSIS — E11.9 TYPE 2 DIABETES MELLITUS WITHOUT COMPLICATIONS: ICD-10-CM

## 2024-02-05 DIAGNOSIS — Z00.00 ENCOUNTER FOR GENERAL ADULT MEDICAL EXAMINATION W/OUT ABNORMAL FINDINGS: ICD-10-CM

## 2024-02-05 DIAGNOSIS — Z95.5 PRESENCE OF CORONARY ANGIOPLASTY IMPLANT AND GRAFT: Chronic | ICD-10-CM

## 2024-02-05 LAB — HBA1C MFR BLD HPLC: 8.3

## 2024-02-05 PROCEDURE — 82043 UR ALBUMIN QUANTITATIVE: CPT

## 2024-02-05 PROCEDURE — 83036 HEMOGLOBIN GLYCOSYLATED A1C: CPT

## 2024-02-05 PROCEDURE — 80053 COMPREHEN METABOLIC PANEL: CPT

## 2024-02-05 PROCEDURE — G0463: CPT

## 2024-02-05 PROCEDURE — 80061 LIPID PANEL: CPT

## 2024-02-05 PROCEDURE — 99213 OFFICE O/P EST LOW 20 MIN: CPT | Mod: GE

## 2024-02-05 PROCEDURE — 85025 COMPLETE CBC W/AUTO DIFF WBC: CPT

## 2024-02-05 NOTE — PHYSICAL EXAM
[No Acute Distress] : no acute distress [Well Nourished] : well nourished [Well Developed] : well developed [Well-Appearing] : well-appearing [Normal Sclera/Conjunctiva] : normal sclera/conjunctiva [EOMI] : extraocular movements intact [Normal Outer Ear/Nose] : the outer ears and nose were normal in appearance [Normal Oropharynx] : the oropharynx was normal [No Lymphadenopathy] : no lymphadenopathy [Supple] : supple [Thyroid Normal, No Nodules] : the thyroid was normal and there were no nodules present [No Respiratory Distress] : no respiratory distress  [No Accessory Muscle Use] : no accessory muscle use [Clear to Auscultation] : lungs were clear to auscultation bilaterally [Normal Rate] : normal rate  [Regular Rhythm] : with a regular rhythm [Normal S1, S2] : normal S1 and S2 [No Murmur] : no murmur heard [No Edema] : there was no peripheral edema [Soft] : abdomen soft [Non Tender] : non-tender [Non-distended] : non-distended [Normal Posterior Cervical Nodes] : no posterior cervical lymphadenopathy [Normal Anterior Cervical Nodes] : no anterior cervical lymphadenopathy [No Spinal Tenderness] : no spinal tenderness [No Joint Swelling] : no joint swelling [Grossly Normal Strength/Tone] : grossly normal strength/tone [No Rash] : no rash [Coordination Grossly Intact] : coordination grossly intact [No Focal Deficits] : no focal deficits [Normal Gait] : normal gait [Deep Tendon Reflexes (DTR)] : deep tendon reflexes were 2+ and symmetric [Normal Affect] : the affect was normal [Normal Insight/Judgement] : insight and judgment were intact

## 2024-02-06 LAB
ALBUMIN SERPL ELPH-MCNC: 4.7 G/DL
ALP BLD-CCNC: 79 U/L
ALT SERPL-CCNC: 25 U/L
ANION GAP SERPL CALC-SCNC: 13 MMOL/L
AST SERPL-CCNC: 24 U/L
BASOPHILS # BLD AUTO: 0.03 K/UL
BASOPHILS NFR BLD AUTO: 0.4 %
BILIRUB SERPL-MCNC: 0.6 MG/DL
BUN SERPL-MCNC: 16 MG/DL
CALCIUM SERPL-MCNC: 9.3 MG/DL
CHLORIDE SERPL-SCNC: 104 MMOL/L
CHOLEST SERPL-MCNC: 114 MG/DL
CO2 SERPL-SCNC: 25 MMOL/L
CREAT SERPL-MCNC: 0.91 MG/DL
CREAT SPEC-SCNC: 84 MG/DL
EGFR: 100 ML/MIN/1.73M2
EOSINOPHIL # BLD AUTO: 0.13 K/UL
EOSINOPHIL NFR BLD AUTO: 1.6 %
GLUCOSE SERPL-MCNC: 97 MG/DL
HCT VFR BLD CALC: 40.8 %
HDLC SERPL-MCNC: 59 MG/DL
HGB BLD-MCNC: 13.6 G/DL
IMM GRANULOCYTES NFR BLD AUTO: 0.1 %
LDLC SERPL CALC-MCNC: 42 MG/DL
LYMPHOCYTES # BLD AUTO: 2.99 K/UL
LYMPHOCYTES NFR BLD AUTO: 36.1 %
MAN DIFF?: NORMAL
MCHC RBC-ENTMCNC: 31.1 PG
MCHC RBC-ENTMCNC: 33.3 GM/DL
MCV RBC AUTO: 93.2 FL
MICROALBUMIN 24H UR DL<=1MG/L-MCNC: <1.2 MG/DL
MICROALBUMIN/CREAT 24H UR-RTO: NORMAL MG/G
MONOCYTES # BLD AUTO: 0.75 K/UL
MONOCYTES NFR BLD AUTO: 9 %
NEUTROPHILS # BLD AUTO: 4.38 K/UL
NEUTROPHILS NFR BLD AUTO: 52.8 %
NONHDLC SERPL-MCNC: 55 MG/DL
PLATELET # BLD AUTO: 250 K/UL
POTASSIUM SERPL-SCNC: 4.8 MMOL/L
PROT SERPL-MCNC: 7.6 G/DL
RBC # BLD: 4.38 M/UL
RBC # FLD: 14.8 %
SODIUM SERPL-SCNC: 142 MMOL/L
TRIGL SERPL-MCNC: 60 MG/DL
WBC # FLD AUTO: 8.29 K/UL

## 2024-02-11 PROBLEM — H26.9 CATARACT, UNSPECIFIED CATARACT TYPE, UNSPECIFIED LATERALITY: Status: ACTIVE | Noted: 2022-12-09

## 2024-02-11 NOTE — HISTORY OF PRESENT ILLNESS
[de-identified] : 53M PMH T2DM, HTN, HLD, NSTEMI 11/16/19 s/p LHC w/ KHARI, s/p CABG here for CPE  #cataracts -per pt, has hx of cataracts and has blurry vision with tearing and photsensitivity for last two years -was previously seen by optho and was scheduling for surgery but never went through w it  #T2DM -Takes trulicity 1.5/week, humulin 20 in the am 12 units pm, metformin 1000mg bid -sometimes misses evenign dose of humulin -A1c 9.1 in Oct, POC A1c today 8.3 -FSG fasting 110s -hypoglycemic events happened once 2 weeks ago, forgot to take food, improved after eating  #HTN -Takes valsartan 40mg qd  sleep: 6-7 hours no smoking alcohol 1x per month sexually active 1 partner

## 2024-02-11 NOTE — ASSESSMENT
[FreeTextEntry1] : 53M PMH T2DM, HTN, HLD, NSTEMI 11/16/19 s/p LHC w/ KHARI, s/p CABG here for CPE  #T2DM -c/w current regimen of trulicity 1.5/week, humulin 20 in the am 12 units pm, metformin 1000mg bid -reminded pt to take evening dose of humulin -discussed with patient that we can increase dose of trulicity, pt would like to try taking evening doses of humulin better before increasing trulicity -if a1c is the same or worse next visit, can increase trulicity  #HTN -c/w valsartan 40mg qd  #cataracts -optho referral given  #HCM -f/u CBC, CMP, lipid -flu given 2023 -[id number] 2022 -zoster second shot due 4/2023 -colonoscopy referral given, FIT test given  Case discussed with Dr. Mccoy RTC 3 months

## 2024-02-22 ENCOUNTER — NON-APPOINTMENT (OUTPATIENT)
Age: 54
End: 2024-02-22

## 2024-04-08 ENCOUNTER — NON-APPOINTMENT (OUTPATIENT)
Age: 54
End: 2024-04-08

## 2024-04-09 ENCOUNTER — NON-APPOINTMENT (OUTPATIENT)
Age: 54
End: 2024-04-09

## 2024-04-11 RX ORDER — DULAGLUTIDE 1.5 MG/.5ML
1.5 INJECTION, SOLUTION SUBCUTANEOUS DAILY
Qty: 30 | Refills: 2 | Status: ACTIVE | COMMUNITY
Start: 2023-10-27 | End: 1900-01-01

## 2024-04-15 ENCOUNTER — APPOINTMENT (OUTPATIENT)
Dept: INTERNAL MEDICINE | Facility: CLINIC | Age: 54
End: 2024-04-15
Payer: COMMERCIAL

## 2024-04-15 VITALS
OXYGEN SATURATION: 97 % | HEIGHT: 65 IN | WEIGHT: 140 LBS | SYSTOLIC BLOOD PRESSURE: 126 MMHG | BODY MASS INDEX: 23.32 KG/M2 | DIASTOLIC BLOOD PRESSURE: 70 MMHG | HEART RATE: 62 BPM

## 2024-04-15 LAB — HBA1C MFR BLD HPLC: 9.6

## 2024-04-15 PROCEDURE — 99213 OFFICE O/P EST LOW 20 MIN: CPT | Mod: GE,25

## 2024-04-15 RX ORDER — METFORMIN HYDROCHLORIDE 1000 MG/1
1000 TABLET, COATED ORAL
Qty: 180 | Refills: 1 | Status: ACTIVE | COMMUNITY
Start: 2017-07-17 | End: 1900-01-01

## 2024-04-15 RX ORDER — ASPIRIN ENTERIC COATED TABLETS 81 MG 81 MG/1
81 TABLET, DELAYED RELEASE ORAL DAILY
Qty: 90 | Refills: 3 | Status: ACTIVE | COMMUNITY
Start: 2019-11-26 | End: 1900-01-01

## 2024-04-15 RX ORDER — ATORVASTATIN CALCIUM 40 MG/1
40 TABLET, FILM COATED ORAL DAILY
Qty: 180 | Refills: 1 | Status: ACTIVE | COMMUNITY
Start: 2022-12-09 | End: 1900-01-01

## 2024-04-15 RX ORDER — METOPROLOL SUCCINATE 50 MG/1
50 TABLET, EXTENDED RELEASE ORAL DAILY
Qty: 90 | Refills: 3 | Status: ACTIVE | COMMUNITY
Start: 2020-12-22 | End: 1900-01-01

## 2024-04-15 RX ORDER — VALSARTAN 40 MG/1
40 TABLET, COATED ORAL
Qty: 90 | Refills: 0 | Status: ACTIVE | COMMUNITY
Start: 2022-12-09 | End: 1900-01-01

## 2024-04-15 RX ORDER — PRASUGREL 5 MG/1
5 TABLET, FILM COATED ORAL DAILY
Qty: 180 | Refills: 1 | Status: ACTIVE | COMMUNITY
Start: 2023-05-10 | End: 1900-01-01

## 2024-04-15 RX ORDER — INSULIN HUMAN 100 [IU]/ML
(70-30) 100 INJECTION, SUSPENSION SUBCUTANEOUS
Qty: 10 | Refills: 4 | Status: ACTIVE | COMMUNITY
Start: 2022-08-17 | End: 1900-01-01

## 2024-04-17 NOTE — PHYSICAL EXAM
[No Acute Distress] : no acute distress [Well Nourished] : well nourished [Well Developed] : well developed [Well-Appearing] : well-appearing [PERRL] : pupils equal round and reactive to light [Normal Sclera/Conjunctiva] : normal sclera/conjunctiva [EOMI] : extraocular movements intact [Normal Outer Ear/Nose] : the outer ears and nose were normal in appearance [Normal Oropharynx] : the oropharynx was normal [No JVD] : no jugular venous distention [No Lymphadenopathy] : no lymphadenopathy [Supple] : supple [Thyroid Normal, No Nodules] : the thyroid was normal and there were no nodules present [No Respiratory Distress] : no respiratory distress  [No Accessory Muscle Use] : no accessory muscle use [Normal Rate] : normal rate  [Clear to Auscultation] : lungs were clear to auscultation bilaterally [Regular Rhythm] : with a regular rhythm [Normal S1, S2] : normal S1 and S2 [No Murmur] : no murmur heard [No Carotid Bruits] : no carotid bruits [No Abdominal Bruit] : a ~M bruit was not heard ~T in the abdomen [No Varicosities] : no varicosities [Pedal Pulses Present] : the pedal pulses are present [No Edema] : there was no peripheral edema [No Palpable Aorta] : no palpable aorta [No Extremity Clubbing/Cyanosis] : no extremity clubbing/cyanosis [Soft] : abdomen soft [Non Tender] : non-tender [Non-distended] : non-distended [No Masses] : no abdominal mass palpated [No HSM] : no HSM [Normal Bowel Sounds] : normal bowel sounds [Normal Posterior Cervical Nodes] : no posterior cervical lymphadenopathy [No CVA Tenderness] : no CVA  tenderness [Normal Anterior Cervical Nodes] : no anterior cervical lymphadenopathy [No Spinal Tenderness] : no spinal tenderness [No Joint Swelling] : no joint swelling [Grossly Normal Strength/Tone] : grossly normal strength/tone [No Rash] : no rash [Coordination Grossly Intact] : coordination grossly intact [No Focal Deficits] : no focal deficits [Normal Gait] : normal gait [Normal Affect] : the affect was normal [Normal Insight/Judgement] : insight and judgment were intact

## 2024-04-18 NOTE — HISTORY OF PRESENT ILLNESS
[FreeTextEntry1] : DMT2 f/u [de-identified] : 54M hx HTN, HLD, DM2, NSTEMI (s/p stent x3 and CABG x2) presenting for follow-up.   Pt reports he has not been adherent to Trulicity x1 month. He states he remembered his humulin more often, but because of [employer] fasting and large meals he thinks his diet has been worse.  POC A1c worse today, 9.6%  Pt denies fevers/chills, HA, dizziness, cough, sore throat, rhinorrhea, CP, palpitations, SOB, abdominal pain, n/v, bowel/bladder changes, numbness/tingling, or fatigue. No other acute complaints reported at this time.

## 2024-04-18 NOTE — ASSESSMENT
[FreeTextEntry1] : 54M hx HTN, HLD, DM2, NSTEMI (s/p stent x3 and CABG x2) presenting for follow-up.   #T2DM  -obtain and restart trulicity 1.5/week, c/w humulin 20 in the am 12 units pm, metformin 1000mg bid  -advised to call us if he is having difficulty obtaining trulicity from his pharmacy  #HCM  -refilled all meds -completed shingrix series -provided additional FIT per patient request  Case d/w Dr. Akhtar RTO in 5 weeks

## 2024-05-03 ENCOUNTER — APPOINTMENT (OUTPATIENT)
Dept: OPHTHALMOLOGY | Facility: CLINIC | Age: 54
End: 2024-05-03

## 2024-05-20 ENCOUNTER — OUTPATIENT (OUTPATIENT)
Dept: OUTPATIENT SERVICES | Facility: HOSPITAL | Age: 54
LOS: 1 days | End: 2024-05-20
Payer: SELF-PAY

## 2024-05-20 ENCOUNTER — APPOINTMENT (OUTPATIENT)
Dept: INTERNAL MEDICINE | Facility: CLINIC | Age: 54
End: 2024-05-20
Payer: COMMERCIAL

## 2024-05-20 VITALS
SYSTOLIC BLOOD PRESSURE: 144 MMHG | HEIGHT: 65 IN | BODY MASS INDEX: 23.16 KG/M2 | OXYGEN SATURATION: 97 % | WEIGHT: 139 LBS | HEART RATE: 75 BPM | DIASTOLIC BLOOD PRESSURE: 78 MMHG

## 2024-05-20 VITALS — SYSTOLIC BLOOD PRESSURE: 130 MMHG | DIASTOLIC BLOOD PRESSURE: 70 MMHG

## 2024-05-20 DIAGNOSIS — E11.9 TYPE 2 DIABETES MELLITUS W/OUT COMPLICATIONS: ICD-10-CM

## 2024-05-20 DIAGNOSIS — I10 ESSENTIAL (PRIMARY) HYPERTENSION: ICD-10-CM

## 2024-05-20 PROCEDURE — 99213 OFFICE O/P EST LOW 20 MIN: CPT | Mod: GE

## 2024-05-20 PROCEDURE — G0463: CPT

## 2024-05-20 NOTE — INTERPRETER SERVICES
Referral from CANDIDA Estrada. Patient newly diagnosed with rectal and anal cancer.    He and spouse are concerned about finances as there was discussion patient may not be able to work.    Crissy requests SW call pt to introduce self, role, and advise she would follow up at clinic.    Phone call to patient who was still inpatient at time of call. Spoke to spouse at request of pt.    Introduced self, role, and advised SW would follow on outpatient basis. SW will watch for upcoming appt and meet with tp at that time.       [Patient Declined  Services] : - None: Patient declined  services [TWNoteComboBox1] : Marcella

## 2024-05-20 NOTE — ASSESSMENT
[FreeTextEntry1] : 54M PMH HTN, HLD, T2DM, NSTEMI s/p stents x3 and CABG x2) presenting for follow up.  #T2DM -A1c 9.6 in April 2024 -trulicity 1.5/week restarted (delivered to home from [facility]), and continued on humalin 20 am and 12 units pm, metformin 1000mg bid -takes trulicity on mondays, has been taking last 4-5 weeks -follow up in 2 months for a1c check -optho referral given   #HTN -c/w valsartan 40mg  #HCM -flu 2023 -prevnar 2022 -defer tdap shot to next visit  RTC 2 months Case discussed with Dr. Mccoy

## 2024-05-20 NOTE — HISTORY OF PRESENT ILLNESS
[de-identified] : 54M PMH HTN, HLD, T2DM, NSTEMI s/p stents x3 and CABG x2) presenting for follow up.  #T2DM -A1c 9.6 in April 2024 -trulicity 1.5/week restarted (delivered to home from [facility]), and continued on humalin 20 am and 12 units pm, metformin 1000mg bid -takes trulicity on mondays, has been taking last 4-5 weeks -fasting glucose 90s-110s -has been having trouble finding opto appts without insurance   #HTN -takes valsartan 40mg

## 2024-05-20 NOTE — PHYSICAL EXAM
[No Acute Distress] : no acute distress [Well Nourished] : well nourished [Well Developed] : well developed [Well-Appearing] : well-appearing [Normal Sclera/Conjunctiva] : normal sclera/conjunctiva [EOMI] : extraocular movements intact [Normal Outer Ear/Nose] : the outer ears and nose were normal in appearance [Normal Oropharynx] : the oropharynx was normal [No Lymphadenopathy] : no lymphadenopathy [Supple] : supple [No Respiratory Distress] : no respiratory distress  [No Accessory Muscle Use] : no accessory muscle use [Clear to Auscultation] : lungs were clear to auscultation bilaterally [Normal Rate] : normal rate  [Regular Rhythm] : with a regular rhythm [Normal S1, S2] : normal S1 and S2 [No Murmur] : no murmur heard [No Edema] : there was no peripheral edema [Soft] : abdomen soft [Non Tender] : non-tender [Non-distended] : non-distended [No Spinal Tenderness] : no spinal tenderness [No Joint Swelling] : no joint swelling [Grossly Normal Strength/Tone] : grossly normal strength/tone [No Rash] : no rash [No Focal Deficits] : no focal deficits [Normal Gait] : normal gait [Normal Affect] : the affect was normal [Normal Insight/Judgement] : insight and judgment were intact

## 2024-05-21 ENCOUNTER — NON-APPOINTMENT (OUTPATIENT)
Age: 54
End: 2024-05-21

## 2024-05-21 ENCOUNTER — APPOINTMENT (OUTPATIENT)
Dept: OPHTHALMOLOGY | Facility: CLINIC | Age: 54
End: 2024-05-21
Payer: COMMERCIAL

## 2024-05-21 PROCEDURE — 92014 COMPRE OPH EXAM EST PT 1/>: CPT

## 2024-05-28 DIAGNOSIS — E11.9 TYPE 2 DIABETES MELLITUS WITHOUT COMPLICATIONS: ICD-10-CM

## 2024-06-20 RX ORDER — OMEPRAZOLE 40 MG/1
40 CAPSULE, DELAYED RELEASE ORAL
Qty: 30 | Refills: 2 | Status: DISCONTINUED | COMMUNITY
Start: 2023-05-10 | End: 2024-06-20

## 2024-06-20 RX ORDER — PANTOPRAZOLE 40 MG/1
40 TABLET, DELAYED RELEASE ORAL DAILY
Qty: 30 | Refills: 0 | Status: ACTIVE | COMMUNITY
Start: 2024-06-20 | End: 1900-01-01

## 2024-07-22 ENCOUNTER — APPOINTMENT (OUTPATIENT)
Dept: INTERNAL MEDICINE | Facility: CLINIC | Age: 54
End: 2024-07-22
Payer: COMMERCIAL

## 2024-07-22 ENCOUNTER — MED ADMIN CHARGE (OUTPATIENT)
Age: 54
End: 2024-07-22

## 2024-07-22 VITALS
OXYGEN SATURATION: 98 % | BODY MASS INDEX: 22.99 KG/M2 | WEIGHT: 138 LBS | DIASTOLIC BLOOD PRESSURE: 76 MMHG | SYSTOLIC BLOOD PRESSURE: 126 MMHG | HEART RATE: 71 BPM | HEIGHT: 65 IN

## 2024-07-22 DIAGNOSIS — Z23 ENCOUNTER FOR IMMUNIZATION: ICD-10-CM

## 2024-07-22 PROCEDURE — 99213 OFFICE O/P EST LOW 20 MIN: CPT | Mod: 25,GE

## 2024-07-24 ENCOUNTER — NON-APPOINTMENT (OUTPATIENT)
Age: 54
End: 2024-07-24

## 2024-07-24 LAB
ESTIMATED AVERAGE GLUCOSE: 171 MG/DL
HBA1C MFR BLD HPLC: 7.6 %

## 2024-07-24 NOTE — HISTORY OF PRESENT ILLNESS
[FreeTextEntry1] : f/u DM  [de-identified] : 54M PMH HTN, HLD, DM2, NSTEMI s/p stents x3 and CABG p/f f/u re DM. Pt states that he has been complaint w/ his DM medications, metformin 1000 BID, trulicity 1.5 q/week and humalin 20U in AM and 12U in PM. He doesn't regularly check blood sugars at home but notes that his BG was 129 before breakfast today and 194 later in the day. Denies any hypoglycemic episodes. Endorses some dizziness due to blurry vision d/t b/l cataracts for which he has surgery planned September.   Denies fevers, chills, SOB, cough, chest pain, palpitations, abd pain, N/V, dysuria, hematuria, HA.

## 2024-07-30 ENCOUNTER — OUTPATIENT (OUTPATIENT)
Dept: OUTPATIENT SERVICES | Facility: HOSPITAL | Age: 54
LOS: 1 days | End: 2024-07-30
Payer: SELF-PAY

## 2024-07-30 ENCOUNTER — APPOINTMENT (OUTPATIENT)
Dept: CARDIOLOGY | Facility: HOSPITAL | Age: 54
End: 2024-07-30

## 2024-07-30 ENCOUNTER — NON-APPOINTMENT (OUTPATIENT)
Age: 54
End: 2024-07-30

## 2024-07-30 VITALS
HEART RATE: 70 BPM | BODY MASS INDEX: 22.96 KG/M2 | WEIGHT: 138 LBS | SYSTOLIC BLOOD PRESSURE: 124 MMHG | DIASTOLIC BLOOD PRESSURE: 77 MMHG | OXYGEN SATURATION: 98 %

## 2024-07-30 DIAGNOSIS — Z95.1 PRESENCE OF AORTOCORONARY BYPASS GRAFT: Chronic | ICD-10-CM

## 2024-07-30 DIAGNOSIS — I10 ESSENTIAL (PRIMARY) HYPERTENSION: ICD-10-CM

## 2024-07-30 DIAGNOSIS — Z95.1 PRESENCE OF AORTOCORONARY BYPASS GRAFT: ICD-10-CM

## 2024-07-30 DIAGNOSIS — E11.9 TYPE 2 DIABETES MELLITUS W/OUT COMPLICATIONS: ICD-10-CM

## 2024-07-30 DIAGNOSIS — R07.9 CHEST PAIN, UNSPECIFIED: ICD-10-CM

## 2024-07-30 DIAGNOSIS — I25.10 ATHEROSCLEROTIC HEART DISEASE OF NATIVE CORONARY ARTERY W/OUT ANGINA PECTORIS: ICD-10-CM

## 2024-07-30 DIAGNOSIS — I25.10 ATHEROSCLEROTIC HEART DISEASE OF NATIVE CORONARY ARTERY WITHOUT ANGINA PECTORIS: ICD-10-CM

## 2024-07-30 DIAGNOSIS — Z95.5 PRESENCE OF CORONARY ANGIOPLASTY IMPLANT AND GRAFT: Chronic | ICD-10-CM

## 2024-07-30 DIAGNOSIS — E11.9 TYPE 2 DIABETES MELLITUS WITHOUT COMPLICATIONS: ICD-10-CM

## 2024-07-30 DIAGNOSIS — E78.5 HYPERLIPIDEMIA, UNSPECIFIED: ICD-10-CM

## 2024-07-30 PROCEDURE — G0463: CPT

## 2024-07-30 PROCEDURE — 93005 ELECTROCARDIOGRAM TRACING: CPT

## 2024-07-30 NOTE — PHYSICAL EXAM
[Normal] : soft, non-tender, no masses/organomegaly, normal bowel sounds [Normal Gait] : normal gait [No Edema] : no edema [de-identified] : Midline sternotomy with hypertrophic scar

## 2024-07-30 NOTE — CARDIOLOGY SUMMARY
[de-identified] : 07/30/2024 / Sinus rhythm, repolarization abnormality [de-identified] : 04/12/2022 / Normal LVSF, normal RV function, mild MR [de-identified] : 11/20/2023 / Patent LM and LCx stents, patent LIMA to LAD, known occluded SVG to OM graft.

## 2024-07-30 NOTE — ASSESSMENT
[FreeTextEntry1] : Mr. Crocker is a 55 yo [place] man with a PMHx of poorly controlled DM2 (A1c 9.1%), HTN, HLD, NSTEMI 11/16/19 s/p LHC w/ KHARI to 60% pCX and 100% OM2, now s/p 2V CABG (LIMA to LAD and reverse saphenous vein to LCx) 8/18/2020, Left Main KHARI 5/4/22 here for follow up. He has been doing well, having rare and intermittent episodes of chest discomfort that are not cardiac in etiology. He is pending cataract surgery in September, for which he is currently optimized.  - Pre-operative risk assessment. He has a recent [facility] with stable CAD and patent stents and LIMA-LAD, has no history of heart failure and is euvolemic. He has no arrhythmia history and no obstructive valvular disease. He is currently optimized to proceed to his surgery without further testing. He is moderate-risk for a low-risk procedure. He should continue his aspirin, statin, and beta blocker through the surgery. He should hold prasugrel for 5 days prior to surgery and continue as soon as acceptable afterwards.   Recommendations: - Continue ASA, atorvastatin 40, metoprolol succinate 50 - Continue prasugrel 5mg BID, can hold for surgery - Should see dermatology for hypertrophic scar with itching - DM control per Internal Medicine - HTN currently well controlled - Return to clinic in 6 months for f/u  Discussed with Dr. Haley Norman MD Cardiology Fellow

## 2024-07-30 NOTE — REVIEW OF SYSTEMS
[Itching] : itching [Fever] : no fever [Chills] : no chills [SOB] : no shortness of breath [Dyspnea on exertion] : not dyspnea during exertion [Lower Ext Edema] : no extremity edema [Orthopnea] : no orthopnea [PND] : no PND [Syncope] : no syncope [Cough] : no cough

## 2024-07-30 NOTE — REASON FOR VISIT
[FreeTextEntry1] : Mr. Crocker is a 55 yo [facility] man with a PMHx of poorly controlled DM2 (A1c 9.1%), HTN, HLD, NSTEMI 11/16/19 s/p LHC w/ KHARI to 60% pCX and 100% OM2, now s/p 2V CABG (LIMA to LAD and reverse saphenous vein to LCx) 8/18/2020, Left Main KHARI 5/4/22 here for follow up of a recent complaint of chest pain, and underwent angiogram on 11/20/23 which showed patent LM and LCx stents, patent LIMA to LAD graft, and known occluded SVG to OM graft. No intervention was performed.  He has been feeling well since our last visit. He does report rare episodes of chest discomfort that is similar to that which led him to get his recent angiogram. He denies any particular time of day or activity that triggers it, and denies associated symptoms such as shortness of breath or radiation of the pain.   Denies fever, chills, cough, or other systemic symptoms. Denies orthopnea, LE edema, or palpitations. He has been seeing internal medicine for his glycemic control, who increased his Trulicity to three times per week. His A1c has improved from 9.6% to 7.6%. He reports stopping eating rice. Denies exercise but reports he does not get shortness of breath or chest pain on exertion. He reports itching of his sternotomy scar.  EKG performed today sinus rhythm with no ischemic changes.  He will be undergoing cataract surgery in September (9/5 and 9/20) for which his ophthalmologist is requesting cardiac evaluation today.

## 2024-07-31 DIAGNOSIS — I10 ESSENTIAL (PRIMARY) HYPERTENSION: ICD-10-CM

## 2024-07-31 DIAGNOSIS — E78.5 HYPERLIPIDEMIA, UNSPECIFIED: ICD-10-CM

## 2024-09-01 NOTE — H&P ADULT - HISTORY OF PRESENT ILLNESS
49M bilingual Frisian/english speaking, c hx DM2, pw acute onset chest pain.    Pt states he was eating dinner at around 7PM, when he had two types of chest pain. The first chest pain was a burning epigastric and substernal pain. The second chest pain was a more severe, aching pain, localized over his left lateral chest, with radiation down his left arm and to his back. At around 8pm, after his dinner, pt drank some tea, and then developed diaphoresis, palpitations, nausea, lightheadedness, mild SOB, and unbearable chest pain, which brought him to the hospital. The chest pain was worse with lying flat and better with leaning forward. In the ED, pt reports that he started to cry 2/2 his chest pain and discomfort. Pt states that his pain is significantly improved now, and he only has the mild epigastric pain. Pt has never had anything like this before. Pt's last exercise stress test was in August 2011, results in allscripts, which showed 11 mets, normal results. Father passed away of heart attack in his 70s.    VS: Tm 97.4, P 65, /94, R 20, 97% RA  In the ED, received , NS 1L, pepcid, toradol, morphine, NTG
alert

## 2024-10-04 ENCOUNTER — MED ADMIN CHARGE (OUTPATIENT)
Age: 54
End: 2024-10-04

## 2024-10-04 ENCOUNTER — APPOINTMENT (OUTPATIENT)
Dept: INTERNAL MEDICINE | Facility: CLINIC | Age: 54
End: 2024-10-04
Payer: COMMERCIAL

## 2024-10-04 ENCOUNTER — OUTPATIENT (OUTPATIENT)
Dept: OUTPATIENT SERVICES | Facility: HOSPITAL | Age: 54
LOS: 1 days | End: 2024-10-04
Payer: SELF-PAY

## 2024-10-04 VITALS
OXYGEN SATURATION: 98 % | DIASTOLIC BLOOD PRESSURE: 68 MMHG | BODY MASS INDEX: 24.49 KG/M2 | HEART RATE: 67 BPM | SYSTOLIC BLOOD PRESSURE: 110 MMHG | WEIGHT: 147 LBS | HEIGHT: 65 IN

## 2024-10-04 DIAGNOSIS — Z95.5 PRESENCE OF CORONARY ANGIOPLASTY IMPLANT AND GRAFT: Chronic | ICD-10-CM

## 2024-10-04 DIAGNOSIS — Z23 ENCOUNTER FOR IMMUNIZATION: ICD-10-CM

## 2024-10-04 DIAGNOSIS — E11.9 TYPE 2 DIABETES MELLITUS W/OUT COMPLICATIONS: ICD-10-CM

## 2024-10-04 DIAGNOSIS — Z95.1 PRESENCE OF AORTOCORONARY BYPASS GRAFT: Chronic | ICD-10-CM

## 2024-10-04 DIAGNOSIS — I10 ESSENTIAL (PRIMARY) HYPERTENSION: ICD-10-CM

## 2024-10-04 LAB — HBA1C MFR BLD HPLC: 8

## 2024-10-04 PROCEDURE — 90656 IIV3 VACC NO PRSV 0.5 ML IM: CPT

## 2024-10-04 PROCEDURE — G0463: CPT

## 2024-10-04 PROCEDURE — 83036 HEMOGLOBIN GLYCOSYLATED A1C: CPT

## 2024-10-04 PROCEDURE — 99213 OFFICE O/P EST LOW 20 MIN: CPT | Mod: GE

## 2024-10-04 PROCEDURE — G0008: CPT

## 2024-10-04 RX ORDER — DAPAGLIFLOZIN 5 MG/1
5 TABLET, FILM COATED ORAL DAILY
Qty: 90 | Refills: 3 | Status: ACTIVE | OUTPATIENT
Start: 2024-10-04

## 2024-10-04 RX ORDER — DULAGLUTIDE 4.5 MG/.5ML
4.5 INJECTION, SOLUTION SUBCUTANEOUS WEEKLY
Qty: 5 | Refills: 3 | Status: ACTIVE | COMMUNITY
Start: 2024-10-04 | End: 1900-01-01

## 2024-10-04 RX ORDER — DAPAGLIFLOZIN 5 MG/1
5 TABLET, FILM COATED ORAL DAILY
Qty: 90 | Refills: 3 | Status: ACTIVE | COMMUNITY
Start: 2024-10-04

## 2024-10-04 NOTE — HISTORY OF PRESENT ILLNESS
[FreeTextEntry1] : f/u [de-identified] : 54M PMH HTN, HLD, DM2, NSTEMI s/p stent x3 and CABG p/f f/u re: DM. Pt states that he has been compliant with his medications, metformin 1000 BID, humalin 20U AM and 12U PM and trulicity 3mg/week. Pt denies any hypoglycemic episodes or symptoms. Pt states that he irregularly checks his BG levels in the AM, generally around 110-120. Pt endorses loose tooth and tooth pain. Denies any recent fevers, chills, SOB, chest pain, abd pain, N/V, dysuria, hematuria

## 2024-10-04 NOTE — PHYSICAL EXAM
[No Acute Distress] : no acute distress [Well Nourished] : well nourished [Well Developed] : well developed [Well-Appearing] : well-appearing [Normal Sclera/Conjunctiva] : normal sclera/conjunctiva [PERRL] : pupils equal round and reactive to light [EOMI] : extraocular movements intact [Normal Outer Ear/Nose] : the outer ears and nose were normal in appearance [Normal Oropharynx] : the oropharynx was normal [No JVD] : no jugular venous distention [No Lymphadenopathy] : no lymphadenopathy [Supple] : supple [Thyroid Normal, No Nodules] : the thyroid was normal and there were no nodules present [No Respiratory Distress] : no respiratory distress  [No Accessory Muscle Use] : no accessory muscle use [Clear to Auscultation] : lungs were clear to auscultation bilaterally [Normal Rate] : normal rate  [Regular Rhythm] : with a regular rhythm [Normal S1, S2] : normal S1 and S2 [No Murmur] : no murmur heard [No Edema] : there was no peripheral edema [Soft] : abdomen soft [Non Tender] : non-tender [Non-distended] : non-distended [No Masses] : no abdominal mass palpated [No HSM] : no HSM [Normal Bowel Sounds] : normal bowel sounds [No CVA Tenderness] : no CVA  tenderness [No Spinal Tenderness] : no spinal tenderness [No Joint Swelling] : no joint swelling [Grossly Normal Strength/Tone] : grossly normal strength/tone [No Rash] : no rash [Coordination Grossly Intact] : coordination grossly intact [No Focal Deficits] : no focal deficits [Normal Affect] : the affect was normal [Normal Insight/Judgement] : insight and judgment were intact

## 2024-10-14 DIAGNOSIS — Z23 ENCOUNTER FOR IMMUNIZATION: ICD-10-CM

## 2024-10-14 DIAGNOSIS — E11.9 TYPE 2 DIABETES MELLITUS WITHOUT COMPLICATIONS: ICD-10-CM

## 2024-11-30 NOTE — PRE-OP CHECKLIST - BSA (M2)
PTA per pt  -Intake: good PO intake up until x1 week ago when nausea/vomiting started   -Chewing/Swallowing: denies difficulty   -Allergies/Intolerances: NKFA
1.66

## 2025-01-07 NOTE — PROGRESS NOTE ADULT - ASSESSMENT
Writer advised pt of results and sent antibiotic to preferred pharmacy.   48 y/o M w/h/o uncontrolled Type 2 DM with unknown comorbidities until now. Here with CP/unstable angina> s/p cardiac cath with  DESX2. Denies any CP/SOB. Reports tolerating POs with glycemic control variable depending on PO intake. Noted uncontrolled BG leveles whenever pt eats food from outside. Discussed with pt the need to stay on insulin therapy due to recent cardiac rivera and elevated A1C levels. Pt verbalized understanding and agrees with plan. However, he will need to go on mix insulin because he has no insurance.    Met with patient and reviewed the following:    -A1c LEVEL: Present and goal  -Blood glucose goals: 100s to 150s as out pt  -Glucose monitoring frequency: At least ac breakfast and ac dinner  -Hypoglycemia prevention,detection and treatment. Must eat 3 meals/day while on mix insulin  -Healthy eating and portion control. Reviewed food choices and clarified healthy carbs versus not so healthy carbs.  -Insulin(s) action, time of administration and side effects. Mix insulin  -Importance of follow up care. Pt to f/u at medicine clinic   Pt able to verbalized understanding and give teach back.  Has to learn how to prepare and inject insulin prior discharge. Pt has no glucose meter or any DM supplies at home.  Spent over 20 minutes providing face to face education.

## 2025-01-16 ENCOUNTER — APPOINTMENT (OUTPATIENT)
Age: 55
End: 2025-01-16

## 2025-02-18 ENCOUNTER — OUTPATIENT (OUTPATIENT)
Dept: OUTPATIENT SERVICES | Facility: HOSPITAL | Age: 55
LOS: 1 days | End: 2025-02-18
Payer: SELF-PAY

## 2025-02-18 ENCOUNTER — APPOINTMENT (OUTPATIENT)
Dept: INTERNAL MEDICINE | Facility: CLINIC | Age: 55
End: 2025-02-18
Payer: COMMERCIAL

## 2025-02-18 VITALS
BODY MASS INDEX: 23.99 KG/M2 | HEIGHT: 65 IN | SYSTOLIC BLOOD PRESSURE: 120 MMHG | WEIGHT: 144 LBS | OXYGEN SATURATION: 98 % | HEART RATE: 80 BPM | DIASTOLIC BLOOD PRESSURE: 72 MMHG

## 2025-02-18 DIAGNOSIS — E11.9 TYPE 2 DIABETES MELLITUS W/OUT COMPLICATIONS: ICD-10-CM

## 2025-02-18 DIAGNOSIS — Z95.1 PRESENCE OF AORTOCORONARY BYPASS GRAFT: Chronic | ICD-10-CM

## 2025-02-18 DIAGNOSIS — Z23 ENCOUNTER FOR IMMUNIZATION: ICD-10-CM

## 2025-02-18 DIAGNOSIS — Z95.5 PRESENCE OF CORONARY ANGIOPLASTY IMPLANT AND GRAFT: Chronic | ICD-10-CM

## 2025-02-18 DIAGNOSIS — I10 ESSENTIAL (PRIMARY) HYPERTENSION: ICD-10-CM

## 2025-02-18 LAB — HBA1C MFR BLD HPLC: 7.5

## 2025-02-18 PROCEDURE — 83036 HEMOGLOBIN GLYCOSYLATED A1C: CPT

## 2025-02-18 PROCEDURE — G0463: CPT

## 2025-02-18 PROCEDURE — 99213 OFFICE O/P EST LOW 20 MIN: CPT | Mod: GE

## 2025-02-18 RX ORDER — DAPAGLIFLOZIN 10 MG/1
10 TABLET, FILM COATED ORAL DAILY
Qty: 90 | Refills: 3 | Status: ACTIVE | COMMUNITY
Start: 2025-02-18 | End: 1900-01-01

## 2025-02-25 DIAGNOSIS — E11.9 TYPE 2 DIABETES MELLITUS WITHOUT COMPLICATIONS: ICD-10-CM

## 2025-03-11 ENCOUNTER — RX RENEWAL (OUTPATIENT)
Age: 55
End: 2025-03-11

## 2025-03-18 NOTE — ED ADULT NURSE NOTE - NSSEPSISSUSPECTED_ED_A_ED
[de-identified] : 3 views of the right foot today in the office reveal good overall alignment. No fracture or dislocation. No lesions noted. Sesamoids is good position. No fracture.  No

## 2025-03-21 ENCOUNTER — APPOINTMENT (OUTPATIENT)
Age: 55
End: 2025-03-21
Payer: COMMERCIAL

## 2025-03-21 PROCEDURE — D7140: CPT

## 2025-03-21 PROCEDURE — D0220: CPT

## 2025-04-15 ENCOUNTER — APPOINTMENT (OUTPATIENT)
Dept: CARDIOLOGY | Facility: HOSPITAL | Age: 55
End: 2025-04-15

## 2025-04-15 ENCOUNTER — NON-APPOINTMENT (OUTPATIENT)
Age: 55
End: 2025-04-15

## 2025-04-15 ENCOUNTER — OUTPATIENT (OUTPATIENT)
Dept: OUTPATIENT SERVICES | Facility: HOSPITAL | Age: 55
LOS: 1 days | End: 2025-04-15
Payer: SELF-PAY

## 2025-04-15 VITALS
OXYGEN SATURATION: 98 % | DIASTOLIC BLOOD PRESSURE: 74 MMHG | WEIGHT: 144 LBS | HEART RATE: 88 BPM | BODY MASS INDEX: 23.96 KG/M2 | SYSTOLIC BLOOD PRESSURE: 112 MMHG

## 2025-04-15 DIAGNOSIS — Z95.1 PRESENCE OF AORTOCORONARY BYPASS GRAFT: ICD-10-CM

## 2025-04-15 DIAGNOSIS — I73.9 PERIPHERAL VASCULAR DISEASE, UNSPECIFIED: ICD-10-CM

## 2025-04-15 DIAGNOSIS — E78.5 HYPERLIPIDEMIA, UNSPECIFIED: ICD-10-CM

## 2025-04-15 DIAGNOSIS — Z95.5 PRESENCE OF CORONARY ANGIOPLASTY IMPLANT AND GRAFT: Chronic | ICD-10-CM

## 2025-04-15 DIAGNOSIS — E11.9 TYPE 2 DIABETES MELLITUS W/OUT COMPLICATIONS: ICD-10-CM

## 2025-04-15 DIAGNOSIS — I25.10 ATHEROSCLEROTIC HEART DISEASE OF NATIVE CORONARY ARTERY W/OUT ANGINA PECTORIS: ICD-10-CM

## 2025-04-15 DIAGNOSIS — I10 ESSENTIAL (PRIMARY) HYPERTENSION: ICD-10-CM

## 2025-04-15 DIAGNOSIS — Z95.1 PRESENCE OF AORTOCORONARY BYPASS GRAFT: Chronic | ICD-10-CM

## 2025-04-15 DIAGNOSIS — I25.10 ATHEROSCLEROTIC HEART DISEASE OF NATIVE CORONARY ARTERY WITHOUT ANGINA PECTORIS: ICD-10-CM

## 2025-04-15 PROCEDURE — G0463: CPT

## 2025-04-15 PROCEDURE — 93005 ELECTROCARDIOGRAM TRACING: CPT

## 2025-04-16 DIAGNOSIS — I73.9 PERIPHERAL VASCULAR DISEASE, UNSPECIFIED: ICD-10-CM

## 2025-04-16 DIAGNOSIS — E11.9 TYPE 2 DIABETES MELLITUS WITHOUT COMPLICATIONS: ICD-10-CM

## 2025-04-16 DIAGNOSIS — E78.5 HYPERLIPIDEMIA, UNSPECIFIED: ICD-10-CM

## 2025-04-16 DIAGNOSIS — I10 ESSENTIAL (PRIMARY) HYPERTENSION: ICD-10-CM

## 2025-04-16 DIAGNOSIS — Z95.1 PRESENCE OF AORTOCORONARY BYPASS GRAFT: ICD-10-CM

## 2025-05-19 ENCOUNTER — OUTPATIENT (OUTPATIENT)
Dept: OUTPATIENT SERVICES | Facility: HOSPITAL | Age: 55
LOS: 1 days | End: 2025-05-19
Payer: SELF-PAY

## 2025-05-19 ENCOUNTER — APPOINTMENT (OUTPATIENT)
Dept: INTERNAL MEDICINE | Facility: CLINIC | Age: 55
End: 2025-05-19
Payer: COMMERCIAL

## 2025-05-19 VITALS
OXYGEN SATURATION: 98 % | HEART RATE: 71 BPM | HEIGHT: 65 IN | SYSTOLIC BLOOD PRESSURE: 102 MMHG | WEIGHT: 136 LBS | BODY MASS INDEX: 22.66 KG/M2 | DIASTOLIC BLOOD PRESSURE: 60 MMHG

## 2025-05-19 DIAGNOSIS — I10 ESSENTIAL (PRIMARY) HYPERTENSION: ICD-10-CM

## 2025-05-19 DIAGNOSIS — F43.20 ADJUSTMENT DISORDER, UNSPECIFIED: ICD-10-CM

## 2025-05-19 DIAGNOSIS — E78.5 HYPERLIPIDEMIA, UNSPECIFIED: ICD-10-CM

## 2025-05-19 DIAGNOSIS — M79.18 MYALGIA, OTHER SITE: ICD-10-CM

## 2025-05-19 DIAGNOSIS — Z95.5 PRESENCE OF CORONARY ANGIOPLASTY IMPLANT AND GRAFT: Chronic | ICD-10-CM

## 2025-05-19 DIAGNOSIS — I25.10 ATHEROSCLEROTIC HEART DISEASE OF NATIVE CORONARY ARTERY W/OUT ANGINA PECTORIS: ICD-10-CM

## 2025-05-19 DIAGNOSIS — Z95.1 PRESENCE OF AORTOCORONARY BYPASS GRAFT: Chronic | ICD-10-CM

## 2025-05-19 DIAGNOSIS — Z00.00 ENCOUNTER FOR GENERAL ADULT MEDICAL EXAMINATION W/OUT ABNORMAL FINDINGS: ICD-10-CM

## 2025-05-19 DIAGNOSIS — E11.9 TYPE 2 DIABETES MELLITUS W/OUT COMPLICATIONS: ICD-10-CM

## 2025-05-19 LAB — HBA1C MFR BLD HPLC: 8.5

## 2025-05-19 PROCEDURE — G0444 DEPRESSION SCREEN ANNUAL: CPT | Mod: 59

## 2025-05-19 PROCEDURE — 99213 OFFICE O/P EST LOW 20 MIN: CPT | Mod: GE

## 2025-05-19 RX ORDER — DICLOFENAC SODIUM 10 MG/G
1 GEL TOPICAL
Qty: 100 | Refills: 0 | Status: ACTIVE | COMMUNITY
Start: 2025-05-19 | End: 1900-01-01

## 2025-05-20 PROBLEM — F43.20 ADJUSTMENT DISORDER: Status: ACTIVE | Noted: 2025-05-20

## 2025-05-20 PROBLEM — M79.18 MUSCULOSKELETAL PAIN: Status: ACTIVE | Noted: 2025-05-20

## 2025-05-21 LAB
ALBUMIN SERPL ELPH-MCNC: 4.3 G/DL
ALP BLD-CCNC: 82 U/L
ALT SERPL-CCNC: 33 U/L
ANION GAP SERPL CALC-SCNC: 13 MMOL/L
APO LP(A) SERPL-MCNC: 50.2 NMOL/L
AST SERPL-CCNC: 23 U/L
BILIRUB SERPL-MCNC: 0.4 MG/DL
BUN SERPL-MCNC: 17 MG/DL
CALCIUM SERPL-MCNC: 9.3 MG/DL
CHLORIDE SERPL-SCNC: 101 MMOL/L
CHOLEST SERPL-MCNC: 138 MG/DL
CO2 SERPL-SCNC: 24 MMOL/L
CREAT SERPL-MCNC: 0.8 MG/DL
EGFRCR SERPLBLD CKD-EPI 2021: 103 ML/MIN/1.73M2
GLUCOSE SERPL-MCNC: 166 MG/DL
HCT VFR BLD CALC: 39.5 %
HDLC SERPL-MCNC: 72 MG/DL
HGB BLD-MCNC: 13.3 G/DL
LDLC SERPL-MCNC: 40 MG/DL
MCHC RBC-ENTMCNC: 29.2 PG
MCHC RBC-ENTMCNC: 33.7 G/DL
MCV RBC AUTO: 86.6 FL
NONHDLC SERPL-MCNC: 66 MG/DL
PLATELET # BLD AUTO: 194 K/UL
POTASSIUM SERPL-SCNC: 4.5 MMOL/L
PROT SERPL-MCNC: 7 G/DL
RBC # BLD: 4.56 M/UL
RBC # FLD: 16.5 %
SODIUM SERPL-SCNC: 138 MMOL/L
TRIGL SERPL-MCNC: 158 MG/DL
WBC # FLD AUTO: 13.98 K/UL

## 2025-05-28 DIAGNOSIS — I25.10 ATHEROSCLEROTIC HEART DISEASE OF NATIVE CORONARY ARTERY WITHOUT ANGINA PECTORIS: ICD-10-CM

## 2025-05-28 DIAGNOSIS — F43.20 ADJUSTMENT DISORDER, UNSPECIFIED: ICD-10-CM

## 2025-05-28 DIAGNOSIS — E11.9 TYPE 2 DIABETES MELLITUS WITHOUT COMPLICATIONS: ICD-10-CM

## 2025-05-28 DIAGNOSIS — E78.5 HYPERLIPIDEMIA, UNSPECIFIED: ICD-10-CM

## 2025-05-28 DIAGNOSIS — Z13.31 ENCOUNTER FOR SCREENING FOR DEPRESSION: ICD-10-CM

## 2025-05-28 DIAGNOSIS — M79.18 MYALGIA, OTHER SITE: ICD-10-CM

## 2025-06-05 PROCEDURE — 80053 COMPREHEN METABOLIC PANEL: CPT

## 2025-06-05 PROCEDURE — 80061 LIPID PANEL: CPT

## 2025-06-05 PROCEDURE — 83036 HEMOGLOBIN GLYCOSYLATED A1C: CPT

## 2025-06-05 PROCEDURE — 82274 ASSAY TEST FOR BLOOD FECAL: CPT

## 2025-06-05 PROCEDURE — 85027 COMPLETE CBC AUTOMATED: CPT

## 2025-06-05 PROCEDURE — G0463: CPT

## 2025-06-05 PROCEDURE — 83695 ASSAY OF LIPOPROTEIN(A): CPT

## 2025-06-20 ENCOUNTER — APPOINTMENT (OUTPATIENT)
Dept: ORTHOPEDIC SURGERY | Facility: CLINIC | Age: 55
End: 2025-06-20

## 2025-06-20 NOTE — ASU PATIENT PROFILE, ADULT - FALL HARM RISK - FACTORS NURSING JUDGEMENT
Detail Level: Generalized Detail Level: Simple Detail Level: Zone Detail Level: Detailed Prescription Strength Graduated Compression Stockings Recommendations: The patient was counseled that prescription strength graduated compression stockings should be worn for all waking hours. They will follow up with a venous specialist to monitor graduated compression stocking usage and their symptoms. No

## 2025-07-31 ENCOUNTER — APPOINTMENT (OUTPATIENT)
Dept: INTERNAL MEDICINE | Facility: CLINIC | Age: 55
End: 2025-07-31
Payer: COMMERCIAL

## 2025-07-31 ENCOUNTER — OUTPATIENT (OUTPATIENT)
Dept: OUTPATIENT SERVICES | Facility: HOSPITAL | Age: 55
LOS: 1 days | End: 2025-07-31
Payer: SELF-PAY

## 2025-07-31 VITALS
HEART RATE: 81 BPM | WEIGHT: 135 LBS | HEIGHT: 65 IN | DIASTOLIC BLOOD PRESSURE: 60 MMHG | OXYGEN SATURATION: 97 % | BODY MASS INDEX: 22.49 KG/M2 | SYSTOLIC BLOOD PRESSURE: 116 MMHG

## 2025-07-31 DIAGNOSIS — F43.20 ADJUSTMENT DISORDER, UNSPECIFIED: ICD-10-CM

## 2025-07-31 DIAGNOSIS — Z00.00 ENCOUNTER FOR GENERAL ADULT MEDICAL EXAMINATION W/OUT ABNORMAL FINDINGS: ICD-10-CM

## 2025-07-31 DIAGNOSIS — Z95.1 PRESENCE OF AORTOCORONARY BYPASS GRAFT: Chronic | ICD-10-CM

## 2025-07-31 DIAGNOSIS — E11.9 TYPE 2 DIABETES MELLITUS W/OUT COMPLICATIONS: ICD-10-CM

## 2025-07-31 DIAGNOSIS — M25.512 PAIN IN LEFT SHOULDER: ICD-10-CM

## 2025-07-31 DIAGNOSIS — G89.29 PAIN IN LEFT SHOULDER: ICD-10-CM

## 2025-07-31 DIAGNOSIS — I10 ESSENTIAL (PRIMARY) HYPERTENSION: ICD-10-CM

## 2025-07-31 DIAGNOSIS — Z95.5 PRESENCE OF CORONARY ANGIOPLASTY IMPLANT AND GRAFT: Chronic | ICD-10-CM

## 2025-07-31 DIAGNOSIS — I25.10 ATHEROSCLEROTIC HEART DISEASE OF NATIVE CORONARY ARTERY W/OUT ANGINA PECTORIS: ICD-10-CM

## 2025-07-31 LAB — HBA1C MFR BLD HPLC: 7.2

## 2025-07-31 PROCEDURE — G0463: CPT

## 2025-07-31 PROCEDURE — 83036 HEMOGLOBIN GLYCOSYLATED A1C: CPT

## 2025-07-31 PROCEDURE — 85027 COMPLETE CBC AUTOMATED: CPT

## 2025-07-31 PROCEDURE — 99213 OFFICE O/P EST LOW 20 MIN: CPT | Mod: GE

## 2025-07-31 RX ORDER — BLOOD SUGAR DIAGNOSTIC
STRIP MISCELLANEOUS TWICE DAILY
Qty: 90 | Refills: 5 | Status: ACTIVE | COMMUNITY
Start: 2025-07-31 | End: 1900-01-01

## 2025-07-31 RX ORDER — PEN NEEDLE, DIABETIC 32GX 5/32"
32G X 4 MM NEEDLE, DISPOSABLE MISCELLANEOUS
Qty: 90 | Refills: 3 | Status: ACTIVE | COMMUNITY
Start: 2025-07-31 | End: 1900-01-01

## 2025-07-31 RX ORDER — ISOPROPYL ALCOHOL 70 ML/100ML
SWAB TOPICAL
Qty: 600 | Refills: 1 | Status: ACTIVE | COMMUNITY
Start: 2025-07-31 | End: 1900-01-01

## 2025-07-31 RX ORDER — LANCETS
EACH MISCELLANEOUS
Qty: 90 | Refills: 5 | Status: ACTIVE | COMMUNITY
Start: 2025-07-31 | End: 1900-01-01

## 2025-08-01 ENCOUNTER — OUTPATIENT (OUTPATIENT)
Dept: OUTPATIENT SERVICES | Facility: HOSPITAL | Age: 55
LOS: 1 days | End: 2025-08-01
Payer: COMMERCIAL

## 2025-08-01 ENCOUNTER — APPOINTMENT (OUTPATIENT)
Dept: RADIOLOGY | Facility: HOSPITAL | Age: 55
End: 2025-08-01
Payer: COMMERCIAL

## 2025-08-01 ENCOUNTER — NON-APPOINTMENT (OUTPATIENT)
Age: 55
End: 2025-08-01

## 2025-08-01 DIAGNOSIS — M54.51 VERTEBROGENIC LOW BACK PAIN: ICD-10-CM

## 2025-08-01 DIAGNOSIS — Z95.5 PRESENCE OF CORONARY ANGIOPLASTY IMPLANT AND GRAFT: Chronic | ICD-10-CM

## 2025-08-01 DIAGNOSIS — Z95.1 PRESENCE OF AORTOCORONARY BYPASS GRAFT: Chronic | ICD-10-CM

## 2025-08-01 LAB
HCT VFR BLD CALC: 37.7 %
HGB BLD-MCNC: 12.6 G/DL
MCHC RBC-ENTMCNC: 29.9 PG
MCHC RBC-ENTMCNC: 33.4 G/DL
MCV RBC AUTO: 89.3 FL
PLATELET # BLD AUTO: 246 K/UL
RBC # BLD: 4.22 M/UL
RBC # FLD: 15.9 %
WBC # FLD AUTO: 9.21 K/UL

## 2025-08-01 PROCEDURE — 72110 X-RAY EXAM L-2 SPINE 4/>VWS: CPT

## 2025-08-01 PROCEDURE — 72110 X-RAY EXAM L-2 SPINE 4/>VWS: CPT | Mod: 26

## 2025-08-05 DIAGNOSIS — F43.20 ADJUSTMENT DISORDER, UNSPECIFIED: ICD-10-CM

## 2025-08-05 DIAGNOSIS — G89.29 OTHER CHRONIC PAIN: ICD-10-CM

## 2025-08-05 DIAGNOSIS — E11.9 TYPE 2 DIABETES MELLITUS WITHOUT COMPLICATIONS: ICD-10-CM

## 2025-08-05 DIAGNOSIS — M25.512 PAIN IN LEFT SHOULDER: ICD-10-CM

## 2025-08-05 DIAGNOSIS — I25.10 ATHEROSCLEROTIC HEART DISEASE OF NATIVE CORONARY ARTERY WITHOUT ANGINA PECTORIS: ICD-10-CM

## 2025-09-17 ENCOUNTER — RESULT CHARGE (OUTPATIENT)
Age: 55
End: 2025-09-17

## 2025-09-17 ENCOUNTER — APPOINTMENT (OUTPATIENT)
Dept: INTERNAL MEDICINE | Facility: CLINIC | Age: 55
End: 2025-09-17
Payer: COMMERCIAL

## 2025-09-17 ENCOUNTER — APPOINTMENT (OUTPATIENT)
Dept: CARDIOLOGY | Facility: HOSPITAL | Age: 55
End: 2025-09-17

## 2025-09-17 VITALS
DIASTOLIC BLOOD PRESSURE: 85 MMHG | HEART RATE: 71 BPM | OXYGEN SATURATION: 99 % | WEIGHT: 140 LBS | BODY MASS INDEX: 23.3 KG/M2 | SYSTOLIC BLOOD PRESSURE: 142 MMHG

## 2025-09-17 VITALS
BODY MASS INDEX: 23.32 KG/M2 | DIASTOLIC BLOOD PRESSURE: 74 MMHG | HEIGHT: 65 IN | WEIGHT: 140 LBS | SYSTOLIC BLOOD PRESSURE: 124 MMHG | OXYGEN SATURATION: 98 % | HEART RATE: 70 BPM

## 2025-09-17 DIAGNOSIS — Z01.818 ENCOUNTER FOR OTHER PREPROCEDURAL EXAMINATION: ICD-10-CM

## 2025-09-17 DIAGNOSIS — E11.9 TYPE 2 DIABETES MELLITUS W/OUT COMPLICATIONS: ICD-10-CM

## 2025-09-17 DIAGNOSIS — I25.10 ATHEROSCLEROTIC HEART DISEASE OF NATIVE CORONARY ARTERY W/OUT ANGINA PECTORIS: ICD-10-CM

## 2025-09-17 DIAGNOSIS — E78.5 HYPERLIPIDEMIA, UNSPECIFIED: ICD-10-CM

## 2025-09-17 DIAGNOSIS — R07.9 CHEST PAIN, UNSPECIFIED: ICD-10-CM

## 2025-09-17 DIAGNOSIS — I10 ESSENTIAL (PRIMARY) HYPERTENSION: ICD-10-CM

## 2025-09-17 LAB — GLUCOSE BLDC GLUCOMTR-MCNC: 137

## 2025-09-17 PROCEDURE — 99213 OFFICE O/P EST LOW 20 MIN: CPT | Mod: GE

## 2025-09-17 PROCEDURE — G2211 COMPLEX E/M VISIT ADD ON: CPT

## 2025-09-18 ENCOUNTER — NON-APPOINTMENT (OUTPATIENT)
Age: 55
End: 2025-09-18

## 2025-09-18 RX ORDER — INSULIN HUMAN 100 [IU]/ML
100 INJECTION, SUSPENSION SUBCUTANEOUS
Qty: 1 | Refills: 0 | Status: ACTIVE | COMMUNITY
Start: 2025-09-18 | End: 1900-01-01

## 2025-09-19 LAB
ALBUMIN SERPL ELPH-MCNC: 4.5 G/DL
ALP BLD-CCNC: 85 U/L
ALT SERPL-CCNC: 24 U/L
ANION GAP SERPL CALC-SCNC: 13 MMOL/L
APTT BLD: 28.4 SEC
AST SERPL-CCNC: 26 U/L
BILIRUB SERPL-MCNC: 0.7 MG/DL
BUN SERPL-MCNC: 18 MG/DL
CALCIUM SERPL-MCNC: 9.2 MG/DL
CHLORIDE SERPL-SCNC: 103 MMOL/L
CO2 SERPL-SCNC: 24 MMOL/L
CREAT SERPL-MCNC: 0.87 MG/DL
EGFRCR SERPLBLD CKD-EPI 2021: 101 ML/MIN/1.73M2
ESTIMATED AVERAGE GLUCOSE: 186 MG/DL
GLUCOSE SERPL-MCNC: 111 MG/DL
HBA1C MFR BLD HPLC: 8.1 %
HCT VFR BLD CALC: 37.8 %
HGB BLD-MCNC: 12.2 G/DL
INR PPP: 0.92 RATIO
MCHC RBC-ENTMCNC: 29.8 PG
MCHC RBC-ENTMCNC: 32.3 G/DL
MCV RBC AUTO: 92.2 FL
PLATELET # BLD AUTO: 269 K/UL
POTASSIUM SERPL-SCNC: 4.3 MMOL/L
PROT SERPL-MCNC: 7.5 G/DL
PT BLD: 10.7 SEC
RBC # BLD: 4.1 M/UL
RBC # FLD: 15.3 %
SODIUM SERPL-SCNC: 140 MMOL/L
WBC # FLD AUTO: 9.37 K/UL